# Patient Record
Sex: MALE | Race: WHITE | Employment: FULL TIME | URBAN - METROPOLITAN AREA
[De-identification: names, ages, dates, MRNs, and addresses within clinical notes are randomized per-mention and may not be internally consistent; named-entity substitution may affect disease eponyms.]

---

## 2018-09-25 ENCOUNTER — HOSPITAL ENCOUNTER (INPATIENT)
Age: 53
LOS: 2 days | Discharge: HOME | DRG: 247 | End: 2018-09-27
Attending: EMERGENCY MEDICINE | Admitting: INTERNAL MEDICINE
Payer: COMMERCIAL

## 2018-09-25 DIAGNOSIS — I21.09 ST ELEVATION MYOCARDIAL INFARCTION (STEMI) OF ANTERIOR WALL (HCC): Primary | ICD-10-CM

## 2018-09-25 DIAGNOSIS — R07.9 ACUTE CHEST PAIN: ICD-10-CM

## 2018-09-25 PROBLEM — F98.8 ADD (ATTENTION DEFICIT DISORDER): Status: ACTIVE | Noted: 2018-09-25

## 2018-09-25 PROBLEM — I21.3 STEMI (ST ELEVATION MYOCARDIAL INFARCTION) (HCC): Status: ACTIVE | Noted: 2018-09-25

## 2018-09-25 PROBLEM — I10 HTN (HYPERTENSION): Status: ACTIVE | Noted: 2018-09-25

## 2018-09-25 PROBLEM — Z72.0 TOBACCO ABUSE: Status: ACTIVE | Noted: 2018-09-25

## 2018-09-25 PROBLEM — Z95.5 S/P CORONARY ARTERY STENT PLACEMENT: Status: ACTIVE | Noted: 2018-09-25

## 2018-09-25 PROBLEM — E78.5 DYSLIPIDEMIA: Status: ACTIVE | Noted: 2018-09-25

## 2018-09-25 LAB
ALBUMIN SERPL-MCNC: 3.6 G/DL (ref 3.5–5)
ALBUMIN/GLOB SERPL: 0.8 {RATIO} (ref 1.1–2.2)
ALP SERPL-CCNC: 110 U/L (ref 45–117)
ALT SERPL-CCNC: 65 U/L (ref 12–78)
ANION GAP SERPL CALC-SCNC: 5 MMOL/L (ref 5–15)
AST SERPL-CCNC: 240 U/L (ref 15–37)
BASOPHILS # BLD: 0.1 K/UL (ref 0–0.1)
BASOPHILS NFR BLD: 0 % (ref 0–1)
BILIRUB SERPL-MCNC: 0.5 MG/DL (ref 0.2–1)
BUN SERPL-MCNC: 15 MG/DL (ref 6–20)
BUN/CREAT SERPL: 21 (ref 12–20)
CALCIUM SERPL-MCNC: 9.4 MG/DL (ref 8.5–10.1)
CHLORIDE SERPL-SCNC: 102 MMOL/L (ref 97–108)
CO2 SERPL-SCNC: 29 MMOL/L (ref 21–32)
COMMENT, HOLDF: NORMAL
CREAT SERPL-MCNC: 0.73 MG/DL (ref 0.7–1.3)
DIFFERENTIAL METHOD BLD: ABNORMAL
EOSINOPHIL # BLD: 0.1 K/UL (ref 0–0.4)
EOSINOPHIL NFR BLD: 1 % (ref 0–7)
ERYTHROCYTE [DISTWIDTH] IN BLOOD BY AUTOMATED COUNT: 13.3 % (ref 11.5–14.5)
GLOBULIN SER CALC-MCNC: 4.3 G/DL (ref 2–4)
GLUCOSE BLD STRIP.AUTO-MCNC: 128 MG/DL (ref 65–100)
GLUCOSE SERPL-MCNC: 115 MG/DL (ref 65–100)
HCT VFR BLD AUTO: 45.5 % (ref 36.6–50.3)
HGB BLD-MCNC: 15.4 G/DL (ref 12.1–17)
IMM GRANULOCYTES # BLD: 0.1 K/UL (ref 0–0.04)
IMM GRANULOCYTES NFR BLD AUTO: 0 % (ref 0–0.5)
LYMPHOCYTES # BLD: 2.5 K/UL (ref 0.8–3.5)
LYMPHOCYTES NFR BLD: 16 % (ref 12–49)
MCH RBC QN AUTO: 29.8 PG (ref 26–34)
MCHC RBC AUTO-ENTMCNC: 33.8 G/DL (ref 30–36.5)
MCV RBC AUTO: 88 FL (ref 80–99)
MONOCYTES # BLD: 1.3 K/UL (ref 0–1)
MONOCYTES NFR BLD: 8 % (ref 5–13)
NEUTS SEG # BLD: 12.1 K/UL (ref 1.8–8)
NEUTS SEG NFR BLD: 75 % (ref 32–75)
NRBC # BLD: 0 K/UL (ref 0–0.01)
NRBC BLD-RTO: 0 PER 100 WBC
PLATELET # BLD AUTO: 325 K/UL (ref 150–400)
PMV BLD AUTO: 10.1 FL (ref 8.9–12.9)
POTASSIUM SERPL-SCNC: 3.6 MMOL/L (ref 3.5–5.1)
PROT SERPL-MCNC: 7.9 G/DL (ref 6.4–8.2)
RBC # BLD AUTO: 5.17 M/UL (ref 4.1–5.7)
SAMPLES BEING HELD,HOLD: NORMAL
SERVICE CMNT-IMP: ABNORMAL
SODIUM SERPL-SCNC: 136 MMOL/L (ref 136–145)
TROPONIN I SERPL-MCNC: >100 NG/ML
WBC # BLD AUTO: 16.1 K/UL (ref 4.1–11.1)

## 2018-09-25 PROCEDURE — 74011636320 HC RX REV CODE- 636/320: Performed by: INTERNAL MEDICINE

## 2018-09-25 PROCEDURE — 74011250636 HC RX REV CODE- 250/636: Performed by: INTERNAL MEDICINE

## 2018-09-25 PROCEDURE — 74011250637 HC RX REV CODE- 250/637: Performed by: EMERGENCY MEDICINE

## 2018-09-25 PROCEDURE — B2111ZZ FLUOROSCOPY OF MULTIPLE CORONARY ARTERIES USING LOW OSMOLAR CONTRAST: ICD-10-PCS | Performed by: INTERNAL MEDICINE

## 2018-09-25 PROCEDURE — 027034Z DILATION OF CORONARY ARTERY, ONE ARTERY WITH DRUG-ELUTING INTRALUMINAL DEVICE, PERCUTANEOUS APPROACH: ICD-10-PCS | Performed by: INTERNAL MEDICINE

## 2018-09-25 PROCEDURE — 65660000000 HC RM CCU STEPDOWN

## 2018-09-25 PROCEDURE — 93005 ELECTROCARDIOGRAM TRACING: CPT

## 2018-09-25 PROCEDURE — C1887 CATHETER, GUIDING: HCPCS

## 2018-09-25 PROCEDURE — 84484 ASSAY OF TROPONIN QUANT: CPT | Performed by: NURSE PRACTITIONER

## 2018-09-25 PROCEDURE — 74011000250 HC RX REV CODE- 250: Performed by: INTERNAL MEDICINE

## 2018-09-25 PROCEDURE — C1874 STENT, COATED/COV W/DEL SYS: HCPCS

## 2018-09-25 PROCEDURE — C1725 CATH, TRANSLUMIN NON-LASER: HCPCS

## 2018-09-25 PROCEDURE — 74011636320 HC RX REV CODE- 636/320

## 2018-09-25 PROCEDURE — 80053 COMPREHEN METABOLIC PANEL: CPT | Performed by: EMERGENCY MEDICINE

## 2018-09-25 PROCEDURE — 77030018729 HC ELECTRD DEFIB PAD CARD -B

## 2018-09-25 PROCEDURE — 82962 GLUCOSE BLOOD TEST: CPT

## 2018-09-25 PROCEDURE — 74011250636 HC RX REV CODE- 250/636: Performed by: EMERGENCY MEDICINE

## 2018-09-25 PROCEDURE — 74011000258 HC RX REV CODE- 258: Performed by: INTERNAL MEDICINE

## 2018-09-25 PROCEDURE — C1894 INTRO/SHEATH, NON-LASER: HCPCS

## 2018-09-25 PROCEDURE — 74011250636 HC RX REV CODE- 250/636

## 2018-09-25 PROCEDURE — 85025 COMPLETE CBC W/AUTO DIFF WBC: CPT | Performed by: EMERGENCY MEDICINE

## 2018-09-25 PROCEDURE — 74011250637 HC RX REV CODE- 250/637

## 2018-09-25 PROCEDURE — 99284 EMERGENCY DEPT VISIT MOD MDM: CPT

## 2018-09-25 PROCEDURE — 77030028837 HC SYR ANGI PWR INJ COEU -A

## 2018-09-25 PROCEDURE — B2151ZZ FLUOROSCOPY OF LEFT HEART USING LOW OSMOLAR CONTRAST: ICD-10-PCS | Performed by: INTERNAL MEDICINE

## 2018-09-25 PROCEDURE — 74011250637 HC RX REV CODE- 250/637: Performed by: NURSE PRACTITIONER

## 2018-09-25 PROCEDURE — C1769 GUIDE WIRE: HCPCS

## 2018-09-25 PROCEDURE — 77030008543 HC TBNG MON PRSS MRTM -A

## 2018-09-25 PROCEDURE — 99153 MOD SED SAME PHYS/QHP EA: CPT

## 2018-09-25 PROCEDURE — 74011000250 HC RX REV CODE- 250

## 2018-09-25 PROCEDURE — 36415 COLL VENOUS BLD VENIPUNCTURE: CPT | Performed by: NURSE PRACTITIONER

## 2018-09-25 PROCEDURE — 77030015766

## 2018-09-25 PROCEDURE — 77030010221 HC SPLNT WR POS TELE -B

## 2018-09-25 PROCEDURE — 77030019698 HC SYR ANGI MDLON MRTM -A

## 2018-09-25 PROCEDURE — 4A023N7 MEASUREMENT OF CARDIAC SAMPLING AND PRESSURE, LEFT HEART, PERCUTANEOUS APPROACH: ICD-10-PCS | Performed by: INTERNAL MEDICINE

## 2018-09-25 PROCEDURE — 77030019569 HC BND COMPR RAD TERU -B

## 2018-09-25 PROCEDURE — 96374 THER/PROPH/DIAG INJ IV PUSH: CPT

## 2018-09-25 RX ORDER — PRAVASTATIN SODIUM 40 MG/1
40 TABLET ORAL
Status: DISCONTINUED | OUTPATIENT
Start: 2018-09-25 | End: 2018-09-27

## 2018-09-25 RX ORDER — SODIUM CHLORIDE 9 MG/ML
100 INJECTION, SOLUTION INTRAVENOUS CONTINUOUS
Status: DISCONTINUED | OUTPATIENT
Start: 2018-09-26 | End: 2018-09-26

## 2018-09-25 RX ORDER — SODIUM CHLORIDE 0.9 % (FLUSH) 0.9 %
5-10 SYRINGE (ML) INJECTION EVERY 8 HOURS
Status: DISCONTINUED | OUTPATIENT
Start: 2018-09-25 | End: 2018-09-27 | Stop reason: HOSPADM

## 2018-09-25 RX ORDER — MORPHINE SULFATE 4 MG/ML
1 INJECTION INTRAVENOUS
Status: DISCONTINUED | OUTPATIENT
Start: 2018-09-25 | End: 2018-09-27 | Stop reason: HOSPADM

## 2018-09-25 RX ORDER — DULOXETIN HYDROCHLORIDE 30 MG/1
60 CAPSULE, DELAYED RELEASE ORAL DAILY
Status: DISCONTINUED | OUTPATIENT
Start: 2018-09-26 | End: 2018-09-27 | Stop reason: HOSPADM

## 2018-09-25 RX ORDER — VERAPAMIL HYDROCHLORIDE 2.5 MG/ML
2.5 INJECTION, SOLUTION INTRAVENOUS ONCE
Status: COMPLETED | OUTPATIENT
Start: 2018-09-25 | End: 2018-09-25

## 2018-09-25 RX ORDER — ASPIRIN 81 MG/1
81 TABLET ORAL DAILY
Status: DISCONTINUED | OUTPATIENT
Start: 2018-09-26 | End: 2018-09-27 | Stop reason: HOSPADM

## 2018-09-25 RX ORDER — HEPARIN SODIUM 1000 [USP'U]/ML
INJECTION, SOLUTION INTRAVENOUS; SUBCUTANEOUS
Status: DISCONTINUED
Start: 2018-09-25 | End: 2018-09-27 | Stop reason: HOSPADM

## 2018-09-25 RX ORDER — HEPARIN SODIUM 1000 [USP'U]/ML
2500 INJECTION, SOLUTION INTRAVENOUS; SUBCUTANEOUS ONCE
Status: DISCONTINUED | OUTPATIENT
Start: 2018-09-25 | End: 2018-09-25 | Stop reason: HOSPADM

## 2018-09-25 RX ORDER — HEPARIN SODIUM 200 [USP'U]/100ML
500 INJECTION, SOLUTION INTRAVENOUS ONCE
Status: COMPLETED | OUTPATIENT
Start: 2018-09-25 | End: 2018-09-25

## 2018-09-25 RX ORDER — FENTANYL CITRATE 50 UG/ML
25-50 INJECTION, SOLUTION INTRAMUSCULAR; INTRAVENOUS
Status: DISCONTINUED | OUTPATIENT
Start: 2018-09-25 | End: 2018-09-25

## 2018-09-25 RX ORDER — BIVALIRUDIN 250 MG/5ML
INJECTION, POWDER, LYOPHILIZED, FOR SOLUTION INTRAVENOUS
Status: DISCONTINUED
Start: 2018-09-25 | End: 2018-09-27 | Stop reason: HOSPADM

## 2018-09-25 RX ORDER — VERAPAMIL HYDROCHLORIDE 2.5 MG/ML
INJECTION, SOLUTION INTRAVENOUS
Status: COMPLETED
Start: 2018-09-25 | End: 2018-09-25

## 2018-09-25 RX ORDER — MIDAZOLAM HYDROCHLORIDE 1 MG/ML
INJECTION, SOLUTION INTRAMUSCULAR; INTRAVENOUS
Status: COMPLETED
Start: 2018-09-25 | End: 2018-09-25

## 2018-09-25 RX ORDER — FENTANYL CITRATE 50 UG/ML
INJECTION, SOLUTION INTRAMUSCULAR; INTRAVENOUS
Status: COMPLETED
Start: 2018-09-25 | End: 2018-09-25

## 2018-09-25 RX ORDER — LIDOCAINE HYDROCHLORIDE 10 MG/ML
1-30 INJECTION, SOLUTION EPIDURAL; INFILTRATION; INTRACAUDAL; PERINEURAL
Status: DISCONTINUED | OUTPATIENT
Start: 2018-09-25 | End: 2018-09-25

## 2018-09-25 RX ORDER — METOPROLOL TARTRATE 25 MG/1
25 TABLET, FILM COATED ORAL EVERY 12 HOURS
Status: DISCONTINUED | OUTPATIENT
Start: 2018-09-25 | End: 2018-09-27 | Stop reason: HOSPADM

## 2018-09-25 RX ORDER — LIDOCAINE HYDROCHLORIDE 10 MG/ML
INJECTION, SOLUTION EPIDURAL; INFILTRATION; INTRACAUDAL; PERINEURAL
Status: COMPLETED
Start: 2018-09-25 | End: 2018-09-25

## 2018-09-25 RX ORDER — HEPARIN SODIUM 200 [USP'U]/100ML
INJECTION, SOLUTION INTRAVENOUS
Status: COMPLETED
Start: 2018-09-25 | End: 2018-09-25

## 2018-09-25 RX ORDER — SODIUM CHLORIDE 0.9 % (FLUSH) 0.9 %
5-10 SYRINGE (ML) INJECTION AS NEEDED
Status: DISCONTINUED | OUTPATIENT
Start: 2018-09-25 | End: 2018-09-27 | Stop reason: HOSPADM

## 2018-09-25 RX ORDER — GUAIFENESIN 100 MG/5ML
LIQUID (ML) ORAL
Status: DISCONTINUED
Start: 2018-09-25 | End: 2018-09-27 | Stop reason: HOSPADM

## 2018-09-25 RX ORDER — HEPARIN SODIUM 5000 [USP'U]/ML
4000 INJECTION, SOLUTION INTRAVENOUS; SUBCUTANEOUS
Status: COMPLETED | OUTPATIENT
Start: 2018-09-25 | End: 2018-09-25

## 2018-09-25 RX ORDER — SODIUM CHLORIDE 900 MG/100ML
INJECTION INTRAVENOUS
Status: DISCONTINUED
Start: 2018-09-25 | End: 2018-09-27 | Stop reason: HOSPADM

## 2018-09-25 RX ORDER — MIDAZOLAM HYDROCHLORIDE 1 MG/ML
.5-2 INJECTION, SOLUTION INTRAMUSCULAR; INTRAVENOUS
Status: DISCONTINUED | OUTPATIENT
Start: 2018-09-25 | End: 2018-09-25

## 2018-09-25 RX ORDER — ASPIRIN 325 MG
325 TABLET ORAL ONCE
Status: COMPLETED | OUTPATIENT
Start: 2018-09-25 | End: 2018-09-25

## 2018-09-25 RX ORDER — NALOXONE HYDROCHLORIDE 0.4 MG/ML
0.4 INJECTION, SOLUTION INTRAMUSCULAR; INTRAVENOUS; SUBCUTANEOUS AS NEEDED
Status: DISCONTINUED | OUTPATIENT
Start: 2018-09-25 | End: 2018-09-27 | Stop reason: HOSPADM

## 2018-09-25 RX ORDER — MORPHINE SULFATE 10 MG/ML
2 INJECTION, SOLUTION INTRAMUSCULAR; INTRAVENOUS ONCE
Status: COMPLETED | OUTPATIENT
Start: 2018-09-25 | End: 2018-09-25

## 2018-09-25 RX ORDER — ACETAMINOPHEN 325 MG/1
650 TABLET ORAL
Status: DISCONTINUED | OUTPATIENT
Start: 2018-09-25 | End: 2018-09-27 | Stop reason: HOSPADM

## 2018-09-25 RX ADMIN — HEPARIN SODIUM 4000 UNITS: 5000 INJECTION, SOLUTION INTRAVENOUS; SUBCUTANEOUS at 11:09

## 2018-09-25 RX ADMIN — NITROGLYCERIN 200 MCG: 5 INJECTION, SOLUTION INTRAVENOUS at 11:31

## 2018-09-25 RX ADMIN — HEPARIN SODIUM IN SODIUM CHLORIDE 1000 UNITS: 200 INJECTION INTRAVENOUS at 11:42

## 2018-09-25 RX ADMIN — METOPROLOL TARTRATE 25 MG: 25 TABLET ORAL at 13:03

## 2018-09-25 RX ADMIN — MORPHINE SULFATE 2 MG: 10 INJECTION INTRAVENOUS at 13:03

## 2018-09-25 RX ADMIN — FENTANYL CITRATE 50 MCG: 50 INJECTION, SOLUTION INTRAMUSCULAR; INTRAVENOUS at 11:13

## 2018-09-25 RX ADMIN — HEPARIN SODIUM 1000 UNITS: 200 INJECTION, SOLUTION INTRAVENOUS at 11:42

## 2018-09-25 RX ADMIN — VERAPAMIL HYDROCHLORIDE 2.5 MG: 2.5 INJECTION INTRAVENOUS at 11:30

## 2018-09-25 RX ADMIN — NITROGLYCERIN 200 MCG: 5 INJECTION, SOLUTION INTRAVENOUS at 11:45

## 2018-09-25 RX ADMIN — TICAGRELOR 90 MG: 90 TABLET ORAL at 21:51

## 2018-09-25 RX ADMIN — IOPAMIDOL 130 ML: 755 INJECTION, SOLUTION INTRAVENOUS at 11:50

## 2018-09-25 RX ADMIN — BIVALIRUDIN 1.75 MG/KG/HR: 250 INJECTION, POWDER, LYOPHILIZED, FOR SOLUTION INTRAVENOUS at 11:38

## 2018-09-25 RX ADMIN — Medication 10 ML: at 13:04

## 2018-09-25 RX ADMIN — TICAGRELOR 180 MG: 90 TABLET ORAL at 12:12

## 2018-09-25 RX ADMIN — LIDOCAINE HYDROCHLORIDE 2 ML: 10 INJECTION, SOLUTION EPIDURAL; INFILTRATION; INTRACAUDAL; PERINEURAL at 11:30

## 2018-09-25 RX ADMIN — PRAVASTATIN SODIUM 40 MG: 40 TABLET ORAL at 21:51

## 2018-09-25 RX ADMIN — IOPAMIDOL 60 ML: 755 INJECTION, SOLUTION INTRAVENOUS at 12:00

## 2018-09-25 RX ADMIN — MIDAZOLAM HYDROCHLORIDE 2 MG: 1 INJECTION, SOLUTION INTRAMUSCULAR; INTRAVENOUS at 11:13

## 2018-09-25 RX ADMIN — IOPAMIDOL 24 ML: 755 INJECTION, SOLUTION INTRAVENOUS at 12:05

## 2018-09-25 RX ADMIN — ASPIRIN 325 MG ORAL TABLET 325 MG: 325 PILL ORAL at 11:07

## 2018-09-25 RX ADMIN — VERAPAMIL HYDROCHLORIDE 2.5 MG: 2.5 INJECTION, SOLUTION INTRAVENOUS at 11:30

## 2018-09-25 RX ADMIN — Medication 10 ML: at 21:51

## 2018-09-25 RX ADMIN — MIDAZOLAM HYDROCHLORIDE 2 MG: 1 INJECTION, SOLUTION INTRAMUSCULAR; INTRAVENOUS at 11:29

## 2018-09-25 RX ADMIN — MIDAZOLAM 2 MG: 1 INJECTION INTRAMUSCULAR; INTRAVENOUS at 11:13

## 2018-09-25 RX ADMIN — METOPROLOL TARTRATE 25 MG: 25 TABLET ORAL at 21:51

## 2018-09-25 NOTE — H&P
[address] [place] [address] [address] [facility]  [phone] CARDIOLOGY HISTORY AND PHYSICAL Date of  Admission: 9/25/2018 11:06 AM  
 
Admission type:Emergency Subjective:  
  
Sia Cuevas is a 46 y.o. male admitted for STEMI. No previous cardiac hx.  8/10 CP started on 9/22/18, continued throughout the weekend, worse last night, could not sleep. Pain radiating to jaw and back, has been ongoing for several weeks, attributed to previous neck surgery. Went to PCP who directed them to ED. ECG showed LAURENCE ant/lat with qwaves. Previous treatment/evaluation includes stress thallium last year. Cardiac risk factors: smoking/ tobacco exposure, dyslipidemia, male gender, hypertension. Patient Active Problem List  
 Diagnosis Date Noted  STEMI (ST elevation myocardial infarction) ([facility] 09/25/2018  
 HTN (hypertension) 09/25/2018  Dyslipidemia 09/25/2018  ADD (attention deficit disorder) 09/25/2018  Tobacco abuse 09/25/2018 Robyn Cisneros MD 
Past Medical History:  
Diagnosis Date  ADD (attention deficit disorder) 9/25/2018  Dyslipidemia 9/25/2018  
 HTN (hypertension) 9/25/2018  Hypertension  Other ill-defined conditions(799.89) ADHD,BELL'S PALSY WEAKNESS  LT FACE  
 Psychiatric disorder DEPRESSION  
 STEMI (ST elevation myocardial infarction) ([facility] 9/25/2018  Tobacco abuse 9/25/2018 Social History Social History  Marital status:  Spouse name: N/A  
 Number of children: N/A  
 Years of education: N/A Social History Main Topics  Smoking status: Current Some Day Smoker Packs/day: 1.50 Years: 40.00  Smokeless tobacco: Not on file  Alcohol use No  
 Drug use: No  
 Sexual activity: Not on file Other Topics Concern  Not on file Social History Narrative Allergies Allergen Reactions  Bee Sting [Sting, Bee] Anaphylaxis Family History Problem Relation Age of Onset  Hypertension Mother  Lung Disease Paternal Aunt Current Facility-Administered Medications Medication Dose Route Frequency  fentaNYL citrate (PF) injection 25-50 mcg  25-50 mcg IntraVENous Multiple  iopamidol (ISOVUE-370) 76 % injection 0-150 mL  0-150 mL IntraVENous Multiple  lidocaine (PF) (XYLOCAINE) 10 mg/mL (1 %) injection 1-30 mL  1-30 mL IntraDERMal Multiple  midazolam (VERSED) injection 0.5-2 mg  0.5-2 mg IntraVENous Multiple  heparin (porcine) 1,000 unit/mL injection 2,500 Units  2,500 Units IntraVENous ONCE  
 heparin (porcine) 1,000 unit/mL injection  iopamidol (ISOVUE-370) 76 % injection  iopamidol (ISOVUE-370) 76 % injection  bivalirudin (ANGIOMAX) 250 mg injection  aspirin 81 mg chewable tablet  bivalirudin (ANGIOMAX) 250 mg in 0.9% sodium chloride (MBP/ADV) 50 mL  1.75 mg/kg/hr IntraVENous CONTINUOUS  
 nitroglycerin 100 mcg/ml compounded injection  0-300 mcg IntraCORONary Multiple Review of Symptoms: as above Constitutional: negative Eyes: negative Ears, nose, mouth, throat, and face: negative Respiratory: negative Cardiovascular: negative Gastrointestinal: negative Genitourinary:negative Musculoskeletal:negative Neurological: negative Behvioral/Psych: negative Endocrine: negative Objective:  
Physical Exam: 
General Appearance:  WNWD  male in no acute distress Chest:   Clear Cardiovascular:  Regular rate and rhythm, no murmur, no carotid bruits Abdomen:   Soft, non-tender, bowel sounds are active.  
Extremities: no peripheral edema Skin:  Warm and dry.  
 
Visit Vitals  BP (!) 160/111 (BP 1 Location: Left arm, BP Patient Position: At rest)  Pulse (!) 106  Temp 98.1 °F (36.7 °C)  Resp 16  
 Ht 5' 9\" (1.753 m)  Wt 86.7 kg (191 lb 2.2 oz)  SpO2 98%  BMI 28.23 kg/m2 Cardiographics Telemetry: ST 
ECG: LAURENCE ant/lat Labs: 
Recent Labs  
   09/25/18 
 1100 WBC  16.1*  
 HGB  15.4 HCT  45.5 PLT  325 No results for input(s): NA, K, CL, CO2, GLU, BUN, CREA, CA, MG, PHOS, ALB, TBIL, TBILI, SGOT, ALT, INR in the last 72 hours. No lab exists for component: INREXT, INREXT No results for input(s): TROIQ, CPK, CKMB in the last 72 hours. Assessment:  
  
 Principal Problem: STEMI (ST elevation myocardial infarction) ([address] (9/25/2018) Active Problems: 
  HTN (hypertension) (9/25/2018) Dyslipidemia (9/25/2018) ADD (attention deficit disorder) (9/25/2018) Tobacco abuse (9/25/2018) Plan: STEMI: 
Urgent cardiac cath Dr. Rajeev Hciks discussed the risks/benefits/alternatives of cardiac catheterization +/- PCI with the patient. Risks include (but are not limited to) bleeding, infection, cva/mi/tamponade/death. The patient understands and wishes to proceed. Receive IV heparin 4000u,, ASA Further management of HTN, dyslipidemia, tob abuse to be discussed post procedure. Patient seen and examined by me with nurse practitioner Marixa Tomlin. I personally performed all components of the history, physical, and medical decision making and agree with the assessment and plan with minor modifications as noted.

## 2018-09-25 NOTE — IP AVS SNAPSHOT
[address] [facility] 
[phone] Patient: Tuan Barrientos MRN: [id number] About your hospitalization You were admitted on:  September 25, 2018 You last received care in the:  [facility] CARDIO You were discharged on:  September 27, 2018 Why you were hospitalized Your primary diagnosis was:  Stemi (St Elevation Myocardial Infarction) ([facility]) Your diagnoses also included:  Htn (Hypertension), Dyslipidemia, Add (Attention Deficit Disorder), Tobacco Abuse, S/P Coronary Artery Stent Placement Follow-up Information Follow up With Details Comments Contact Info Harvey Castillo MD On 10/5/2018 at 11:15 am [address] [place] [address] 
[phone] Danita Vieira MD   [address] [place] [address] 
[phone] Your Scheduled Appointments Friday October 05, 2018 11:15 AM EDT  
ESTABLISHED PATIENT with Harvey Castillo MD  
[facility] [address]) [address] [place] [address]  
[phone] Discharge Orders None A check caitlyn indicates which time of day the medication should be taken. My Medications START taking these medications Instructions Each Dose to Equal  
 Morning Noon Evening Bedtime  
 aspirin delayed-release 81 mg tablet Take 1 Tab by mouth daily. 81 mg  
    
  
   
   
   
  
 atorvastatin 40 mg tablet Commonly known as:  LIPITOR Take 1 Tab by mouth nightly. 40 mg  
    
   
   
   
  
 metoprolol tartrate 25 mg tablet Commonly known as:  LOPRESSOR Take 1 Tab by mouth every twelve (12) hours. 25 mg  
    
  
   
   
   
  
 ticagrelor 90 mg tablet Commonly known as:  [employer] Take 1 Tab by mouth every twelve (12) hours every twelve (12) hours.   
 90 mg  
    
  
   
   
   
  
  
 CONTINUE taking these medications Instructions Each Dose to Equal  
 Morning Noon Evening Bedtime  
 dextroamphetamine-amphetamine 10 mg tablet Commonly known as:  ADDERALL Take 20 mg by mouth two (2) times a day. 20 mg DULoxetine 60 mg capsule Commonly known as:  CYMBALTA Take 60 mg by mouth daily. 60 mg  
    
  
   
   
   
  
 losartan 25 mg tablet Commonly known as:  COZAAR Take 25 mg by mouth daily. 25 mg  
    
  
   
   
   
  
 SPIRIVA WITH HANDIHALER 18 mcg inhalation capsule Generic drug:  tiotropium Take 1 Cap by inhalation daily. 1 Cap STOP taking these medications   
 pravastatin 40 mg tablet Commonly known as:  PRAVACHOL Where to Get Your Medications Information on where to get these meds will be given to you by the nurse or doctor. ! Ask your nurse or doctor about these medications  
  aspirin delayed-release 81 mg tablet  
 atorvastatin 40 mg tablet  
 metoprolol tartrate 25 mg tablet  
 ticagrelor 90 mg tablet Discharge Instructions [address]  [phone] Patient ID: 
Cassy Allen [id number] 
46 y.o. 
1965 Admit Date: 9/25/2018 Discharge Date: 9/27/2018 Admitting Physician: Dylan Williamson MD  
 
Discharge Physician: Dylan Williamson MD 
 
Admission Diagnoses: STEMI (ST elevation myocardial infarction) ([facility] Discharge Diagnoses:  
Principal Problem: STEMI (ST elevation [facility] infarction) ([facility] (9/25/2018) Active Problems: 
  HTN (hypertension) (9/25/2018) Dyslipidemia (9/25/2018) ADD (attention deficit disorder) (9/25/2018) Tobacco abuse (9/25/2018) S/P coronary artery stent placement (9/25/2018) Overview: 9/25/18 PCI/MAVIS to LAD Discharge Condition: Good Cardiology Procedures this Admission:  Left heart catheterization with PCI EchoCardiogram 
PCI/MAVIS to proximal LAD Disposition: home Reference discharge instructions provided by nursing for diet and activity. Signed: 
Jeimy January 9/27/2018 
9:33 AM 
 
 
Radial Cardiac Catheterization/Angiography Discharge Instructions It is normal to feel tired the first couple days. Take it easy and follow the physicians instructions. CHECK THE CATHETER INSERTION SITE DAILY: 
 
Remove the wrist dressing 24 hours after the procedure. You may shower 24 hours after the procedure. Wash with soap and water and pat dry. Gentle cleaning of the site with soap and water is sufficient, cover with a dry clean dressing or bandage. Do not apply creams or powders to the area. No soaking the wrist for 3 days. Leave the puncture site open to air after 24 hours post-procedure. CALL THE PHYSICIANS:  
 
If the site becomes red, swollen or feels warm to the touch If there is bleeding or drainage or if there is unusual pain at the radial site. If there is any minor oozing, you may apply a band-aid and remove after 12 hours. If the bleeding continues, hold pressure with the middle finger against the puncture site and the thumb against the back of the wrist,call 911 to be transported to the hospital. 
DO NOT DRIVE YOURSELF, [address]. ACTIVITY:  
For the first 24 hours do not manipulate the wrist. 
No lifting, pushing or pulling over 3-5 pounds with the affected wrist for 7 daysand no straining the insertion site. Do not life grocery bags or the garbage can, do not run the vacuum  or  for 7 days. Start with short walks as in the hospital and gradually increase as tolerated each day. It is recommended to walk 30 minutes 5-7 days per week. Follow your physicians instructions on activity.   Avoid walking outside in extremes of heat or cold. Walk inside when it is cold and windy or hot and humid. Things to keep in mind: 
No driving for at least 24 hours, or as designated by your physician. Limit the number of times you go up and down the stairs Take rests and pace yourself with activity. Be careful and do not strain with bowel movements. MEDICATIONS: 
 
Take all medications as prescribed Call your physician if you have any questions Keep an updated list of your medications with you at all times and give a list to your physician and pharmacist 
 
SIGNS AND SYMPTOMS:  
Be cautious of symptoms of angina or recurrent symptoms such as chest discomfort, unusual shortness of breath or fatigue. These could be symptoms of restenosis, a new blockage or a heart attack. If your symptoms are relieved with rest it is still recommended that you notify your physician of recurrent chest pain or discomfort. For CHEST PAIN or symptoms of angina not relieved with rest:  If the discomfort is not relieved with rest, and you have been prescribed Nitroglycerin, take as directed (taken under the tongue, one at a time 5 minutes apart for a total of 3 doses). If the discomfort is not relieved after the 3rd nitroglycerin, call 911. If you have not been prescribed Nitroglycerin  and your chest discomfort is not relieved with rest, call 911. AFTER CARE:  
Follow up with your physician as instructed. Follow a heart healthy diet with proper portion control, daily stress management, daily exercise, blood pressure and cholesterol control , and smoking cessation. Smoking Cessation Program:  
This is a free, 
phone/text/email based, smoking cessation program. The program is individualized to meet each patient's needs. To enroll use this link https://Goldpocket Interactive.Greatist. "[employer]/ra/survey/4794 Introducing \[facility]\"" & HEALTH SERVICES!    
 Serene Rios introduces [employer] patient portal. Now you can access parts of your medical record, email your doctor's office, and request medication refills online. 1. In your internet browser, go to [email or web address] 2. Click on the First Time User? Click Here link in the Sign In box. You will see the New Member Sign Up page. 3. Enter your [employer] Access Code exactly as it appears below. You will not need to use this code after youve completed the sign-up process. If you do not sign up before the expiration date, you must request a new code. · [employer] Access Code: [email or web address] Expires: 12/24/2018 10:48 AM 
 
4. Enter the last four digits of your Social Security Number (xxxx) and Date of Birth (mm/dd/yyyy) as indicated and click Submit. You will be taken to the next sign-up page. 5. Create a [employer] ID. This will be your [employer] login ID and cannot be changed, so think of one that is secure and easy to remember. 6. Create a [employer] password. You can change your password at any time. 7. Enter your Password Reset Question and Answer. This can be used at a later time if you forget your password. 8. Enter your e-mail address. You will receive e-mail notification when new information is available in [address] 9. Click Sign Up. You can now view and download portions of your medical record. 10. Click the Download Summary menu link to download a portable copy of your medical information. If you have questions, please visit the Frequently Asked Questions section of the [employer] website. Remember, [employer] is NOT to be used for urgent needs. For medical emergencies, dial 911. Now available from your iPhone and Android! Introducing Adrian Oliver As a [facility] patient, I wanted to make you aware of our electronic visit tool called Adrian Oliver. [facility] 24/7 allows you to connect within minutes with a medical provider 24 hours a day, seven days a week via a mobile device or tablet or logging into a secure website from your computer. You can access [facility] from anywhere in the [place]. A virtual visit might be right for you when you have a simple condition and feel like you just dont want to get out of bed, or cant get away from work for an appointment, when your regular [employer] provider is not available (evenings, weekends or holidays), or when youre out of town and need minor care. Electronic visits cost only $49 and if the [employer] 24/7 provider determines a prescription is needed to treat your condition, one can be electronically transmitted to a nearby pharmacy*. Please take a moment to enroll today if you have not already done so. The enrollment process is free and takes just a few minutes. To enroll, please download the [employer] 24/7 ernesto to your tablet or phone, or visit [email or web address] to enroll on your computer. And, as an [address] [place] [address] patient with a [employer] account, the results of your visits will be scanned into your electronic medical record and your primary care provider will be able to view the scanned results. We urge you to continue to see your regular [employer] provider for your ongoing medical care. And while your primary care provider may not be the one available when you seek a [facility] virtual visit, the peace of mind you get from getting a real diagnosis real time can be priceless. For more information on [facility], view our Frequently Asked Questions (FAQs) at [email or web address]. Sincerely, 
 
Shane Bellamy MD 
Chief Medical Officer [facility] Aileen Neely *:  certain medications cannot be prescribed via [facility] Providers Seen During Your Hospitalization Provider Specialty Primary office phone Maricruz Banda MD Emergency Medicine [phone] Candelario Bellamy MD Cardiology [phone] Immunizations Administered for This Admission Name Date Influenza Vaccine (Quad) PF 9/27/2018 Your Primary Care Physician (PCP) Primary Care Physician Office Phone Office Fax Antonio Blakely [phone] You are allergic to the following Allergen Reactions Bee Sting (Sting, Bee) Anaphylaxis Recent Documentation Height Weight BMI Smoking Status 1.753 m 86.7 kg 28.23 kg/m2 Current Some Day Smoker Emergency Contacts Name Discharge Info Relation Home Work Mobile [address] CAREGIVER [3] Spouse [3] [phone] Patient Belongings The following personal items are in your possession at time of discharge: 
  Dental Appliances: None         Home Medications: None   Jewelry: None  Clothing: At bedside    Other Valuables: None Please provide this summary of care documentation to your next provider. Signatures-by signing, you are acknowledging that this After Visit Summary has been reviewed with you and you have received a copy. Patient Signature:  ____________________________________________________________ Date:  ____________________________________________________________  
  
Mike Jackson Provider Signature:  ____________________________________________________________ Date:  ____________________________________________________________

## 2018-09-25 NOTE — CARDIO/PULMONARY
Cardiopulmonary Rehab Nursing Entry: 
 
Chart reviewed and pt visited for cardiac teaching. Pt is a 45 yo admitted with c/o chest pain radiating into the jaw and back, STEMI, s/p PCI/MAVIS, PMHx of HTN, hyperlipidemia, tobacco use. Cardiac Rehab: MI education folder, with catheterization brochure, to bedside of La Akins. Educated pts wife as pt napped in the bed using teach back method. Reviewed MI diagnosis definition and purpose of intervention. Discussed risk factors for CAD to include the following: family history, elevated BMI, hyperlipidemia, hypertension, diabetes, stress, and smoking. Smoking Cessation Program link added to AVS. Discussed Heart Healthy/Low Sodium (2000 mg) diet. Reviewed the importance of medication compliance, the purpose of brilinta, and potential side effects. Discussed follow up appointments with cardiologist, signs and symptoms of angina, and what to report to physician after discharge. Emphasized the value of cardiac rehab. Discussed Cardiac Rehab Program format, benefits, and encouraged enrollment to assist with risk modification and management. Mrs. Summer Whitehead will discuss with pt when awake, he works full-time rotating hours and may have flexibility of schedule. La Akins verbalized understanding with questions answered.   Raiza Gonzalez RN

## 2018-09-25 NOTE — PROGRESS NOTES
1309-Pt complaining of pain 5/10 left shoulder with some chest discomfort, MD placed one time order for morphine, 2 mg IV given, pt resting in bed with family at bedside. 1400-pt feeling better, still has mild pain 1500-pt has been up to bathroom and voided no complaints

## 2018-09-25 NOTE — ED PROVIDER NOTES
EMERGENCY DEPARTMENT HISTORY AND PHYSICAL EXAM 
 
 
Date: 9/25/2018 Patient Name: Zack Crockett History of Presenting Illness Chief Complaint Patient presents with  Chest Pain Sent by PCP for evaluation of intermittent chest pain over past several months. Pains to left arm began on Saturday and radiate to left upper chest.   
 
 
History Provided By: Patient HPI: Zack Crockett, 46 y.o. male with PMHx significant for HTN, HLD, and tobacco use, presents ambulatory to the ED with cc of persistent chest pain that radiates into the jaw and thoracic back for several weeks, but worsening in intensity and frequency. Upon ED arrival pt appears diaphoretic and reports SOB. He states he was seen by his PCP regarding sx's this morning, where an EKG was not done, but was referred to the ED for further evaluation. He notes his pain is exacerbated with exertion. Pt states his last stress test was ~1 year ago. Pt specifically denies any nausea, abdominal pain, hematuria, and leg swelling. There are no other complaints, changes, or physical findings at this time. PCP: García Red MD 
 
Current Outpatient Prescriptions Medication Sig Dispense Refill  aspirin delayed-release 81 mg tablet Take 1 Tab by mouth daily. 30 Tab 11  
 atorvastatin (LIPITOR) 40 mg tablet Take 1 Tab by mouth nightly. 30 Tab 11  
 metoprolol tartrate (LOPRESSOR) 25 mg tablet Take 1 Tab by mouth every twelve (12) hours. 60 Tab 11  
 ticagrelor (BRILINTA) 90 mg tablet Take 1 Tab by mouth every twelve (12) hours every twelve (12) hours. 60 Tab 11  
 amphetamine-dextroamphetamine (ADDERALL) 10 mg tablet Take 20 mg by mouth two (2) times a day.  losartan (COZAAR) 25 mg tablet Take 25 mg by mouth daily.  DULoxetine (CYMBALTA) 60 mg capsule Take 60 mg by mouth daily.  tiotropium (SPIRIVA WITH HANDIHALER) 18 mcg inhalation capsule Take 1 Cap by inhalation daily. Past History Past Medical History: 
Past Medical History:  
Diagnosis Date  ADD (attention deficit disorder) 9/25/2018  Dyslipidemia 9/25/2018  
 HTN (hypertension) 9/25/2018  Hypertension  Other ill-defined conditions(799.89) ADHD,BELL'S PALSY WEAKNESS  LT FACE  
 Psychiatric disorder DEPRESSION  
 S/P coronary artery stent placement 9/25/2018 9/25/18 PCI/MAVIS to LAD  STEMI (ST elevation myocardial infarction) ([facility] [address] 9/25/2018  Tobacco abuse 9/25/2018 Past Surgical History: 
Past Surgical History:  
Procedure Laterality Date 15 [facility] STOMACH ABDOMEN ACCIDENT  
 HX HEENT    
 TEETH EXTRACTION  
 [facility] 86 FRACTURE RT FEMUR  
 NEUROLOGICAL PROCEDURE UNLISTED    
 NECK Family History: 
Family History Problem Relation Age of Onset  Hypertension Mother  Lung Disease Paternal Aunt Social History: 
Social History Substance Use Topics  Smoking status: Current Some Day Smoker Packs/day: 1.50 Years: 40.00  Smokeless tobacco: Not on file  Alcohol use No  
 
 
Allergies: Allergies Allergen Reactions  Bee Sting [Sting, Bee] Anaphylaxis Review of Systems Review of Systems Constitutional: Positive for diaphoresis. HENT:  
     + jaw pain Respiratory: Positive for shortness of breath. Cardiovascular: Positive for chest pain. Negative for leg swelling. Gastrointestinal: Negative for abdominal pain and nausea. Genitourinary: Negative for hematuria. Musculoskeletal: Positive for back pain. All other systems reviewed and are negative. Physical Exam  
Physical Exam  
Vital signs and nursing notes reviewed CONSTITUTIONAL: Alert, in moderate distress; appears fatigued, diaphoretic, well-developed; well-nourished. HEAD:  Normocephalic, atraumatic EYES: PERRL; EOM's intact. ENTM: Nose: no rhinorrhea; Throat: no erythema or exudate, mucous membranes moist 
Neck:  Supple. trachea is midline. No JVD. RESP: Chest clear, equal breath sounds. - W/R/R 
CV: regular sinus tachycardia. S1 and S2 WNL; No murmurs, gallops or rubs. 2+ radial and DP pulses bilaterally. GI: non-distended, normal bowel sounds, abdomen soft and non-tender. No masses or organomegaly. : No costo-vertebral angle tenderness. BACK:  Non-tender, normal appearance UPPER EXT:  Normal inspection. no joint or soft tissue swelling LOWER EXT: No edema, no calf tenderness. NEURO: Alert and oriented x3, 5/5 strength and light touch sensation intact in bilateral upper and lower extremities. SKIN: No rashes; Warm and dry PSYCH: Normal mood, normal affect Diagnostic Study Results Labs - No results found for this or any previous visit (from the past 12 hour(s)). Medical Decision Making I am the first provider for this patient. I reviewed the vital signs, available nursing notes, past medical history, past surgical history, family history and social history. Vital Signs-Reviewed the patient's vital signs. No data found. Pulse Oximetry Analysis - 98% on RA Cardiac Monitor:  
Rate: 106 bpm 
Rhythm: Sinus Tachycardia EKG interpretation: (Preliminary) 1055 Rhythm of sinus tachycardia with a rate at 100, acute ST elevation in V2-V6 and one in AvL Written by Dell Guerrier ED Scribe, as dictated by Aye Pineda MD. Records Reviewed: Nursing Notes, Old Medical Records, Previous electrocardiograms, Previous Radiology Studies and Previous Laboratory Studies Provider Notes (Medical Decision Making):  
Pt is a 45 y/o male presenting with chest, back, and jaw pain. Presenting with concerning EKG changes for acute STEMI. Facilitated emergency facilitation of cardiac catheterization. Pt transferred to the cath lab after administration of ASA and Heparin and evaluation in the ED by Dr. Sourav Patel. ED Course:  
Initial assessment performed.  The patients presenting problems have been discussed, and they are in agreement with the care plan formulated and outlined with them. I have encouraged them to ask questions as they arise throughout their visit. JOANNE GODWIN 
1965 Time EMS pre-alert: N/A Time STEMI called: 7262 Time arrived on Unit: 1032, No att. providers found in room Time EKG completed: 1055 Time EKG read by provider: [phone] Time cardiology/cath lab paged: [phone] Time cardiologist returned call: [phone] Time Cardiologist arrived on Unit: 1106 Time Cath Lab arrived on Unit: 1106 CONSULT NOTE:  
11:04 AM 
Agustin Martinez MD spoke with Shadi Black MD 
Specialty: cardiology Discussed pt's hx, disposition, and available diagnostic and imaging results. Reviewed care plans. Consultant agrees with plans as outlined. Dr. Sosa Paulson agrees with 325mg ASA and 4000 of Heparin. Will come evaluate pt. Written by Luis Elizabeth ED Scribe, as dictated by Agustin Martinez MD. 
 
CRITICAL CARE NOTE : 
 
11:06 AM 
 
IMPENDING DETERIORATION -Cardiovascular ASSOCIATED RISK FACTORS - Shock and Dysrhythmia MANAGEMENT- Bedside Assessment and Supervision of Care INTERPRETATION -  ECG, Blood Pressure and Cardiac Output Measures INTERVENTIONS - anticoagulation, emergent cath lab activation, cardiology consultation CASE REVIEW - Medical Sub-Specialist, Nursing and Family TREATMENT RESPONSE -Stable PERFORMED BY - Self NOTES   : 
 
I have spent 32 minutes of critical care time involved in lab review, consultations with specialist, family decision- making, bedside attention and documentation. During this entire length of time I was immediately available to the patient . Agustin Martinez MD 
 
Disposition: 
ADMIT NOTE: 
11:10 AM 
The patient is being admitted to the hospital by Shadi Black MD.  The results of their tests and reasons for their admission have been discussed with the patient and/or available family.   They convey agreement and understanding for the need to be admitted and for their admission diagnosis. PLAN: 
1. Admit to cardiology Diagnosis Clinical Impression: 1. ST elevation myocardial infarction (STEMI) of anterior wall ([address] 2. Acute chest pain Attestations: This note is prepared by Joshua Rosa, acting as Scribe for Hang Hernandes MD. Hang Hernandes MD: The scribe's documentation has been prepared under my direction and personally reviewed by me in its entirety. I confirm that the note above accurately reflects all work, treatment, procedures, and medical decision making performed by me.

## 2018-09-26 LAB
ANION GAP SERPL CALC-SCNC: 8 MMOL/L (ref 5–15)
ATRIAL RATE: 100 BPM
ATRIAL RATE: 73 BPM
ATRIAL RATE: 94 BPM
BUN SERPL-MCNC: 17 MG/DL (ref 6–20)
BUN/CREAT SERPL: 22 (ref 12–20)
CALCIUM SERPL-MCNC: 8.8 MG/DL (ref 8.5–10.1)
CALCULATED P AXIS, ECG09: 44 DEGREES
CALCULATED P AXIS, ECG09: 55 DEGREES
CALCULATED P AXIS, ECG09: 67 DEGREES
CALCULATED R AXIS, ECG10: 72 DEGREES
CALCULATED R AXIS, ECG10: 78 DEGREES
CALCULATED R AXIS, ECG10: 94 DEGREES
CALCULATED T AXIS, ECG11: -17 DEGREES
CALCULATED T AXIS, ECG11: 80 DEGREES
CALCULATED T AXIS, ECG11: 87 DEGREES
CHLORIDE SERPL-SCNC: 105 MMOL/L (ref 97–108)
CHOLEST SERPL-MCNC: 209 MG/DL
CO2 SERPL-SCNC: 24 MMOL/L (ref 21–32)
CREAT SERPL-MCNC: 0.78 MG/DL (ref 0.7–1.3)
DIAGNOSIS, 93000: NORMAL
ERYTHROCYTE [DISTWIDTH] IN BLOOD BY AUTOMATED COUNT: 13.6 % (ref 11.5–14.5)
GLUCOSE SERPL-MCNC: 121 MG/DL (ref 65–100)
HCT VFR BLD AUTO: 41.2 % (ref 36.6–50.3)
HDLC SERPL-MCNC: 34 MG/DL
HDLC SERPL: 6.1 {RATIO} (ref 0–5)
HGB BLD-MCNC: 13.5 G/DL (ref 12.1–17)
LDLC SERPL CALC-MCNC: 128.8 MG/DL (ref 0–100)
LIPID PROFILE,FLP: ABNORMAL
MCH RBC QN AUTO: 29.2 PG (ref 26–34)
MCHC RBC AUTO-ENTMCNC: 32.8 G/DL (ref 30–36.5)
MCV RBC AUTO: 89.2 FL (ref 80–99)
NRBC # BLD: 0 K/UL (ref 0–0.01)
NRBC BLD-RTO: 0 PER 100 WBC
P-R INTERVAL, ECG05: 138 MS
P-R INTERVAL, ECG05: 144 MS
P-R INTERVAL, ECG05: 144 MS
PLATELET # BLD AUTO: 280 K/UL (ref 150–400)
PMV BLD AUTO: 10.4 FL (ref 8.9–12.9)
POTASSIUM SERPL-SCNC: 3.6 MMOL/L (ref 3.5–5.1)
Q-T INTERVAL, ECG07: 294 MS
Q-T INTERVAL, ECG07: 378 MS
Q-T INTERVAL, ECG07: 384 MS
QRS DURATION, ECG06: 72 MS
QRS DURATION, ECG06: 72 MS
QRS DURATION, ECG06: 74 MS
QTC CALCULATION (BEZET), ECG08: 379 MS
QTC CALCULATION (BEZET), ECG08: 423 MS
QTC CALCULATION (BEZET), ECG08: 472 MS
RBC # BLD AUTO: 4.62 M/UL (ref 4.1–5.7)
SODIUM SERPL-SCNC: 137 MMOL/L (ref 136–145)
TRIGL SERPL-MCNC: 231 MG/DL (ref ?–150)
VENTRICULAR RATE, ECG03: 100 BPM
VENTRICULAR RATE, ECG03: 73 BPM
VENTRICULAR RATE, ECG03: 94 BPM
VLDLC SERPL CALC-MCNC: 46.2 MG/DL
WBC # BLD AUTO: 13.8 K/UL (ref 4.1–11.1)

## 2018-09-26 PROCEDURE — 36415 COLL VENOUS BLD VENIPUNCTURE: CPT | Performed by: NURSE PRACTITIONER

## 2018-09-26 PROCEDURE — 93005 ELECTROCARDIOGRAM TRACING: CPT

## 2018-09-26 PROCEDURE — 80061 LIPID PANEL: CPT | Performed by: NURSE PRACTITIONER

## 2018-09-26 PROCEDURE — 85027 COMPLETE CBC AUTOMATED: CPT | Performed by: NURSE PRACTITIONER

## 2018-09-26 PROCEDURE — 74011250636 HC RX REV CODE- 250/636: Performed by: NURSE PRACTITIONER

## 2018-09-26 PROCEDURE — 80048 BASIC METABOLIC PNL TOTAL CA: CPT | Performed by: NURSE PRACTITIONER

## 2018-09-26 PROCEDURE — 65660000000 HC RM CCU STEPDOWN

## 2018-09-26 PROCEDURE — 93306 TTE W/DOPPLER COMPLETE: CPT

## 2018-09-26 PROCEDURE — 74011250637 HC RX REV CODE- 250/637: Performed by: NURSE PRACTITIONER

## 2018-09-26 RX ADMIN — Medication 10 ML: at 06:47

## 2018-09-26 RX ADMIN — PRAVASTATIN SODIUM 40 MG: 40 TABLET ORAL at 21:29

## 2018-09-26 RX ADMIN — METOPROLOL TARTRATE 25 MG: 25 TABLET ORAL at 21:29

## 2018-09-26 RX ADMIN — ASPIRIN 81 MG: 81 TABLET, COATED ORAL at 10:27

## 2018-09-26 RX ADMIN — DULOXETINE HYDROCHLORIDE 60 MG: 30 CAPSULE, DELAYED RELEASE ORAL at 10:27

## 2018-09-26 RX ADMIN — METOPROLOL TARTRATE 25 MG: 25 TABLET ORAL at 10:27

## 2018-09-26 RX ADMIN — Medication 10 ML: at 21:28

## 2018-09-26 RX ADMIN — UMECLIDINIUM 1 PUFF: 62.5 AEROSOL, POWDER ORAL at 10:27

## 2018-09-26 RX ADMIN — TICAGRELOR 90 MG: 90 TABLET ORAL at 21:29

## 2018-09-26 RX ADMIN — TICAGRELOR 90 MG: 90 TABLET ORAL at 10:27

## 2018-09-26 RX ADMIN — SODIUM CHLORIDE 100 ML/HR: 900 INJECTION, SOLUTION INTRAVENOUS at 00:30

## 2018-09-26 NOTE — PROGRESS NOTES
Problem: Pressure Injury - Risk of 
Goal: *Prevention of pressure injury Document Beau Scale and appropriate interventions in the flowsheet. Pressure Injury Interventions: 
  
 
  
 
  
 
  
 
  
 
  
 
  
 
 
 
 
Problem: Falls - Risk of 
Goal: *Absence of Falls Document Eddi Mata Fall Risk and appropriate interventions in the flowsheet. Fall Risk Interventions: 
  
 
  
 
Medication Interventions: Assess postural VS orthostatic hypotension, Evaluate medications/consider consulting pharmacy, Patient to call before getting OOB, Teach patient to arise slowly

## 2018-09-26 NOTE — PROGRESS NOTES
Problem: Pressure Injury - Risk of 
Goal: *Prevention of pressure injury Document Beau Scale and appropriate interventions in the flowsheet. Outcome: Progressing Towards Goal 
Pressure Injury Interventions:

## 2018-09-26 NOTE — PROGRESS NOTES
Bedside shift change report given to Hurtis Bamberger, RN (oncoming nurse) by Yulia Springer RN (offgoing nurse). Report included the following information SBAR, Kardex, ED Summary, Procedure Summary, Intake/Output, MAR, Accordion, Recent Results, Med Rec Status, Cardiac Rhythm NSR, ST elevation, Alarm Parameters , Pre Procedure Checklist, Procedure Verification and Quality Measures.

## 2018-09-26 NOTE — PROGRESS NOTES
[address] [place] [address]  [phone] Cardiology Progress Note 9/26/2018 12:53 PM 
 
Admit Date: 9/25/2018 Admit Diagnosis: STEMI (ST elevation myocardial infarction) ([facility] [address] Subjective:  
 
Sarita Palacio is 46year old male admitted yesterday for STEMI. Patient denies chest pain. He states that he has been walking in the hallway without chest pain as well. He does complain of some shortness of breath when walking and was educated that this could be a side effect of Brilinta that should resolve in a few days. PCI/MAVIS placed to proximal LAD yesterday. Educated patient that if SOB does not decrease or worsens when at home then call or come to the ED. Discussed with patient and family that he will be discharged with ASA, statin, BB, and Brilinta. Educated patient on the importance of taking these medications as prescribed and staying compliant. Also educated patient and family that if he were to start having chest pain similar to yesterday, then do not wait and come to the ED. Patient has no concerns or complaints at this time. Visit Vitals  /77 (BP 1 Location: Left arm)  Pulse 76  Temp 98.5 °F (36.9 °C)  Resp 18  Ht 5' 9\" (1.753 m)  Wt 191 lb 2.2 oz (86.7 kg)  SpO2 94%  BMI 28.23 kg/m2 Current Facility-Administered Medications Medication Dose Route Frequency  influenza vaccine 2018-19 (6 mos+)(PF) (FLUARIX QUAD/FLULAVAL QUAD) injection 0.5 mL  0.5 mL IntraMUSCular PRIOR TO DISCHARGE  heparin (porcine) 1,000 unit/mL injection  bivalirudin (ANGIOMAX) 250 mg injection  aspirin 81 mg chewable tablet  nitroglycerin 1 mg/10mL injection  bivalirudin (ANGIOMAX) 250 mg injection  0.9% sodium chloride (MBP/ADV) infusion  ADDaptor  DULoxetine (CYMBALTA) capsule 60 mg  60 mg Oral DAILY  pravastatin (PRAVACHOL) tablet 40 mg  40 mg Oral QHS  umeclidinium (INCRUSE ELLIPTA) 62.5 mcg/actuation  1 Puff Inhalation DAILY  aspirin delayed-release tablet 81 mg  81 mg Oral DAILY  ticagrelor (BRILINTA) tablet 90 mg  90 mg Oral Q12H  
 metoprolol tartrate (LOPRESSOR) tablet 25 mg  25 mg Oral Q12H  
 sodium chloride (NS) flush 5-10 mL  5-10 mL IntraVENous Q8H  
 sodium chloride (NS) flush 5-10 mL  5-10 mL IntraVENous PRN  
 acetaminophen (TYLENOL) tablet 650 mg  650 mg Oral Q4H PRN  
 naloxone (NARCAN) injection 0.4 mg  0.4 mg IntraVENous PRN  
 morphine injection 1 mg  1 mg IntraVENous Q4H PRN Objective:  
  
Physical Exam: 
General Appearance:   male in no acute distress. Patient has h/o bell's palsy and thus a droop is noted to his left face. Flat affect. Alert and oriented x3. Chest:   Clear to ascultation bilaterally. Cardiovascular:  Regular rate and rhythm, no murmur.  
Abdomen:   Soft, non-tender, bowel sounds are active.  
Extremities: Warm, well-perfused. No edema noted. Pulses are palpable. Right radial cath site is clean, dry, and intact. No ecchymosis or edema noted. Skin:  Warm and dry.  
 
Data Review:  
Recent Labs  
   09/26/18 
 0334  09/25/18 
 1100 WBC  13.8*  16.1* HGB  13.5  15.4 HCT  41.2  45.5 PLT  280  325 Recent Labs  
   09/26/18 
 0334  09/25/18 
 1100 NA  137  136  
K  3.6  3.6 CL  105  102 CO2  24  29 GLU  121*  115* BUN  17  15 CREA  0.78  0.73 CA  8.8  9.4 ALB   --   3.6 TBILI   --   0.5 SGOT   --   240* ALT   --   65 Recent Labs  
   09/25/18 
 1740 TROIQ  >100.00* Intake/Output Summary (Last 24 hours) at 09/26/18 1253 Last data filed at 09/26/18 7695 Gross per 24 hour Intake              590 ml Output              200 ml Net              390 ml Telemetry: Sinus rhythm EKG: New EKG pending. 9/25/2018: Normal sinus rhythm. Possible left atrial enlargement. Anterolateral infarct. Assessment:  
 
Principal Problem: STEMI (ST elevation myocardial infarction) ([facility] [address] (9/25/2018) Active Problems: 
  HTN (hypertension) (9/25/2018) Dyslipidemia (9/25/2018) ADD (attention deficit disorder) (9/25/2018) Tobacco abuse (9/25/2018) S/P coronary artery stent placement (9/25/2018) Overview: 9/25/18 PCI/MAVIS to LAD Plan: STEMI: 
PCI/MAVIS to proximal LAD yesterday with Dr. Tamy Spencer. Continue losartan, ASA, metoprolol, Lipitor and Brilinta. EKG ordered for today. Continue to monitor heart rhythm. ECHO ordered. Will await results of cardiac function post STEMI. Will recheck troponin level tomorrow. Discussed with patient and patient importance of medication compliance. Plan to discharge tomorrow. Tobacco Use: 
Discussed with patient importance of smoking cessation. Recommended stopping smoking. Encouraged use of nicotine patch, nicotine gum or lozenges. Patient not speaking towards smoking at this time. HTN: 
Continue losartan. Recommend monitoring BP at home. Metoprolol added, monitor for low BP. Dyslipidemia: 
Started Lipitor. He admits he was not taking statin prior to admission. Lipid panel checked. Elevated triglycerides and cholesterol and low HDL. Will have patient follow up with PCP outpatient for recheck of lipid panel in 4-6 weeks. Vandana Huntley [facility] Cardiology Patient seen and examined by me with nurse practitioner Vandana Huntley and nurse practitioner student. I personally performed all components of the history, physical, and medical decision making and agree with the assessment and plan with minor modifications as noted.

## 2018-09-27 VITALS
WEIGHT: 191.14 LBS | BODY MASS INDEX: 28.31 KG/M2 | OXYGEN SATURATION: 97 % | HEART RATE: 80 BPM | HEIGHT: 69 IN | RESPIRATION RATE: 18 BRPM | TEMPERATURE: 98.1 F | SYSTOLIC BLOOD PRESSURE: 129 MMHG | DIASTOLIC BLOOD PRESSURE: 81 MMHG

## 2018-09-27 LAB
ATRIAL RATE: 72 BPM
CALCULATED P AXIS, ECG09: 67 DEGREES
CALCULATED R AXIS, ECG10: 83 DEGREES
CALCULATED T AXIS, ECG11: 51 DEGREES
DIAGNOSIS, 93000: NORMAL
EST. AVERAGE GLUCOSE BLD GHB EST-MCNC: 117 MG/DL
HBA1C MFR BLD: 5.7 % (ref 4.2–6.3)
P-R INTERVAL, ECG05: 140 MS
Q-T INTERVAL, ECG07: 358 MS
QRS DURATION, ECG06: 80 MS
QTC CALCULATION (BEZET), ECG08: 392 MS
TROPONIN I SERPL-MCNC: 30.5 NG/ML
VENTRICULAR RATE, ECG03: 72 BPM

## 2018-09-27 PROCEDURE — 36415 COLL VENOUS BLD VENIPUNCTURE: CPT | Performed by: NURSE PRACTITIONER

## 2018-09-27 PROCEDURE — 74011250636 HC RX REV CODE- 250/636: Performed by: INTERNAL MEDICINE

## 2018-09-27 PROCEDURE — 74011250637 HC RX REV CODE- 250/637: Performed by: NURSE PRACTITIONER

## 2018-09-27 PROCEDURE — 90686 IIV4 VACC NO PRSV 0.5 ML IM: CPT | Performed by: INTERNAL MEDICINE

## 2018-09-27 PROCEDURE — 93005 ELECTROCARDIOGRAM TRACING: CPT

## 2018-09-27 PROCEDURE — 83036 HEMOGLOBIN GLYCOSYLATED A1C: CPT | Performed by: NURSE PRACTITIONER

## 2018-09-27 PROCEDURE — 90471 IMMUNIZATION ADMIN: CPT

## 2018-09-27 PROCEDURE — 84484 ASSAY OF TROPONIN QUANT: CPT | Performed by: NURSE PRACTITIONER

## 2018-09-27 RX ORDER — ATORVASTATIN CALCIUM 40 MG/1
40 TABLET, FILM COATED ORAL
Status: DISCONTINUED | OUTPATIENT
Start: 2018-09-27 | End: 2018-09-27 | Stop reason: HOSPADM

## 2018-09-27 RX ORDER — ASPIRIN 81 MG/1
81 TABLET ORAL DAILY
Qty: 30 TAB | Refills: 11 | Status: SHIPPED | OUTPATIENT
Start: 2018-09-27 | End: 2019-10-01 | Stop reason: SDUPTHER

## 2018-09-27 RX ORDER — METOPROLOL TARTRATE 25 MG/1
25 TABLET, FILM COATED ORAL EVERY 12 HOURS
Qty: 60 TAB | Refills: 11 | Status: SHIPPED | OUTPATIENT
Start: 2018-09-27 | End: 2018-11-30 | Stop reason: DRUGHIGH

## 2018-09-27 RX ORDER — METOPROLOL TARTRATE 25 MG/1
25 TABLET, FILM COATED ORAL EVERY 12 HOURS
Qty: 60 TAB | Refills: 11 | Status: SHIPPED | OUTPATIENT
Start: 2018-09-27 | End: 2018-09-27

## 2018-09-27 RX ORDER — ATORVASTATIN CALCIUM 40 MG/1
40 TABLET, FILM COATED ORAL
Qty: 30 TAB | Refills: 11 | Status: SHIPPED | OUTPATIENT
Start: 2018-09-27 | End: 2019-07-31 | Stop reason: DRUGHIGH

## 2018-09-27 RX ORDER — ATORVASTATIN CALCIUM 40 MG/1
40 TABLET, FILM COATED ORAL
Qty: 30 TAB | Refills: 11 | Status: SHIPPED | OUTPATIENT
Start: 2018-09-27 | End: 2018-09-27

## 2018-09-27 RX ORDER — ASPIRIN 81 MG/1
81 TABLET ORAL DAILY
Qty: 30 TAB | Refills: 11 | Status: SHIPPED | OUTPATIENT
Start: 2018-09-27 | End: 2018-09-27

## 2018-09-27 RX ADMIN — METOPROLOL TARTRATE 25 MG: 25 TABLET ORAL at 10:04

## 2018-09-27 RX ADMIN — INFLUENZA VIRUS VACCINE 0.5 ML: 15; 15; 15; 15 SUSPENSION INTRAMUSCULAR at 10:04

## 2018-09-27 RX ADMIN — Medication 10 ML: at 06:40

## 2018-09-27 RX ADMIN — DULOXETINE HYDROCHLORIDE 60 MG: 30 CAPSULE, DELAYED RELEASE ORAL at 10:04

## 2018-09-27 RX ADMIN — TICAGRELOR 90 MG: 90 TABLET ORAL at 10:04

## 2018-09-27 RX ADMIN — ASPIRIN 81 MG: 81 TABLET, COATED ORAL at 10:04

## 2018-09-27 NOTE — PROGRESS NOTES
Bedside shift change report given to Michelle Sharma RN (oncoming nurse) by Faby Boles RN (offgoing nurse). Report included the following information SBAR, Kardex, ED Summary, Procedure Summary, Intake/Output, MAR, Accordion, Recent Results, Med Rec Status, Cardiac Rhythm NSR, Alarm Parameters , Pre Procedure Checklist, Procedure Verification and Quality Measures.

## 2018-09-27 NOTE — PROGRESS NOTES
0430 (9/27) - Received a call from LAB to notify critical Troponin result: 30.50. RN did not notify MD as Troponin trended down from >100.00 on 9/25 @1740, post cath (STEMI,  post hearth cath with stent on 9/25, return to [place] @ 1225)

## 2018-09-27 NOTE — DISCHARGE INSTRUCTIONS
[address] [address]  [phone]        Patient ID:  Nisreen Lemus  [id number]  83 y.o.  1965    Admit Date: 9/25/2018    Discharge Date: 9/27/2018     Admitting Physician: Yaron Peña MD     Discharge Physician: Yaron Peña MD    Admission Diagnoses:   STEMI (ST elevation myocardial infarction) [facility])    Discharge Diagnoses:   Principal Problem:    STEMI (ST elevation myocardial infarction) ([address] (9/25/2018)    Active Problems:    HTN (hypertension) (9/25/2018)      Dyslipidemia (9/25/2018)      ADD (attention deficit disorder) (9/25/2018)      Tobacco abuse (9/25/2018)      S/P coronary artery stent placement (9/25/2018)      Overview: 9/25/18 PCI/MAVIS to LAD        Discharge Condition: Good    Cardiology Procedures this Admission:  Left heart catheterization with PCI  EchoCardiogram  PCI/MAVIS to proximal LAD    Disposition: home    Reference discharge instructions provided by nursing for diet and activity. Signed:  Wale Pollard  9/27/2018  9:33 AM      Radial Cardiac Catheterization/Angiography Discharge Instructions    It is normal to feel tired the first couple days. Take it easy and follow the physicians instructions. CHECK THE CATHETER INSERTION SITE DAILY:    Remove the wrist dressing 24 hours after the procedure. You may shower 24 hours after the procedure. Wash with soap and water and pat dry. Gentle cleaning of the site with soap and water is sufficient, cover with a dry clean dressing or bandage. Do not apply creams or powders to the area. No soaking the wrist for 3 days. Leave the puncture site open to air after 24 hours post-procedure. CALL THE PHYSICIANS:     If the site becomes red, swollen or feels warm to the touch  If there is bleeding or drainage or if there is unusual pain at the radial site. If there is any minor oozing, you may apply a band-aid and remove after 12 hours.    If the bleeding continues, hold pressure with the middle finger against the puncture site and the thumb against the back of the wrist,call 911 to be transported to the hospital.  DO NOT DRIVE YOURSELF, [id number] ACTIVITY:   For the first 24 hours do not manipulate the wrist.  No lifting, pushing or pulling over 3-5 pounds with the affected wrist for 7 daysand no straining the insertion site. Do not life grocery bags or the garbage can, do not run the vacuum  or  for 7 days. Start with short walks as in the hospital and gradually increase as tolerated each day. It is recommended to walk 30 minutes 5-7 days per week. Follow your physicians instructions on activity. Avoid walking outside in extremes of heat or cold. Walk inside when it is cold and windy or hot and humid. Things to keep in mind:  No driving for at least 24 hours, or as designated by your physician. Limit the number of times you go up and down the stairs  Take rests and pace yourself with activity. Be careful and do not strain with bowel movements. MEDICATIONS:    Take all medications as prescribed  Call your physician if you have any questions  Keep an updated list of your medications with you at all times and give a list to your physician and pharmacist    SIGNS AND SYMPTOMS:   Be cautious of symptoms of angina or recurrent symptoms such as chest discomfort, unusual shortness of breath or fatigue. These could be symptoms of restenosis, a new blockage or a heart attack. If your symptoms are relieved with rest it is still recommended that you notify your physician of recurrent chest pain or discomfort. For CHEST PAIN or symptoms of angina not relieved with rest:  If the discomfort is not relieved with rest, and you have been prescribed Nitroglycerin, take as directed (taken under the tongue, one at a time 5 minutes apart for a total of 3 doses).  If the discomfort is not relieved after the 3rd nitroglycerin, call 911. If you have not been prescribed Nitroglycerin  and your chest discomfort is not relieved with rest, call 911. AFTER CARE:   Follow up with your physician as instructed. Follow a heart healthy diet with proper portion control, daily stress management, daily exercise, blood pressure and cholesterol control , and smoking cessation. Smoking Cessation Program:   This is a free,  phone/text/email based, smoking cessation program. The program is individualized to meet each patient's needs. To enroll use this link [email or web address]/[email or web address]

## 2018-09-27 NOTE — PROGRESS NOTES
Pt. Ambulate in the [place] without difficulty. Pt. Report no SOB, chest pain or dizziness. Stable vital signs.

## 2018-09-27 NOTE — PROGRESS NOTES
Problem: Pressure Injury - Risk of 
Goal: *Prevention of pressure injury Document Beau Scale and appropriate interventions in the flowsheet. Pressure Injury Interventions: 
  
 
  
 
  
 
  
 
  
 
  
 
  
 
 
 
 
Problem: Falls - Risk of 
Goal: *Absence of Falls Document Sandrine Maher Fall Risk and appropriate interventions in the flowsheet. Fall Risk Interventions: 
  
 
  
 
Medication Interventions: Assess postural VS orthostatic hypotension, Evaluate medications/consider consulting pharmacy, Patient to call before getting OOB, Teach patient to arise slowly

## 2018-09-27 NOTE — DISCHARGE SUMMARY
[address] [place] [address]  [phone] Patient ID: 
Mary Caballero [id number] 
46 y.o. 
1965 Admit Date: 9/25/2018 Discharge Date: 9/27/2018 Admitting Physician: Ashanti Yusuf MD  
 
Discharge Physician: Ashanti Yusuf MD 
 
Admission Diagnoses: STEMI (ST elevation myocardial infarction) ([facility] Discharge Diagnoses:  
Principal Problem: STEMI (ST elevation myocardial infarction) ([facility] (9/25/2018) Active Problems: 
  HTN (hypertension) (9/25/2018) Dyslipidemia (9/25/2018) ADD (attention deficit disorder) (9/25/2018) Tobacco abuse (9/25/2018) S/P coronary artery stent placement (9/25/2018) Overview: 9/25/18 PCI/MAVIS to LAD Discharge Condition: Good Cardiology Procedures this Admission:  Left heart catheterization with PCI EchoCardiogram 
MAVIS to proximal LAD Disposition: home Reference discharge instructions provided by nursing for diet and activity. Signed: 
Ok Monroy 9/27/2018 
9:24 AM 
 
 
Radial Cardiac Catheterization/Angiography Discharge Instructions It is normal to feel tired the first couple days. Take it easy and follow the physicians instructions. CHECK THE CATHETER INSERTION SITE DAILY: 
 
Remove the wrist dressing 24 hours after the procedure. You may shower 24 hours after the procedure. Wash with soap and water and pat dry. Gentle cleaning of the site with soap and water is sufficient, cover with a dry clean dressing or bandage. Do not apply creams or powders to the area. No soaking the wrist for 3 days. Leave the puncture site open to air after 24 hours post-procedure. CALL THE PHYSICIANS:  
 
If the site becomes red, swollen or feels warm to the touch If there is bleeding or drainage or if there is unusual pain at the radial site. If there is any minor oozing, you may apply a band-aid and remove after 12 hours. If the bleeding continues, hold pressure with the middle finger against the puncture site and the thumb against the back of the wrist,call 911 to be transported to the hospital. 
DO NOT DRIVE YOURSELF, [address] ACTIVITY:  
For the first 24 hours do not manipulate the wrist. 
No lifting, pushing or pulling over 3-5 pounds with the affected wrist for 7 daysand no straining the insertion site. Do not life grocery bags or the garbage can, do not run the vacuum  or  for 7 days. Start with short walks as in the hospital and gradually increase as tolerated each day. It is recommended to walk 30 minutes 5-7 days per week. Follow your physicians instructions on activity. Avoid walking outside in extremes of heat or cold. Walk inside when it is cold and windy or hot and humid. Things to keep in mind: 
No driving for at least 24 hours, or as designated by your physician. Limit the number of times you go up and down the stairs Take rests and pace yourself with activity. Be careful and do not strain with bowel movements. MEDICATIONS: 
 
Take all medications as prescribed Call your physician if you have any questions Keep an updated list of your medications with you at all times and give a list to your physician and pharmacist 
 
SIGNS AND SYMPTOMS:  
Be cautious of symptoms of angina or recurrent symptoms such as chest discomfort, unusual shortness of breath or fatigue. These could be symptoms of restenosis, a new blockage or a heart attack. If your symptoms are relieved with rest it is still recommended that you notify your physician of recurrent chest pain or discomfort. For CHEST PAIN or symptoms of angina not relieved with rest:  If the discomfort is not relieved with rest, and you have been prescribed Nitroglycerin, take as directed (taken under the tongue, one at a time 5 minutes apart for a total of 3 doses).  If the discomfort is not relieved after the 3rd nitroglycerin, call 911. If you have not been prescribed Nitroglycerin  and your chest discomfort is not relieved with rest, call 911. AFTER CARE:  
Follow up with your physician as instructed. Follow a heart healthy diet with proper portion control, daily stress management, daily exercise, blood pressure and cholesterol control , and smoking cessation.

## 2018-09-27 NOTE — PROGRESS NOTES
Pt and wife given d/c instructions, RX, Brilinta discount card, medication education. D/C'd IV and telemetry. Pt walked with RN to main entrance for d/c.

## 2018-09-27 NOTE — DISCHARGE SUMMARY
[address] [address]  [phone]     Cardiology Discharge Summary     Patient ID:  Jeff Larios  [id number]  89 y.o.  1965    Admit Date: 9/25/2018    Discharge Date: 9/27/2018     Admitting Physician: Alyse Paulson MD     Discharge Physician: Alyse Paulson MD    Admission Diagnoses:   STEMI (ST elevation myocardial infarction) [facility])    Discharge Diagnoses:   Principal Problem:    STEMI (ST elevation myocardial infarction) ([facility] (9/25/2018)    Active Problems:    HTN (hypertension) (9/25/2018)      Dyslipidemia (9/25/2018)      ADD (attention deficit disorder) (9/25/2018)      Tobacco abuse (9/25/2018)      S/P coronary artery stent placement (9/25/2018)      Overview: 9/25/18 PCI/MAVIS to LAD        Discharge Condition: Good    Cardiology Procedures this Admission:  Left heart catheterization with PCI  EchoCardiogram  MAVIS to proximal LAD    Patient Active Problem List    Diagnosis Date Noted    [facility] 09/25/2018    HTN (hypertension) 09/25/2018    Dyslipidemia 09/25/2018    ADD (attention deficit disorder) 09/25/2018    Tobacco abuse 09/25/2018    S/P coronary artery stent placement 09/25/2018      Past Medical History:   Diagnosis Date    ADD (attention deficit disorder) 9/25/2018    Dyslipidemia 9/25/2018    HTN (hypertension) 9/25/2018    Hypertension     Other ill-defined conditions(799.89)     ADHD,BELL'S PALSY WEAKNESS  LT FACE    Psychiatric disorder     DEPRESSION    S/P coronary artery stent placement 9/25/2018 9/25/18 PCI/MAVIS to LAD    STEMI (ST elevation myocardial infarction) ([facility] 9/25/2018    Tobacco abuse 9/25/2018      Social History     Social History    Marital status:      Spouse name: N/A    Number of children: N/A    Years of education: N/A     Social History Main Topics    Smoking status: Current Some Day Smoker     Packs/day: 1.50     Years: 40.00    Smokeless tobacco: Not on file    Alcohol use No    Drug use: No    Sexual activity: Not on file     Other Topics Concern    Not on file     Social History Narrative     Allergies   Allergen Reactions    Bee Sting [Sting, Bee] Anaphylaxis      Family History   Problem Relation Age of Onset    Hypertension Mother     Lung Disease Paternal [address] Course: Abigail Medina is a 46year old male presented to ED on 9/25 with chest pain, elevated troponin and ST elevation in anterior and lateral leads with q waves. Patient was admitted for STEMI and urgently taken to the cath lab. Patient is s/p PCI/MAVIS to proximal LAD. Patient denies chest pain and states that he just has minimal shortness of breath when he is walking in the hallway. Discussed with patient that this is temporary and related to the Brilinta, but that if it worsens then patient needs to call the office or come into the ED. Discussed with patient importance of smoking cessation. Patient is agreeable to trying to quit smoking. He is willing to try nicotine patches, gum, and lozenges to quit. Patient able to verbalize two important medications that he must take to prevent a clot from forming in his stent (ASA and Brilinta). Patient has no other concerns or questions at this time. Visit Vitals    /81    Pulse 80    Temp 98.1 °F (36.7 °C)    Resp 18    Ht 5' 9\" (1.753 m)    Wt 191 lb 2.2 oz (86.7 kg)    SpO2 97%    BMI 28.23 kg/m2       Physical Exam  Abdomen: soft, non-tender. Bowel sounds normal.   Extremities: extremities normal, no edema, pulses   Heart: regular rate and rhythm, no murmur, S3/JVD  Lungs: clear to auscultation bilaterally  Neck: supple, symmetrical, trachea midline,   Neurologic: Grossly normal.  Droop to left face noted d/t h/o Bell's palsy. Alert and oriented x3.     Labs:   Recent Labs      09/26/18   0334  09/25/18   1100   WBC  13.8*  16.1*   HGB  13.5  15.4   HCT  41.2  45.5   PLT  280  325     Recent Labs 09/26/18   0334  09/25/18   1100   NA  137  136   K  3.6  3.6   CL  105  102   CO2  24  29   GLU  121*  115*   BUN  17  15   CREA  0.78  0.73   CA  8.8  9.4   ALB   --   3.6   TBILI   --   0.5   SGOT   --   240*   ALT   --   65       Recent Labs      09/27/18   0243  09/25/18   1740   TROIQ  30.50*  >100.00*       EKG:  Normal sinus rhythm   Rightward axis   Anteroseptal infarct (cited on or before 25-SEP-2018)   T wave abnormality, consider inferolateral ischemia   ** ACUTE MI **   When compared with ECG of 25-SEP-2018 12:21,   Serial changes of evolving Anteroseptal infarct present     Echo:  Left ventricle: Systolic function was moderately reduced. Ejection  fraction was estimated in the range of 40 % to 45 %. There was severe  hypokinesis of the mid anteroseptal and apical wall(s). Disposition: home    Patient Instructions:   Current Discharge Medication List      START taking these medications    Details   aspirin delayed-release 81 mg tablet Take 1 Tab by mouth daily. Qty: 30 Tab, Refills: 11      atorvastatin (LIPITOR) 40 mg tablet Take 1 Tab by mouth nightly. Qty: 30 Tab, Refills: 11      metoprolol tartrate (LOPRESSOR) 25 mg tablet Take 1 Tab by mouth every twelve (12) hours. Qty: 60 Tab, Refills: 11      ticagrelor (BRILINTA) 90 mg tablet Take 1 Tab by mouth every twelve (12) hours every twelve (12) hours. Qty: 60 Tab, Refills: 11         CONTINUE these medications which have NOT CHANGED    Details   amphetamine-dextroamphetamine (ADDERALL) 10 mg tablet Take 20 mg by mouth two (2) times a day. losartan (COZAAR) 25 mg tablet Take 25 mg by mouth daily. DULoxetine (CYMBALTA) 60 mg capsule Take 60 mg by mouth daily. tiotropium (SPIRIVA WITH HANDIHALER) 18 mcg inhalation capsule Take 1 Cap by inhalation daily.          STOP taking these medications       pravastatin (PRAVACHOL) 40 mg tablet Comments:   Reason for Stopping:               Referenced discharge instructions provided by nursing for diet and activity. Follow up with:  Dr. Ham Aguero on October 5, 2018 at 11:15 am    Signed:  Gwendolyn Jhaveri  9/27/2018  11:02 AM     Patient seen and examined by me with nurse practitioner Reema Russell and NP student. I personally performed all components of the history, physical, and medical decision making and agree with the assessment and plan with minor modifications as noted.

## 2018-09-27 NOTE — PROGRESS NOTES
[address] [place] [address]  [phone] Cardiology Progress Note 9/27/2018 9:34 AM 
 
Admit Date: 9/25/2018 Admit Diagnosis: STEMI (ST elevation myocardial infarction) ([facility] Subjective:  
 
Mary Caballero is a 46year old male presented to ED with chest pain and admitted for STEMI. Patient is s/p PCI/MAVIS to proximal LAD. Patient denies chest pain and states that he just has minimal shortness of breath when he is walking in the hallway. Discussed with patient that this is temporary and related to the Brilinta, but that if it worsens then patient needs to call the office or come into the ED. Discussed with patient importance of smoking cessation. Patient is agreeable to trying to quit smoking. He is willing to try nicotine patches, gum, and lozenges to quit. Patient able to verbalize two important medications that he must take to prevent a clot from forming in his stent (ASA and Brilinta). Patient has no other concerns or questions at this time. Visit Vitals  /81  Pulse 80  Temp 98.1 °F (36.7 °C)  Resp 18  Ht 5' 9\" (1.753 m)  Wt 191 lb 2.2 oz (86.7 kg)  SpO2 97%  BMI 28.23 kg/m2 Current Facility-Administered Medications Medication Dose Route Frequency  atorvastatin (LIPITOR) tablet 40 mg  40 mg Oral QHS  influenza vaccine 2018-19 (6 mos+)(PF) (FLUARIX QUAD/FLULAVAL QUAD) injection 0.5 mL  0.5 mL IntraMUSCular PRIOR TO DISCHARGE  heparin (porcine) 1,000 unit/mL injection  bivalirudin (ANGIOMAX) 250 mg injection  aspirin 81 mg chewable tablet  nitroglycerin 1 mg/10mL injection  bivalirudin (ANGIOMAX) 250 mg injection  0.9% sodium chloride (MBP/ADV) infusion  ADDaptor  DULoxetine (CYMBALTA) capsule 60 mg  60 mg Oral DAILY  umeclidinium (INCRUSE ELLIPTA) 62.5 mcg/actuation  1 Puff Inhalation DAILY  aspirin delayed-release tablet 81 mg  81 mg Oral DAILY  ticagrelor (BRILINTA) tablet 90 mg  90 mg Oral Q12H  
 metoprolol tartrate (LOPRESSOR) tablet 25 mg  25 mg Oral Q12H  
 sodium chloride (NS) flush 5-10 mL  5-10 mL IntraVENous Q8H  
 sodium chloride (NS) flush 5-10 mL  5-10 mL IntraVENous PRN  
 acetaminophen (TYLENOL) tablet 650 mg  650 mg Oral Q4H PRN  
 naloxone (NARCAN) injection 0.4 mg  0.4 mg IntraVENous PRN  
 morphine injection 1 mg  1 mg IntraVENous Q4H PRN Objective:  
  
Physical Exam: 
General Appearance:   male in no acute distress. Droop to left face noted d/t h/o Bell's palsy. Alert and oriented x3. Chest:   Clear to ascultation bilaterally. Cardiovascular:  Regular rate and rhythm, no murmur.  
Abdomen:   Soft, non-tender, bowel sounds are active.  
Extremities: Warm, well perfused. Right radial cath site is clean, dry, and intact. No edema, erythema, or ecchymosis noted. Pulses are palpable. Skin:  Warm and dry.  
 
Data Review:  
Recent Labs  
   09/26/18 
 0334  09/25/18 
 1100 WBC  13.8*  16.1* HGB  13.5  15.4 HCT  41.2  45.5 PLT  280  325 Recent Labs  
   09/26/18 
 0334  09/25/18 
 1100 NA  137  136  
K  3.6  3.6 CL  105  102 CO2  24  29 GLU  121*  115* BUN  17  15 CREA  0.78  0.73 CA  8.8  9.4 ALB   --   3.6 TBILI   --   0.5 SGOT   --   240* ALT   --   65 Recent Labs  
   09/27/18 
 0243  09/25/18 
 1740 TROIQ  30.50*  >100.00* Intake/Output Summary (Last 24 hours) at 09/27/18 3892 Last data filed at 09/27/18 1868 Gross per 24 hour Intake              890 ml Output              675 ml Net              215 ml Telemetry: Sinus Rhythm EKG: Normal sinus rhythm Rightward axis Anteroseptal infarct (cited on or before 25-SEP-2018) T wave abnormality, consider inferolateral ischemia ** ACUTE MI ** When compared with ECG of 25-SEP-2018 12:21, Serial changes of evolving Anteroseptal infarct present ECHO: Left ventricle: Systolic function was moderately reduced. Ejection 
fraction was estimated in the range of 40 % to 45 %. There was severe 
hypokinesis of the mid anteroseptal and apical wall(s). Assessment:  
 
Principal Problem: STEMI (ST elevation myocardial infarction) ([address] (9/25/2018) Active Problems: 
  HTN (hypertension) (9/25/2018) Dyslipidemia (9/25/2018) ADD (attention deficit disorder) (9/25/2018) Tobacco abuse (9/25/2018) S/P coronary artery stent placement (9/25/2018) Overview: 9/25/18 PCI/MAVIS to LAD Plan: STEMI: 
PCI/MAVIS to proximal LAD yesterday with Dr. Jackson Goldberg. Continue losartan, ASA, metoprolol, Lipitor and Brilinta. Troponin level this am was 30.5. Trending down. ECHO results above. Recommended possible outpatient stress test or additional cardiac cath for residual 2V CAD in future depending on patient's symptoms and presentation. Will follow up with this outpatient Stressed with patient and patient importance of medication compliance. Discharge home today. Follow up appointment scheduled for 10/5/18 at 11:15 am with Dr. Jackson Goldberg. 
  
Tobacco Use: 
Discussed with patient importance of smoking cessation. Recommended stopping smoking. Encouraged use of nicotine patch, nicotine gum or lozenges. Patient agreeable to trying to quit. 
  
HTN: 
Continue losartan. Recommend monitoring BP at home. Continue metoprolol. Follow up with PCP. 
  
Dyslipidemia: 
Discontinued pravastatin and prescribed Lipitor. He admits he was not taking statin prior to admission. Will have patient follow up with PCP outpatient for recheck of lipid panel in 4-6 weeks. Elevated Blood Glucose: 
HgbA1C was 5.7. Not previously diagnosed with DM. Elevated blood glucose most likely r/t STEMI, hospitalization, and stress. Victorino Obrien [facility] Cardiology Patient seen and examined by me with nurse practitioner Victorino Obrien and NP student. I personally performed all components of the history, physical, and medical decision making and agree with the assessment and plan with minor modifications as noted.

## 2018-09-27 NOTE — CARDIO/PULMONARY
Cardiopulmonary Rehab Nursing Entry: 
  
Chart reviewed and pt visited for cardiac teaching. Pt is a 45 yo admitted with c/o chest pain radiating into the jaw and back, STEMI, s/p PCI/MAVIS, PMHx of HTN, hyperlipidemia, tobacco use.   
  
 MI education folder, with catheterization brochure, to bedside of Gerald Nowak on 9/25/2018. Met with pt and wife. Educated using teach back method. Reviewed MI diagnosis definition and purpose of intervention. Discussed risk factors for CAD to include the following: family history, elevated BMI, hyperlipidemia, hypertension,  stress, and smoking. Smoking Cessation Program link added to AVS. Discussed Heart Healthy/Low Sodium (2000 mg) diet. Reviewed the importance of medication compliance, the purpose of Brilinta, and potential side effects. Discussed follow up appointments with cardiologist, signs and symptoms of angina, and what to report to physician after discharge. Emphasized the value of cardiac rehab. Discussed Cardiac Rehab Program format, benefits, and encouraged enrollment to assist with risk modification and management. Offered Cardiac Rehab and wife states she and pt would like to discuss it further at home as he works rotating shifts. States they will also talk with Dr. Katie Durant at follow up appt. Pt still smoking 1.5 PPD and states he does not smoke as much at work. Encouraged to stop or at least try to cut back or try something to curb cravings. Gerald Nowak verbalized understanding with questions answered.   Gene Griffith RN

## 2018-10-02 RX ORDER — LOSARTAN POTASSIUM 25 MG/1
25 TABLET ORAL DAILY
Qty: 30 TAB | Refills: 1 | Status: SHIPPED | OUTPATIENT
Start: 2018-10-02 | End: 2018-11-16 | Stop reason: SDUPTHER

## 2018-10-05 ENCOUNTER — OFFICE VISIT (OUTPATIENT)
Dept: CARDIOLOGY CLINIC | Age: 53
End: 2018-10-05

## 2018-10-05 VITALS
SYSTOLIC BLOOD PRESSURE: 132 MMHG | WEIGHT: 195.7 LBS | HEIGHT: 69 IN | RESPIRATION RATE: 18 BRPM | BODY MASS INDEX: 28.99 KG/M2 | HEART RATE: 76 BPM | OXYGEN SATURATION: 97 % | DIASTOLIC BLOOD PRESSURE: 86 MMHG

## 2018-10-05 DIAGNOSIS — I10 ESSENTIAL HYPERTENSION: ICD-10-CM

## 2018-10-05 DIAGNOSIS — Z95.5 S/P CORONARY ARTERY STENT PLACEMENT: ICD-10-CM

## 2018-10-05 DIAGNOSIS — I25.10 ARTERIOSCLEROTIC HEART DISEASE (ASHD): Primary | ICD-10-CM

## 2018-10-05 DIAGNOSIS — I25.5 ISCHEMIC CARDIOMYOPATHY: ICD-10-CM

## 2018-10-05 DIAGNOSIS — E78.5 DYSLIPIDEMIA: ICD-10-CM

## 2018-10-05 RX ORDER — VARENICLINE TARTRATE 0.5 (11)-1
1 KIT ORAL
Refills: 0 | COMMUNITY
Start: 2018-10-03 | End: 2019-01-09

## 2018-10-05 NOTE — PROGRESS NOTES
1. Have you been to the ER, urgent care clinic since your last visit? Hospitalized since your last visit? [id number] admitted 9-25-18, CAD. 2. Have you seen or consulted any other health care providers outside of the [address] [place] [address] since your last visit? Include any pap smears or colon screening. No  
 
 
 
Chief Complaint Patient presents with  Coronary Artery Disease 1 wk appt. S/P stent. Denied cardiac symptoms.

## 2018-10-05 NOTE — MR AVS SNAPSHOT
[facility] [address] 
[phone] Patient: Seble Munson MRN: [id number] VBQ:[id number] Visit Information Date & Time Provider Department Dept. Phone Encounter #  
 10/5/2018 11:15 AM Jessica Quispe, [address] [place] [address] [facility] [phone] Follow-up Instructions Routing History Follow-up and Disposition History Your Appointments 1/9/2019  9:00 AM  
ECHO CARDIOGRAMS 2D with [address] [place] [address] [facility]) Appt Note: Per Dr. Chacha Mora 2D ECHO [address] [[id number]] (Order [id number])  
 [address]  
[phone] [address] Upcoming Health Maintenance Date Due Hepatitis C Screening 1965 Pneumococcal 19-64 Medium Risk (1 of 1 - PPSV23) 9/11/1983 DTaP/Tdap/Td series (1 - Tdap) 9/11/1985 Shingrix Vaccine Age 50> (1 of 2) 9/11/2014 FOBT Q 1 YEAR AGE 50-75 9/11/2014 Allergies as of 10/5/2018  Review Complete On: 10/5/2018 By: Jessica Quispe MD  
  
 Severity Noted Reaction Type Reactions Bee Sting [Sting, Bee] High 03/20/2013   Systemic Anaphylaxis Current Immunizations  Never Reviewed Name Date Influenza Vaccine (Quad) PF 9/27/2018 10:04 AM  
  
 Not reviewed this visit You Were Diagnosed With   
  
 Codes Comments Arteriosclerotic heart disease (ASHD)    -  Primary ICD-10-CM: I25.10 ICD-9-CM: 414.00 Essential hypertension     ICD-10-CM: I10 
ICD-9-CM: 401.9 Dyslipidemia     ICD-10-CM: E78.5 ICD-9-CM: 272.4 S/P coronary artery stent placement     ICD-10-CM: Z95.5 ICD-9-CM: V45.82 Ischemic cardiomyopathy     ICD-10-CM: I25.5 ICD-9-CM: 414.8 Vitals BP Pulse Resp Height(growth percentile) Weight(growth percentile) SpO2 132/86 (BP 1 Location: Right arm, BP Patient Position: Sitting) 76 18 5' 9\" (1.753 m) 195 lb 11.2 oz (88.8 kg) 97% BMI Smoking Status 28.9 kg/m2 Current Some Day Smoker Vitals History BMI and BSA Data Body Mass Index Body Surface Area  
 28.9 kg/m 2 2.08 m 2 Preferred Pharmacy Pharmacy Name Phone [address] [place] [address] [phone] Your Updated Medication List  
  
   
This list is accurate as of 10/5/18 12:23 PM.  Always use your most recent med list.  
  
  
  
  
 aspirin delayed-release 81 mg tablet Take 1 Tab by mouth daily. atorvastatin 40 mg tablet Commonly known as:  LIPITOR Take 1 Tab by mouth nightly. CHANTIX STARTING MONTH BOX 0.5 mg (11)- 1 mg (42) Dspk Generic drug:  varenicline  
  
 dextroamphetamine-amphetamine 10 mg tablet Commonly known as:  ADDERALL Take 20 mg by mouth two (2) times a day. DULoxetine 60 mg capsule Commonly known as:  CYMBALTA Take 60 mg by mouth daily. losartan 25 mg tablet Commonly known as:  COZAAR Take 1 Tab by mouth daily. metoprolol tartrate 25 mg tablet Commonly known as:  LOPRESSOR Take 1 Tab by mouth every twelve (12) hours. SPIRIVA WITH HANDIHALER 18 mcg inhalation capsule Generic drug:  tiotropium Take 1 Cap by inhalation daily. ticagrelor 90 mg tablet Commonly known as:  [place] Take 1 Tab by mouth every twelve (12) hours every twelve (12) hours. We Performed the Following AMB POC EKG ROUTINE W/ 12 LEADS, INTER & REP [72262 CPT(R)] REFERRAL TO CARDIAC REHAB [XQX916 Custom] To-Do List   
 12/05/2018 Lab:  CK   
  
 12/05/2018 Lab:  LIPID PANEL   
  
 12/05/2018 Lab:  METABOLIC PANEL, COMPREHENSIVE   
  
 12/28/2018 ECHO:  2D ECHO LIMTED ADULT W OR WO CONTR Referral Information Referral ID Referred By Referred To [id number] Alina Harris Not Available Visits Status Start Date End Date 1 New Request 10/5/18 10/5/19 If your referral has a status of pending review or denied, additional information will be sent to support the outcome of this decision. Introducing [facility]! [facility] introduces TableGrabber patient portal. Now you can access parts of your medical record, email your doctor's office, and request medication refills online. 1. In your internet browser, go to [email or web address] 2. Click on the First Time User? Click Here link in the Sign In box. You will see the New Member Sign Up page. 3. Enter your TableGrabber Access Code exactly as it appears below. You will not need to use this code after youve completed the sign-up process. If you do not sign up before the expiration date, you must request a new code. · TableGrabber Access Code: [email or web address] Expires: 12/24/2018 10:48 AM 
 
4. Enter the last four digits of your Social Security Number (xxxx) and Date of Birth (mm/dd/yyyy) as indicated and click Submit. You will be taken to the next sign-up page. 5. Create a TableGrabber ID. This will be your TableGrabber login ID and cannot be changed, so think of one that is secure and easy to remember. 6. Create a TableGrabber password. You can change your password at any time. 7. Enter your Password Reset Question and Answer. This can be used at a later time if you forget your password. 8. Enter your e-mail address. You will receive e-mail notification when new information is available in [address] 9. Click Sign Up. You can now view and download portions of your medical record. 10. Click the Download Summary menu link to download a portable copy of your medical information. If you have questions, please visit the Frequently Asked Questions section of the TableGrabber website. Remember, TableGrabber is NOT to be used for urgent needs. For medical emergencies, dial 911. Now available from your iPhone and Android! Please provide this summary of care documentation to your next provider. Your primary care clinician is listed as Demetria Skelton. If you have any questions after today's visit, please call [phone].

## 2018-10-05 NOTE — LETTER
NOTIFICATION OF RETURN TO WORK / SCHOOL 
 
10/5/2018 Mr. Trever York [address] [place] [phone] To Whom It May Concern: 
 
Trever York was under the care of [facility] -from 9/25/2018 to 10/5/2018. He may return to work without restrictions on 10/6/2018. If there are questions or concerns please have the patient contact our office.  
 
 
 
Sincerely, 
 
 
 
 
Brit Casey MD

## 2018-10-05 NOTE — PROGRESS NOTES
Christal Oglesby DNP, ANP-BC Subjective/HPI:  
 
Edouard Hutchins is a 46 y.o. male is here for transitional care hospital follow-up from anterior wall STEMI PTCA stenting of the LAD. Post MI ejection fraction 40-45%. Patient reports compliance in taking all medications. Unfortunately has remained smoking 2 packs a day. He has a prescription for Chantix which he says he is starting today. PCP Provider Mesha Ventura MD 
Past Medical History:  
Diagnosis Date  ADD (attention deficit disorder) 9/25/2018  Dyslipidemia 9/25/2018  
 HTN (hypertension) 9/25/2018  Hypertension  Other ill-defined conditions(799.89) ADHD,BELL'S PALSY WEAKNESS  LT FACE  
 Psychiatric disorder DEPRESSION  
 S/P coronary artery stent placement 9/25/2018 9/25/18 PCI/MAVIS to LAD  STEMI (ST elevation myocardial infarction) ([facility] [address] 9/25/2018  Tobacco abuse 9/25/2018 Past Surgical History:  
Procedure Laterality Date 15 [facility] STOMACH ABDOMEN ACCIDENT  
 HX HEENT    
 TEETH EXTRACTION  
 [facility] 86 FRACTURE RT FEMUR  
 NEUROLOGICAL PROCEDURE UNLISTED    
 NECK Allergies Allergen Reactions  Bee Sting [Sting, Bee] Anaphylaxis Family History Problem Relation Age of Onset  Hypertension Mother  Lung Disease Paternal Aunt Current Outpatient Prescriptions Medication Sig  CHANTIX STARTING MONTH BOX 0.5 mg (11)- 1 mg (42) DsPk  losartan (COZAAR) 25 mg tablet Take 1 Tab by mouth daily.  aspirin delayed-release 81 mg tablet Take 1 Tab by mouth daily.  atorvastatin (LIPITOR) 40 mg tablet Take 1 Tab by mouth nightly.  metoprolol tartrate (LOPRESSOR) 25 mg tablet Take 1 Tab by mouth every twelve (12) hours.  ticagrelor (BRILINTA) 90 mg tablet Take 1 Tab by mouth every twelve (12) hours every twelve (12) hours.  DULoxetine (CYMBALTA) 60 mg capsule Take 60 mg by mouth daily.  tiotropium (SPIRIVA WITH HANDIHALER) 18 mcg inhalation capsule Take 1 Cap by inhalation daily.  amphetamine-dextroamphetamine (ADDERALL) 10 mg tablet Take 20 mg by mouth two (2) times a day. No current facility-administered medications for this visit. Vitals:  
 10/05/18 1100 10/05/18 1107 BP: 136/88 132/86 Pulse: 76 Resp: 18 SpO2: 97% Weight: 195 lb 11.2 oz (88.8 kg) Height: 5' 9\" (1.753 m) Social History Social History  Marital status:  Spouse name: N/A  
 Number of children: N/A  
 Years of education: N/A Occupational History  Not on file. Social History Main Topics  Smoking status: Current Some Day Smoker Packs/day: 1.50 Years: 40.00  Smokeless tobacco: Never Used  Alcohol use No  
 Drug use: No  
 Sexual activity: Not on file Other Topics Concern  Not on file Social History Narrative I have reviewed the nurses notes, vitals, problem list, allergy list, medical history, family, social history and medications. Review of Symptoms: 
 
General: Pt denies excessive weight gain or loss. Pt is able to conduct ADL's HEENT: Denies blurred vision, headaches, epistaxis and difficulty swallowing. Respiratory: Denies shortness of breath, OLEARY, wheezing or stridor. Cardiovascular: Denies precordial pain, palpitations, edema or PND Gastrointestinal: Denies poor appetite, indigestion, abdominal pain or blood in stool Musculoskeletal: Denies pain or swelling from muscles or joints Neurologic: Denies tremor, paresthesias, or sensory motor disturbance Skin: Denies rash, itching or texture change. Physical Exam:   
 
General: Well developed, in no acute distress, cooperative and alert HEENT: No carotid bruits, no JVD, trach is midline. Neck Supple, PEERL, EOM intact. Heart:  Normal S1/S2 negative S3 or S4. Regular, no murmur, gallop or rub.  
Respiratory: Clear bilaterally x 4, no wheezing or rales Abdomen:   Soft, non-tender, no masses, bowel sounds are active.  
Extremities:  No edema, normal cap refill, no cyanosis, atraumatic. Neuro: A&Ox3, speech clear, gait stable. Skin: Skin color is normal. No rashes or lesions. Non diaphoretic Vascular: 2+ pulses symmetric in all extremities right radial access site well-healed Cardiographics ECG: Sinus rhythm with diffuse T wave inversions consistent with recent MI. Results for orders placed or performed during the hospital encounter of 09/25/18 EKG, 12 LEAD, INITIAL Result Value Ref Range Ventricular Rate 72 BPM  
 Atrial Rate 72 BPM  
 P-R Interval 140 ms QRS Duration 80 ms  
 Q-T Interval 358 ms QTC Calculation (Bezet) 392 ms Calculated P Axis 67 degrees Calculated R Axis 83 degrees Calculated T Axis 51 degrees Diagnosis Normal sinus rhythm Anteroseptal infarct (cited on or before 25-SEP-2018) ** ACUTE MI ** When compared with ECG of 26-SEP-2018 13:37, 
T wave inversion no longer evident in Inferior leads Confirmed by Patricia Worthy, P.V. ([id number]) on 9/27/2018 10:01:11 AM 
  
 
 
 
Cardiology Labs: 
Lab Results Component Value Date/Time Cholesterol, total 209 (H) 09/26/2018 03:34 AM  
 HDL Cholesterol 34 09/26/2018 03:34 AM  
 LDL, calculated 128.8 (H) 09/26/2018 03:34 AM  
 Triglyceride 231 (H) 09/26/2018 03:34 AM  
 CHOL/HDL Ratio 6.1 (H) 09/26/2018 03:34 AM  
 
 
Lab Results Component Value Date/Time  Sodium 137 09/26/2018 03:34 AM  
 Potassium 3.6 09/26/2018 03:34 AM  
 Chloride 105 09/26/2018 03:34 AM  
 CO2 24 09/26/2018 03:34 AM  
 Anion gap 8 09/26/2018 03:34 AM  
 Glucose 121 (H) 09/26/2018 03:34 AM  
 BUN 17 09/26/2018 03:34 AM  
 Creatinine 0.78 09/26/2018 03:34 AM  
 BUN/Creatinine ratio 22 (H) 09/26/2018 03:34 AM  
 GFR est AA >60 09/26/2018 03:34 AM  
 GFR est non-AA >60 09/26/2018 03:34 AM  
 Calcium 8.8 09/26/2018 03:34 AM  
 Bilirubin, total 0.5 09/25/2018 11:00 AM  
 AST (SGOT) 240 (H) 09/25/2018 11:00 AM  
 Alk. phosphatase 110 09/25/2018 11:00 AM  
 Protein, total 7.9 09/25/2018 11:00 AM  
 Albumin 3.6 09/25/2018 11:00 AM  
 Globulin 4.3 (H) 09/25/2018 11:00 AM  
 A-G Ratio 0.8 (L) 09/25/2018 11:00 AM  
 ALT (SGPT) 65 09/25/2018 11:00 AM  
  
 
 
 Assessment: 
 
 Assessment:  
 
Diagnoses and all orders for this visit: 1. Arteriosclerotic heart disease (ASHD) -     AMB POC EKG ROUTINE W/ 12 LEADS, INTER & REP 
-     2D ECHO LIMTED ADULT W OR WO CONTR; Future -     METABOLIC PANEL, COMPREHENSIVE; Future -     LIPID PANEL; Future 
-     CK; Future 
-     REFERRAL TO CARDIAC REHAB 2. Essential hypertension 
-     2D ECHO LIMTED ADULT W OR WO CONTR; Future -     METABOLIC PANEL, COMPREHENSIVE; Future -     LIPID PANEL; Future 
-     CK; Future 
-     REFERRAL TO CARDIAC REHAB 3. Dyslipidemia 
-     2D ECHO LIMTED ADULT W OR WO CONTR; Future -     METABOLIC PANEL, COMPREHENSIVE; Future -     LIPID PANEL; Future 
-     CK; Future 
-     REFERRAL TO CARDIAC REHAB 4. S/P coronary artery stent placement 
-     2D ECHO LIMTED ADULT W OR WO CONTR; Future -     METABOLIC PANEL, COMPREHENSIVE; Future -     LIPID PANEL; Future 
-     CK; Future 
-     REFERRAL TO CARDIAC REHAB 5. Ischemic cardiomyopathy 
-     2D ECHO LIMTED ADULT W OR WO CONTR; Future -     METABOLIC PANEL, COMPREHENSIVE; Future -     LIPID PANEL; Future 
-     CK; Future 
-     REFERRAL TO CARDIAC REHAB 
 
 
  ICD-10-CM ICD-9-CM 1. Arteriosclerotic heart disease (ASHD) I25.10 414.00 AMB POC EKG ROUTINE W/ 12 LEADS, INTER & REP  
   2D ECHO LIMTED ADULT W OR WO CONTR  
   METABOLIC PANEL, COMPREHENSIVE  
   LIPID PANEL  
   CK REFERRAL TO CARDIAC REHAB 2. Essential hypertension I10 401.9 2D ECHO LIMTED ADULT W OR WO CONTR  
   METABOLIC PANEL, COMPREHENSIVE  
   LIPID PANEL  
   CK REFERRAL TO CARDIAC REHAB 3.  Dyslipidemia E78.5 272.4 2D ECHO LIMTED ADULT W OR WO CONTR  
 METABOLIC PANEL, COMPREHENSIVE  
   LIPID PANEL  
   CK REFERRAL TO CARDIAC REHAB 4. S/P coronary artery stent placement Z95.5 V45.82 2D ECHO LIMTED ADULT W OR WO CONTR  
   METABOLIC PANEL, COMPREHENSIVE  
   LIPID PANEL  
   CK REFERRAL TO CARDIAC REHAB 5. Ischemic cardiomyopathy I25.5 414.8 2D ECHO LIMTED ADULT W OR WO CONTR  
   METABOLIC PANEL, COMPREHENSIVE  
   LIPID PANEL  
   CK REFERRAL TO CARDIAC REHAB Orders Placed This Encounter  METABOLIC PANEL, COMPREHENSIVE Standing Status:   Future Standing Expiration Date:   4/5/2019  LIPID PANEL Standing Status:   Future Standing Expiration Date:   4/5/2019  CK Standing Status:   Future Standing Expiration Date:   4/5/2019  REFERRAL TO CARDIAC REHAB Referral Priority:   Routine Referral Type:   Consultation Referral Reason:   Specialty Services Required  AMB POC EKG ROUTINE W/ 12 LEADS, INTER & REP Order Specific Question:   Reason for Exam: Answer:   routine  2D ECHO LIMTED ADULT W OR WO CONTR Standing Status:   Future Standing Expiration Date:   4/5/2019 Order Specific Question:   Reason for Exam: Answer:   Ef% 90 days post MI Order Specific Question:   Contrast Enhancement (Bubble Study, Definity, Optison) may be used if criteria listed in established evidence-based protocol has been identified. Answer: Yes  CHANTIX STARTING MONTH BOX 0.5 mg (11)- 1 mg (42) DsPk Refill:  0 Plan: 1. Atherosclerotic heart disease: Status post STEMI, MAVIS to the LAD. Reinforced need to remain on dual antiplatelet therapy uninterrupted for 1 year. Refer to CV rehab. 
2.  Ischemic cardiomyopathy: Ejection fraction 40-45%, on losartan and metoprolol, will repeat echocardiogram in 90 days. 3.  Hyperlipidemia: On atorvastatin 40 mg, will recheck labs in 90 days 4.   Tobacco abuse: Discussed with patient smoking cessation plan, start Chantix +/-use of over-the-counter nicotine supplement in lieu of cigarettes. Follow-up in 3 months Rose Valentine MD 
 
This note was created using voice recognition software. Despite editing, there may be syntax errors.

## 2018-10-08 ENCOUNTER — TELEPHONE (OUTPATIENT)
Dept: CARDIOLOGY CLINIC | Age: 53
End: 2018-10-08

## 2018-10-10 NOTE — TELEPHONE ENCOUNTER
Peggy Ricardo, on HIPAA, called asking for an answer about the adderol which I told her you would call as soon as Dr. Cezar Del Toro gives you an answer. Also asking how low should BP go before they should be alarmed. Typically runs in the 120's. BP this morning was 107/71 @ 5:00 a.m. Before taking med and after med 116/71. Please try [phone] if no answer @ (H) number. Thanks!

## 2018-10-11 NOTE — TELEPHONE ENCOUNTER
Adderall is an amphetamine and will increase the risk of cardiac events including heart attack slightly. If he could use alternative agents or strategies, that would be ideal.  If he has to have it to function, I would discuss with the prescribing physician using the lowest dose possible to be able to function. Low blood pressure is not a concern unless it is below 90 and/or he is feeling very weak or lightheaded. If that is the case, let us know. Goal would typically be from about 333-328 systolic.

## 2018-11-12 ENCOUNTER — HOSPITAL ENCOUNTER (OUTPATIENT)
Dept: CARDIAC REHAB | Age: 53
Discharge: HOME | End: 2018-11-12
Payer: COMMERCIAL

## 2018-11-12 VITALS — BODY MASS INDEX: 30.13 KG/M2 | HEIGHT: 69 IN | WEIGHT: 203.4 LBS

## 2018-11-12 PROCEDURE — 93798 PHYS/QHP OP CAR RHAB W/ECG: CPT

## 2018-11-12 RX ORDER — ATOMOXETINE 40 MG/1
40 CAPSULE ORAL DAILY
COMMUNITY
End: 2019-01-09 | Stop reason: DRUGHIGH

## 2018-11-12 NOTE — CARDIO/PULMONARY
Cardiopulmonary Rehab Orientation: Met with Sheron Pearce and his wife Shi Gardner for the initial session. Mr Ivone Nath is a 46year-old patient of Dr. Beatrice Hewitt and Dr. Silverio Berry, who presents to rehab for cardiac conditioning and strengthening, S/P Stemi on 9/25/2018/2018 with cardiac stenting; LVEF 40-45  %. History also includes   HTN,Dyslipidemia,ADD,Tobacco abuse, Depression; NKDA. Immunization for influenza vaccine UTD. Pt is current everyday  Smoker. He works at [employer]. Lungs were CTA; occas smoker NP cough. No LE edema was present. Exercise at home includes- none Limitations:  Hx of cervical fusion,lower back surgery. Patient was given an educational notebook and viewed the cardiac rehab DVD. Psychosocial: currently under tx by Dr. Silverio Berry for depression,ADD. Takes Cymbalta and changed from Adderall to [employer]. States he doesn't feel like it is working-been on it one week per wife. States he has some SOB at times and states he was told in hospital it was from [employer]. States it is still going on and encouraged him to call Dr. Beatrice Hewitt re this issue. O2 sats 98%ra. States he stopped taking Chantix 2 days ago due to stomachache. Encouraged him to call Dr. Silverio Berry regarding this also. His stress level is an 8 he states due to being tired and depressed and starting back to work this Thursday. He says he has not thought of hurting himself or how to carry it through. Admits to being tired all day,every day,no exercise,no energy,napping daily,smoking. Tried to walk at home then stopped. Wife is very supportive. Does not follow [address] and admits he and his wife would like to attend the nutrition class at some point in the program.    He has 2 stepchildren with his wife. He has cut back from 2ppd smoking to 1 ppd. BMI 30 , based on height of 5'9\" and wt of 203.4lbs  . Patient's identified goals are:   1. Complete Cardiac Rehab. 2. Lose 10 pounds  3. Stop smoking  4.  Home exercise program.    Pt will be calling weekly to set up appts depending on his work schedule. Will be attending this week Tues,Wed.           Plan: Return for first exercise session on

## 2018-11-13 ENCOUNTER — HOSPITAL ENCOUNTER (OUTPATIENT)
Dept: CARDIAC REHAB | Age: 53
Discharge: HOME | End: 2018-11-13
Payer: COMMERCIAL

## 2018-11-13 VITALS — WEIGHT: 202.2 LBS | BODY MASS INDEX: 29.86 KG/M2

## 2018-11-13 PROCEDURE — 93798 PHYS/QHP OP CAR RHAB W/ECG: CPT

## 2018-11-14 ENCOUNTER — HOSPITAL ENCOUNTER (OUTPATIENT)
Dept: CARDIAC REHAB | Age: 53
Discharge: HOME | End: 2018-11-14
Payer: COMMERCIAL

## 2018-11-14 VITALS — BODY MASS INDEX: 29.98 KG/M2 | WEIGHT: 203 LBS

## 2018-11-14 PROCEDURE — 93798 PHYS/QHP OP CAR RHAB W/ECG: CPT

## 2018-11-14 NOTE — CARDIO/PULMONARY
Cardiopulmonary Rehab Nursing Entry:    Pt presented for CR exercise session 3/36 he reported having some pain in his upper back yesterday that radiated around to his shoulder. Pt denied discomfort on arrival to CR. 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 Pt stated that he has had this discomfort to his back on & off over the past 1 year. He did report that these symptoms were similar to his heart attack symptoms. Pt without changes to his telemetry strip. VSS. Allowed to exercise and advised to communicate with staff if symptoms develop during exercise. Pt denied any return of back pain or any other physical symptoms during his routine. Informed to contact cardiologist office if re-occurrence after exercise. Pt verbalized understanding.

## 2018-11-30 ENCOUNTER — APPOINTMENT (OUTPATIENT)
Dept: GENERAL RADIOLOGY | Age: 53
End: 2018-11-30
Attending: EMERGENCY MEDICINE
Payer: COMMERCIAL

## 2018-11-30 ENCOUNTER — APPOINTMENT (OUTPATIENT)
Dept: CT IMAGING | Age: 53
End: 2018-11-30
Attending: EMERGENCY MEDICINE
Payer: COMMERCIAL

## 2018-11-30 ENCOUNTER — HOSPITAL ENCOUNTER (EMERGENCY)
Age: 53
Discharge: HOME | End: 2018-11-30
Attending: EMERGENCY MEDICINE
Payer: COMMERCIAL

## 2018-11-30 VITALS
HEIGHT: 69 IN | DIASTOLIC BLOOD PRESSURE: 57 MMHG | HEART RATE: 68 BPM | SYSTOLIC BLOOD PRESSURE: 101 MMHG | TEMPERATURE: 97 F | WEIGHT: 203.93 LBS | RESPIRATION RATE: 16 BRPM | BODY MASS INDEX: 30.2 KG/M2 | OXYGEN SATURATION: 94 %

## 2018-11-30 DIAGNOSIS — Z71.6 ENCOUNTER FOR TOBACCO USE CESSATION COUNSELING: ICD-10-CM

## 2018-11-30 DIAGNOSIS — I25.10 CAD S/P PERCUTANEOUS CORONARY ANGIOPLASTY: ICD-10-CM

## 2018-11-30 DIAGNOSIS — R07.9 ACUTE CHEST PAIN: ICD-10-CM

## 2018-11-30 DIAGNOSIS — R07.89 ATYPICAL CHEST PAIN: Primary | ICD-10-CM

## 2018-11-30 DIAGNOSIS — R53.83 FATIGUE, UNSPECIFIED TYPE: ICD-10-CM

## 2018-11-30 DIAGNOSIS — Z98.61 CAD S/P PERCUTANEOUS CORONARY ANGIOPLASTY: ICD-10-CM

## 2018-11-30 DIAGNOSIS — S60.511A ABRASION OF RIGHT HAND, INITIAL ENCOUNTER: ICD-10-CM

## 2018-11-30 LAB
ALBUMIN SERPL-MCNC: 3.4 G/DL (ref 3.5–5)
ALBUMIN/GLOB SERPL: 0.9 {RATIO} (ref 1.1–2.2)
ALP SERPL-CCNC: 111 U/L (ref 45–117)
ALT SERPL-CCNC: 26 U/L (ref 12–78)
ANION GAP SERPL CALC-SCNC: 6 MMOL/L (ref 5–15)
APPEARANCE UR: CLEAR
AST SERPL-CCNC: 15 U/L (ref 15–37)
ATRIAL RATE: 78 BPM
BACTERIA URNS QL MICRO: NEGATIVE /HPF
BASOPHILS # BLD: 0.1 K/UL (ref 0–0.1)
BASOPHILS NFR BLD: 1 % (ref 0–1)
BILIRUB SERPL-MCNC: 0.3 MG/DL (ref 0.2–1)
BILIRUB UR QL: NEGATIVE
BNP SERPL-MCNC: 845 PG/ML (ref 0–125)
BUN SERPL-MCNC: 18 MG/DL (ref 6–20)
BUN/CREAT SERPL: 22 (ref 12–20)
CALCIUM SERPL-MCNC: 8.8 MG/DL (ref 8.5–10.1)
CALCULATED P AXIS, ECG09: 62 DEGREES
CALCULATED R AXIS, ECG10: 46 DEGREES
CALCULATED T AXIS, ECG11: 115 DEGREES
CHLORIDE SERPL-SCNC: 106 MMOL/L (ref 97–108)
CK SERPL-CCNC: 92 U/L (ref 39–308)
CO2 SERPL-SCNC: 26 MMOL/L (ref 21–32)
COLOR UR: NORMAL
CREAT SERPL-MCNC: 0.83 MG/DL (ref 0.7–1.3)
DIAGNOSIS, 93000: NORMAL
DIFFERENTIAL METHOD BLD: NORMAL
EOSINOPHIL # BLD: 0.4 K/UL (ref 0–0.4)
EOSINOPHIL NFR BLD: 4 % (ref 0–7)
EPITH CASTS URNS QL MICRO: NORMAL /LPF
ERYTHROCYTE [DISTWIDTH] IN BLOOD BY AUTOMATED COUNT: 14 % (ref 11.5–14.5)
GLOBULIN SER CALC-MCNC: 4 G/DL (ref 2–4)
GLUCOSE SERPL-MCNC: 94 MG/DL (ref 65–100)
GLUCOSE UR STRIP.AUTO-MCNC: NEGATIVE MG/DL
HCT VFR BLD AUTO: 42.8 % (ref 36.6–50.3)
HGB BLD-MCNC: 14 G/DL (ref 12.1–17)
HGB UR QL STRIP: NEGATIVE
HYALINE CASTS URNS QL MICRO: NORMAL /LPF (ref 0–5)
IMM GRANULOCYTES # BLD: 0 K/UL (ref 0–0.04)
IMM GRANULOCYTES NFR BLD AUTO: 0 % (ref 0–0.5)
KETONES UR QL STRIP.AUTO: NEGATIVE MG/DL
LEUKOCYTE ESTERASE UR QL STRIP.AUTO: NEGATIVE
LYMPHOCYTES # BLD: 2.9 K/UL (ref 0.8–3.5)
LYMPHOCYTES NFR BLD: 34 % (ref 12–49)
MCH RBC QN AUTO: 28.8 PG (ref 26–34)
MCHC RBC AUTO-ENTMCNC: 32.7 G/DL (ref 30–36.5)
MCV RBC AUTO: 88.1 FL (ref 80–99)
MONOCYTES # BLD: 0.6 K/UL (ref 0–1)
MONOCYTES NFR BLD: 7 % (ref 5–13)
NEUTS SEG # BLD: 4.7 K/UL (ref 1.8–8)
NEUTS SEG NFR BLD: 54 % (ref 32–75)
NITRITE UR QL STRIP.AUTO: NEGATIVE
NRBC # BLD: 0 K/UL (ref 0–0.01)
NRBC BLD-RTO: 0 PER 100 WBC
P-R INTERVAL, ECG05: 148 MS
PH UR STRIP: 5.5 [PH] (ref 5–8)
PLATELET # BLD AUTO: 319 K/UL (ref 150–400)
PMV BLD AUTO: 10.1 FL (ref 8.9–12.9)
POTASSIUM SERPL-SCNC: 4.1 MMOL/L (ref 3.5–5.1)
PROT SERPL-MCNC: 7.4 G/DL (ref 6.4–8.2)
PROT UR STRIP-MCNC: NEGATIVE MG/DL
Q-T INTERVAL, ECG07: 368 MS
QRS DURATION, ECG06: 78 MS
QTC CALCULATION (BEZET), ECG08: 419 MS
RBC # BLD AUTO: 4.86 M/UL (ref 4.1–5.7)
RBC #/AREA URNS HPF: NORMAL /HPF (ref 0–5)
SODIUM SERPL-SCNC: 138 MMOL/L (ref 136–145)
SP GR UR REFRACTOMETRY: 1.02 (ref 1–1.03)
TROPONIN I BLD-MCNC: <0.04 NG/ML (ref 0–0.08)
TROPONIN I SERPL-MCNC: <0.05 NG/ML
TROPONIN I SERPL-MCNC: <0.05 NG/ML
TSH SERPL DL<=0.05 MIU/L-ACNC: 1.39 UIU/ML (ref 0.36–3.74)
UA: UC IF INDICATED,UAUC: NORMAL
UROBILINOGEN UR QL STRIP.AUTO: 0.2 EU/DL (ref 0.2–1)
VENTRICULAR RATE, ECG03: 78 BPM
WBC # BLD AUTO: 8.6 K/UL (ref 4.1–11.1)
WBC URNS QL MICRO: NORMAL /HPF (ref 0–4)

## 2018-11-30 PROCEDURE — 93005 ELECTROCARDIOGRAM TRACING: CPT

## 2018-11-30 PROCEDURE — 84484 ASSAY OF TROPONIN QUANT: CPT

## 2018-11-30 PROCEDURE — 83880 ASSAY OF NATRIURETIC PEPTIDE: CPT

## 2018-11-30 PROCEDURE — 80053 COMPREHEN METABOLIC PANEL: CPT

## 2018-11-30 PROCEDURE — 74011250637 HC RX REV CODE- 250/637: Performed by: EMERGENCY MEDICINE

## 2018-11-30 PROCEDURE — 82550 ASSAY OF CK (CPK): CPT

## 2018-11-30 PROCEDURE — 85025 COMPLETE CBC W/AUTO DIFF WBC: CPT

## 2018-11-30 PROCEDURE — 99285 EMERGENCY DEPT VISIT HI MDM: CPT

## 2018-11-30 PROCEDURE — 84443 ASSAY THYROID STIM HORMONE: CPT

## 2018-11-30 PROCEDURE — 73130 X-RAY EXAM OF HAND: CPT

## 2018-11-30 PROCEDURE — 36415 COLL VENOUS BLD VENIPUNCTURE: CPT

## 2018-11-30 PROCEDURE — 81001 URINALYSIS AUTO W/SCOPE: CPT

## 2018-11-30 PROCEDURE — 71045 X-RAY EXAM CHEST 1 VIEW: CPT

## 2018-11-30 PROCEDURE — 70450 CT HEAD/BRAIN W/O DYE: CPT

## 2018-11-30 RX ORDER — ASPIRIN 325 MG
325 TABLET ORAL
Status: COMPLETED | OUTPATIENT
Start: 2018-11-30 | End: 2018-11-30

## 2018-11-30 RX ORDER — PANTOPRAZOLE SODIUM 20 MG/1
20 TABLET, DELAYED RELEASE ORAL DAILY
Qty: 30 TAB | Refills: 11 | Status: SHIPPED | OUTPATIENT
Start: 2018-11-30 | End: 2019-01-09

## 2018-11-30 RX ORDER — METOPROLOL TARTRATE 25 MG/1
25 TABLET, FILM COATED ORAL 2 TIMES DAILY
COMMUNITY
End: 2019-03-26

## 2018-11-30 RX ORDER — ACETAMINOPHEN 500 MG
2000 TABLET ORAL
COMMUNITY

## 2018-11-30 RX ORDER — ALBUTEROL SULFATE 90 UG/1
2 AEROSOL, METERED RESPIRATORY (INHALATION)
COMMUNITY
End: 2020-01-31 | Stop reason: SDUPTHER

## 2018-11-30 RX ORDER — NITROGLYCERIN 0.4 MG/1
0.4 TABLET SUBLINGUAL
Qty: 1 BOTTLE | Refills: 1 | Status: SHIPPED | OUTPATIENT
Start: 2018-11-30 | End: 2021-07-28 | Stop reason: SDUPTHER

## 2018-11-30 RX ORDER — ATOMOXETINE 25 MG/1
25 CAPSULE ORAL
COMMUNITY
End: 2019-01-09

## 2018-11-30 RX ORDER — ISOSORBIDE MONONITRATE 30 MG/1
30 TABLET, EXTENDED RELEASE ORAL DAILY
Qty: 30 TAB | Refills: 11 | Status: SHIPPED | OUTPATIENT
Start: 2018-11-30 | End: 2019-10-01 | Stop reason: SDUPTHER

## 2018-11-30 RX ADMIN — ASPIRIN 325 MG: 325 TABLET ORAL at 12:10

## 2018-11-30 RX ADMIN — BACITRACIN ZINC, NEOMYCIN SULFATE, POLYMYXIN B SULFATE 1 PACKET: 3.5; 5000; 4 OINTMENT TOPICAL at 12:10

## 2018-11-30 RX ADMIN — NITROGLYCERIN 1 INCH: 20 OINTMENT TOPICAL at 12:10

## 2018-11-30 NOTE — ED NOTES
Discharge instructions reviewed with patient, copy given by Dr. Ana Blum. Pt is accomponied by family, denies use of wheelchair.

## 2018-11-30 NOTE — ED PROVIDER NOTES
EMERGENCY DEPARTMENT HISTORY AND PHYSICAL EXAM 
 
 
Date: 11/30/2018 Patient Name: Ania Lew History of Presenting Illness Chief Complaint Patient presents with  Fatigue Patient complain of fatigue onset a couple od days ago  Chest Pain  Extremity Weakness History Provided By: Patient and Patient's Wife HPI: Ania Lew, 46 y.o. male with PMHx significant for STEMI, HTN, tobacco abuse, presents ambulatory to the ED with cc of a sharp chest pain 5 days ago, worsening this morning. He reports associated symptoms of lightheadedness, constant sob, feeling flushed, and nausea this morning. Pt states his pain was at its worst x 5 days ago and was sharp in nature. Today, he had experienced a mild, L sided chest pain, while waiting in the hallway today. Per medical records, the pt was admitted from 09/25-09/27 for a STEMI with a stent in his LAD and EF of 40%. Pt has residual disease in 2 vessels. He had seen his PCP yesterday, who believes his symptoms are secondary to not having stents in all his blocked vessels and had increased his metoprolol to 2 doses in the morning and 2 doses in the evening. He states this episode of chest pain is \"sometimes\" similar to his prior STEMI. He had taken his dose of ASA today. The pt is also complaining of a R hand swelling from a cut that he had sustained while cutting food. He endorses his TDAP being UTD. He further reports a R arm weakness for a couple days and states he is R hand dominant. 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 Additionally, pt specifically denies any recent fever, chills, headache, vomiting, diarrhea, abdominal pain, dizziness, numbness, slurred speech, confusion, tingling, BLE swelling, heart palpitations, urinary sxs, changes in BM, changes in PO intake, melena, hematochezia, cough, or congestion. PCP: Gagan Galaviz MD  
Cardiology: Dr. Joe Gomez PMHx: Significant for HTN, STEMI 
 PSHx: Significant for cardiac stent Social Hx: +tobacco (1 pdd), +EtOH, -Illicit Drugs There are no other complaints, changes or physical findings at this time. Past History Past Medical History: 
Past Medical History:  
Diagnosis Date  ADD (attention deficit disorder) 9/25/2018  Asthma  Chest pain 11/30/2018  Dyslipidemia 9/25/2018  Fatigue 11/30/2018  
 HTN (hypertension) 9/25/2018  Hypertension  Other ill-defined conditions(799.89) ADHD,BELL'S PALSY WEAKNESS  LT FACE  
 Psychiatric disorder DEPRESSION  
 S/P coronary artery stent placement 9/25/2018 9/25/18 PCI/MAVIS to LAD  STEMI (ST elevation myocardial infarction) ([facility] [address] 9/25/2018  Tobacco abuse 9/25/2018 Past Surgical History: 
Past Surgical History:  
Procedure [facility] Date [facility]  
 HX HEENT    
 TEETH EXTRACTION  
 [facility] 86 FRACTURE RT FEMUR  
 NEUROLOGICAL PROCEDURE UNLISTED    
 NECK Family History: 
Family History Problem Relation Age of Onset  Hypertension Mother  Lung Disease Paternal Aunt Social History: 
Social History Tobacco Use  Smoking status: Current Every Day Smoker Packs/day: 1.00 Years: 40.00 Pack years: 40.00  Smokeless tobacco: Never Used  Tobacco comment: were on chantix,stopped 2 days ago due to stomachache Substance Use Topics  Alcohol use: Yes Alcohol/week: 1.2 oz Types: 1 Cans of beer, 1 Shots of liquor per week Comment: special  
 Drug use: No  
 
 
Allergies: Allergies Allergen Reactions  Bee Sting [Sting, Bee] Anaphylaxis Review of Systems Review of Systems Constitutional: Negative for chills and fever. HENT: Negative. Negative for congestion, facial swelling, rhinorrhea, sore throat, trouble swallowing and voice change. Eyes: Negative. Respiratory: Positive for shortness of breath.  Negative for apnea, cough, chest tightness and wheezing. Cardiovascular: Positive for chest pain. Negative for palpitations and leg swelling. Gastrointestinal: Positive for nausea. Negative for abdominal distention, abdominal pain, blood in stool, constipation, diarrhea and vomiting. Endocrine: Negative. Negative for cold intolerance, heat intolerance and polyuria. Genitourinary: Negative. Negative for difficulty urinating, dysuria, flank pain, frequency, hematuria and urgency. Musculoskeletal: Negative. Negative for arthralgias, back pain, myalgias, neck pain and neck stiffness. +R hand swelling Skin: Negative. Negative for color change and rash. Neurological: Positive for weakness (+R arm) and light-headedness. Negative for dizziness, syncope, facial asymmetry, speech difficulty, numbness and headaches. Hematological: Negative. Does not bruise/bleed easily. Psychiatric/Behavioral: Negative. Negative for confusion and self-injury. The patient is not nervous/anxious. Physical Exam  
Physical Exam  
Constitutional: He is oriented to person, place, and time. Vital signs are normal. He appears well-developed and well-nourished. He is cooperative. Non-toxic appearance. Smells of tobacco  
HENT:  
Head: Normocephalic and atraumatic. Mouth/Throat: Mucous membranes are normal. No posterior oropharyngeal erythema. Eyes: Conjunctivae and EOM are normal. Pupils are equal, round, and reactive to light. Neck: Normal range of motion. Cardiovascular: Normal rate, regular rhythm, normal heart sounds and intact distal pulses. Exam reveals no gallop and no friction rub. No murmur heard. Pulmonary/Chest: Effort normal and breath sounds normal. No respiratory distress. He has no wheezes. He has no rales. He exhibits no tenderness. Abdominal: Soft. Bowel sounds are normal. He exhibits no distension and no mass. There is no tenderness. There is no rebound and no guarding. Musculoskeletal: Normal range of motion. He exhibits no edema, tenderness or deformity. Neurological: He is alert and oriented to person, place, and time. He displays normal reflexes. No cranial nerve deficit. He exhibits normal muscle tone. Coordination normal.  
L sided facial droop involving forehead (baseline per pt), strength 4/5 R arm Skin: Skin is warm. No rash noted. R hand: superficial abrasion/wound on R dorsal aspect 4th MCP, minimal swelling, no crepitus, no bony TTP, no erythema or discharge. Psychiatric: He has a normal mood and affect. Nursing note and vitals reviewed. Diagnostic Study Results Labs - Recent Results (from the past 24 hour(s)) CBC WITH AUTOMATED DIFF Collection Time: 11/30/18 12:09 PM  
Result Value Ref Range WBC 8.6 4.1 - 11.1 K/uL  
 RBC 4.86 4.10 - 5.70 M/uL  
 HGB 14.0 12.1 - 17.0 g/dL HCT 42.8 36.6 - 50.3 % MCV 88.1 80.0 - 99.0 FL  
 MCH 28.8 26.0 - 34.0 PG  
 MCHC 32.7 30.0 - 36.5 g/dL  
 RDW 14.0 11.5 - 14.5 % PLATELET 653 458 - 290 K/uL MPV 10.1 8.9 - 12.9 FL  
 NRBC 0.0 0  WBC ABSOLUTE NRBC 0.00 0.00 - 0.01 K/uL NEUTROPHILS 54 32 - 75 % LYMPHOCYTES 34 12 - 49 % MONOCYTES 7 5 - 13 % EOSINOPHILS 4 0 - 7 % BASOPHILS 1 0 - 1 % IMMATURE GRANULOCYTES 0 0.0 - 0.5 % ABS. NEUTROPHILS 4.7 1.8 - 8.0 K/UL  
 ABS. LYMPHOCYTES 2.9 0.8 - 3.5 K/UL  
 ABS. MONOCYTES 0.6 0.0 - 1.0 K/UL  
 ABS. EOSINOPHILS 0.4 0.0 - 0.4 K/UL  
 ABS. BASOPHILS 0.1 0.0 - 0.1 K/UL  
 ABS. IMM. GRANS. 0.0 0.00 - 0.04 K/UL  
 DF AUTOMATED METABOLIC PANEL, COMPREHENSIVE Collection Time: 11/30/18 12:09 PM  
Result Value Ref Range Sodium 138 136 - 145 mmol/L Potassium 4.1 3.5 - 5.1 mmol/L Chloride 106 97 - 108 mmol/L  
 CO2 26 21 - 32 mmol/L Anion gap 6 5 - 15 mmol/L Glucose 94 65 - 100 mg/dL BUN 18 6 - 20 MG/DL Creatinine 0.83 0.70 - 1.30 MG/DL  
 BUN/Creatinine ratio 22 (H) 12 - 20 GFR est AA >60 >60 ml/min/1.73m2 GFR est non-AA >60 >60 ml/min/1.73m2 Calcium 8.8 8.5 - 10.1 MG/DL Bilirubin, total 0.3 0.2 - 1.0 MG/DL  
 ALT (SGPT) 26 12 - 78 U/L  
 AST (SGOT) 15 15 - 37 U/L Alk. phosphatase 111 45 - 117 U/L Protein, total 7.4 6.4 - 8.2 g/dL Albumin 3.4 (L) 3.5 - 5.0 g/dL Globulin 4.0 2.0 - 4.0 g/dL A-G Ratio 0.9 (L) 1.1 - 2.2    
TROPONIN I Collection Time: 11/30/18 12:09 PM  
Result Value Ref Range Troponin-I, Qt. <0.05 <0.05 ng/mL NT-PRO BNP Collection Time: 11/30/18 12:09 PM  
Result Value Ref Range NT pro- (H) 0 - 125 PG/ML  
TSH 3RD GENERATION Collection Time: 11/30/18 12:09 PM  
Result Value Ref Range TSH 1.39 0.36 - 3.74 uIU/mL  
POC TROPONIN-I Collection Time: 11/30/18 12:11 PM  
Result Value Ref Range Troponin-I (POC) <0.04 0.00 - 0.08 ng/mL URINALYSIS W/ REFLEX CULTURE Collection Time: 11/30/18  2:51 PM  
Result Value Ref Range Color YELLOW/STRAW Appearance CLEAR CLEAR Specific gravity 1.021 1.003 - 1.030    
 pH (UA) 5.5 5.0 - 8.0 Protein NEGATIVE  NEG mg/dL Glucose NEGATIVE  NEG mg/dL Ketone NEGATIVE  NEG mg/dL Bilirubin NEGATIVE  NEG Blood NEGATIVE  NEG Urobilinogen 0.2 0.2 - 1.0 EU/dL Nitrites NEGATIVE  NEG Leukocyte Esterase NEGATIVE  NEG    
 WBC 0-4 0 - 4 /hpf  
 RBC 0-5 0 - 5 /hpf Epithelial cells FEW FEW /lpf Bacteria NEGATIVE  NEG /hpf  
 UA:UC IF INDICATED CULTURE NOT INDICATED BY UA RESULT CNI Hyaline cast 0-2 0 - 5 /lpf  
TROPONIN I Collection Time: 11/30/18  2:51 PM  
Result Value Ref Range Troponin-I, Qt. <0.05 <0.05 ng/mL CK W/ REFLX CKMB Collection Time: 11/30/18  2:51 PM  
Result Value Ref Range CK 92 39 - 308 U/L Radiologic Studies -  
XR HAND RT MIN 3 V Final Result Initial Result:  
Impression:  
 IMPRESSION: 
 
No evidence for acute fracture or dislocation.  
 
 
   
  
  
  
Narrative:  
 history: Trauma with right hand pain COMPARISON: 6/27/2016 FINDINGS: 
 
3 views of the right hand submitted for review. No evidence for acute fracture or dislocation. CT Results  (Last 48 hours)  
          
 11/30/18 1157  CT HEAD WO CONT Final result Impression:  IMPRESSION: No acute abnormality. Narrative:  EXAM:  CT HEAD WO CONT INDICATION:   right arm weakness (2 days) COMPARISON: None. CONTRAST:  None. TECHNIQUE: Unenhanced CT of the head was performed using 5 mm images. Brain and  
bone windows were generated. CT dose reduction was achieved through use of a  
standardized protocol tailored for this examination and automatic exposure  
control for dose modulation. FINDINGS:  
The ventricles and sulci are normal in size, shape and configuration and  
midline. There is no significant white matter disease. There is no intracranial  
hemorrhage, extra-axial collection, mass, mass effect or midline shift. The  
basilar cisterns are open. No acute infarct is identified. The bone windows  
demonstrate no abnormalities. The visualized portions of the paranasal sinuses  
and mastoid air cells are clear. CXR Results  (Last 48 hours)  
          
 11/30/18 1148  XR CHEST PORT Final result Impression:  IMPRESSION:  
1. No acute process Narrative:  EXAM:  XR CHEST PORT INDICATION:  weakness, h/o STEMI, hypertension, myocardial infarction, tobacco  
abuse COMPARISON:  6/27/2016 FINDINGS: A portable AP radiograph of the chest was obtained at 1134 hours. The  
heart size is normal. Coronary stent is identified. The lungs are clear. Visualized osseous structures reveal that the patient is  
status post cervical spine surgery. Medical Decision Making I am the first provider for this patient. I reviewed the vital signs, available nursing notes, past medical history, past surgical history, family history and social history. Vital Signs-Reviewed the patient's vital signs. Patient Vitals for the past 24 hrs: 
 Pulse Resp BP SpO2  
11/30/18 1600 68 16 101/57 94 % 11/30/18 1430 77 15 112/76 97 % 11/30/18 1230 70 17 122/84 98 % 11/30/18 1216 88  126/78   
11/30/18 1215 75  121/79   
11/30/18 1214 75  124/82  ED EKG interpretation: 1031 Rhythm: normal sinus rhythm; and regular . Rate (approx.): 78; Axis: normal; P wave: normal; QRS interval: normal ; ST/T wave: normal; Other findings: anterolateral infarct. This EKG was interpreted by Crystal Be M.D. Records Reviewed: Nursing Notes, Old Medical Records, Previous electrocardiograms, Previous Radiology Studies and Previous Laboratory Studies Provider Notes (Medical Decision Making): DDx: atypical chest pain, ACS, costochondritis, PNA, bronchitis, CHF, pleural effusion, MSK pain, GERD, pancreatitis Pt presents with acute chest pain; stable vitals and nontoxic appearing. The pt is unlikely to have PE. There is no pleuritic chest pain, no tachycardia, no hypoxia, pt non-smoker, no unilateral leg swelling, no hormone use, no recent travel/immobilization, no recent surgery. Therefore no d-dimer or PE The pt is unlikely to have aortic dissection. The pulses are equal, there is no sharp tearing chest pain radiating to back, the patient is not extremely hypertensive. Therefore no d-dimer or CTA chest for dissection Patient not extremely hypertensive, unlikely to be hypertensive emergency. No history of valve abnormality and no harsh murmur, no signs of flash pulmonary edema, therefore less likely ruptured valve. Endocarditis unlikely -- no IV drug abuse, native valve, no fevers, patient well appearing, no murmurs/splinter hemorrhages/janeway lesions or oslar nodes The pt is unlikely to have esophageal rupture. There is no history of forceful vomiting, patient well appearing, no difficulty swallowing Will pursue cardiac work-up with EKG, troponin, ckmb, CXR. While the pt is less likely to have pneumonia as there is no cough and no fever, and less likely to have ptx, will order CXR to assess lung fields. Will provide pain control and reassess. ED Course:  
Initial assessment performed. The patients presenting problems have been discussed, and they are in agreement with the care plan formulated and outlined with them. I have encouraged them to ask questions as they arise throughout their visit. TOBACCO COUNSELING:  
Upon evaluation, pt expressed that they are a current tobacco user. For approximately 10 minutes, pt has been counseled on the dangers of smoking and was encouraged to quit as soon as possible in order to decrease further risks to their health. Pt has conveyed their understanding of the risks involved should they continue to use tobacco products. ALCOHOL/SUBSTANCE ABUSE COUNSELING: 
Upon evaluation, pt endorsed recent alcohol/illicit drug use. For approximately 15 minutes, pt has been counseled on the dangers of alcohol and illicit drug use on their health, and they were encouraged to quit as soon as possible in order to decrease further risks to their health. Pt has conveyed their understanding of the risks involved should they continue to use these products. HYPERTENSION COUNSELING Education was provided to the patient today regarding their hypertension. Patient is made aware of their elevated blood pressure and is instructed to follow up this week with their Primary Care for a recheck. Patient is counseled regarding consequences of chronic, uncontrolled hypertension including kidney disease, heart disease, stroke or even death. Patient states their understanding and agrees to follow up this week.  Additionally, during their visit, I discussed sodium restriction, maintaining ideal body weight and regular exercise program as physiologic means to achieve blood pressure control. The patient will strive towards this. I reviewed our electronic medical record system for any past medical records that were available that may contribute to the patients current condition, the nursing notes and vital signs from today's visit. Debra Michelle MD 
Medications Administered During ED Management[de-identified] 
Medications  
aspirin tablet 325 mg (325 mg Oral Given 11/30/18 1210)  
neomycin-bacitracnZn-polymyxnB (NEOSPORIN) ointment 1 Packet (1 Packet Topical Given 11/30/18 1210)  
nitroglycerin (NITROBID) 2 % ointment 1 Inch (1 Inch Topical Given 11/30/18 1210) Progress Note: 
ED Course as of Dec 01 1044 Fri Nov 30, 2018 1335 Pt updated on results; currently without chest pain or SOB; will consult Cardiology, will ambulate patient to see if sx's are reproduced. [HW] 279 [facility] NOTE: 
1:53 PM 
Leslie Javier MD spoke with Dr. Joel Howard, Specialty: Cardiology Discussed patient's hx, disposition, and available diagnostic and imaging results. Reviewed care plans. Consultant agrees with plans as outlined. He recommends performing a repeat trop at 1500. [CT] 315 [facility], NP (cardiology) had came by and states she will await for the second trop, but will place the pt on IMDUR and PPI.  [CT] 12 Pt assessed by Donald Scales NP; ok for discharge home if 2nd trop is negative. [HW] 5 Pt ambulated to restroom and denies any sx of chest pain. [HW] ED Course User Index 
[CT] Mallory Dears 
[HW] Sarai Kang MD  
 
 
Progress Note: 
4:10PM 
Cardiology Note reviewed: 
Continue on ASA, Brilinta, BB, statin Start Imdur 30mg daily, nitro sl PRN, and PPI (GI prophylactic) Scheduled nuclear stress study on Tuesday Dec 4, 2018 at [facility] office. Progress Note: 
4:10PM 
Patient reports feeling better and symptoms have improved after ED treatment. Pt able to tolerate PO and ambulate per baseline.  Brooke Sandoval Moira's final labs and imaging have been reviewed with him. He has been counseled regarding his diagnosis. He verbally conveys understanding and agreement of the signs, symptoms, diagnosis, treatment and prognosis and additionally agrees to follow up as recommended with Dr. Kimi Blood MD in 24 - 48 hours. He also agrees with the care-plan and conveys that all of his questions have been answered. I have also put together some discharge instructions for him that include: 1) educational information regarding their diagnosis, 2) how to care for their diagnosis at home, as well a 3) list of reasons why they would want to return to the ED prior to their follow-up appointment, should their condition change. Critical Care Time:  
0 Disposition: 
DISCHARGE NOTE 
4:10PM 
The patient has been re-evaluated and is ready for discharge. Reviewed available results with patient. Counseled patient on diagnosis and care plan. Patient has expressed understanding, and all questions have been answered. Patient agrees with plan and agrees to follow up as recommended, or return to the ED if their symptoms worsen. Discharge instructions have been provided and explained to the patient, along with reasons to return to the ED. PLAN: 
1. Discharge Discharge Medication List as of 11/30/2018  4:14 PM  
  
START taking these medications Details  
isosorbide mononitrate ER (IMDUR) 30 mg tablet Take 1 Tab by mouth daily. , Normal, Disp-30 Tab, R-11  
  
pantoprazole (PROTONIX) 20 mg tablet Take 1 Tab by mouth daily. , Normal, Disp-30 Tab, R-11  
  
nitroglycerin (NITROSTAT) 0.4 mg SL tablet 1 Tab by SubLINGual route every five (5) minutes as needed for Chest Pain. Up to 3 doses. , Normal, Disp-1 Bottle, R-1  
  
  
CONTINUE these medications which have NOT CHANGED Details  
!! atomoxetine (STRATTERA) 25 mg capsule Take 25 mg by mouth daily (after lunch).  Patient takes 40 mg QAM and 25 mg in the afternoon, Historical Med  
 metoprolol tartrate (LOPRESSOR) 25 mg tablet Take 50 mg by mouth two (2) times a day., Historical Med  
  
acetaminophen (TYLENOL) 500 mg tablet Take 2,000 mg by mouth two (2) times daily as needed for Pain., Historical Med  
  
albuterol (PROAIR HFA) 90 mcg/actuation inhaler Take 2 Puffs by inhalation every four (4) hours as needed for Wheezing., Historical Med  
  
losartan (COZAAR) 25 mg tablet TAKE 1 TABLET BY MOUTH ONCE DAILY, NormalPlease consider 90 day supplies to promote better adherenceDisp-30 Tab, R-1  
  
!! atomoxetine (STRATTERA) 40 mg capsule Take 40 mg by mouth daily. Patient takes 40 mg QAM and 25 mg in the afternoon, Historical Med CHANTIX STARTING MONTH BOX 0.5 mg (11)- 1 mg (42) DsPk Take 1 mg by mouth daily (after breakfast). , Historical Med, R-0, MITCHELL  
  
aspirin delayed-release 81 mg tablet Take 1 Tab by mouth daily. , Print, Disp-30 Tab, R-11  
  
atorvastatin (LIPITOR) 40 mg tablet Take 1 Tab by mouth nightly. , Print, Disp-30 Tab, R-11  
  
ticagrelor (BRILINTA) 90 mg tablet Take 1 Tab by mouth every twelve (12) hours every twelve (12) hours. , Print, Disp-60 Tab, R-11 DULoxetine (CYMBALTA) 60 mg capsule Take 60 mg by mouth daily. , Historical Med  
  
 !! - Potential duplicate medications found. Please discuss with provider. 2.  
Follow-up Information Follow up With Specialties Details Why Contact Info Arslan Cleveland MD [facility]   [address] [place] [address] 
[phone] [facility] EMERGENCY DEPT Emergency Medicine  As needed, If symptoms worsen [address] [facility] 
[phone] [facility]  On 12/4/2018 at 9:30AM for nuclear stress test.  Please do not eat past midnight 12/3/18. Stop metoprolol onf 12/3/18 AM dose for stress study. Do not drink any caffeine 24 hours prior to the stress study. [address] [address] Return to ED if worse Diagnosis Clinical Impression: 1. Atypical chest pain 2. Acute chest pain 3. Encounter for tobacco use cessation counseling 4. Fatigue, unspecified type 5. CAD S/P percutaneous coronary angioplasty 6. Abrasion of right hand, initial encounter Attestations This note is prepared by Servando Montiel, acting as Scribe for MD Charlotte Reyes MD : The scribe's documentation has been prepared under my direction and personally reviewed by me in its entirety. I confirm that the note above accurately reflects all work, treatment, procedures, and medical decision making performed by me. 
 
: 
 
This note will not be viewable in [address] [place]

## 2018-11-30 NOTE — PROGRESS NOTES
[address] [address] [address] [facility]  [phone] [address] [facility] Date of  Admission: 11/30/2018 11:20 AM  
 
Admission type:Emergency Consult for: C/O CHEST PAIN Consult by ED MD 
 
 Subjective:  
 
Anneliese Garcia is a 46 y.o. male presented to ED with c/o nausea, flushed feeling, diaphoretic and weakness. While sitting in waiting room did have fleeting, stabbing chest discomfort last secs. Patient has hx of STEMI with PCI/MAVIS to LAD, HTN, dyslipidemia, tob abuse. Patient further states that on 11/25/18 had a 5 minutes episode of chest discomfort with assoc symptoms. Since no further events. His wife states that over the last few weeks he has been off and on not feeling well, c/o weakness, fatigue. ECG SR with inverted T waver ant/laterally, old, no acute changes. Troponin x 2 neg. Patient continues smoking 2 pks of cigs/day, has not been going to rehab. Previous treatment/evaluation includes Percutaneous Coronary Intervention and echocardiogram .  He does have some residual branch vessel CAD. Cardiac risk factors: smoking/ tobacco exposure, family history, dyslipidemia, obesity, sedentary life style, male gender, hypertension. Patient Active Problem List  
 Diagnosis Date Noted  Fatigue 11/30/2018  Chest pain 11/30/2018  Arteriosclerotic heart disease (ASHD) 10/05/2018  STEMI (ST elevation myocardial infarction) ([facility] [address] 09/25/2018  
 HTN (hypertension) 09/25/2018  Dyslipidemia 09/25/2018  ADD (attention deficit disorder) 09/25/2018  Tobacco abuse 09/25/2018  S/P coronary artery stent placement 09/25/2018 Zeke Vila MD 
Past Medical History:  
Diagnosis Date  ADD (attention deficit disorder) 9/25/2018  Asthma  Chest pain 11/30/2018  Dyslipidemia 9/25/2018  Fatigue 11/30/2018  
 HTN (hypertension) 9/25/2018  Hypertension  Other ill-defined conditions(799.89) ADHD,ARIAS'S PALSY WEAKNESS  LT FACE  
 Psychiatric disorder DEPRESSION  
 S/P coronary artery stent placement 9/25/2018 9/25/18 PCI/MAVIS to LAD  STEMI (ST elevation myocardial infarction) ([facility] [address] 9/25/2018  Tobacco abuse 9/25/2018 Social History Socioeconomic History  Marital status:  Spouse name: Alireza Araiza  Number of children: 2  
 Years of education: 15  
 Highest education level: 12th grade Social Needs  Financial resource strain: Not hard at all  Food insecurity - worry: Never true  Food insecurity - inability: Never true  Transportation needs - medical: Patient refused  Transportation needs - non-medical: Patient refused Tobacco Use  Smoking status: Current Every Day Smoker Packs/day: 1.00 Years: 40.00 Pack years: 40.00  Smokeless tobacco: Never Used  Tobacco comment: were on chantix,stopped 2 days ago due to stomachache Substance and Sexual Activity  Alcohol use: Yes Alcohol/week: 1.2 oz Types: 1 Cans of beer, 1 Shots of liquor per week Comment: special  
 Drug use: No  
 Sexual activity: Not Currently Other Topics Concern   Service No  
 Blood Transfusions Yes  Caffeine Concern Yes Comment: coffee 1 pot  Occupational Exposure Yes Comment: cig smoke  Hobby Hazards No  
 Sleep Concern Yes Comment: does not sleep well,wakes up sweating,dog  Stress Concern Yes Comment: high level daily -depression he states  Weight Concern Yes Comment: more tired  Special Diet No  
 Back Care No  
  Comment: neck and low back surgery  Exercise No  
  Comment: does not exercise  Bike Helmet No  
  Comment: does not ride bike [address] Yes Comment: 1/2 time wears seatbelt  Self-Exams No  
 
Allergies Allergen Reactions  Bee Sting [Sting, Bee] Anaphylaxis Family History Problem Relation Age of Onset  Hypertension Mother  Lung Disease Paternal Aunt No current facility-administered medications for this encounter. Current Outpatient Medications Medication Sig  
 atomoxetine (STRATTERA) 25 mg capsule Take 25 mg by mouth daily (after lunch). Patient takes 40 mg QAM and 25 mg in the afternoon  metoprolol tartrate (LOPRESSOR) 25 mg tablet Take 50 mg by mouth two (2) times a day.  acetaminophen (TYLENOL) 500 mg tablet Take 2,000 mg by mouth two (2) times daily as needed for Pain.  albuterol (PROAIR HFA) 90 mcg/actuation inhaler Take 2 Puffs by inhalation every four (4) hours as needed for Wheezing.  losartan (COZAAR) 25 mg tablet TAKE 1 TABLET BY MOUTH ONCE DAILY  atomoxetine (STRATTERA) 40 mg capsule Take 40 mg by mouth daily. Patient takes 40 mg QAM and 25 mg in the afternoon  CHANTIX STARTING MONTH BOX 0.5 mg (11)- 1 mg (42) DsPk Take 1 mg by mouth daily (after breakfast).  aspirin delayed-release 81 mg tablet Take 1 Tab by mouth daily.  atorvastatin (LIPITOR) 40 mg tablet Take 1 Tab by mouth nightly.  ticagrelor (BRILINTA) 90 mg tablet Take 1 Tab by mouth every twelve (12) hours every twelve (12) hours.  DULoxetine (CYMBALTA) 60 mg capsule Take 60 mg by mouth daily. Review of Symptoms:  
11 systems reviewed, negative other than as stated in the HPI Objective:  
  
Visit Vitals /76 Pulse 77 Temp 97 °F (36.1 °C) Resp 15 Ht 5' 9\" (1.753 m) Wt 92.5 kg (203 lb 14.8 oz) SpO2 97% BMI 30.11 kg/m² Physical:  
General: slightly obese Heart: RRR, no m/S3/JVD, no carotid bruits Lungs: clear Abdomen: Soft, +BS, NTND Extremities: LE guillermo +DP/PT, no edema Neurologic: Grossly normal 
 
Data Review:  
Recent Labs 11/30/18 
1209 WBC 8.6 HGB 14.0  
HCT 42.8  Recent Labs 11/30/18 
1209   
K 4.1  CO2 26 GLU 94 BUN 18 CREA 0.83 CA 8.8 ALB 3.4* TBILI 0.3 SGOT 15 ALT 26 Recent Labs 11/30/18 
1451 11/30/18 
1209 TROIQ <0.05 <0.05 No intake or output data in the 24 hours ending 11/30/18 1543 Cardiographics Telemetry: SR 
ECG: SR with  No acute changes Assessment:  
  
 Principal Problem: 
  Fatigue (11/30/2018) Active Problems: 
  HTN (hypertension) (9/25/2018) Dyslipidemia (9/25/2018) ADD (attention deficit disorder) (9/25/2018) Tobacco abuse (9/25/2018) S/P coronary artery stent placement (9/25/2018) Overview: 9/25/18 PCI/MAVIS to LAD Arteriosclerotic heart disease (ASHD) (10/5/2018) Chest pain (11/30/2018) Plan:  
CAD with  Hx of STEMI: 
His presentation to the ED with c/o fatigue and a few minor episodes of chest discomfort are not likely cardiac related. Troponin's neg x 2 and episodes of CP started 6 days ago. Not to discount that this could be anginal symptoms as he does have some small vessel disease Continue on ASA, Brilinta, BB, statin Start Imdur 30mg daily, nitro sl PRN, and PPI (GI prophylactic) Tob Abuse: 
Patient admits to worsening depression, is ADHD and uses tob products as  \"relaxer\" Encouraged cessation. Scheduled nuclear stress study on Tuesday Dec 4, 2018 at [facility] office. Instructed patient to return to ED if s/s worsen. Thank you for consulting [facility]. Abhi Hardy NP Patient seen and examined by me with nurse practitioner Kristen Mullins. I personally performed all components of the history, physical, and medical decision making and agree with the assessment and plan with minor modifications as noted. I Vernell issue today nausea, flushed feeling, diaphoresis without any specific abnormal vital signs. EKG unchanged, ruled out for MI by serial markers. A couple brief episodes of chest discomfort. Will add isosorbide mononitrate, sublingual nitroglycerin, PPI, and has nuclear stress test scheduled to evaluate small branch vessel CAD. Follow-up with me after that.  The patient knows to go to the ED if any progression of symptoms or symptoms not relieved immediately by rest/ NTG.

## 2018-11-30 NOTE — DISCHARGE INSTRUCTIONS
Thank you for allowing us to take care of you today! We hope we addressed all of your concerns and needs. We strive to provide excellent quality care in the Emergency Department. You will receive a survey after your visit to evaluate the care you were provided. Should you receive a survey from us, we invite you to share your experience and tell us what made it excellent. It was a pleasure serving you, we invite you to share your experience with us, in our pursuit for excellence, should you be selected to receive a survey. The exam and treatment you received in the Emergency Department were for an urgent problem and are not intended as complete care. It is important that you follow up with a doctor, nurse practitioner, or physician assistant for ongoing care. If your symptoms become worse or you do not improve as expected and you are unable to reach your usual health care provider, you should return to the Emergency Department. We are available 24 hours a day. Please take your discharge instructions with you when you go to your follow-up appointment. If you have any problem arranging a follow-up appointment, contact the Emergency Department immediately. If a prescription has been provided, please have it filled as soon as possible to prevent a delay in treatment. Read the entire medication instruction sheet provided to you by the pharmacy. If you have any questions or reservations about taking the medication due to side effects or interactions with other medications, please call your primary care physician or contact the ER to speak with the charge nurse. Make an appointment with your family doctor or the physician you were referred to for follow-up of this visit as instructed on your discharge paperwork, as this is mandatory follow-up. Return to the ER if you are unable to be seen or if you are unable to be seen in a timely manner.     If you have any problem arranging the follow-up visit, contact the Emergency Department immediately. I hope you feel better and thank you again for allow us to provide you with excellent care today at [facility]! Warmest regards,    Angela Turcios MD  Emergency Medicine Physician  [facility]           Chest Pain: Care Instructions  Your Care Instructions    There are many things that can cause chest pain. Some are not serious and will get better on their own in a few days. But some kinds of chest pain need more testing and treatment. Your doctor may have recommended a follow-up visit in the next 8 to 12 hours. If you are not getting better, you may need more tests or treatment. Even though your doctor has released you, you still need to watch for any problems. The doctor carefully checked you, but sometimes problems can develop later. If you have new symptoms or if your symptoms do not get better, get medical care right away. If you have worse or different chest pain or pressure that lasts more than 5 minutes or you passed out (lost consciousness), call 911 or seek other emergency help right away. A medical visit is only one step in your treatment. Even if you feel better, you still need to do what your doctor recommends, such as going to all suggested follow-up appointments and taking medicines exactly as directed. This will help you recover and help prevent future problems. How can you care for yourself at home? · Rest until you feel better. · Take your medicine exactly as prescribed. Call your doctor if you think you are having a problem with your medicine. · Do not drive after taking a prescription pain medicine. When should you call for help? Call 911 if:    · You passed out (lost consciousness).     · You have severe difficulty breathing.     · You have symptoms of a heart attack. These may include:  ?  Chest pain or pressure, or a strange feeling in your chest.  ? Sweating. ? Shortness of breath. ? Nausea or vomiting. ? Pain, pressure, or a strange feeling in your back, neck, jaw, or upper belly or in one or both shoulders or arms. ? Lightheadedness or sudden weakness. ? A fast or irregular heartbeat. After you call 911, the  may tell you to chew 1 adult-strength or 2 to 4 low-dose aspirin. Wait for an ambulance. Do not try to drive yourself.    Call your doctor today if:    · You have any trouble breathing.     · Your chest pain gets worse.     · You are dizzy or lightheaded, or you feel like you may faint.     · You are not getting better as expected.     · You are having new or different chest pain. Where can you learn more? Go to http://amor-liam.info/. Enter A120 in the search box to learn more about \"Chest Pain: Care Instructions. \"  Current as of: November 20, 2017  Content Version: 11.8  © 3926-4711 [employer]. Care instructions adapted under license by [employer] (which disclaims liability or warranty for this information). If you have questions about a medical condition or this instruction, always ask your healthcare professional. [address] [address] any warranty or liability for your use of this information.

## 2018-11-30 NOTE — PROGRESS NOTES
Pharmacy Clarification of Prior to Admission Medication Regimen The patient was interviewed regarding clarification of the prior to admission medication regimen. Wife was present in room and obtained permission from patient to discuss drug regimen with visitor(s) present. Patient was questioned regarding use of any other inhalers, topical products, over the counter medications, herbal medications, vitamin products or ophthalmic/nasal/otic medication use. Information Obtained From: Patient's wife, August Sites Pertinent Pharmacy Findings: 
? Patient is not a good historian. Patient's wife manages the medications. PTA medication list was corrected to the following:  
 
Prior to Admission Medications Prescriptions Last Dose Informant Patient Reported? Taking? CHANTIX STARTING MONTH BOX 0.5 mg (11)- 1 mg (42) DsPk 11/29/2018 at Unknown time Significant Other Yes Yes Sig: Take 1 mg by mouth daily (after breakfast). DULoxetine (CYMBALTA) 60 mg capsule 11/30/2018 at Unknown time Significant Other Yes Yes Sig: Take 60 mg by mouth daily. acetaminophen (TYLENOL) 500 mg tablet 11/28/2018 at Unknown time Significant Other Yes Yes Sig: Take 2,000 mg by mouth two (2) times daily as needed for Pain. albuterol (PROAIR HFA) 90 mcg/actuation inhaler 11/23/2018 at Unknown time Significant Other Yes Yes Sig: Take 2 Puffs by inhalation every four (4) hours as needed for Wheezing. aspirin delayed-release 81 mg tablet 11/30/2018 at Unknown time Significant Other No Yes Sig: Take 1 Tab by mouth daily. atomoxetine (STRATTERA) 25 mg capsule 11/29/2018 at Unknown time Significant Other Yes Yes Sig: Take 25 mg by mouth daily (after lunch). Patient takes 40 mg QAM and 25 mg in the afternoon  
atomoxetine (STRATTERA) 40 mg capsule 11/29/2018 at Unknown time Significant Other Yes Yes Sig: Take 40 mg by mouth daily. Patient takes 40 mg QAM and 25 mg in the afternoon atorvastatin (LIPITOR) 40 mg tablet 11/29/2018 at Unknown time Significant Other No Yes Sig: Take 1 Tab by mouth nightly. losartan (COZAAR) 25 mg tablet 11/30/2018 at Unknown time Significant Other No Yes Sig: TAKE 1 TABLET BY MOUTH ONCE DAILY  
metoprolol tartrate (LOPRESSOR) 25 mg tablet 11/30/2018 at Unknown time Significant Other Yes Yes Sig: Take 50 mg by mouth two (2) times a day. ticagrelor (BRILINTA) 90 mg tablet 11/30/2018 at Unknown time Significant Other No Yes Sig: Take 1 Tab by mouth every twelve (12) hours every twelve (12) hours. Facility-Administered Medications: None Thank you, 
Nate Espinoza CPhT Medication History Pharmacy Technician

## 2018-11-30 NOTE — LETTER
[address] 
[facility] EMERGENCY DEPT 
[address] [place] [address] [address] [id number] [phone] Work/School Note Date: 11/30/2018 To Whom It May concern: 
 
Anne Guevara was seen and treated today in the emergency room by the following provider(s): 
Attending Provider: Corrinne Bosch, MD.   
 
Anne Guevara may return to work on 12/2/2018. Sincerely, Cecilia Olvera RN

## 2018-12-04 ENCOUNTER — CLINICAL SUPPORT (OUTPATIENT)
Dept: CARDIOLOGY CLINIC | Age: 53
End: 2018-12-04

## 2018-12-04 DIAGNOSIS — R93.1 ABNORMAL NUCLEAR CARDIAC IMAGING TEST: ICD-10-CM

## 2018-12-04 DIAGNOSIS — R07.9 CHEST PAIN, UNSPECIFIED TYPE: Primary | ICD-10-CM

## 2018-12-05 DIAGNOSIS — E78.5 DYSLIPIDEMIA: ICD-10-CM

## 2018-12-05 DIAGNOSIS — I10 ESSENTIAL HYPERTENSION: ICD-10-CM

## 2018-12-05 DIAGNOSIS — Z95.5 S/P CORONARY ARTERY STENT PLACEMENT: ICD-10-CM

## 2018-12-05 DIAGNOSIS — I25.5 ISCHEMIC CARDIOMYOPATHY: ICD-10-CM

## 2018-12-05 DIAGNOSIS — I25.10 ARTERIOSCLEROTIC HEART DISEASE (ASHD): ICD-10-CM

## 2018-12-18 NOTE — TELEPHONE ENCOUNTER
Please call Lashaun Galloway with [facility] home delivery @ [phone], regarding a 90 day supply of Losartan which they need for it to mailed now. Thanks Left message need more information 3 come up at this #  Unable to send for refiill.

## 2018-12-19 RX ORDER — LOSARTAN POTASSIUM 25 MG/1
TABLET ORAL
Qty: 90 TAB | Refills: 1 | Status: SHIPPED | OUTPATIENT
Start: 2018-12-19 | End: 2019-08-01 | Stop reason: SDUPTHER

## 2019-01-04 ENCOUNTER — TELEPHONE (OUTPATIENT)
Dept: CARDIAC REHAB | Age: 54
End: 2019-01-04

## 2019-01-07 ENCOUNTER — APPOINTMENT (OUTPATIENT)
Dept: CT IMAGING | Age: 54
End: 2019-01-07
Attending: EMERGENCY MEDICINE
Payer: COMMERCIAL

## 2019-01-07 ENCOUNTER — HOSPITAL ENCOUNTER (EMERGENCY)
Age: 54
Discharge: HOME | End: 2019-01-08
Attending: EMERGENCY MEDICINE | Admitting: EMERGENCY MEDICINE
Payer: COMMERCIAL

## 2019-01-07 DIAGNOSIS — R55 SYNCOPE AND COLLAPSE: Primary | ICD-10-CM

## 2019-01-07 LAB
ALBUMIN SERPL-MCNC: 3.7 G/DL (ref 3.5–5)
ALBUMIN/GLOB SERPL: 0.9 {RATIO} (ref 1.1–2.2)
ALP SERPL-CCNC: 129 U/L (ref 45–117)
ALT SERPL-CCNC: 24 U/L (ref 12–78)
ANION GAP SERPL CALC-SCNC: 7 MMOL/L (ref 5–15)
APPEARANCE UR: CLEAR
AST SERPL-CCNC: 18 U/L (ref 15–37)
BACTERIA URNS QL MICRO: NEGATIVE /HPF
BASOPHILS # BLD: 0.1 K/UL (ref 0–0.1)
BASOPHILS NFR BLD: 1 % (ref 0–1)
BILIRUB SERPL-MCNC: 0.4 MG/DL (ref 0.2–1)
BILIRUB UR QL: NEGATIVE
BUN SERPL-MCNC: 12 MG/DL (ref 6–20)
BUN/CREAT SERPL: 15 (ref 12–20)
CALCIUM SERPL-MCNC: 9.4 MG/DL (ref 8.5–10.1)
CHLORIDE SERPL-SCNC: 105 MMOL/L (ref 97–108)
CK MB CFR SERPL CALC: 1.8 % (ref 0–2.5)
CK MB SERPL-MCNC: 2.2 NG/ML (ref 5–25)
CK SERPL-CCNC: 119 U/L (ref 39–308)
CO2 SERPL-SCNC: 28 MMOL/L (ref 21–32)
COLOR UR: ABNORMAL
CREAT SERPL-MCNC: 0.82 MG/DL (ref 0.7–1.3)
DIFFERENTIAL METHOD BLD: ABNORMAL
EOSINOPHIL # BLD: 0.3 K/UL (ref 0–0.4)
EOSINOPHIL NFR BLD: 3 % (ref 0–7)
EPITH CASTS URNS QL MICRO: ABNORMAL /LPF
ERYTHROCYTE [DISTWIDTH] IN BLOOD BY AUTOMATED COUNT: 14.3 % (ref 11.5–14.5)
GLOBULIN SER CALC-MCNC: 4.1 G/DL (ref 2–4)
GLUCOSE SERPL-MCNC: 113 MG/DL (ref 65–100)
GLUCOSE UR STRIP.AUTO-MCNC: NEGATIVE MG/DL
HCT VFR BLD AUTO: 43.1 % (ref 36.6–50.3)
HGB BLD-MCNC: 14.2 G/DL (ref 12.1–17)
HGB UR QL STRIP: ABNORMAL
HYALINE CASTS URNS QL MICRO: ABNORMAL /LPF (ref 0–5)
IMM GRANULOCYTES # BLD: 0 K/UL (ref 0–0.04)
IMM GRANULOCYTES NFR BLD AUTO: 0 % (ref 0–0.5)
KETONES UR QL STRIP.AUTO: NEGATIVE MG/DL
LEUKOCYTE ESTERASE UR QL STRIP.AUTO: NEGATIVE
LYMPHOCYTES # BLD: 3.8 K/UL (ref 0.8–3.5)
LYMPHOCYTES NFR BLD: 34 % (ref 12–49)
MCH RBC QN AUTO: 29.6 PG (ref 26–34)
MCHC RBC AUTO-ENTMCNC: 32.9 G/DL (ref 30–36.5)
MCV RBC AUTO: 89.8 FL (ref 80–99)
MONOCYTES # BLD: 0.6 K/UL (ref 0–1)
MONOCYTES NFR BLD: 5 % (ref 5–13)
NEUTS SEG # BLD: 6.3 K/UL (ref 1.8–8)
NEUTS SEG NFR BLD: 57 % (ref 32–75)
NITRITE UR QL STRIP.AUTO: NEGATIVE
NRBC # BLD: 0 K/UL (ref 0–0.01)
NRBC BLD-RTO: 0 PER 100 WBC
PH UR STRIP: 5.5 [PH] (ref 5–8)
PLATELET # BLD AUTO: 332 K/UL (ref 150–400)
PMV BLD AUTO: 10.2 FL (ref 8.9–12.9)
POTASSIUM SERPL-SCNC: 3.6 MMOL/L (ref 3.5–5.1)
PROT SERPL-MCNC: 7.8 G/DL (ref 6.4–8.2)
PROT UR STRIP-MCNC: NEGATIVE MG/DL
RBC # BLD AUTO: 4.8 M/UL (ref 4.1–5.7)
RBC #/AREA URNS HPF: ABNORMAL /HPF (ref 0–5)
SODIUM SERPL-SCNC: 140 MMOL/L (ref 136–145)
SP GR UR REFRACTOMETRY: 1.02 (ref 1–1.03)
TROPONIN I SERPL-MCNC: 0.05 NG/ML
TROPONIN I SERPL-MCNC: 0.05 NG/ML
TROPONIN I SERPL-MCNC: 0.06 NG/ML
UA: UC IF INDICATED,UAUC: ABNORMAL
UROBILINOGEN UR QL STRIP.AUTO: 0.2 EU/DL (ref 0.2–1)
WBC # BLD AUTO: 11.1 K/UL (ref 4.1–11.1)
WBC URNS QL MICRO: ABNORMAL /HPF (ref 0–4)

## 2019-01-07 PROCEDURE — 93005 ELECTROCARDIOGRAM TRACING: CPT

## 2019-01-07 PROCEDURE — 36415 COLL VENOUS BLD VENIPUNCTURE: CPT

## 2019-01-07 PROCEDURE — 85025 COMPLETE CBC W/AUTO DIFF WBC: CPT

## 2019-01-07 PROCEDURE — 84484 ASSAY OF TROPONIN QUANT: CPT

## 2019-01-07 PROCEDURE — 70450 CT HEAD/BRAIN W/O DYE: CPT

## 2019-01-07 PROCEDURE — 82550 ASSAY OF CK (CPK): CPT

## 2019-01-07 PROCEDURE — 80053 COMPREHEN METABOLIC PANEL: CPT

## 2019-01-07 PROCEDURE — 99285 EMERGENCY DEPT VISIT HI MDM: CPT

## 2019-01-07 PROCEDURE — 81001 URINALYSIS AUTO W/SCOPE: CPT

## 2019-01-07 NOTE — ED TRIAGE NOTES
Per EMS, patient was found at work unconscious. Patient states he does not remember passing out, but did eat this morning. Patient denies n/v/d/numbess/tingling. States he is beginning to have a headache. Noncompliant on BP medications. 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 BG 82 in route to ED.

## 2019-01-08 VITALS
SYSTOLIC BLOOD PRESSURE: 98 MMHG | HEART RATE: 77 BPM | WEIGHT: 203.48 LBS | RESPIRATION RATE: 21 BRPM | DIASTOLIC BLOOD PRESSURE: 57 MMHG | HEIGHT: 69 IN | BODY MASS INDEX: 30.14 KG/M2 | OXYGEN SATURATION: 96 % | TEMPERATURE: 97.7 F

## 2019-01-08 LAB
ATRIAL RATE: 74 BPM
ATRIAL RATE: 79 BPM
CALCULATED P AXIS, ECG09: 22 DEGREES
CALCULATED P AXIS, ECG09: 53 DEGREES
CALCULATED R AXIS, ECG10: 23 DEGREES
CALCULATED R AXIS, ECG10: 3 DEGREES
CALCULATED T AXIS, ECG11: 113 DEGREES
CALCULATED T AXIS, ECG11: 93 DEGREES
DIAGNOSIS, 93000: NORMAL
DIAGNOSIS, 93000: NORMAL
P-R INTERVAL, ECG05: 148 MS
P-R INTERVAL, ECG05: 152 MS
Q-T INTERVAL, ECG07: 370 MS
Q-T INTERVAL, ECG07: 386 MS
QRS DURATION, ECG06: 80 MS
QRS DURATION, ECG06: 80 MS
QTC CALCULATION (BEZET), ECG08: 410 MS
QTC CALCULATION (BEZET), ECG08: 442 MS
VENTRICULAR RATE, ECG03: 74 BPM
VENTRICULAR RATE, ECG03: 79 BPM

## 2019-01-08 NOTE — ED NOTES
Pt via EMS to ED with wife. Pt c/o of syncopal episode that began today at work. EMS reports he was on break and found unresponsive but was short lived as the patient quickly came back to. Pt reports some redness and numbness on the left side of his face after but no long has that symptom. Pt also reports blurred vision which has subsided. Pt reports a headache. Pt has hx of MI in September and bels palsy with baseline left side facial droop. Pt is non-compliant with bp medications. Pt denies numbness, tingling, N/V. Pt in NAD. Pt changed into hospital gown and placed on monitor x3. Pt's wife at the bedside.

## 2019-01-08 NOTE — ED NOTES
OS BP completed. Discussed pt's OS BP of 125/80, 102/81 and 113/64, supine, sitting and standing respectively with Jayne Gay MD. No orders received at this time.

## 2019-01-08 NOTE — ED NOTES
Pt discharge with discharge instructions from Yissel Duran MD. Pt given opportunities to ask questions. Pt expressed no more needs at the time of discharge. Pt discharged via ambulatory with wife. Pt refused wheelchair.

## 2019-01-08 NOTE — DISCHARGE INSTRUCTIONS
Patient Education        Fainting: Care Instructions  Your Care Instructions    When you faint, or pass out, you lose consciousness for a short time. A brief drop in blood flow to the brain often causes it. When you fall or lie down, more blood flows to your brain and you regain consciousness. Emotional stress, pain, or overheating--especially if you have been standing--can make you faint. In these cases, fainting is usually not serious. But fainting can be a sign of a more serious problem. Your doctor may want you to have more tests to rule out other causes. The treatment you need depends on the reason why you fainted. The doctor has checked you carefully, but problems can develop later. If you notice any problems or new symptoms, get medical treatment right away. Follow-up care is a key part of your treatment and safety. Be sure to make and go to all appointments, and call your doctor if you are having problems. It's also a good idea to know your test results and keep a list of the medicines you take. How can you care for yourself at home? · Drink plenty of fluids to prevent dehydration. If you have kidney, heart, or liver disease and have to limit fluids, talk with your doctor before you increase your fluid intake. When should you call for help? Call 911 anytime you think you may need emergency care. For example, call if:    · You have symptoms of a heart problem. These may include:  ? Chest pain or pressure. ? Severe trouble breathing. ? A fast or irregular heartbeat. ? Lightheadedness or sudden weakness. ? Coughing up pink, foamy mucus. ? Passing out. After you call 911, the  may tell you to chew 1 adult-strength or 2 to 4 low-dose aspirin. Wait for an ambulance. Do not try to drive yourself.     · You have symptoms of a stroke. These may include:  ? Sudden numbness, tingling, weakness, or loss of movement in your face, arm, or leg, especially on only one side of your body.   ? Sudden vision changes. ? Sudden trouble speaking. ? Sudden confusion or trouble understanding simple statements. ? Sudden problems with walking or balance. ? A sudden, severe headache that is different from past headaches.     · You passed out (lost consciousness) again.    Watch closely for changes in your health, and be sure to contact your doctor if:    · You do not get better as expected. Where can you learn more? Go to http://amor-liam.info/. Enter A927 in the search box to learn more about \"Fainting: Care Instructions. \"  Current as of: November 20, 2017  Content Version: 11.8  © 8255-9923 [employer]. Care instructions adapted under license by [employer] (which disclaims liability or warranty for this information). If you have questions about a medical condition or this instruction, always ask your healthcare professional. [address] any warranty or liability for your use of this information.

## 2019-01-08 NOTE — ED PROVIDER NOTES
EMERGENCY DEPARTMENT HISTORY AND PHYSICAL EXAM 
 
 
Date: 1/7/2019 Patient Name: Shelly Rodriguez History of Presenting Illness Chief Complaint Patient presents with  Syncope History Provided By: Patient, Patient's Wife and EMS 
 
HPI: Shelly Rodriguez, 46 y.o. male with PMHx significant for HTN, STEMI, asthma, presents via EMS to the ED with cc of an episode of unresponsiveness today. He reports associated symptoms of a \"burning\" sensation the L side of his face, BL hand and feet numbness/tingling, SOB. Pt notes he was at work and had felt SOB to which he had taken his inhaler twice with relief. He had then went on break as he had felt unwell and was found unresponsive. His manager was able to finally wake him up to which he had reported all these symptoms. Wife states that due to a prior MI in September, they were concerned about a stroke. Pt notes he is currently not experiencing any of the symptoms he was experiencing earlier today. Pt denies any CP, vision changes, dizziness, HA, nausea, vomiting, or changes in his urine/bowel habits. There are no other complaints, changes, or physical findings at this time. PCP: Loren Anaya MD  
Cardiology: Dr. Olmedo Query No current facility-administered medications on file prior to encounter. Current Outpatient Medications on File Prior to Encounter Medication Sig Dispense Refill  losartan (COZAAR) 25 mg tablet TAKE 1 TABLET BY MOUTH ONCE DAILY 90 Tab 1  
 atomoxetine (STRATTERA) 25 mg capsule Take 25 mg by mouth daily (after lunch). Patient takes 40 mg QAM and 25 mg in the afternoon  metoprolol tartrate (LOPRESSOR) 25 mg tablet Take 50 mg by mouth two (2) times a day.  acetaminophen (TYLENOL) 500 mg tablet Take 2,000 mg by mouth two (2) times daily as needed for Pain.  albuterol (PROAIR HFA) 90 mcg/actuation inhaler Take 2 Puffs by inhalation every four (4) hours as needed for Wheezing.  isosorbide mononitrate ER (IMDUR) 30 mg tablet Take 1 Tab by mouth daily. 30 Tab 11  
 pantoprazole (PROTONIX) 20 mg tablet Take 1 Tab by mouth daily. 30 Tab 11  
 nitroglycerin (NITROSTAT) 0.4 mg SL tablet 1 Tab by SubLINGual route every five (5) minutes as needed for Chest Pain. Up to 3 doses. 1 Bottle 1  
 atomoxetine (STRATTERA) 40 mg capsule Take 40 mg by mouth daily. Patient takes 40 mg QAM and 25 mg in the afternoon  CHANTIX STARTING MONTH BOX 0.5 mg (11)- 1 mg (42) DsPk Take 1 mg by mouth daily (after breakfast). 0  
 aspirin delayed-release 81 mg tablet Take 1 Tab by mouth daily. 30 Tab 11  
 atorvastatin (LIPITOR) 40 mg tablet Take 1 Tab by mouth nightly. 30 Tab 11  
 ticagrelor (BRILINTA) 90 mg tablet Take 1 Tab by mouth every twelve (12) hours every twelve (12) hours. 60 Tab 11  
 DULoxetine (CYMBALTA) 60 mg capsule Take 60 mg by mouth daily. Past History Past Medical History: 
Past Medical History:  
Diagnosis Date  ADD (attention deficit disorder) 9/25/2018  Asthma  Chest pain 11/30/2018  Dyslipidemia 9/25/2018  Fatigue 11/30/2018  
 HTN (hypertension) 9/25/2018  Hypertension  Other ill-defined conditions(799.89) ADHD,BELL'S PALSY WEAKNESS  LT FACE  
 Psychiatric disorder DEPRESSION  
 S/P coronary artery stent placement 9/25/2018 9/25/18 PCI/MAVIS to LAD  STEMI (ST elevation myocardial infarction) ([facility] [address] 9/25/2018  Tobacco abuse 9/25/2018 Past Surgical History: 
Past Surgical History:  
Procedure Laterality Date 15 [facility] STOMACH ABDOMEN ACCIDENT  
 HX HEENT    
 TEETH EXTRACTION  
 [place] [id number] FRACTURE RT FEMUR  
 NEUROLOGICAL PROCEDURE UNLISTED    
 NECK Family History: 
Family History Problem Relation Age of Onset  Hypertension Mother  Lung Disease Paternal Aunt Social History: 
Social History Tobacco Use  Smoking status: Current Every Day Smoker   Packs/day: 1.00  
 Years: 40.00 Pack years: 40.00  Smokeless tobacco: Never Used  Tobacco comment: were on chantix,stopped 2 days ago due to stomachache Substance Use Topics  Alcohol use: Yes Alcohol/week: 1.2 oz Types: 1 Cans of beer, 1 Shots of liquor per week Comment: special  
 Drug use: No  
 
 
Allergies: Allergies Allergen Reactions  Bee Sting [Sting, Bee] Anaphylaxis Review of Systems Review of Systems Constitutional: Negative for chills, fatigue and fever. HENT: Negative for congestion and rhinorrhea. Eyes: Negative for visual disturbance. Respiratory: Positive for shortness of breath. Negative for cough and wheezing. Cardiovascular: Negative for chest pain and palpitations. Gastrointestinal: Negative for abdominal distention, abdominal pain, constipation, diarrhea, nausea and vomiting. Endocrine: Negative. Genitourinary: Negative for difficulty urinating and dysuria. Musculoskeletal: Negative. Skin: Negative for rash. Neurological: Positive for numbness. Negative for dizziness, weakness, light-headedness and headaches. Psychiatric/Behavioral: Negative for suicidal ideas. Physical Exam  
Physical Exam  
Constitutional: He is oriented to person, place, and time. He appears well-developed and well-nourished. No distress. HENT:  
Head: Normocephalic and atraumatic. Mouth/Throat: Oropharynx is clear and moist.  
Eyes: Conjunctivae and EOM are normal.  
Neck: Neck supple. No JVD present. No tracheal deviation present. Cardiovascular: Normal rate, regular rhythm and intact distal pulses. Exam reveals no gallop and no friction rub. No murmur heard. Pulmonary/Chest: Effort normal and breath sounds normal. No stridor. No respiratory distress. He has no wheezes. Abdominal: Soft. Bowel sounds are normal. He exhibits no distension and no mass. There is no tenderness. There is no guarding. Musculoskeletal: Normal range of motion. He exhibits no edema or tenderness. No deformity Neurological: He is alert and oriented to person, place, and time. He has normal strength. 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 5/5 muscle strength in bilateral UE and LE. Sensation intact bilaterally. No pronator drift. Skin: Skin is warm, dry and intact. No rash noted. Psychiatric: He has a normal mood and affect. His behavior is normal. Judgment and thought content normal.  
Nursing note and vitals reviewed. Diagnostic Study Results Labs - Recent Results (from the past 12 hour(s)) EKG, 12 LEAD, INITIAL Collection Time: 01/07/19  5:19 PM  
Result Value Ref Range Ventricular Rate 74 BPM  
 Atrial Rate 74 BPM  
 P-R Interval 152 ms QRS Duration 80 ms  
 Q-T Interval 370 ms QTC Calculation (Bezet) 410 ms Calculated P Axis 22 degrees Calculated R Axis 3 degrees Calculated T Axis 93 degrees Diagnosis Normal sinus rhythm Anteroseptal infarct (cited on or before 25-SEP-2018) T wave abnormality, consider lateral ischemia When compared with ECG of 30-NOV-2018 10:31, 
Questionable change in initial forces of Lateral leads Nonspecific T wave abnormality has replaced inverted T waves in Anterior  
leads METABOLIC PANEL, COMPREHENSIVE Collection Time: 01/07/19  7:48 PM  
Result Value Ref Range Sodium 140 136 - 145 mmol/L Potassium 3.6 3.5 - 5.1 mmol/L Chloride 105 97 - 108 mmol/L  
 CO2 28 21 - 32 mmol/L Anion gap 7 5 - 15 mmol/L Glucose 113 (H) 65 - 100 mg/dL BUN 12 6 - 20 MG/DL Creatinine 0.82 0.70 - 1.30 MG/DL  
 BUN/Creatinine ratio 15 12 - 20 GFR est AA >60 >60 ml/min/1.73m2 GFR est non-AA >60 >60 ml/min/1.73m2 Calcium 9.4 8.5 - 10.1 MG/DL Bilirubin, total 0.4 0.2 - 1.0 MG/DL  
 ALT (SGPT) 24 12 - 78 U/L  
 AST (SGOT) 18 15 - 37 U/L Alk.  phosphatase 129 (H) 45 - 117 U/L  
 Protein, total 7.8 6.4 - 8.2 g/dL Albumin 3.7 3.5 - 5.0 g/dL Globulin 4.1 (H) 2.0 - 4.0 g/dL A-G Ratio 0.9 (L) 1.1 - 2.2 CK W/ CKMB & INDEX Collection Time: 01/07/19  7:48 PM  
Result Value Ref Range  39 - 308 U/L  
 CK - MB 2.2 <3.6 NG/ML  
 CK-MB Index 1.8 0 - 2.5    
CBC WITH AUTOMATED DIFF Collection Time: 01/07/19  7:48 PM  
Result Value Ref Range WBC 11.1 4.1 - 11.1 K/uL  
 RBC 4.80 4. 10 - 5.70 M/uL  
 HGB 14.2 12.1 - 17.0 g/dL HCT 43.1 36.6 - 50.3 % MCV 89.8 80.0 - 99.0 FL  
 MCH 29.6 26.0 - 34.0 PG  
 MCHC 32.9 30.0 - 36.5 g/dL  
 RDW 14.3 11.5 - 14.5 % PLATELET 091 590 - 393 K/uL MPV 10.2 8.9 - 12.9 FL  
 NRBC 0.0 0  WBC ABSOLUTE NRBC 0.00 0.00 - 0.01 K/uL NEUTROPHILS 57 32 - 75 % LYMPHOCYTES 34 12 - 49 % MONOCYTES 5 5 - 13 % EOSINOPHILS 3 0 - 7 % BASOPHILS 1 0 - 1 % IMMATURE GRANULOCYTES 0 0.0 - 0.5 % ABS. NEUTROPHILS 6.3 1.8 - 8.0 K/UL  
 ABS. LYMPHOCYTES 3.8 (H) 0.8 - 3.5 K/UL  
 ABS. MONOCYTES 0.6 0.0 - 1.0 K/UL  
 ABS. EOSINOPHILS 0.3 0.0 - 0.4 K/UL  
 ABS. BASOPHILS 0.1 0.0 - 0.1 K/UL  
 ABS. IMM. GRANS. 0.0 0.00 - 0.04 K/UL  
 DF AUTOMATED    
TROPONIN I Collection Time: 01/07/19  7:48 PM  
Result Value Ref Range Troponin-I, Qt. 0.05 (H) <0.05 ng/mL TROPONIN I Collection Time: 01/07/19  8:57 PM  
Result Value Ref Range Troponin-I, Qt. 0.05 (H) <0.05 ng/mL EKG, 12 LEAD, SUBSEQUENT Collection Time: 01/07/19  9:30 PM  
Result Value Ref Range Ventricular Rate 79 BPM  
 Atrial Rate 79 BPM  
 P-R Interval 148 ms QRS Duration 80 ms  
 Q-T Interval 386 ms QTC Calculation (Bezet) 442 ms Calculated P Axis 53 degrees Calculated R Axis 23 degrees Calculated T Axis 113 degrees Diagnosis Normal sinus rhythm Anterolateral infarct , age undetermined When compared with ECG of 07-JAN-2019 17:19, 
MANUAL COMPARISON REQUIRED, DATA IS UNCONFIRMED URINALYSIS W/ REFLEX CULTURE  
 Collection Time: 01/07/19  9:42 PM  
Result Value Ref Range Color YELLOW/STRAW Appearance CLEAR CLEAR Specific gravity 1.016 1.003 - 1.030    
 pH (UA) 5.5 5.0 - 8.0 Protein NEGATIVE  NEG mg/dL Glucose NEGATIVE  NEG mg/dL Ketone NEGATIVE  NEG mg/dL Bilirubin NEGATIVE  NEG Blood SMALL (A) NEG Urobilinogen 0.2 0.2 - 1.0 EU/dL Nitrites NEGATIVE  NEG Leukocyte Esterase NEGATIVE  NEG    
 WBC 0-4 0 - 4 /hpf  
 RBC 5-10 0 - 5 /hpf Epithelial cells FEW FEW /lpf Bacteria NEGATIVE  NEG /hpf  
 UA:UC IF INDICATED CULTURE NOT INDICATED BY UA RESULT CNI Hyaline cast 0-2 0 - 5 /lpf  
TROPONIN I Collection Time: 01/07/19 11:02 PM  
Result Value Ref Range Troponin-I, Qt. 0.06 (H) <0.05 ng/mL Radiologic Studies -  
CT HEAD WO CONT (Final result) Result time 01/07/19 22:28:12 Final result by Gris Delgado MD (01/07/19 61:20:00) Impression:  
 IMPRESSION:  
 
No acute intracranial abnormality on this noncontrast head CT. No change. Narrative: EXAM: CT HEAD WO CONT INDICATION: Syncope today. Headache. COMPARISON: CT head on 11/30/2018. TECHNIQUE: Noncontrast head CT. Coronal and sagittal reformats.  CT dose 
reduction was achieved through the use of a standardized protocol tailored for 
this examination and automatic exposure control for dose modulation. FINDINGS: The ventricles and sulci are age-appropriate without hydrocephalus. There is no mass effect or midline shift. There is no intracranial hemorrhage or 
extra-axial fluid collection. There is no abnormal area of decreased density to 
suggest infarct. The calvarium is intact. The visualized paranasal sinuses and mastoid air cells 
are clear. Medical Decision Making I am the first provider for this patient. I reviewed the vital signs, available nursing notes, past medical history, past surgical history, family history and social history. Vital Signs-Reviewed the patient's vital signs. Patient Vitals for the past 12 hrs: 
 Temp Pulse Resp BP SpO2  
01/08/19 0001  77 21 98/57 96 % 01/07/19 2332  73  125/80   
01/07/19 2330  74 16 116/81   
01/07/19 2300  75 19 111/76   
01/07/19 2230  70 16 112/71   
01/07/19 2200  75 18 120/79 93 % 01/07/19 2141    126/84   
01/07/19 1726 97.7 °F (36.5 °C) 77 14 150/83 100 % EKG interpretation: (Preliminary): 0814 Rhythm: normal sinus rhythm; and regular . Rate (approx.): 74; Axis: normal; QRS interval: normal ; ST/T wave: T wave inversion in lateral leads; Other findings: similar to prior 11/30/18. Written by Katherin Pallas, ED Scribe, as dictated by Georgina Pérez DO. 
 
EKG interpretation: (Repeat): 2130 Rhythm: normal sinus rhythm; and regular . Rate (approx.): 79; Axis: normal; QRS interval: normal ; ST/T wave: T wave inversion in anterior leads. Written by Katherin Pallas, ED Scribe, as dictated by Georgina Pérez DO. Records Reviewed: Nursing Notes, Old Medical Records, Previous electrocardiograms, Previous Radiology Studies and Previous Laboratory Studies Provider Notes (Medical Decision Making): Pt presenting s/p syncopal episode. Has no focal neurological deficits. Hx inconsistent with CVA. DDx include arrhythmia, vasovagal syncope, orthostatic hypotension, ACS, metabolic abnormality, dehydration. Pt is now at his baseline and is asymptomatic. Will check labs, cardiac enzymes, EKG, CXR, and head CT. ED Course:  
Initial assessment performed. The patients presenting problems have been discussed, and they are in agreement with the care plan formulated and outlined with them. I have encouraged them to ask questions as they arise throughout their visit. 10:18 PM 
Patient's presentation, labs/imaging and plan of care was reviewed with Jair Oates MD as part of sign out. They will assume care as part of the plan discussed with the patient. Henry Morrow MD's assistance in completion of this plan is greatly appreciated but it should be noted that I will be the provider of record for this patient. Nichole Huynh, DO 
 
PROGRESS NOTE: 
12:13 AM 
The pt's BP did drop when going from lying down to sitting up, but is otherwise asymptomatic. His last echocardiogram had an EF of 40-45%. Will not fluid bolus in the ED and encouraged the pt to drink on his own. Offered admission for synopal event to which he declined and so he will be discharged per Dr. Elif George and he will f/u w/ Dr. Naila Tai, his cardiologist, on Wednesday Written by Mirta Eduardo, ED Scribe, as dictated by Henry Morrow MD. Critical Care Time:  
0 Disposition: 
DISCHARGE NOTE 
12:13 AM 
The patient has been re-evaluated and is ready for discharge. Reviewed available results with patient. Counseled pt on diagnosis and care plan. Pt has expressed understanding, and all questions have been answered. Pt agrees with plan and agrees to F/U as recommended, or return to the ED if their sxs worsen. Discharge instructions have been provided and explained to the pt, along with reasons to return to the ED. Written by Mirta Eduardo, ED Scribe, as dictated by Henry Morrow MD. 
 
PLAN: 
1. Current Discharge Medication List  
  
 
2. Follow-up Information Follow up With Specialties Details Why Contact Info Parris Rios MD Family Practice In 2 days If symptoms worsen, As needed [address] [place] [address] 
[phone] Radhames Anderson MD Cardiology, [address] [facility] Surgery, Internal Medicine In 1 day as scheduled [address] 
[phone] [facility] EMERGENCY DEPT Emergency Medicine  If symptoms worsen [address] [facility] 
[phone] Return to ED if worse Diagnosis Clinical Impression: 1. Syncope and collapse Attestations: This note is prepared by Brianna Silverman, acting as Scribe for Julia Devries DO. Julia Devries DO: The scribe's documentation has been prepared under my direction and personally reviewed by me in its entirety. I confirm that the note above accurately reflects all work, treatment, procedures, and medical decision making performed by me. This note is prepared by Sarthak Callejas, acting as Scribe for MD Jonah Shaw MD: The scribe's documentation has been prepared under my direction and personally reviewed by me in its entirety. I confirm that the note above accurately reflects all work, treatment, procedures, and medical decision making performed by me. This note will not be viewable in [address]

## 2019-01-09 ENCOUNTER — CLINICAL SUPPORT (OUTPATIENT)
Dept: CARDIOLOGY CLINIC | Age: 54
End: 2019-01-09

## 2019-01-09 ENCOUNTER — OFFICE VISIT (OUTPATIENT)
Dept: CARDIOLOGY CLINIC | Age: 54
End: 2019-01-09

## 2019-01-09 VITALS
BODY MASS INDEX: 29.93 KG/M2 | OXYGEN SATURATION: 98 % | RESPIRATION RATE: 20 BRPM | WEIGHT: 202.1 LBS | SYSTOLIC BLOOD PRESSURE: 120 MMHG | DIASTOLIC BLOOD PRESSURE: 90 MMHG | HEIGHT: 69 IN | HEART RATE: 80 BPM

## 2019-01-09 DIAGNOSIS — Z95.5 S/P CORONARY ARTERY STENT PLACEMENT: ICD-10-CM

## 2019-01-09 DIAGNOSIS — I10 ESSENTIAL HYPERTENSION: ICD-10-CM

## 2019-01-09 DIAGNOSIS — R55 SYNCOPE, UNSPECIFIED SYNCOPE TYPE: ICD-10-CM

## 2019-01-09 DIAGNOSIS — I25.5 ISCHEMIC CARDIOMYOPATHY: ICD-10-CM

## 2019-01-09 DIAGNOSIS — E78.5 DYSLIPIDEMIA: ICD-10-CM

## 2019-01-09 DIAGNOSIS — I25.10 ARTERIOSCLEROTIC HEART DISEASE (ASHD): Primary | ICD-10-CM

## 2019-01-09 DIAGNOSIS — I25.10 ARTERIOSCLEROTIC HEART DISEASE (ASHD): ICD-10-CM

## 2019-01-09 RX ORDER — ATOMOXETINE 40 MG/1
40 CAPSULE ORAL DAILY
COMMUNITY
End: 2020-09-17 | Stop reason: ALTCHOICE

## 2019-01-09 NOTE — LETTER
NOTIFICATION OF RETURN TO WORK / SCHOOL 
1/9/2019 Mr. Nadia Brown [address] [place] [id number] To Whom It May Concern: 
 
Nadia Brown was under the care of [facility]. He will not return to work until seen on January 17, 2019. If there are questions or concerns please have the patient contact our office.  
 
 
 
Sincerely, 
 
 
Rosalia Hankins MD

## 2019-01-09 NOTE — PROGRESS NOTES
[address], [address] [facility]  [phone] Subjective:  
  
Billy Fowler is a 46 y.o. male is here for ED f/u where he presented 1/7/19 s/p syncopal episode at work, negative cardiac work up with normal head CT in the ED. He was also in the ED in November for cp/fatigue, scheduled OP stress test which was done in December. Today, reports rare episodes of chest discomfort, unchanged george but worse when he is anxious. Denies orthopnea, PND, LE edema, or palpitations. [facility] 12/18 Findings: The overall quality of the study is excellent. Attenuation artifact was Noted. Left ventricular cavity is noted to be mildly dilated on the rest and stress studies. Additionally, the right ventricle is normal.  
 
SPECT images demonstrate a large, fixed abnormality of severe degree in the anterior, inferior and apical regions on the stress and rest images. Gated SPECT images demonstrates akinesis  of the anterior, septal and apical regions. The left ventricular ejection fraction was calculated to be 33 %. Impression:  
Myocardial perfusion imaging is abnormal: there is a large area of infarction in the anterior, apical and distal inferior walls. Overall left ventricular systolic function was abnormal: with regional wall motion abnormalities (as noted above). These test results indicate high likelihood for the presence of angiographically significant coronary artery disease.      
 
 
 
Patient Active Problem List  
 Diagnosis Date Noted  Fatigue 11/30/2018  Chest pain 11/30/2018  Arteriosclerotic heart disease (ASHD) 10/05/2018  STEMI (ST elevation myocardial infarction) ([facility] [place] [address] 09/25/2018  
 HTN (hypertension) 09/25/2018  Dyslipidemia 09/25/2018  ADD (attention deficit disorder) 09/25/2018  Tobacco abuse 09/25/2018  S/P coronary artery stent placement 09/25/2018 Jose Francisco Lucia MD 
Past Medical History:  
Diagnosis Date  ADD (attention deficit disorder) 9/25/2018  Asthma  Chest pain 11/30/2018  Dyslipidemia 9/25/2018  Fatigue 11/30/2018  
 HTN (hypertension) 9/25/2018  Hypertension  Other ill-defined conditions(799.89) ADHD,BELL'S PALSY WEAKNESS  LT FACE  
 Psychiatric disorder DEPRESSION  
 S/P coronary artery stent placement 9/25/2018 9/25/18 PCI/MAVIS to LAD  STEMI (ST elevation myocardial infarction) ([facility] [address] 9/25/2018  Tobacco abuse 9/25/2018 Past Surgical History:  
Procedure Laterality Date [address] STOMACH ABDOMEN ACCIDENT  
 HX HEENT    
 TEETH EXTRACTION  
 [facility] [address] FRACTURE RT FEMUR  
 NEUROLOGICAL PROCEDURE UNLISTED    
 NECK Allergies Allergen Reactions  Bee Sting [Sting, Bee] Anaphylaxis Family History Problem Relation Age of Onset  Hypertension Mother  Lung Disease Paternal Aunt Social History Socioeconomic History  Marital status:  Spouse name: Bryanna Body  Number of children: 2  
 Years of education: 15  
 Highest education level: 12th grade Social Needs  Financial resource strain: Not hard at all  Food insecurity - worry: Never true  Food insecurity - inability: Never true  Transportation needs - medical: Patient refused  Transportation needs - non-medical: Patient refused Occupational History  Not on file Tobacco Use  Smoking status: Current Every Day Smoker Packs/day: 1.00 Years: 40.00 Pack years: 40.00  Smokeless tobacco: Never Used  Tobacco comment: were on chantix,stopped 2 days ago due to stomachache Substance and Sexual Activity  Alcohol use: Yes Alcohol/week: 1.2 oz Types: 1 Cans of beer, 1 Shots of liquor per week Comment: special  
 Drug use: No  
 Sexual activity: Not Currently Other Topics Concern   Service No  
 Blood Transfusions Yes  Caffeine Concern Yes Comment: coffee 1 pot  Occupational Exposure Yes Comment: cig smoke  Hobby Hazards No  
 Sleep Concern Yes Comment: does not sleep well,wakes up sweating,dog  Stress Concern Yes Comment: high level daily -depression he states  Weight Concern Yes Comment: more tired  Special Diet No  
 Back Care No  
  Comment: neck and low back surgery  Exercise No  
  Comment: does not exercise  Bike Helmet No  
  Comment: does not ride bike [address] Yes Comment: 1/2 time wears seatbelt  Self-Exams No  
Social History Narrative  Not on file Current Outpatient Medications Medication Sig  
 atomoxetine (STRATTERA) 40 mg capsule Take 40 mg by mouth two (2) times a day.  losartan (COZAAR) 25 mg tablet TAKE 1 TABLET BY MOUTH ONCE DAILY  metoprolol tartrate (LOPRESSOR) 25 mg tablet Take 25 mg by mouth two (2) times a day.  acetaminophen (TYLENOL) 500 mg tablet Take 2,000 mg by mouth two (2) times daily as needed for Pain.  albuterol (PROAIR HFA) 90 mcg/actuation inhaler Take 2 Puffs by inhalation every four (4) hours as needed for Wheezing.  isosorbide mononitrate ER (IMDUR) 30 mg tablet Take 1 Tab by mouth daily.  nitroglycerin (NITROSTAT) 0.4 mg SL tablet 1 Tab by SubLINGual route every five (5) minutes as needed for Chest Pain. Up to 3 doses.  aspirin delayed-release 81 mg tablet Take 1 Tab by mouth daily.  atorvastatin (LIPITOR) 40 mg tablet Take 1 Tab by mouth nightly.  ticagrelor (BRILINTA) 90 mg tablet Take 1 Tab by mouth every twelve (12) hours every twelve (12) hours.  DULoxetine (CYMBALTA) 60 mg capsule Take 60 mg by mouth daily. No current facility-administered medications for this visit. Review of Symptoms: 
11 systems reviewed, negative other than as stated in the HPI Physical ExamPhysical Exam:   
Vitals:  
 01/09/19 1258 01/09/19 1304 01/09/19 1305 01/09/19 1306 BP: 140/90 130/90 120/90 120/90 Pulse: 80 84 80 Resp:  20    
 SpO2: 98% 97% 98% Weight:  202 lb 1.6 oz (91.7 kg) Height:  5' 9\" (1.753 m) Body mass index is 29.84 kg/m². General PE Gen:  NAD Mental Status - Alert. General Appearance - Not in acute distress. Chest and Lung Exam  
Inspection: Accessory muscles - No use of accessory muscles in breathing. Auscultation:  
Breath sounds: - Normal.  
Cardiovascular Inspection: Jugular vein - Bilateral - Inspection Normal.  
Palpation/Percussion:  
Apical Impulse: - Normal.  
Auscultation: Rhythm - Regular. Heart Sounds - S1 WNL and S2 WNL. No S3 or S4. Murmurs & Other Heart Sounds: Auscultation of the heart reveals - No Murmurs. Peripheral Vascular Upper Extremity: Inspection - Bilateral - No Cyanotic nailbeds or Digital clubbing. Lower Extremity:  
Palpation: Edema - Bilateral - No edema. Abdomen:   Soft, non-tender, bowel sounds are active. Neuro: A&O times 3, CN and motor grossly WNL Labs:  
Lab Results Component Value Date/Time Cholesterol, total 209 (H) 09/26/2018 03:34 AM  
 HDL Cholesterol 34 09/26/2018 03:34 AM  
 LDL, calculated 128.8 (H) 09/26/2018 03:34 AM  
 Triglyceride 231 (H) 09/26/2018 03:34 AM  
 CHOL/HDL Ratio 6.1 (H) 09/26/2018 03:34 AM  
 
Lab Results Component Value Date/Time  01/07/2019 07:48 PM  
 
Lab Results Component Value Date/Time Sodium 140 01/07/2019 07:48 PM  
 Potassium 3.6 01/07/2019 07:48 PM  
 Chloride 105 01/07/2019 07:48 PM  
 CO2 28 01/07/2019 07:48 PM  
 Anion gap 7 01/07/2019 07:48 PM  
 Glucose 113 (H) 01/07/2019 07:48 PM  
 BUN 12 01/07/2019 07:48 PM  
 Creatinine 0.82 01/07/2019 07:48 PM  
 BUN/Creatinine ratio 15 01/07/2019 07:48 PM  
 GFR est AA >60 01/07/2019 07:48 PM  
 GFR est non-AA >60 01/07/2019 07:48 PM  
 Calcium 9.4 01/07/2019 07:48 PM  
 Bilirubin, total 0.4 01/07/2019 07:48 PM  
 AST (SGOT) 18 01/07/2019 07:48 PM  
 Alk.  phosphatase 129 (H) 01/07/2019 07:48 PM  
 Protein, total 7.8 01/07/2019 07:48 PM  
 Albumin 3.7 01/07/2019 07:48 PM  
 Globulin 4.1 (H) 01/07/2019 07:48 PM  
 A-G Ratio 0.9 (L) 01/07/2019 07:48 PM  
 ALT (SGPT) 24 01/07/2019 07:48 PM  
 
 
EKG: 
NSR, evidence of anterior infarction Assessment: 1. Arteriosclerotic heart disease (ASHD) 2. Essential hypertension 3. S/P coronary artery stent placement 4. Dyslipidemia 5. Ischemic cardiomyopathy 6. Syncope, unspecified syncope type Orders Placed This Encounter  AMB POC EKG ROUTINE W/ 12 LEADS, INTER & REP Order Specific Question:   Reason for Exam: Answer:   routine  HOLTER MONITOR, 24 HOURS, Clinic Performed Standing Status:   Future Number of Occurrences:   1 Standing Expiration Date:   7/9/2019 Order Specific Question:   Reason for Exam: Answer:   syncope/cardiomyopathy  atomoxetine (STRATTERA) 40 mg capsule Sig: Take 40 mg by mouth two (2) times a day. Plan: This  is a 46 y.o. male is here for ED f/u where he presented 1/7/19 s/p syncopal episode at work, negative cardiac work up with normal head CT in the ED. He was also in the ED in November for cp/fatigue, scheduled OP stress test which was done in December. Today, reports rare episodes of chest discomfort, unchanged george but worse when he is anxious. Syncopal episode He reports he was coming in from his break at work and felt weak and \"heavy\" with numbness in his arms bilaterally and dizziness. Went to sit down, and placed his head on his desk. The next thing he remembers is his co-workers waking him up. Denies any palpitations or rapid heart rate associated prior to this. Does report some dizziness from time to time not associated with these symptoms.  
Will obtain Holter monitor to r/o arrhythmia, refer to Dr Nelly Lim in light of syncope, large anterior infarction(patient presented late with his MI after 2 days of severe symptoms, with initial troponin of 100), high suspicion for dysrhythmia-consider EP study if indicated, if VT seen on monitor with recent syncope, likely ICD indicated Atherosclerotic heart disease:  
Status post STEMI, PCI/MAVIS to the LAD 9/18 Nuclear stress test no ischemia, showed fixed defect in anterior, apical and distal inferior walls December 2018 Reinforced need to remain on dual antiplatelet therapy uninterrupted for 1 year. Continue Imdur, statin, BB Has not been compliant with cardiac rehab Ischemic cardiomyopathy:  
Ejection fraction 40-45 September 2018, on losartan and metoprolol Repeat echo today same ejection fraction, wall motion abnormalities consistent with a large anterior infarction Hyperlipidemia:  
9/18 LDL at 128. On atorvastatin 40 mg, Has the labslip for repeat Tobacco abuse, down to 1 PPD Continues to smoke. Not on Chantix Continue current care and f/u 3 mos after follow-up with Dr. Joanne Guy in the near future.  
 
Edwina Lewis MD

## 2019-01-09 NOTE — PROGRESS NOTES
1. Have you been to the ER, urgent care clinic since your last visit? Hospitalized since your last visit? Yes When: 1/7/19 [facility] syncope 2. Have you seen or consulted any other health care providers outside of the [address] [place] [address] since your last visit? Include any pap smears or colon screening. Yes PCP & Eye Exam 
 
Patient had syncope episode 1/7/19 seen in ED from work he C/O fatigue and SOB . The metoprolol was never increased to 50 mg BID due to script was never given will need meds refill @ 90 supply if to continue. He had recent Echo this am and nuclear testing in Dec that requires review

## 2019-01-17 ENCOUNTER — HOSPITAL ENCOUNTER (OUTPATIENT)
Dept: GENERAL RADIOLOGY | Age: 54
Discharge: HOME | End: 2019-01-17
Payer: COMMERCIAL

## 2019-01-17 ENCOUNTER — OFFICE VISIT (OUTPATIENT)
Dept: CARDIOLOGY CLINIC | Age: 54
End: 2019-01-17

## 2019-01-17 VITALS
BODY MASS INDEX: 30.13 KG/M2 | WEIGHT: 203.4 LBS | HEART RATE: 86 BPM | SYSTOLIC BLOOD PRESSURE: 126 MMHG | RESPIRATION RATE: 18 BRPM | HEIGHT: 69 IN | DIASTOLIC BLOOD PRESSURE: 80 MMHG

## 2019-01-17 DIAGNOSIS — I25.10 ARTERIOSCLEROTIC HEART DISEASE (ASHD): Primary | ICD-10-CM

## 2019-01-17 DIAGNOSIS — I50.22 CHRONIC SYSTOLIC CONGESTIVE HEART FAILURE (HCC): ICD-10-CM

## 2019-01-17 DIAGNOSIS — I25.10 ASHD (ARTERIOSCLEROTIC HEART DISEASE): ICD-10-CM

## 2019-01-17 DIAGNOSIS — I25.5 ISCHEMIC CARDIOMYOPATHY: ICD-10-CM

## 2019-01-17 DIAGNOSIS — R55 SYNCOPE AND COLLAPSE: ICD-10-CM

## 2019-01-17 PROCEDURE — 71046 X-RAY EXAM CHEST 2 VIEWS: CPT

## 2019-01-17 NOTE — H&P (VIEW-ONLY)
Subjective:  
  
Yury Baron is a 46 y.o. male is here for EP consult. He is sp STEMI. He had syncope at work last week - no prodrome. His nuclear scan last month revealed an ef of 33% with large infarct anteriorly. His lvef was 40-45% this month. He has fatigue and continues to smoke. Patient Active Problem List  
 Diagnosis Date Noted  Fatigue 11/30/2018  Chest pain 11/30/2018  Arteriosclerotic heart disease (ASHD) 10/05/2018  STEMI (ST elevation myocardial infarction) ([facility] [address] 09/25/2018  
 HTN (hypertension) 09/25/2018  Dyslipidemia 09/25/2018  ADD (attention deficit disorder) 09/25/2018  Tobacco abuse 09/25/2018  S/P coronary artery stent placement 09/25/2018 Marc Amaya MD 
Past Medical History:  
Diagnosis Date  ADD (attention deficit disorder) 9/25/2018  Asthma  Chest pain 11/30/2018  Dyslipidemia 9/25/2018  Fatigue 11/30/2018  
 HTN (hypertension) 9/25/2018  Hypertension  Other ill-defined conditions(799.89) ADHD,BELL'S PALSY WEAKNESS  LT FACE  
 Psychiatric disorder DEPRESSION  
 S/P coronary artery stent placement 9/25/2018 9/25/18 PCI/MAVIS to LAD  STEMI (ST elevation myocardial infarction) ([facility] [address] 9/25/2018  Tobacco abuse 9/25/2018 Past Surgical History:  
Procedure Laterality Date [address] STOMACH ABDOMEN ACCIDENT  
 HX HEENT    
 TEETH EXTRACTION  
 [facility] FRACTURE RT FEMUR  
 NEUROLOGICAL PROCEDURE UNLISTED    
 NECK Allergies Allergen Reactions  Bee Sting [Sting, Bee] Anaphylaxis Family History Problem Relation Age of Onset  Hypertension Mother  Lung Disease Paternal Aunt   
 negative for cardiac disease Social History Socioeconomic History  Marital status:  Spouse name: Dixon Corbin  Number of children: 2  
 Years of education: 15  
 Highest education level: 12th grade Social Needs  Financial resource strain: Not hard at all  Food insecurity - worry: Never true  Food insecurity - inability: Never true  Transportation needs - medical: Patient refused  Transportation needs - non-medical: Patient refused Tobacco Use  Smoking status: Current Every Day Smoker Packs/day: 1.00 Years: 40.00 Pack years: 40.00  Smokeless tobacco: Never Used  Tobacco comment: were on chantix,stopped 2 days ago due to stomachache Substance and Sexual Activity  Alcohol use: No  
  Alcohol/week: 0.0 oz Frequency: Never  Drug use: No  
 Sexual activity: Not Currently Other Topics Concern   Service No  
 Blood Transfusions Yes  Caffeine Concern Yes Comment: coffee 1 pot  Occupational Exposure Yes Comment: cig smoke  Hobby Hazards No  
 Sleep Concern Yes Comment: does not sleep well,wakes up sweating,dog  Stress Concern Yes Comment: high level daily -depression he states  Weight Concern Yes Comment: more tired  Special Diet No  
 Back Care No  
  Comment: neck and low back surgery  Exercise No  
  Comment: does not exercise  Bike Helmet No  
  Comment: does not ride bike [address] Yes Comment: 1/2 time wears seatbelt  Self-Exams No  
 
Current Outpatient Medications Medication Sig  
 atomoxetine (STRATTERA) 40 mg capsule Take 40 mg by mouth two (2) times a day.  losartan (COZAAR) 25 mg tablet TAKE 1 TABLET BY MOUTH ONCE DAILY  metoprolol tartrate (LOPRESSOR) 25 mg tablet Take 25 mg by mouth two (2) times a day.  acetaminophen (TYLENOL) 500 mg tablet Take 2,000 mg by mouth two (2) times daily as needed for Pain.  albuterol (PROAIR HFA) 90 mcg/actuation inhaler Take 2 Puffs by inhalation every four (4) hours as needed for Wheezing.  isosorbide mononitrate ER (IMDUR) 30 mg tablet Take 1 Tab by mouth daily.   
 nitroglycerin (NITROSTAT) 0.4 mg SL tablet 1 Tab by SubLINGual route every five (5) minutes as needed for Chest Pain. Up to 3 doses.  aspirin delayed-release 81 mg tablet Take 1 Tab by mouth daily.  atorvastatin (LIPITOR) 40 mg tablet Take 1 Tab by mouth nightly.  ticagrelor (BRILINTA) 90 mg tablet Take 1 Tab by mouth every twelve (12) hours every twelve (12) hours.  DULoxetine (CYMBALTA) 60 mg capsule Take 60 mg by mouth daily. No current facility-administered medications for this visit. Vitals:  
 01/17/19 6596 BP: 126/80 Pulse: 86 Resp: 18 Weight: 203 lb 6.4 oz (92.3 kg) Height: 5' 9\" (1.753 m) I have reviewed the nurses notes, vitals, problem list, allergy list, medical history, family, social history and medications. Review of Symptoms: 
 
General: Pt denies excessive weight gain or loss. Pt is able to conduct ADL's, + fatigue HEENT: Denies blurred vision, headaches, epistaxis and difficulty swallowing. Respiratory: Denies shortness of breath, OLEARY, wheezing or stridor. Cardiovascular: Denies precordial pain, palpitations, edema or PND Gastrointestinal: Denies poor appetite, indigestion, abdominal pain or blood in stool Urinary: Denies dysuria, pyuria Musculoskeletal: Denies pain or swelling from muscles or joints Neurologic: Denies tremor, paresthesias, or sensory motor disturbance Skin: Denies rash, itching or texture change. Psych: Denies depression Physical Exam:   
 
General: Well developed, in no acute distress. HEENT: Eyes - PERRL, no jvd Heart:  Normal S1/S2 negative S3 or S4. Regular, no murmur, gallop or rub.  
Respiratory: Clear bilaterally x 4, no wheezing or rales Abdomen:   Soft, non-tender, bowel sounds are active.  
Extremities:  No edema, normal cap refill, no cyanosis. Musculoskeletal: No clubbing Neuro: A&Ox3, speech clear, gait stable. Skin: Skin color is normal. No rashes or lesions. Non diaphoretic Vascular: 2+ pulses symmetric in all extremities Cardiographics Ekg: nsr 
 
 Stress - ef 33%, anterior scar Echo - lvef 40-45 Results for orders placed or performed in visit on 01/09/19 CARDIAC HOLTER MONITOR, 24 HOURS Narrative ECG Monitor/24 hours, Complete Reason for Holter Monitor  SYNCOPE Heartbeat Slowest 60 Average 83 Fastest  113 Results:  
Underlying Rhythm: Normal sinus rhythm Atrial Arrhythmias: premature atrial contractions; occasional         
 
AV Conduction: normal 
 
Ventricular Arrhythmias: premature ventricular contractions; occasional  
 
ST Segment Analysis:normal  
 
Symptom Correlation: 
none Comment:  
Sinus rhythm throughout - no symptoms Milena Dockery MD, Kaleigh Roche Results for orders placed or performed during the hospital encounter of 01/07/19 EKG, 12 LEAD, INITIAL Result Value Ref Range Ventricular Rate 74 BPM  
 Atrial Rate 74 BPM  
 P-R Interval 152 ms QRS Duration 80 ms  
 Q-T Interval 370 ms QTC Calculation (Bezet) 410 ms Calculated P Axis 22 degrees Calculated R Axis 3 degrees Calculated T Axis 93 degrees Diagnosis Normal sinus rhythm Anterolateral infarct (cited on or before 25-SEP-2018) T wave abnormality, consider lateral ischemia Confirmed by Kirit Gomez ([id number]) on 1/8/2019 8:48:20 PM 
  
 
 
 
Lab Results Component Value Date/Time WBC 11.1 01/07/2019 07:48 PM  
 HGB 14.2 01/07/2019 07:48 PM  
 HCT 43.1 01/07/2019 07:48 PM  
 PLATELET 308 23/75/7822 07:48 PM  
 MCV 89.8 01/07/2019 07:48 PM  
  
Lab Results Component Value Date/Time  Sodium 140 01/07/2019 07:48 PM  
 Potassium 3.6 01/07/2019 07:48 PM  
 Chloride 105 01/07/2019 07:48 PM  
 CO2 28 01/07/2019 07:48 PM  
 Anion gap 7 01/07/2019 07:48 PM  
 Glucose 113 (H) 01/07/2019 07:48 PM  
 BUN 12 01/07/2019 07:48 PM  
 Creatinine 0.82 01/07/2019 07:48 PM  
 BUN/Creatinine ratio 15 01/07/2019 07:48 PM  
 GFR est AA >60 01/07/2019 07:48 PM  
 GFR est non-AA >60 01/07/2019 07:48 PM  
 Calcium 9.4 01/07/2019 07:48 PM  
 Bilirubin, total 0.4 01/07/2019 07:48 PM  
 AST (SGOT) 18 01/07/2019 07:48 PM  
 Alk. phosphatase 129 (H) 01/07/2019 07:48 PM  
 Protein, total 7.8 01/07/2019 07:48 PM  
 Albumin 3.7 01/07/2019 07:48 PM  
 Globulin 4.1 (H) 01/07/2019 07:48 PM  
 A-G Ratio 0.9 (L) 01/07/2019 07:48 PM  
 ALT (SGPT) 24 01/07/2019 07:48 PM  
  
 
 Assessment: 
 
 Assessment: ICD-10-CM ICD-9-CM 1. Arteriosclerotic heart disease (ASHD) I25.10 414.00 AMB POC EKG ROUTINE W/ 12 LEADS, INTER & REP 2. Syncope and collapse R55 780.2 PROTHROMBIN TIME + INR  
   CBC WITH AUTOMATED DIFF  
   METABOLIC PANEL, COMPREHENSIVE  
   XR CHEST PA LAT 3. Ischemic cardiomyopathy I25.5 414.8 PROTHROMBIN TIME + INR  
   CBC WITH AUTOMATED DIFF  
   METABOLIC PANEL, COMPREHENSIVE  
   XR CHEST PA LAT 4. Chronic systolic congestive heart failure (HCC) I50.22 428.22 PROTHROMBIN TIME + INR  
  428.0 CBC WITH AUTOMATED DIFF  
   METABOLIC PANEL, COMPREHENSIVE  
   XR CHEST PA LAT 5. ASHD (arteriosclerotic heart disease) I25.10 414.00 PROTHROMBIN TIME + INR  
   CBC WITH AUTOMATED DIFF  
   METABOLIC PANEL, COMPREHENSIVE  
   XR CHEST PA LAT Orders Placed This Encounter  XR CHEST PA LAT Standing Status:   Future Standing Expiration Date:   2/17/2020 Order Specific Question:   Reason for Exam  
  Answer:   pre op Order Specific Question:   Is Patient Allergic to Contrast Dye? Answer:   Unknown  PROTHROMBIN TIME + INR  CBC WITH AUTOMATED DIFF  
 METABOLIC PANEL, COMPREHENSIVE  
 AMB POC EKG ROUTINE W/ 12 LEADS, INTER & REP Order Specific Question:   Reason for Exam: Answer:   routine Plan:  
Mr Bess Gan is a pleasant gentleman with ischemic cardiomyopathy with large anterior scar on nuclear scan and now presenting with syncope. Will need an eps to r/o ventricular arrhythmia.  If the eps is positive, he will need an icd. I discussed the risks/benefits/alternatives of the procedure with the patient. Risks include (but are not limited to) bleeding, heart block, infection, cva/mi/tamponade/death. The patient understands and agrees to proceed. Thank you for this interesting consultation. Continue medical management for ischemic cardiomyopathy, ashd and htn. Thank you for allowing me to participate in Matt Knight 's care.  
 
Oral Moreno MD, Vandana Zamudio

## 2019-01-17 NOTE — PROGRESS NOTES
Subjective:      Cheyanne Melara is a 46 y.o. male is here for EP consult. He is sp STEMI. He had syncope at work last week - no prodrome. His nuclear scan last month revealed an ef of 33% with large infarct anteriorly. His lvef was 40-45% this month. He has fatigue and continues to smoke.     Patient Active Problem List    Diagnosis Date Noted    Fatigue 11/30/2018    Chest pain 11/30/2018    Arteriosclerotic heart disease (ASHD) 10/05/2018    STEMI (ST elevation myocardial infarction) ([facility] [address] 09/25/2018    HTN (hypertension) 09/25/2018    Dyslipidemia 09/25/2018    ADD (attention deficit disorder) 09/25/2018    Tobacco abuse 09/25/2018    S/P coronary artery stent placement 09/25/2018      Leticia Murphy MD  Past Medical History:   Diagnosis Date    ADD (attention deficit disorder) 9/25/2018    Asthma     Chest pain 11/30/2018    Dyslipidemia 9/25/2018    Fatigue 11/30/2018    HTN (hypertension) 9/25/2018    Hypertension     Other ill-defined conditions(799.89)     ADHD,BELL'S PALSY WEAKNESS  LT FACE    Psychiatric disorder     DEPRESSION    S/P coronary artery stent placement 9/25/2018 9/25/18 PCI/MAVIS to LAD    STEMI (ST elevation myocardial infarction) ([facility] [address] 9/25/2018    Tobacco abuse 9/25/2018      Past Surgical History:   Procedure Laterality Date    HX GI  1984    STOMACH ABDOMEN ACCIDENT    HX HEENT      TEETH EXTRACTION    HX ORTHOPAEDIC  1984    FRACTURE RT FEMUR    NEUROLOGICAL PROCEDURE UNLISTED      NECK     Allergies   Allergen Reactions    Bee Sting [Sting, Bee] Anaphylaxis      Family History   Problem Relation Age of Onset    Hypertension Mother     Lung Disease Paternal Aunt     negative for cardiac disease  Social History     Socioeconomic History    Marital status:      Spouse name: Rodriguez Hamilton Number of children: 2    Years of education: 15    Highest education level: 12th grade   Social Needs    Financial resource strain: Not hard at all   [facility] Food insecurity - worry: Never true    Food insecurity - inability: Never true    Transportation needs - medical: Patient refused    Transportation needs - non-medical: Patient refused   Tobacco Use    Smoking status: Current Every Day Smoker     Packs/day: 1.00     Years: 40.00     Pack years: 40.00    Smokeless tobacco: Never Used    Tobacco comment: were on chantix,stopped 2 days ago due to stomachache   Substance and Sexual Activity    Alcohol use: No     Alcohol/week: 0.0 oz     Frequency: Never    Drug use: No    Sexual activity: Not Currently   Other Topics Concern     Service No    Blood Transfusions Yes    Caffeine Concern Yes     Comment: coffee 1 pot    Occupational Exposure Yes     Comment: cig smoke    Hobby Hazards No    Sleep Concern Yes     Comment: does not sleep well,wakes up sweating,dog    Stress Concern Yes     Comment: high level daily -depression he states    Weight Concern Yes     Comment: more tired    Special Diet No    Back Care No     Comment: neck and low back surgery    Exercise No     Comment: does not exercise    Bike Helmet No     Comment: does not ride bike   [place] Yes     Comment: 1/2 time wears seatbelt    Self-Exams No     Current Outpatient Medications   Medication Sig    atomoxetine (STRATTERA) 40 mg capsule Take 40 mg by mouth two (2) times a day.  losartan (COZAAR) 25 mg tablet TAKE 1 TABLET BY MOUTH ONCE DAILY    metoprolol tartrate (LOPRESSOR) 25 mg tablet Take 25 mg by mouth two (2) times a day.  acetaminophen (TYLENOL) 500 mg tablet Take 2,000 mg by mouth two (2) times daily as needed for Pain.  albuterol (PROAIR HFA) 90 mcg/actuation inhaler Take 2 Puffs by inhalation every four (4) hours as needed for Wheezing.  isosorbide mononitrate ER (IMDUR) 30 mg tablet Take 1 Tab by mouth daily.  nitroglycerin (NITROSTAT) 0.4 mg SL tablet 1 Tab by SubLINGual route every five (5) minutes as needed for Chest Pain. Up to 3 doses.  aspirin delayed-release 81 mg tablet Take 1 Tab by mouth daily.  atorvastatin (LIPITOR) 40 mg tablet Take 1 Tab by mouth nightly.  ticagrelor (BRILINTA) 90 mg tablet Take 1 Tab by mouth every twelve (12) hours every twelve (12) hours.  DULoxetine (CYMBALTA) 60 mg capsule Take 60 mg by mouth daily. No current facility-administered medications for this visit. Vitals:    01/17/19 0956   BP: 126/80   Pulse: 86   Resp: 18   Weight: 203 lb 6.4 oz (92.3 kg)   Height: 5' 9\" (1.753 m)       I have reviewed the nurses notes, vitals, problem list, allergy list, medical history, family, social history and medications. Review of Symptoms:    General: Pt denies excessive weight gain or loss. Pt is able to conduct ADL's, + fatigue  HEENT: Denies blurred vision, headaches, epistaxis and difficulty swallowing. Respiratory: Denies shortness of breath, OLEARY, wheezing or stridor. Cardiovascular: Denies precordial pain, palpitations, edema or PND  Gastrointestinal: Denies poor appetite, indigestion, abdominal pain or blood in stool  Urinary: Denies dysuria, pyuria  Musculoskeletal: Denies pain or swelling from muscles or joints  Neurologic: Denies tremor, paresthesias, or sensory motor disturbance  Skin: Denies rash, itching or texture change. Psych: Denies depression      Physical Exam:      General: Well developed, in no acute distress. HEENT: Eyes - PERRL, no jvd  Heart:  Normal S1/S2 negative S3 or S4. Regular, no murmur, gallop or rub.   Respiratory: Clear bilaterally x 4, no wheezing or rales  Abdomen:   Soft, non-tender, bowel sounds are active.   Extremities:  No edema, normal cap refill, no cyanosis. Musculoskeletal: No clubbing  Neuro: A&Ox3, speech clear, gait stable. Skin: Skin color is normal. No rashes or lesions.  Non diaphoretic  Vascular: 2+ pulses symmetric in all extremities    Cardiographics    Ekg: nsr    Stress - ef 33%, anterior scar  Echo - lvef 40-45    Results for orders placed or performed in visit on 01/09/19   CARDIAC HOLTER MONITOR, 24 HOURS    Narrative    ECG Monitor/24 hours, Complete    Reason for Holter Monitor  SYNCOPE    Heartbeat    Slowest 60  Average 83  Fastest  113      Results:   Underlying Rhythm: Normal sinus rhythm      Atrial Arrhythmias: premature atrial contractions; occasional            AV Conduction: normal    Ventricular Arrhythmias: premature ventricular contractions; occasional     ST Segment Analysis:normal     Symptom Correlation:  none    Comment:   Sinus rhythm throughout - no symptoms     Beltran Johnston MD, [facility]      Results for orders placed or performed during the hospital encounter of 01/07/19   EKG, 12 LEAD, INITIAL   Result Value Ref Range    Ventricular Rate 74 BPM    Atrial Rate 74 BPM    P-R Interval 152 ms    QRS Duration 80 ms    Q-T Interval 370 ms    QTC Calculation (Bezet) 410 ms    Calculated P Axis 22 degrees    Calculated R Axis 3 degrees    Calculated T Axis 93 degrees    Diagnosis       Normal sinus rhythm  Anterolateral infarct (cited on or before 25-SEP-2018)  T wave abnormality, consider lateral ischemia  Confirmed by Paulo Galdamez ([id number]) on 1/8/2019 8:48:20 PM           Lab Results   Component Value Date/Time    WBC 11.1 01/07/2019 07:48 PM    HGB 14.2 01/07/2019 07:48 PM    HCT 43.1 01/07/2019 07:48 PM    PLATELET 456 59/14/3374 07:48 PM    MCV 89.8 01/07/2019 07:48 PM      Lab Results   Component Value Date/Time    Sodium 140 01/07/2019 07:48 PM    Potassium 3.6 01/07/2019 07:48 PM    Chloride 105 01/07/2019 07:48 PM    CO2 28 01/07/2019 07:48 PM    Anion gap 7 01/07/2019 07:48 PM    Glucose 113 (H) 01/07/2019 07:48 PM    BUN 12 01/07/2019 07:48 PM    Creatinine 0.82 01/07/2019 07:48 PM    BUN/Creatinine ratio 15 01/07/2019 07:48 PM    GFR est AA >60 01/07/2019 07:48 PM    GFR est non-AA >60 01/07/2019 07:48 PM    Calcium 9.4 01/07/2019 07:48 PM    Bilirubin, total 0.4 01/07/2019 07:48 PM    AST (SGOT) 18 01/07/2019 07:48 PM    Alk. phosphatase 129 (H) 01/07/2019 07:48 PM    Protein, total 7.8 01/07/2019 07:48 PM    Albumin 3.7 01/07/2019 07:48 PM    Globulin 4.1 (H) 01/07/2019 07:48 PM    A-G Ratio 0.9 (L) 01/07/2019 07:48 PM    ALT (SGPT) 24 01/07/2019 07:48 PM         Assessment:     Assessment:        ICD-10-CM ICD-9-CM    1. Arteriosclerotic heart disease (ASHD) I25.10 414.00 AMB POC EKG ROUTINE W/ 12 LEADS, INTER & REP   2. Syncope and collapse R55 780.2 PROTHROMBIN TIME + INR      CBC WITH AUTOMATED DIFF      METABOLIC PANEL, COMPREHENSIVE      XR CHEST PA LAT   3. Ischemic cardiomyopathy I25.5 414.8 PROTHROMBIN TIME + INR      CBC WITH AUTOMATED DIFF      METABOLIC PANEL, COMPREHENSIVE      XR CHEST PA LAT   4. Chronic systolic congestive heart failure (HCC) I50.22 428.22 PROTHROMBIN TIME + INR     428.0 CBC WITH AUTOMATED DIFF      METABOLIC PANEL, COMPREHENSIVE      XR CHEST PA LAT   5. ASHD (arteriosclerotic heart disease) I25.10 414.00 PROTHROMBIN TIME + INR      CBC WITH AUTOMATED DIFF      METABOLIC PANEL, COMPREHENSIVE      XR CHEST PA LAT     Orders Placed This Encounter    XR CHEST PA LAT     Standing Status:   Future     Standing Expiration Date:   2/17/2020     Order Specific Question:   Reason for Exam     Answer:   pre op     Order Specific Question:   Is Patient Allergic to Contrast Dye? Answer:   Unknown    PROTHROMBIN TIME + INR    CBC WITH AUTOMATED DIFF    METABOLIC PANEL, COMPREHENSIVE    AMB POC EKG ROUTINE W/ 12 LEADS, INTER & REP     Order Specific Question:   Reason for Exam:     Answer:   routine        Plan:   Mr Vinnie Cool is a pleasant gentleman with ischemic cardiomyopathy with large anterior scar on nuclear scan and now presenting with syncope. Will need an eps to r/o ventricular arrhythmia. If the eps is positive, he will need an icd. I discussed the risks/benefits/alternatives of the procedure with the patient.  Risks include (but are not limited to) bleeding, heart block, infection, cva/mi/tamponade/death. The patient understands and agrees to proceed. Thank you for this interesting consultation. Continue medical management for ischemic cardiomyopathy, ashd and htn. Thank you for allowing me to participate in [facility] 's care.     Ramez Hugo MD, Dorita Wagner

## 2019-01-17 NOTE — PROGRESS NOTES
1. Have you been to the ER, urgent care clinic since your last visit? Hospitalized since your last visit? No      2. Have you seen or consulted any other health care providers outside of the [address] [place] [address] since your last visit? Include any pap smears or colon screening. No      Chief Complaint   Patient presents with    Dizziness     NP, ref by Dr. Hang Canseco, syncope. See HM results. R/O arrhythmia. C/O CP, SOB rest or exertion.

## 2019-01-18 LAB
ALBUMIN SERPL-MCNC: 4.2 G/DL (ref 3.5–5.5)
ALBUMIN/GLOB SERPL: 1.4 {RATIO} (ref 1.2–2.2)
ALP SERPL-CCNC: 112 IU/L (ref 39–117)
ALT SERPL-CCNC: 25 IU/L (ref 0–44)
AST SERPL-CCNC: 22 IU/L (ref 0–40)
BASOPHILS # BLD AUTO: 0.1 X10E3/UL (ref 0–0.2)
BASOPHILS NFR BLD AUTO: 1 %
BILIRUB SERPL-MCNC: 0.2 MG/DL (ref 0–1.2)
BUN SERPL-MCNC: 16 MG/DL (ref 6–24)
BUN/CREAT SERPL: 19 (ref 9–20)
CALCIUM SERPL-MCNC: 9.8 MG/DL (ref 8.7–10.2)
CHLORIDE SERPL-SCNC: 101 MMOL/L (ref 96–106)
CO2 SERPL-SCNC: 23 MMOL/L (ref 20–29)
CREAT SERPL-MCNC: 0.86 MG/DL (ref 0.76–1.27)
EOSINOPHIL # BLD AUTO: 0.3 X10E3/UL (ref 0–0.4)
EOSINOPHIL NFR BLD AUTO: 2 %
ERYTHROCYTE [DISTWIDTH] IN BLOOD BY AUTOMATED COUNT: 15.2 % (ref 12.3–15.4)
GLOBULIN SER CALC-MCNC: 2.9 G/DL (ref 1.5–4.5)
GLUCOSE SERPL-MCNC: 86 MG/DL (ref 65–99)
HCT VFR BLD AUTO: 43.6 % (ref 37.5–51)
HGB BLD-MCNC: 14.8 G/DL (ref 13–17.7)
IMM GRANULOCYTES # BLD AUTO: 0 X10E3/UL (ref 0–0.1)
IMM GRANULOCYTES NFR BLD AUTO: 0 %
INR PPP: 1 (ref 0.8–1.2)
LYMPHOCYTES # BLD AUTO: 3.4 X10E3/UL (ref 0.7–3.1)
LYMPHOCYTES NFR BLD AUTO: 25 %
MCH RBC QN AUTO: 29.2 PG (ref 26.6–33)
MCHC RBC AUTO-ENTMCNC: 33.9 G/DL (ref 31.5–35.7)
MCV RBC AUTO: 86 FL (ref 79–97)
MONOCYTES # BLD AUTO: 1 X10E3/UL (ref 0.1–0.9)
MONOCYTES NFR BLD AUTO: 7 %
NEUTROPHILS # BLD AUTO: 8.7 X10E3/UL (ref 1.4–7)
NEUTROPHILS NFR BLD AUTO: 65 %
PLATELET # BLD AUTO: 310 X10E3/UL (ref 150–379)
POTASSIUM SERPL-SCNC: 4.5 MMOL/L (ref 3.5–5.2)
PROT SERPL-MCNC: 7.1 G/DL (ref 6–8.5)
PROTHROMBIN TIME: 9.9 SEC (ref 9.1–12)
RBC # BLD AUTO: 5.06 X10E6/UL (ref 4.14–5.8)
SODIUM SERPL-SCNC: 141 MMOL/L (ref 134–144)
WBC # BLD AUTO: 13.6 X10E3/UL (ref 3.4–10.8)

## 2019-01-22 ENCOUNTER — DOCUMENTATION ONLY (OUTPATIENT)
Dept: CARDIOLOGY CLINIC | Age: 54
End: 2019-01-22

## 2019-01-25 ENCOUNTER — ANESTHESIA EVENT (OUTPATIENT)
Dept: NON INVASIVE DIAGNOSTICS | Age: 54
End: 2019-01-25
Payer: COMMERCIAL

## 2019-01-25 ENCOUNTER — HOSPITAL ENCOUNTER (OUTPATIENT)
Dept: NON INVASIVE DIAGNOSTICS | Age: 54
Discharge: HOME | End: 2019-01-25
Attending: INTERNAL MEDICINE | Admitting: INTERNAL MEDICINE
Payer: COMMERCIAL

## 2019-01-25 ENCOUNTER — APPOINTMENT (OUTPATIENT)
Dept: GENERAL RADIOLOGY | Age: 54
End: 2019-01-25
Attending: INTERNAL MEDICINE
Payer: COMMERCIAL

## 2019-01-25 ENCOUNTER — ANESTHESIA (OUTPATIENT)
Dept: NON INVASIVE DIAGNOSTICS | Age: 54
End: 2019-01-25
Payer: COMMERCIAL

## 2019-01-25 VITALS
HEIGHT: 69 IN | DIASTOLIC BLOOD PRESSURE: 82 MMHG | HEART RATE: 82 BPM | WEIGHT: 203 LBS | BODY MASS INDEX: 30.07 KG/M2 | TEMPERATURE: 97.5 F | SYSTOLIC BLOOD PRESSURE: 118 MMHG | RESPIRATION RATE: 7 BRPM | OXYGEN SATURATION: 90 %

## 2019-01-25 PROBLEM — R55 SYNCOPE: Status: ACTIVE | Noted: 2019-01-25

## 2019-01-25 PROCEDURE — A4565 SLINGS: HCPCS

## 2019-01-25 PROCEDURE — 76060000033 HC ANESTHESIA 1 TO 1.5 HR

## 2019-01-25 PROCEDURE — C1730 CATH, EP, 19 OR FEW ELECT: HCPCS

## 2019-01-25 PROCEDURE — 71045 X-RAY EXAM CHEST 1 VIEW: CPT

## 2019-01-25 PROCEDURE — 74011250636 HC RX REV CODE- 250/636

## 2019-01-25 PROCEDURE — 77030018673

## 2019-01-25 PROCEDURE — 77030031139 HC SUT VCRL2 J&J -A

## 2019-01-25 PROCEDURE — C1777 LEAD, AICD, ENDO SINGLE COIL: HCPCS

## 2019-01-25 PROCEDURE — 93620 COMP EP EVL R AT VEN PAC&REC: CPT

## 2019-01-25 PROCEDURE — 74011000250 HC RX REV CODE- 250

## 2019-01-25 PROCEDURE — 77030015398 HC CBL EP EXT STJU -C

## 2019-01-25 PROCEDURE — 77030018836 HC SOL IRR NACL ICUM -A

## 2019-01-25 PROCEDURE — 77030002996 HC SUT SLK J&J -A

## 2019-01-25 PROCEDURE — C1894 INTRO/SHEATH, NON-LASER: HCPCS

## 2019-01-25 PROCEDURE — C1722 AICD, SINGLE CHAMBER: HCPCS

## 2019-01-25 PROCEDURE — 77030018729 HC ELECTRD DEFIB PAD CARD -B

## 2019-01-25 PROCEDURE — C1893 INTRO/SHEATH, FIXED,NON-PEEL: HCPCS

## 2019-01-25 PROCEDURE — 77030016894 HC CBL EP DX CATH3 STJU -B

## 2019-01-25 PROCEDURE — 74011250636 HC RX REV CODE- 250/636: Performed by: INTERNAL MEDICINE

## 2019-01-25 PROCEDURE — 77030037713 HC CLOSR DEV INCIS ZIP STRY -B

## 2019-01-25 RX ORDER — LIDOCAINE HYDROCHLORIDE 20 MG/ML
INJECTION, SOLUTION EPIDURAL; INFILTRATION; INTRACAUDAL; PERINEURAL AS NEEDED
Status: DISCONTINUED | OUTPATIENT
Start: 2019-01-25 | End: 2019-01-25 | Stop reason: HOSPADM

## 2019-01-25 RX ORDER — LIDOCAINE HYDROCHLORIDE 10 MG/ML
1-40 INJECTION INFILTRATION; PERINEURAL
Status: DISCONTINUED | OUTPATIENT
Start: 2019-01-25 | End: 2019-01-25 | Stop reason: HOSPADM

## 2019-01-25 RX ORDER — LIDOCAINE HYDROCHLORIDE 10 MG/ML
INJECTION INFILTRATION; PERINEURAL
Status: COMPLETED
Start: 2019-01-25 | End: 2019-01-25

## 2019-01-25 RX ORDER — ACETAMINOPHEN 325 MG/1
TABLET ORAL
Status: DISCONTINUED
Start: 2019-01-25 | End: 2019-01-25 | Stop reason: HOSPADM

## 2019-01-25 RX ORDER — HEPARIN SODIUM 200 [USP'U]/100ML
500 INJECTION, SOLUTION INTRAVENOUS ONCE
Status: DISCONTINUED | OUTPATIENT
Start: 2019-01-25 | End: 2019-01-25 | Stop reason: HOSPADM

## 2019-01-25 RX ORDER — LIDOCAINE HYDROCHLORIDE 10 MG/ML
INJECTION, SOLUTION EPIDURAL; INFILTRATION; INTRACAUDAL; PERINEURAL
Status: COMPLETED
Start: 2019-01-25 | End: 2019-01-25

## 2019-01-25 RX ORDER — PROPOFOL 10 MG/ML
INJECTION, EMULSION INTRAVENOUS AS NEEDED
Status: DISCONTINUED | OUTPATIENT
Start: 2019-01-25 | End: 2019-01-25 | Stop reason: HOSPADM

## 2019-01-25 RX ORDER — CEFAZOLIN SODIUM/WATER 2 G/20 ML
2 SYRINGE (ML) INTRAVENOUS ONCE
Status: COMPLETED | OUTPATIENT
Start: 2019-01-25 | End: 2019-01-25

## 2019-01-25 RX ORDER — FENTANYL CITRATE 50 UG/ML
INJECTION, SOLUTION INTRAMUSCULAR; INTRAVENOUS
Status: COMPLETED
Start: 2019-01-25 | End: 2019-01-25

## 2019-01-25 RX ORDER — NALOXONE HYDROCHLORIDE 0.4 MG/ML
0.4 INJECTION, SOLUTION INTRAMUSCULAR; INTRAVENOUS; SUBCUTANEOUS AS NEEDED
Status: DISCONTINUED | OUTPATIENT
Start: 2019-01-25 | End: 2019-01-25 | Stop reason: HOSPADM

## 2019-01-25 RX ORDER — MIDAZOLAM HYDROCHLORIDE 1 MG/ML
INJECTION, SOLUTION INTRAMUSCULAR; INTRAVENOUS AS NEEDED
Status: DISCONTINUED | OUTPATIENT
Start: 2019-01-25 | End: 2019-01-25 | Stop reason: HOSPADM

## 2019-01-25 RX ORDER — FENTANYL CITRATE 50 UG/ML
12.5-5 INJECTION, SOLUTION INTRAMUSCULAR; INTRAVENOUS
Status: DISCONTINUED | OUTPATIENT
Start: 2019-01-25 | End: 2019-01-25 | Stop reason: HOSPADM

## 2019-01-25 RX ORDER — MIDAZOLAM HYDROCHLORIDE 1 MG/ML
INJECTION, SOLUTION INTRAMUSCULAR; INTRAVENOUS
Status: COMPLETED
Start: 2019-01-25 | End: 2019-01-25

## 2019-01-25 RX ORDER — DOBUTAMINE HYDROCHLORIDE 200 MG/100ML
INJECTION INTRAVENOUS
Status: DISCONTINUED
Start: 2019-01-25 | End: 2019-01-25 | Stop reason: HOSPADM

## 2019-01-25 RX ORDER — PHENYLEPHRINE HCL IN 0.9% NACL 0.4MG/10ML
SYRINGE (ML) INTRAVENOUS AS NEEDED
Status: DISCONTINUED | OUTPATIENT
Start: 2019-01-25 | End: 2019-01-25 | Stop reason: HOSPADM

## 2019-01-25 RX ORDER — SODIUM CHLORIDE 0.9 % (FLUSH) 0.9 %
5-40 SYRINGE (ML) INJECTION AS NEEDED
Status: DISCONTINUED | OUTPATIENT
Start: 2019-01-25 | End: 2019-01-25 | Stop reason: HOSPADM

## 2019-01-25 RX ORDER — MIDAZOLAM HYDROCHLORIDE 1 MG/ML
1-5 INJECTION, SOLUTION INTRAMUSCULAR; INTRAVENOUS
Status: DISCONTINUED | OUTPATIENT
Start: 2019-01-25 | End: 2019-01-25 | Stop reason: HOSPADM

## 2019-01-25 RX ORDER — BACITRACIN 50000 [IU]/1
INJECTION, POWDER, FOR SOLUTION INTRAMUSCULAR
Status: COMPLETED
Start: 2019-01-25 | End: 2019-01-25

## 2019-01-25 RX ORDER — SODIUM CHLORIDE 9 MG/ML
INJECTION, SOLUTION INTRAVENOUS
Status: DISCONTINUED | OUTPATIENT
Start: 2019-01-25 | End: 2019-01-25 | Stop reason: HOSPADM

## 2019-01-25 RX ORDER — PROPOFOL 10 MG/ML
INJECTION, EMULSION INTRAVENOUS
Status: DISCONTINUED | OUTPATIENT
Start: 2019-01-25 | End: 2019-01-25 | Stop reason: HOSPADM

## 2019-01-25 RX ORDER — SODIUM CHLORIDE 0.9 % (FLUSH) 0.9 %
5-40 SYRINGE (ML) INJECTION EVERY 8 HOURS
Status: DISCONTINUED | OUTPATIENT
Start: 2019-01-25 | End: 2019-01-25 | Stop reason: HOSPADM

## 2019-01-25 RX ORDER — FENTANYL CITRATE 50 UG/ML
INJECTION, SOLUTION INTRAMUSCULAR; INTRAVENOUS AS NEEDED
Status: DISCONTINUED | OUTPATIENT
Start: 2019-01-25 | End: 2019-01-25 | Stop reason: HOSPADM

## 2019-01-25 RX ORDER — DOBUTAMINE HYDROCHLORIDE 200 MG/100ML
0-10 INJECTION INTRAVENOUS
Status: DISCONTINUED | OUTPATIENT
Start: 2019-01-25 | End: 2019-01-25 | Stop reason: HOSPADM

## 2019-01-25 RX ORDER — BACITRACIN 50000 [IU]/1
50000 INJECTION, POWDER, FOR SOLUTION INTRAMUSCULAR ONCE
Status: COMPLETED | OUTPATIENT
Start: 2019-01-25 | End: 2019-01-25

## 2019-01-25 RX ORDER — PROPOFOL 10 MG/ML
INJECTION, EMULSION INTRAVENOUS
Status: COMPLETED
Start: 2019-01-25 | End: 2019-01-25

## 2019-01-25 RX ORDER — HEPARIN SODIUM 200 [USP'U]/100ML
500 INJECTION, SOLUTION INTRAVENOUS ONCE
Status: COMPLETED | OUTPATIENT
Start: 2019-01-25 | End: 2019-01-25

## 2019-01-25 RX ORDER — ACETAMINOPHEN 325 MG/1
650 TABLET ORAL
Status: DISCONTINUED | OUTPATIENT
Start: 2019-01-25 | End: 2019-01-25 | Stop reason: HOSPADM

## 2019-01-25 RX ORDER — HEPARIN SODIUM 200 [USP'U]/100ML
INJECTION, SOLUTION INTRAVENOUS
Status: COMPLETED
Start: 2019-01-25 | End: 2019-01-25

## 2019-01-25 RX ADMIN — FENTANYL CITRATE 50 MCG: 50 INJECTION, SOLUTION INTRAMUSCULAR; INTRAVENOUS at 07:57

## 2019-01-25 RX ADMIN — MIDAZOLAM HYDROCHLORIDE 1 MG: 1 INJECTION, SOLUTION INTRAMUSCULAR; INTRAVENOUS at 08:40

## 2019-01-25 RX ADMIN — HEPARIN SODIUM 2000 UNITS: 200 INJECTION, SOLUTION INTRAVENOUS at 08:29

## 2019-01-25 RX ADMIN — LIDOCAINE HYDROCHLORIDE 10 ML: 10 INJECTION, SOLUTION EPIDURAL; INFILTRATION; INTRACAUDAL; PERINEURAL at 08:20

## 2019-01-25 RX ADMIN — BACITRACIN 50000 UNITS: 50000 INJECTION, POWDER, FOR SOLUTION INTRAMUSCULAR at 08:55

## 2019-01-25 RX ADMIN — PROPOFOL 20 MG: 10 INJECTION, EMULSION INTRAVENOUS at 07:56

## 2019-01-25 RX ADMIN — FENTANYL CITRATE 25 MCG: 50 INJECTION, SOLUTION INTRAMUSCULAR; INTRAVENOUS at 08:40

## 2019-01-25 RX ADMIN — LIDOCAINE HYDROCHLORIDE 30 ML: 10 INJECTION INFILTRATION; PERINEURAL at 08:41

## 2019-01-25 RX ADMIN — Medication 80 MCG: at 08:17

## 2019-01-25 RX ADMIN — LIDOCAINE HYDROCHLORIDE 60 MG: 20 INJECTION, SOLUTION EPIDURAL; INFILTRATION; INTRACAUDAL; PERINEURAL at 07:57

## 2019-01-25 RX ADMIN — MIDAZOLAM HYDROCHLORIDE 1 MG: 1 INJECTION, SOLUTION INTRAMUSCULAR; INTRAVENOUS at 08:30

## 2019-01-25 RX ADMIN — Medication 2 G: at 08:23

## 2019-01-25 RX ADMIN — MIDAZOLAM HYDROCHLORIDE 3 MG: 1 INJECTION, SOLUTION INTRAMUSCULAR; INTRAVENOUS at 07:55

## 2019-01-25 RX ADMIN — PROPOFOL 20 MG: 10 INJECTION, EMULSION INTRAVENOUS at 08:17

## 2019-01-25 RX ADMIN — SODIUM CHLORIDE: 9 INJECTION, SOLUTION INTRAVENOUS at 07:45

## 2019-01-25 RX ADMIN — LIDOCAINE HYDROCHLORIDE 30 ML: 10 INJECTION, SOLUTION INFILTRATION; PERINEURAL at 08:41

## 2019-01-25 RX ADMIN — PROPOFOL 50 MCG/KG/MIN: 10 INJECTION, EMULSION INTRAVENOUS at 07:56

## 2019-01-25 RX ADMIN — FENTANYL CITRATE 25 MCG: 50 INJECTION, SOLUTION INTRAMUSCULAR; INTRAVENOUS at 08:37

## 2019-01-25 NOTE — PROCEDURES
[address]  [phone]    Indications and Pre-Procedure Diagnosis:  Matt Knight is a 46 y.o. male with syncope, ischemic cardiomyopathy is referred for electr-physiologic evaluation and intervention. The left ventricular ejection fraction is 33% (per nuclear scan) and 40-45% per echo and the patient is NYHA Class II. Post Procedure Diagnosis  Syncope  Ischemic cardiomyopathy  VT/VF    Electrophysiology Study Procedure  Informed consent was obtained. All vascular access sites were prepped and draped in the usual sterile fashion and the Seldinger technique was used to catherize the RFV and LFV with multi-polar electrode catheters, which were placed in the appropriate intra-cardiac sites under fluoroscopic guidance (see catheter list). Right and left atrial pacing and recording, His bundle recording, and right ventricular pacing and recording were performed. Continuous pulse oximetry and cuff BP monitoring were performed. During the procedure, the patient received Versed and Fentanyl for sedation. Rapid atrial pacing failed to induce any arrhythmias. Ventricular pacing with extra systoles induced VT/VF that was successfully cardioverted with a 300J biphasic shock. At the end of the procedure all catheters were removed and vascular hemostasis achieved. The patient left the laboratory in a stable condition. Fluoroscopic and total procedure times were 1 and 20 minutes respectively. Estimated blood loss: <10 ml. Sharp counts: correct. Specimen (s) collected: none. The procedure related complication occurred: none. The following problems were encountered: none. Conduction intervals (ms)    A-A A-H H-V P-R QRS Q-T R-R V-V  [phone] [phone]    AV tai conduction    VA Block when pacing at 300 ms    AV Wenckebach when pacing at 330 ms    Findings and Summary    This study demonstrates:  1.  Easily inducible VT/VF requiring cardioversion    Recommendation:  1. ICD    Thank you for allowing me to participate in this patients care.     Meche Lockwood MD, [facility]

## 2019-01-25 NOTE — PROGRESS NOTES
Cardiac Cath Lab Recovery Arrival Note: 
 
 
Yury Baron arrived to Cardiac Cath Lab, Recovery Area. Staff introduced to patient. Patient identifiers verified with NAME and DATE OF BIRTH. Procedure verified with patient. Consent forms reviewed and signed by patient or authorized representative and verified. Allergies verified. Patient and family oriented to department. Patient and family informed of procedure and plan of care. Questions answered with review. Patient prepped for procedure, per orders from physician, prior to arrival. 
 
Patient on cardiac monitor, non-invasive blood pressure, SPO2 monitor. On room air. Patient is A&Ox 3. Patient reports no c/o. Patient in stretcher, in low position, with side rails up, call bell within reach, patient instructed to call if assistance as needed. Patient prep in: [address], [place] [address]. Patient family has pager # none Family in: CCL waiting area.   
Prep by: Mi Berry, TONJA and Sunny Ivy RN

## 2019-01-25 NOTE — PROGRESS NOTES
Patient tolerated post procedure without problems. Swelling to chest lessened prior to d/c area soft no hematoma. Tolerated po intake and ambulation. Discharged to home.

## 2019-01-25 NOTE — DISCHARGE INSTRUCTIONS
[address] [place], [place] [id number]        ICD INSERTION DISCHARGE INSTRUCTIONS    Patient ID:  Becky Petersen  [id number]  59 y.o.  1965    Admit Date: 1/25/2019    Discharge Date: 1/25/2019     Admitting Physician: Leslie Hernandez MD     Discharge Physician: Leslie Hernandez MD    Admission Diagnoses:   syncope    Discharge Diagnoses: Active Problems:    * No active hospital problems. *      Discharge Condition: Good    Cardiology Procedures this Admission:  S/P ICD implantation. Disposition: home    Reference discharge instructions provided by nursing for diet and activity. Follow-up with ICD/PM clinic in 2 weeks. Call [phone] to make an appointment. Signed:  PERLA Bunch  1/25/2019  9:10 AM      DISCHARGE INSTRUCTIONS FOR PATIENTS WITH ICD'S    1. Remember to call for an appointment in 2-4 weeks [phone]. 2. Medic Alert Bracelets are available from your pharmacist to wear at all times if you choose to wear one. 3. Carry your ID card for your ICD with you at all times. This card will be given to you in the hospital or mailed to you. 4. The ICD will bulge slightly under your skin. The bulge will decrease in size over the next few weeks. Please notify the doctor's office if you notice any of the following around your ICD site:   A.  A bruise that does not go away. B.  Soreness or yellow, green, or brown drainage from the site. C. Any swelling from the site. D. If you have a fever of 100 degrees or higher that lasts for a few days. INCISION CARE       1.  Leave the dressing over your site until your follow up visit. 2.  You may not shower until after follow up visit. 3.  For comfort, wear loose fitting clothing. 4.  Report any signs of infection, fever, pain, swelling, redness, oozing, or heat at site especially if these symptoms increase after the first 3 to 4 days. ACTIVITY PRECAUTIONS     1.  Avoid rough contact with the implant site. 2. No driving for 14 days. 3. Avoid lifting your arm over your head, carrying anything on the affected side, or lifting over 10 pounds for 30 days. For the first 2 days only bend your arm at the elbow. 4. Any extreme activity such as golf, weight lifting or exercise biking should be restricted for 60 days. 5. Do not carry objects by holding them against your implant site. 6.  No shooting rifles or any type of gun with the affected shoulder permanently. 7.  Welding and chainsaws are prohibited. SPECIAL PRECAUTIONS     1. You should avoid all strong magnetic fields, such as arc welding, large transformers, large motors. Some ICD devices will beep if it detects a strong magnet. If this occurs, move out of the area. 2.  You may or may not have an MRI which uses a strong magnet to take pictures. 3.  Treatments or surgery that requires diathermy or electrocautery should be discussed with your doctor before scheduled. 4.  Avoid radio frequency transmitters, including radar. 5.  Advise dentist or other medical personnel you see that you have an ICD. 6.  Cell phones and microwave oven use is okay. 7.  If you plan to move or take a trip to a new area, the doctor's office will give you a name of a doctor to contact for any problems. SPECIAL INSTRUCTIONS ON SHOCKS     1. Notify your doctor for any of the following:       A. Anytime a shock is received in a 24 hour period. An office visit is not usually required for a single shock. B.  Two or more shocks in a row. If you do not feel well, call the [facility], otherwise call your doctor. This may require an office visit. C. Two or more shocks spaced apart by several hours. This may require an office visit. 2.  Keep a record of events. Include date, time, symptoms and activity at that time.         ANTIBIOTIC THERAPY    During the first 8 weeks after your ICD insertion, you may need antibiotics before any dental work or certain tests or operations. Let the dentist or doctor who is caring for you know that you have had an implanted device. S/P EPS DISCHARGE INSTRUCTIONS    It is normal to feel tired the first couple days. Take it easy and follow the physicians instructions. CHECK THE CATHETER INSERTION SITE DAILY:  You may shower 24 hours after the procedure, remove the bandage during showering. Wash with soap and water and pat dry. Gentle cleaning of the site with soap and water is sufficient, cover with a dry clean dressing or bandage. Do not apply creams or powders to the area. Do not sit in a bathtub or pool of water for 7 days or until wound has completely healed. Temporary bruising and discomfort is normal and may last a few weeks. You may have a  formation of a small lump at the site which may last up to 6 weeks. CALL THE PHYSICIAN:  If the site becomes red, swollen or feels warm to the touch  If there is bleeding or drainage or if there is unusual pain at the groin or down the leg. If there is any bleeding, lie down, apply pressure or have someone apply pressure with a clean cloth until the bleeding stops. If the bleeding continues, call 911 to be transported to the hospital.  DO NOT DRIVE YOURSELF, [facility]. Activity:      For the first 24-48 hours or as instructed by the physician:  No lifting, pushing or pulling over 5 pounds and no straining the insertion site. Do not life grocery bags or the garbage can, do not run the vacuum  or  for 7 days. Start with short walks as in the hospital and gradually increase as tolerated each day. It is recommended to walk 30 minutes 5-7 days per week. Follow your physicians instructions on activity. Avoid walking outside in extremes of heat or cold. Walk inside when it is cold and windy or hot and humid.      Things to keep in mind:  No driving for at least 5 days, or as designated by your physician. Limit the number of times you go up and down the stairs  Take rests and pace yourself with activity. Be careful and do not strain with bowel movements. Medications: Take all medications as prescribed  Call your physician if you have any questions  Keep an updated list of your medications with you at all times and give a list to your physician and pharmacist    Signs and Symptoms:  Be cautious of symptoms of angina or recurrent symptoms such as chest discomfort, unusual shortness of breath or fatigue, palpitations. After Care: Follow up with your physician as instructed. Follow a heart healthy diet with proper portion control, daily stress management, daily exercise, blood pressure and cholesterol control , and smoking cessation.

## 2019-01-25 NOTE — ANESTHESIA POSTPROCEDURE EVALUATION
* No procedures listed *. Anesthesia Post Evaluation Patient location during evaluation: PACU Note status: Adequate. Level of consciousness: responsive to verbal stimuli and sleepy but conscious Pain management: satisfactory to patient Airway patency: patent Anesthetic complications: no 
Cardiovascular status: acceptable Respiratory status: acceptable Hydration status: acceptable Comments: +Post-Anesthesia Evaluation and Assessment Patient: Elo Rosario MRN: [id number]  SSN: [id number] YOB: 1965  Age: 46 y.o. Sex: male Cardiovascular Function/Vital Signs /84   Pulse 72   Temp 36.4 °C (97.5 °F)   Resp 14   Ht 5' 9\" (1.753 m)   Wt 92.1 kg (203 lb)   SpO2 97%   BMI 29.98 kg/m² Patient is status post * No procedures listed *. Nausea/Vomiting: Controlled. Postoperative hydration reviewed and adequate. Pain: 
Pain Scale 1: Numeric (0 - 10) (01/25/19 1030) Pain Intensity 1: 0 (01/25/19 1030) Managed. Neurological Status: At baseline. Mental Status and Level of Consciousness: Arousable. Pulmonary Status:  
O2 Device: Nasal cannula (01/25/19 1030) Adequate oxygenation and airway patent. Complications related to anesthesia: None Post-anesthesia assessment completed. No concerns. Signed By: Elsie Leach MD  
 1/25/2019 Post anesthesia nausea and vomiting:  controlled Visit Vitals /84 Pulse 72 Temp 36.4 °C (97.5 °F) Resp 14 Ht 5' 9\" (1.753 m) Wt 92.1 kg (203 lb) SpO2 97% BMI 29.98 kg/m²

## 2019-01-25 NOTE — PROGRESS NOTES
TRANSFER - IN REPORT: 
 
Verbal report received from [facility] C[facility] on Nadia Brown  being received from EP for routine progression of care. Report consisted of patients Situation, Background, Assessment and Recommendations(SBAR). Information from the following report(s) Procedure Summary and MAR was reviewed with the receiving clinician. Opportunity for questions and clarification was provided. Assessment completed upon patients arrival to [address] [place] [address] and care assumed. Cardiac Cath Lab Recovery Arrival Note: 
 
Nadia Brown arrived to [facility] recovery area. Patient procedure= EPS/ICD. Patient on cardiac monitor, non-invasive blood pressure, SPO2 monitor. On O2 @ 2 lpm via nc. Patient status doing well without problems. Patient is A&Ox 3. Patient reports no c/o. PROCEDURE SITE CHECK: 
 
Procedure site:without any bleeding and no hematoma, no pain/discomfort reported at procedure site. No change in patient status. Continue to monitor patient and status.

## 2019-01-25 NOTE — ANESTHESIA PREPROCEDURE EVALUATION
Anesthetic History No history of anesthetic complications Review of Systems / Medical History Patient summary reviewed, nursing notes reviewed and pertinent labs reviewed Pulmonary Smoker Asthma : well controlled Neuro/Psych Psychiatric history Cardiovascular Hypertension Past MI (09/2018), CAD, cardiac stents and hyperlipidemia Exercise tolerance: >4 METS Comments: Ejection fraction was estimated in the range of 40 % to 45 %. GI/Hepatic/Renal 
Within defined limits Endo/Other Obesity Other Findings Physical Exam 
 
Airway Mallampati: II 
TM Distance: 4 - 6 cm Neck ROM: normal range of motion Mouth opening: Normal 
 
 Cardiovascular Regular rate and rhythm,  S1 and S2 normal,  no murmur, click, rub, or gallop Dental 
 
Dentition: Full upper dentures and Full lower dentures Pulmonary Breath sounds clear to auscultation Abdominal 
GI exam deferred Other Findings Anesthetic Plan ASA: 3 Anesthesia type: total IV anesthesia and MAC Induction: Intravenous Anesthetic plan and risks discussed with: Patient

## 2019-01-25 NOTE — PROCEDURES
[address] [place] [address]  [phone]    Indications and Pre-Procedure Diagnosis:  Marlin Multani is a 46 y.o. male with syncope, ischemic cardiomyopathy and VT is referred for single chamber defibrillator. The left ventricular ejection fraction is 33/40-45% and the patient is NYHA Class II. The patient has been on ace inhibitor and beta blocker therapy for greater than 3 months. Post Procedure Diagnosis:  Ischemic cardiomyopathy  Syncope  VT    ICD Implant Procedure and Findings:  Informed consent was obtained and the patient was premedicated with cefazolin. The procedure was performed under local anesthesia. Continuous pulse oximetry and cuff pressure were monitored. During the procedure, the patient received Versed, Fentanyl and Propofol for sedation per anesthesia personnel. The left deltopectoral area was prepped and draped in the usual sterile fashion and was liberally infiltrated with 1% lidocaine. An incision was made over the left subpectoral area and a generator pocket was manually dissected. Access was achieved in the left axillary vein under fluoroscopic guidance and using the seldinger technique. Through the left axillary vein, pacing/defibrillation leads were positioned in appropriate regions in the right heart chambers where satisfactory pacing and sensing parameters were measured. Stability of the leads was assessed with deep breathing and there was no diaphragmatic pacing at 10V output. The leads were anchored using the sleeves and a pulse generator pocket fashioned using blunt dissection. The leads were then connected to the pulse generator. The pulse generator pocket was then liberally infiltrated with bacitracin solution, and the device implanted with a single silk fixation suture in the header to prevent migration. The wound was closed in layers using continuous 2-0 Vicryl and 4-0 Vicryl ending with a sub-cuticular closure.  A bio-occlusive dressing were applied to the skin. Fluoroscopy and total procedure times were 1 and 20 minutes respectively. Estimated blood loss  <10 ml. Sharp count: correct. Specimen(s) collected: none. The following procedure related complication occurred: none. The following problems were encountered: none. Findings: successful ICD placement.     Device Data Measurements:  Lead Sensing (mV) Threshold (V)Pulse Width (ms) Impedance (Ohms)    RV 12.5  0.9  0.5   490      Defibrillator Function Testing  Induction Rhythm Energy (J) Impedance Polarity Success        (Ohms)  On T  VF  41  61  RV-  Yes         Final Programmed Parameters  Bradycardia pacing rate  40 bpm  Pacing Mode    VVI  Pacing Output    3.5 V@ 0.5 ms  Fibrillation Detect Interval  210 bpm  First Shock Energy   41 J  Electrode Configuration  RV -  ATP Status    On      Supplies Summary available in the chart    Jose Alberto South MD, Michael Weiss

## 2019-01-25 NOTE — INTERVAL H&P NOTE
H&P Update: 
Beatris Hurst was seen and examined. History and physical has been reviewed. The patient has been examined.  There have been no significant clinical changes since the completion of the originally dated History and Physical. 
 
Signed By: Fer Milan MD   
 January 25, 2019 8:07 AM

## 2019-01-28 ENCOUNTER — DOCUMENTATION ONLY (OUTPATIENT)
Dept: CARDIOLOGY CLINIC | Age: 54
End: 2019-01-28

## 2019-01-28 NOTE — PROGRESS NOTES
RENITA paperwork completed. Message left with patient that he needs to sign a release of information form before we can fax paperwork.

## 2019-01-31 ENCOUNTER — TELEPHONE (OUTPATIENT)
Dept: CARDIAC REHAB | Age: 54
End: 2019-01-31

## 2019-01-31 NOTE — CARDIO/PULMONARY
Spoke with pt regarding returning to Cardiac Rehab. States he had an ICD placed January 25,2019. Follow up appt on Feb 14,2019 and would like to start back if cleared by Dr. Yariel Varghese on February 18. Appts scheduled. Sent in basket message to Dr. Yariel Varghese regarding pt returning.     Called pt to remind him to get a note from Dr. Yariel Varghese with ok to return to exercise

## 2019-01-31 NOTE — TELEPHONE ENCOUNTER
Spoke with pt regarding returning to Cardiac Rehab. States he had an ICD placed January 25,2019. Follow up appt on Feb 14,2019 and would like to start back if cleared by Dr. Alfredo Jennings on February 18. Appts scheduled.

## 2019-02-14 ENCOUNTER — CLINICAL SUPPORT (OUTPATIENT)
Dept: CARDIOLOGY CLINIC | Age: 54
End: 2019-02-14

## 2019-02-14 ENCOUNTER — DOCUMENTATION ONLY (OUTPATIENT)
Dept: CARDIOLOGY CLINIC | Age: 54
End: 2019-02-14

## 2019-02-14 DIAGNOSIS — I25.5 ISCHEMIC CARDIOMYOPATHY: Primary | ICD-10-CM

## 2019-02-14 DIAGNOSIS — Z95.810 PRESENCE OF AUTOMATIC CARDIOVERTER/DEFIBRILLATOR (AICD): ICD-10-CM

## 2019-02-14 DIAGNOSIS — I25.5 ISCHEMIC CARDIOMYOPATHY: ICD-10-CM

## 2019-02-14 DIAGNOSIS — Z51.89 VISIT FOR WOUND CHECK: ICD-10-CM

## 2019-02-14 DIAGNOSIS — Z95.810 ICD (IMPLANTABLE CARDIOVERTER-DEFIBRILLATOR) IN PLACE: Primary | ICD-10-CM

## 2019-02-14 NOTE — PROGRESS NOTES
Marlin Multani 1965 [place] Russ MD 
 
 
  
 Subjective: 
 
Patient is here for 2 week site check and device interrogation after ICD implantation. The patient denies chest pain or shortness of breath. Patient Active Problem List  
 Diagnosis Date Noted  Syncope 01/25/2019  Fatigue 11/30/2018  Chest pain 11/30/2018  Arteriosclerotic heart disease (ASHD) 10/05/2018  STEMI (ST elevation myocardial infarction) ([facility] 09/25/2018  
 HTN (hypertension) 09/25/2018  Dyslipidemia 09/25/2018  ADD (attention deficit disorder) 09/25/2018  Tobacco abuse 09/25/2018  S/P coronary artery stent placement 09/25/2018 Past Medical History:  
Diagnosis Date  ADD (attention deficit disorder) 9/25/2018  Asthma  Chest pain 11/30/2018  Dyslipidemia 9/25/2018  Fatigue 11/30/2018  
 HTN (hypertension) 9/25/2018  Hypertension  Other ill-defined conditions(799.89) ADHD,BELL'S PALSY WEAKNESS  LT FACE  
 Psychiatric disorder DEPRESSION  
 S/P coronary artery stent placement 9/25/2018 9/25/18 PCI/MAVIS to LAD  STEMI (ST elevation myocardial infarction) ([facility] 9/25/2018  Tobacco abuse 9/25/2018 Past Surgical History:  
Procedure Laterality Date 15 [facility] [place] [facility] STOMACH ABDOMEN ACCIDENT  
 HX HEENT    
 TEETH EXTRACTION  
 [facility] 86 FRACTURE RT FEMUR  
 NEUROLOGICAL PROCEDURE UNLISTED    
 NECK Allergies Allergen Reactions  Bee Sting [Sting, Bee] Anaphylaxis Family History Problem Relation Age of Onset  Hypertension Mother  Lung Disease Paternal Aunt Current Outpatient Medications Medication Sig  
 atomoxetine (STRATTERA) 40 mg capsule Take 40 mg by mouth two (2) times a day.  losartan (COZAAR) 25 mg tablet TAKE 1 TABLET BY MOUTH ONCE DAILY  metoprolol tartrate (LOPRESSOR) 25 mg tablet Take 25 mg by mouth two (2) times a day.  acetaminophen (TYLENOL) 500 mg tablet Take 2,000 mg by mouth two (2) times daily as needed for Pain.  albuterol (PROAIR HFA) 90 mcg/actuation inhaler Take 2 Puffs by inhalation every four (4) hours as needed for Wheezing.  isosorbide mononitrate ER (IMDUR) 30 mg tablet Take 1 Tab by mouth daily.  nitroglycerin (NITROSTAT) 0.4 mg SL tablet 1 Tab by SubLINGual route every five (5) minutes as needed for Chest Pain. Up to 3 doses.  aspirin delayed-release 81 mg tablet Take 1 Tab by mouth daily.  atorvastatin (LIPITOR) 40 mg tablet Take 1 Tab by mouth nightly.  ticagrelor (BRILINTA) 90 mg tablet Take 1 Tab by mouth every twelve (12) hours every twelve (12) hours.  DULoxetine (CYMBALTA) 60 mg capsule Take 60 mg by mouth daily. No current facility-administered medications for this visit. Review of Systems: 
 
General: Pt denies excessive weight gain or loss. Pt is able to conduct ADL's Respiratory: Denies shortness of breath, OLEARY, wheezing or stridor. Cardiovascular: Denies precordial pain, palpitations, edema or PND Physical ExamPhysical Exam:  
  
General: Well developed, in no acute distress. Deedee Peaks Chest: left subclavian ICD site approximated well Neuro: A&Ox3, speech clear, gait stable. Assessment:  
Assessment: ICD-10-CM ICD-9-CM 1. ICD (implantable cardioverter-defibrillator) in place Z95.810 V45.02   
2. Ischemic cardiomyopathy I25.5 414.8 3. Visit for wound check Z51.89 V58.89 Plan:  
 
Patient feels well following following ICD implantation. Left subclavian ICD site approximated well, no discharge. No erythema or heat. Device interrogation demonstrates normal functioning. Follow up as planned.   
 
Salazar Veliz NP

## 2019-03-20 ENCOUNTER — HOSPITAL ENCOUNTER (OUTPATIENT)
Dept: GENERAL RADIOLOGY | Age: 54
Discharge: HOME | End: 2019-03-20
Payer: COMMERCIAL

## 2019-03-20 DIAGNOSIS — R06.02 SHORTNESS OF BREATH: ICD-10-CM

## 2019-03-20 PROCEDURE — 71046 X-RAY EXAM CHEST 2 VIEWS: CPT

## 2019-03-22 ENCOUNTER — TELEPHONE (OUTPATIENT)
Dept: CARDIOLOGY CLINIC | Age: 54
End: 2019-03-22

## 2019-03-22 ENCOUNTER — OFFICE VISIT (OUTPATIENT)
Dept: CARDIOLOGY CLINIC | Age: 54
End: 2019-03-22

## 2019-03-22 VITALS
OXYGEN SATURATION: 95 % | HEART RATE: 88 BPM | HEIGHT: 69 IN | DIASTOLIC BLOOD PRESSURE: 90 MMHG | SYSTOLIC BLOOD PRESSURE: 136 MMHG | RESPIRATION RATE: 18 BRPM | BODY MASS INDEX: 30.87 KG/M2 | WEIGHT: 208.4 LBS

## 2019-03-22 DIAGNOSIS — I25.10 ARTERIOSCLEROTIC HEART DISEASE (ASHD): Primary | ICD-10-CM

## 2019-03-22 DIAGNOSIS — Z95.810 ICD (IMPLANTABLE CARDIOVERTER-DEFIBRILLATOR) IN PLACE: ICD-10-CM

## 2019-03-22 DIAGNOSIS — I10 ESSENTIAL HYPERTENSION: ICD-10-CM

## 2019-03-22 DIAGNOSIS — Z72.0 TOBACCO ABUSE: ICD-10-CM

## 2019-03-22 DIAGNOSIS — R39.198 DIFFICULTY URINATING: ICD-10-CM

## 2019-03-22 DIAGNOSIS — E78.5 DYSLIPIDEMIA: ICD-10-CM

## 2019-03-22 DIAGNOSIS — I25.5 ISCHEMIC CARDIOMYOPATHY: ICD-10-CM

## 2019-03-22 RX ORDER — TAMSULOSIN HYDROCHLORIDE 0.4 MG/1
0.4 CAPSULE ORAL DAILY
Qty: 30 CAP | Refills: 0 | Status: SHIPPED | OUTPATIENT
Start: 2019-03-22 | End: 2019-03-26

## 2019-03-22 RX ORDER — FUROSEMIDE 40 MG/1
40 TABLET ORAL DAILY
Qty: 90 TAB | Refills: 1 | Status: ON HOLD | OUTPATIENT
Start: 2019-03-22 | End: 2019-03-26 | Stop reason: SDUPTHER

## 2019-03-22 NOTE — LETTER
3/22/19 Patient: Jose Eduardo Dixon YOB: 1965 Date of Visit: 3/22/2019 Yariel Jackson MD 
[address] [place] [address] [address] [address] VIA Facsimile: [phone] Dear Yariel Jackson MD, Thank you for referring Mr. Joel Carter to [address] [place] [address] for evaluation. My notes for this consultation are attached. If you have questions, please do not hesitate to call me. I look forward to following your patient along with you.  
 
 
Sincerely, 
 
Kimi Echeverria MD

## 2019-03-22 NOTE — PROGRESS NOTES
Chief Complaint Patient presents with  Fatigue  
  feeling weaker and weaker- feels he has no strength  Urinary Hesitancy  
  difficulty urinating  Hand Swelling  
  as well as face  Shortness of Breath  
  daily 1. Have you been to the ER, urgent care clinic since your last visit? Hospitalized since your last visit? No  
 
2. Have you seen or consulted any other health care providers outside of the [address] [place] [address] since your last visit? Include any pap smears or colon screening.   No

## 2019-03-22 NOTE — PROGRESS NOTES
Basilio Hawkins, [facility] Subjective/HPI:  
 
Mr. Evangelista Chi is a 46 y.o. male is here for hospital f/u. He has a PMHx of CAD s/p STEMI, ischemic cardiomyopathy s/p ICD, HTN, HLD, tobacco abuse. Since last visit, he underwent EP study for syncopal episode. He had inducible VTach, and so had ICD implant by Dr. Joey Nash on 1/25/19. Since then, he reports feeling weaker, with increased swelling and shortness of breath. He reports weight gain since his heart attack. He reports symptoms of orthopnea, abdominal bloating, swelling of his hands and face, and shortness of breath with exertion. He also describes difficulty with urination. He reports a fullness in his bladder, and has difficulty with urination. He reports a start/stop stream.  He denies dysuria. He would like to quit smoking. He was told he cannot take Chantix. He is interested in nicotine patches. PCP Provider Mitch De Jesus MD 
 
Patient Active Problem List  
Diagnosis Code  STEMI (ST elevation myocardial infarction) (HCC) I21.3  
 HTN (hypertension) I10  
 Dyslipidemia E78.5  ADD (attention deficit disorder) F98.8  Tobacco abuse Z72.0  
 S/P coronary artery stent placement Z95.5  Arteriosclerotic heart disease (ASHD) I25.10  Fatigue R53.83  Chest pain R07.9  Syncope R55  Ischemic cardiomyopathy I25.5  ICD (implantable cardioverter-defibrillator) in place Z95.810 Past Surgical History:  
Procedure Laterality Date 15 [facility] STOMACH ABDOMEN ACCIDENT  
 HX HEENT    
 TEETH EXTRACTION  
 [place] [address] FRACTURE RT FEMUR  
 NEUROLOGICAL PROCEDURE UNLISTED    
 NECK Family History Problem Relation Age of Onset  Hypertension Mother  Lung Disease Paternal Aunt Social History Socioeconomic History  Marital status:  Spouse name: Mary Parrish  Number of children: 2  
 Years of education: 15  
 Highest education level: 12th grade Occupational History  Not on file Social Needs  Financial resource strain: Not hard at all  Food insecurity:  
  Worry: Never true Inability: Never true  Transportation needs:  
  Medical: Patient refused Non-medical: Patient refused Tobacco Use  Smoking status: Current Every Day Smoker Packs/day: 1.00 Years: 40.00 Pack years: 40.00  Smokeless tobacco: Never Used  Tobacco comment: were on chantix,stopped 2 days ago due to stomachache Substance and Sexual Activity  Alcohol use: No  
  Alcohol/week: 0.0 oz Frequency: Never  Drug use: No  
 Sexual activity: Not Currently Lifestyle  Physical activity:  
  Days per week: 0 days Minutes per session: 0 min  Stress: Very much Relationships  Social connections:  
  Talks on phone: More than three times a week Gets together: Once a week Attends [employer] service: Never Active member of club or organization: No  
  Attends meetings of clubs or organizations: Never Relationship status:   Intimate partner violence:  
  Fear of current or ex partner: No  
  Emotionally abused: No  
  Physically abused: No  
  Forced sexual activity: No  
Other Topics Concern   Service No  
 Blood Transfusions Yes  Caffeine Concern Yes Comment: coffee 1 pot  Occupational Exposure Yes Comment: cig smoke  Hobby Hazards No  
 Sleep Concern Yes Comment: does not sleep well,wakes up sweating,dog  Stress Concern Yes Comment: high level daily -depression he states  Weight Concern Yes Comment: more tired  Special Diet No  
 Back Care No  
  Comment: neck and low back surgery  Exercise No  
  Comment: does not exercise  Bike Helmet No  
  Comment: does not ride bike [address] Yes Comment: 1/2 time wears seatbelt  Self-Exams No  
Social History Narrative  Not on file Allergies Allergen Reactions  Bee Sting [Sting, Bee] Anaphylaxis Current Outpatient Medications Medication Sig  furosemide (LASIX) 40 mg tablet Take 1 Tab by mouth daily.  tamsulosin (FLOMAX) 0.4 mg capsule Take 1 Cap by mouth daily.  atomoxetine (STRATTERA) 40 mg capsule Take 40 mg by mouth two (2) times a day.  losartan (COZAAR) 25 mg tablet TAKE 1 TABLET BY MOUTH ONCE DAILY  metoprolol tartrate (LOPRESSOR) 25 mg tablet Take 25 mg by mouth two (2) times a day.  acetaminophen (TYLENOL) 500 mg tablet Take 2,000 mg by mouth two (2) times daily as needed for Pain.  albuterol (PROAIR HFA) 90 mcg/actuation inhaler Take 2 Puffs by inhalation every four (4) hours as needed for Wheezing.  isosorbide mononitrate ER (IMDUR) 30 mg tablet Take 1 Tab by mouth daily.  nitroglycerin (NITROSTAT) 0.4 mg SL tablet 1 Tab by SubLINGual route every five (5) minutes as needed for Chest Pain. Up to 3 doses.  aspirin delayed-release 81 mg tablet Take 1 Tab by mouth daily.  atorvastatin (LIPITOR) 40 mg tablet Take 1 Tab by mouth nightly.  ticagrelor (BRILINTA) 90 mg tablet Take 1 Tab by mouth every twelve (12) hours every twelve (12) hours.  DULoxetine (CYMBALTA) 60 mg capsule Take 60 mg by mouth daily. No current facility-administered medications for this visit. I have reviewed the problem list, allergy list, medical history, family, social history and medications. Review of Symptoms: 
 
Review of Systems Constitutional: Negative for chills, fever and weight loss. HENT: Negative for nosebleeds. Eyes: Negative for blurred vision and double vision. Respiratory: Positive for shortness of breath. Negative for cough and wheezing. Cardiovascular: Positive for leg swelling. Negative for chest pain, palpitations, orthopnea and PND. Gastrointestinal: Negative for abdominal pain, blood in stool, diarrhea, nausea and vomiting. Musculoskeletal: Negative for joint pain. Skin: Negative for rash. Neurological: Negative for dizziness, tingling and loss of consciousness. Endo/Heme/Allergies: Does not bruise/bleed easily. Physical Exam:   
 
General: Well developed, in no acute distress, cooperative and alert HEENT: No carotid bruits, no JVD, trach is midline. Neck Supple, PEERL, EOM intact. Heart:  reg rate and rhythm; normal S1/S2; no murmurs, gallops or rubs. Respiratory: Clear bilaterally x 4, no wheezing or rales Abdomen:   Soft, non-tender, no distention, no masses. + BS. Extremities:  Normal cap refill, no cyanosis, atraumatic. No edema. Neuro: A&Ox3, speech clear, gait stable. Skin: Skin color is normal. No rashes or lesions. Non diaphoretic Vascular: 2+ pulses symmetric in all extremities Vitals:  
 03/22/19 4183 03/22/19 5580 BP: 136/90 136/90 Pulse: 88 Resp: 18 SpO2: 95% Weight: 208 lb 6.4 oz (94.5 kg) Height: 5' 9\" (1.753 m) Cardiographics ECG: sinus rhythm Results for orders placed or performed in visit on 01/09/19 CARDIAC HOLTER MONITOR, 24 HOURS Narrative ECG Monitor/24 hours, Complete Reason for Holter Monitor  SYNCOPE Heartbeat Slowest 60 Average 83 Fastest  113 Results:  
Underlying Rhythm: Normal sinus rhythm Atrial Arrhythmias: premature atrial contractions; occasional         
 
AV Conduction: normal 
 
Ventricular Arrhythmias: premature ventricular contractions; occasional  
 
ST Segment Analysis:normal  
 
Symptom Correlation: 
none Comment:  
Sinus rhythm throughout - no symptoms Brie De La Paz MD, Az Bishop Results for orders placed or performed during the hospital encounter of 01/07/19 EKG, 12 LEAD, INITIAL Result Value Ref Range Ventricular Rate 74 BPM  
 Atrial Rate 74 BPM  
 P-R Interval 152 ms QRS Duration 80 ms  
 Q-T Interval 370 ms QTC Calculation (Bezet) 410 ms Calculated P Axis 22 degrees Calculated R Axis 3 degrees Calculated T Axis 93 degrees Diagnosis Normal sinus rhythm Anterolateral infarct (cited on or before 25-SEP-2018) T wave abnormality, consider lateral ischemia Confirmed by Kirit Gomez ([id number]) on 1/8/2019 8:48:20 PM 
  
 
 
Cardiology Labs: 
Lab Results Component Value Date/Time Cholesterol, total 209 (H) 09/26/2018 03:34 AM  
 HDL Cholesterol 34 09/26/2018 03:34 AM  
 LDL, calculated 128.8 (H) 09/26/2018 03:34 AM  
 Triglyceride 231 (H) 09/26/2018 03:34 AM  
 CHOL/HDL Ratio 6.1 (H) 09/26/2018 03:34 AM  
 
 
Lab Results Component Value Date/Time Sodium 141 01/17/2019 11:56 AM  
 Potassium 4.5 01/17/2019 11:56 AM  
 Chloride 101 01/17/2019 11:56 AM  
 CO2 23 01/17/2019 11:56 AM  
 Anion gap 7 01/07/2019 07:48 PM  
 Glucose 86 01/17/2019 11:56 AM  
 BUN 16 01/17/2019 11:56 AM  
 Creatinine 0.86 01/17/2019 11:56 AM  
 BUN/Creatinine ratio 19 01/17/2019 11:56 AM  
 GFR est  01/17/2019 11:56 AM  
 GFR est non-AA 98 01/17/2019 11:56 AM  
 Calcium 9.8 01/17/2019 11:56 AM  
 Bilirubin, total 0.2 01/17/2019 11:56 AM  
 AST (SGOT) 22 01/17/2019 11:56 AM  
 Alk. phosphatase 112 01/17/2019 11:56 AM  
 Protein, total 7.1 01/17/2019 11:56 AM  
 Albumin 4.2 01/17/2019 11:56 AM  
 Globulin 4.1 (H) 01/07/2019 07:48 PM  
 A-G Ratio 1.4 01/17/2019 11:56 AM  
 ALT (SGPT) 25 01/17/2019 11:56 AM  
  
 
Orders Placed This Encounter  LIPID PANEL  
 METABOLIC PANEL, COMPREHENSIVE  BNP  METABOLIC PANEL, COMPREHENSIVE Standing Status:   Future Standing Expiration Date:   9/22/2019  BNP Standing Status:   Future Standing Expiration Date:   9/22/2019  AMB POC EKG ROUTINE W/ 12 LEADS, INTER & REP Order Specific Question:   Reason for Exam: Answer:   routine  furosemide (LASIX) 40 mg tablet Sig: Take 1 Tab by mouth daily. Dispense:  90 Tab Refill:  1  
 tamsulosin (FLOMAX) 0.4 mg capsule Sig: Take 1 Cap by mouth daily. Dispense:  30 Cap Refill:  0 Assessment: 
 
 Assessment: ICD-10-CM ICD-9-CM 1. Arteriosclerotic heart disease (ASHD) I25.10 414.00 AMB POC EKG ROUTINE W/ 12 LEADS, INTER & REP  
 9/2018 - anterior STEMI s/p MAVIS to prox LAD 2. Ischemic cardiomyopathy G08.7 917.6 METABOLIC PANEL, COMPREHENSIVE  
   BNP  
   furosemide (LASIX) 40 mg tablet METABOLIC PANEL, COMPREHENSIVE  
   BNP 3. Essential hypertension I10 401.9 4. Dyslipidemia E78.5 272.4 LIPID PANEL 5. Tobacco abuse Z72.0 305.1 6. ICD (implantable cardioverter-defibrillator) in place Z95.810 V45.02   
 single chamber [employer] ICD - 1/2019 7. Difficulty urinating R39.198 788.99 tamsulosin (FLOMAX) 0.4 mg capsule Plan: 1. Arteriosclerotic heart disease (ASHD) S/p anterior STEMI in 9/2018, with peak troponin 100, with MAVIS to the prox LAD. Echo in 1/2019 with reduced LVEF 40-45% with severe hypokinesis of the apical anterior, apical inferior, apical septal and apical lateral walls. Presently maintained on Brilinta, ASA, metoprolol and atorvastatin. Continue present medical management and risk factor modification. 
- AMB POC EKG ROUTINE W/ 12 LEADS, INTER & REP 2. Ischemic cardiomyopathy Echo in 1/2019 with reduced LVEF 40-45%; on lopressor and cozaar. With NYHA Class II-III symptoms. Will start him on Lasix 40 mg daily. Obtain BNP and CMP today, and again in 2 weeks. Discussed importance of daily weights, sodium restriction and fluid restriction. S/p [employer] ICD implant on 1/2019. 
 
3. Essential hypertension BP controlled; continue anti-hypertensive medications and low sodium diet. 4. Dyslipidemia  in 9/2018; on statin therapy. Repeat lipids today. 5. Tobacco abuse Down to 1 ppd; continues to smoke; wants to quit. Discussed OTC nicotine patches, start at 21 mcg/day x 6 weeks, then 14 mcg/day x 2 weeks, and finally 7 mcg/day x 2 weeks. 6. ICD (implantable cardioverter-defibrillator) in place S/p single chamber [employer] ICD implant in 1/2019 by Dr. Joanne Guy. 
Continue with routine device interrogations in Dr. Blanche Harris clinic. 7.  Difficulty with urination Will start Flomax today, supply only 30 days. If improvement, he must discuss further with his PCP. He has an appointment in about 3 weeks. He will keep that appointment unless he is feeling dramatically better and his labs are stable. If that is the case, we will move his appointment out 3 months.  
 
Edwina Lewis MD

## 2019-03-23 ENCOUNTER — HOSPITAL ENCOUNTER (OUTPATIENT)
Age: 54
Setting detail: OBSERVATION
Discharge: HOME | End: 2019-03-26
Attending: EMERGENCY MEDICINE | Admitting: INTERNAL MEDICINE
Payer: COMMERCIAL

## 2019-03-23 ENCOUNTER — APPOINTMENT (OUTPATIENT)
Dept: CT IMAGING | Age: 54
End: 2019-03-23
Attending: EMERGENCY MEDICINE
Payer: COMMERCIAL

## 2019-03-23 ENCOUNTER — APPOINTMENT (OUTPATIENT)
Dept: GENERAL RADIOLOGY | Age: 54
End: 2019-03-23
Attending: EMERGENCY MEDICINE
Payer: COMMERCIAL

## 2019-03-23 DIAGNOSIS — I25.5 ISCHEMIC CARDIOMYOPATHY: ICD-10-CM

## 2019-03-23 DIAGNOSIS — I95.1 ORTHOSTATIC HYPOTENSION: ICD-10-CM

## 2019-03-23 DIAGNOSIS — R55 SYNCOPE, UNSPECIFIED SYNCOPE TYPE: Primary | ICD-10-CM

## 2019-03-23 PROBLEM — I25.10 ARTERIOSCLEROTIC HEART DISEASE (ASHD): Chronic | Status: ACTIVE | Noted: 2018-10-05

## 2019-03-23 PROBLEM — I10 HTN (HYPERTENSION): Chronic | Status: ACTIVE | Noted: 2018-09-25

## 2019-03-23 LAB
ALBUMIN SERPL-MCNC: 3.9 G/DL (ref 3.5–5)
ALBUMIN/GLOB SERPL: 0.9 {RATIO} (ref 1.1–2.2)
ALP SERPL-CCNC: 128 U/L (ref 45–117)
ALT SERPL-CCNC: 24 U/L (ref 12–78)
ANION GAP SERPL CALC-SCNC: 8 MMOL/L (ref 5–15)
APPEARANCE UR: CLEAR
AST SERPL-CCNC: 28 U/L (ref 15–37)
BACTERIA URNS QL MICRO: NEGATIVE /HPF
BASOPHILS # BLD: 0.1 K/UL (ref 0–0.1)
BASOPHILS NFR BLD: 1 % (ref 0–1)
BILIRUB SERPL-MCNC: 0.3 MG/DL (ref 0.2–1)
BILIRUB UR QL: NEGATIVE
BUN SERPL-MCNC: 15 MG/DL (ref 6–20)
BUN/CREAT SERPL: 16 (ref 12–20)
CALCIUM SERPL-MCNC: 9 MG/DL (ref 8.5–10.1)
CHLORIDE SERPL-SCNC: 103 MMOL/L (ref 97–108)
CK SERPL-CCNC: 129 U/L (ref 39–308)
CO2 SERPL-SCNC: 24 MMOL/L (ref 21–32)
COLOR UR: NORMAL
COMMENT, HOLDF: NORMAL
CREAT SERPL-MCNC: 0.91 MG/DL (ref 0.7–1.3)
DIFFERENTIAL METHOD BLD: ABNORMAL
EOSINOPHIL # BLD: 0.3 K/UL (ref 0–0.4)
EOSINOPHIL NFR BLD: 3 % (ref 0–7)
EPITH CASTS URNS QL MICRO: NORMAL /LPF
ERYTHROCYTE [DISTWIDTH] IN BLOOD BY AUTOMATED COUNT: 13.2 % (ref 11.5–14.5)
GLOBULIN SER CALC-MCNC: 4.3 G/DL (ref 2–4)
GLUCOSE SERPL-MCNC: 117 MG/DL (ref 65–100)
GLUCOSE UR STRIP.AUTO-MCNC: NEGATIVE MG/DL
HCT VFR BLD AUTO: 46 % (ref 36.6–50.3)
HGB BLD-MCNC: 15.1 G/DL (ref 12.1–17)
HGB UR QL STRIP: NEGATIVE
HYALINE CASTS URNS QL MICRO: NORMAL /LPF (ref 0–5)
IMM GRANULOCYTES # BLD AUTO: 0.1 K/UL (ref 0–0.04)
IMM GRANULOCYTES NFR BLD AUTO: 0 % (ref 0–0.5)
KETONES UR QL STRIP.AUTO: NEGATIVE MG/DL
LEUKOCYTE ESTERASE UR QL STRIP.AUTO: NEGATIVE
LYMPHOCYTES # BLD: 3.4 K/UL (ref 0.8–3.5)
LYMPHOCYTES NFR BLD: 29 % (ref 12–49)
MCH RBC QN AUTO: 29.2 PG (ref 26–34)
MCHC RBC AUTO-ENTMCNC: 32.8 G/DL (ref 30–36.5)
MCV RBC AUTO: 89 FL (ref 80–99)
MONOCYTES # BLD: 0.8 K/UL (ref 0–1)
MONOCYTES NFR BLD: 7 % (ref 5–13)
NEUTS SEG # BLD: 7.2 K/UL (ref 1.8–8)
NEUTS SEG NFR BLD: 61 % (ref 32–75)
NITRITE UR QL STRIP.AUTO: NEGATIVE
NRBC # BLD: 0 K/UL (ref 0–0.01)
NRBC BLD-RTO: 0 PER 100 WBC
PH UR STRIP: 6 [PH] (ref 5–8)
PLATELET # BLD AUTO: 267 K/UL (ref 150–400)
POTASSIUM SERPL-SCNC: 3.9 MMOL/L (ref 3.5–5.1)
PROT SERPL-MCNC: 8.2 G/DL (ref 6.4–8.2)
PROT UR STRIP-MCNC: NEGATIVE MG/DL
RBC # BLD AUTO: 5.17 M/UL (ref 4.1–5.7)
RBC #/AREA URNS HPF: NORMAL /HPF (ref 0–5)
SAMPLES BEING HELD,HOLD: NORMAL
SODIUM SERPL-SCNC: 135 MMOL/L (ref 136–145)
SP GR UR REFRACTOMETRY: 1.01 (ref 1–1.03)
TROPONIN I SERPL-MCNC: <0.05 NG/ML
TROPONIN I SERPL-MCNC: <0.05 NG/ML
UA: UC IF INDICATED,UAUC: NORMAL
UROBILINOGEN UR QL STRIP.AUTO: 0.2 EU/DL (ref 0.2–1)
WBC # BLD AUTO: 11.9 K/UL (ref 4.1–11.1)
WBC URNS QL MICRO: NORMAL /HPF (ref 0–4)

## 2019-03-23 PROCEDURE — 74011250636 HC RX REV CODE- 250/636: Performed by: INTERNAL MEDICINE

## 2019-03-23 PROCEDURE — 80053 COMPREHEN METABOLIC PANEL: CPT

## 2019-03-23 PROCEDURE — 96372 THER/PROPH/DIAG INJ SC/IM: CPT

## 2019-03-23 PROCEDURE — 93005 ELECTROCARDIOGRAM TRACING: CPT

## 2019-03-23 PROCEDURE — 81001 URINALYSIS AUTO W/SCOPE: CPT

## 2019-03-23 PROCEDURE — 70450 CT HEAD/BRAIN W/O DYE: CPT

## 2019-03-23 PROCEDURE — 84484 ASSAY OF TROPONIN QUANT: CPT

## 2019-03-23 PROCEDURE — 85025 COMPLETE CBC W/AUTO DIFF WBC: CPT

## 2019-03-23 PROCEDURE — 99285 EMERGENCY DEPT VISIT HI MDM: CPT

## 2019-03-23 PROCEDURE — 36415 COLL VENOUS BLD VENIPUNCTURE: CPT

## 2019-03-23 PROCEDURE — 71045 X-RAY EXAM CHEST 1 VIEW: CPT

## 2019-03-23 PROCEDURE — 74011250637 HC RX REV CODE- 250/637: Performed by: INTERNAL MEDICINE

## 2019-03-23 PROCEDURE — 82550 ASSAY OF CK (CPK): CPT

## 2019-03-23 PROCEDURE — 99218 HC RM OBSERVATION: CPT

## 2019-03-23 RX ORDER — SODIUM CHLORIDE 0.9 % (FLUSH) 0.9 %
5-40 SYRINGE (ML) INJECTION AS NEEDED
Status: DISCONTINUED | OUTPATIENT
Start: 2019-03-23 | End: 2019-03-26 | Stop reason: HOSPADM

## 2019-03-23 RX ORDER — ATORVASTATIN CALCIUM 40 MG/1
40 TABLET, FILM COATED ORAL
Status: DISCONTINUED | OUTPATIENT
Start: 2019-03-23 | End: 2019-03-26 | Stop reason: HOSPADM

## 2019-03-23 RX ORDER — ATOMOXETINE 40 MG/1
40 CAPSULE ORAL 2 TIMES DAILY
Status: DISCONTINUED | OUTPATIENT
Start: 2019-03-23 | End: 2019-03-26 | Stop reason: HOSPADM

## 2019-03-23 RX ORDER — IBUPROFEN 200 MG
1 TABLET ORAL DAILY
Status: DISCONTINUED | OUTPATIENT
Start: 2019-03-24 | End: 2019-03-26 | Stop reason: HOSPADM

## 2019-03-23 RX ORDER — TAMSULOSIN HYDROCHLORIDE 0.4 MG/1
0.4 CAPSULE ORAL DAILY
Status: DISCONTINUED | OUTPATIENT
Start: 2019-03-24 | End: 2019-03-24

## 2019-03-23 RX ORDER — LOSARTAN POTASSIUM 25 MG/1
25 TABLET ORAL DAILY
Status: DISCONTINUED | OUTPATIENT
Start: 2019-03-24 | End: 2019-03-26 | Stop reason: HOSPADM

## 2019-03-23 RX ORDER — SODIUM CHLORIDE 0.9 % (FLUSH) 0.9 %
5-40 SYRINGE (ML) INJECTION EVERY 8 HOURS
Status: DISCONTINUED | OUTPATIENT
Start: 2019-03-23 | End: 2019-03-26 | Stop reason: HOSPADM

## 2019-03-23 RX ORDER — ENOXAPARIN SODIUM 100 MG/ML
40 INJECTION SUBCUTANEOUS EVERY 24 HOURS
Status: DISCONTINUED | OUTPATIENT
Start: 2019-03-23 | End: 2019-03-26 | Stop reason: HOSPADM

## 2019-03-23 RX ORDER — DULOXETIN HYDROCHLORIDE 30 MG/1
60 CAPSULE, DELAYED RELEASE ORAL DAILY
Status: DISCONTINUED | OUTPATIENT
Start: 2019-03-24 | End: 2019-03-26 | Stop reason: HOSPADM

## 2019-03-23 RX ORDER — ISOSORBIDE MONONITRATE 30 MG/1
30 TABLET, EXTENDED RELEASE ORAL DAILY
Status: DISCONTINUED | OUTPATIENT
Start: 2019-03-24 | End: 2019-03-26 | Stop reason: HOSPADM

## 2019-03-23 RX ORDER — ASPIRIN 81 MG/1
81 TABLET ORAL DAILY
Status: DISCONTINUED | OUTPATIENT
Start: 2019-03-24 | End: 2019-03-26 | Stop reason: HOSPADM

## 2019-03-23 RX ADMIN — ATORVASTATIN CALCIUM 40 MG: 40 TABLET, FILM COATED ORAL at 21:04

## 2019-03-23 RX ADMIN — TICAGRELOR 90 MG: 90 TABLET ORAL at 21:04

## 2019-03-23 RX ADMIN — ENOXAPARIN SODIUM 40 MG: 40 INJECTION SUBCUTANEOUS at 21:04

## 2019-03-23 RX ADMIN — Medication 10 ML: at 04:35

## 2019-03-23 NOTE — CONSULTS
Subjective:                 [address], [place], [address] [facility], [place]  [phone]    Date of  Admission: 3/23/2019 11:38 AM     Admission type:Emergency    Jessica Campbell is a 46 y.o. male admitted for syncope. He was seen by dr Jonh Alexandra yesterday in clinic and felt to fluid overloaded. Dr Jonh Alexandra started him on lasix and flomax. He presents post syncopal episode. He is noted to be hypotensive. He denies cp/sob.     Patient Active Problem List    Diagnosis Date Noted    Ischemic cardiomyopathy 03/22/2019    ICD (implantable cardioverter-defibrillator) in place 03/22/2019    Syncope 01/25/2019    Fatigue 11/30/2018    Chest pain 11/30/2018    Arteriosclerotic heart disease (ASHD) 10/05/2018    STEMI (ST elevation myocardial infarction) ([facility] [address] 09/25/2018    HTN (hypertension) 09/25/2018    Dyslipidemia 09/25/2018    ADD (attention deficit disorder) 09/25/2018    Tobacco abuse 09/25/2018    S/P coronary artery stent placement 09/25/2018      Luzma Castelan MD  Past Medical History:   Diagnosis Date    ADD (attention deficit disorder) 9/25/2018    Asthma     Chest pain 11/30/2018    Dyslipidemia 9/25/2018    Fatigue 11/30/2018    HTN (hypertension) 9/25/2018    Hypertension     Other ill-defined conditions(799.89)     ADHD,BELL'S PALSY WEAKNESS  LT FACE    Psychiatric disorder     DEPRESSION    S/P coronary artery stent placement 9/25/2018 9/25/18 PCI/MAVIS to LAD    STEMI (ST elevation myocardial infarction) ([facility] [address] 9/25/2018    Tobacco abuse 9/25/2018      Past Surgical History:   Procedure Laterality Date    HX GI  1984    STOMACH ABDOMEN ACCIDENT    HX HEENT      TEETH EXTRACTION    HX ORTHOPAEDIC  1984    FRACTURE RT FEMUR    NEUROLOGICAL PROCEDURE UNLISTED      NECK     Allergies   Allergen Reactions    Bee Sting [Sting, Bee] Anaphylaxis      Social History     Tobacco Use    Smoking status: Current Every Day Smoker     Packs/day: 1.00     Years: 40.00     Pack years: 40.00    Smokeless tobacco: Never Used    Tobacco comment: were on chantix,stopped 2 days ago due to stomachache   Substance Use Topics    Alcohol use: No     Alcohol/week: 0.0 oz     Frequency: Never    Drug use: No     Family History   Problem Relation Age of Onset    Hypertension Mother     Lung Disease Paternal Aunt       No current facility-administered medications for this encounter. Current Outpatient Medications   Medication Sig    furosemide (LASIX) 40 mg tablet Take 1 Tab by mouth daily.  tamsulosin (FLOMAX) 0.4 mg capsule Take 1 Cap by mouth daily.  atomoxetine (STRATTERA) 40 mg capsule Take 40 mg by mouth two (2) times a day.  losartan (COZAAR) 25 mg tablet TAKE 1 TABLET BY MOUTH ONCE DAILY    metoprolol tartrate (LOPRESSOR) 25 mg tablet Take 25 mg by mouth two (2) times a day.  acetaminophen (TYLENOL) 500 mg tablet Take 2,000 mg by mouth two (2) times daily as needed for Pain.  albuterol (PROAIR HFA) 90 mcg/actuation inhaler Take 2 Puffs by inhalation every four (4) hours as needed for Wheezing.  isosorbide mononitrate ER (IMDUR) 30 mg tablet Take 1 Tab by mouth daily.  nitroglycerin (NITROSTAT) 0.4 mg SL tablet 1 Tab by SubLINGual route every five (5) minutes as needed for Chest Pain. Up to 3 doses.  aspirin delayed-release 81 mg tablet Take 1 Tab by mouth daily.  atorvastatin (LIPITOR) 40 mg tablet Take 1 Tab by mouth nightly.  ticagrelor (BRILINTA) 90 mg tablet Take 1 Tab by mouth every twelve (12) hours every twelve (12) hours.  DULoxetine (CYMBALTA) 60 mg capsule Take 60 mg by mouth daily.          Review of Symptoms:  Constitutional: negative  Eyes: negative  Ears, nose, mouth, throat, and face: negative  Respiratory: negative  Cardiovascular: negative  Gastrointestinal: negative  Genitourinary: negative  Musculoskeletal: negative  Neurological: syncope  Behvioral/Psych: adhd  Endocrine: negative     Subjective:      Visit Vitals  /86 (BP 1 Location: Left arm, BP Patient Position: Supine)   Pulse 82   Temp 97.9 °F (36.6 °C)   Resp 19   Ht 5' 9\" (1.753 m)   Wt 208 lb 5.4 oz (94.5 kg)   SpO2 96%   BMI 30.77 kg/m²       Physical Exam  Abdomen: soft, non-tender. Extremities: extremities normal  Heart: regular rate and rhythm  Lungs: clear to auscultation bilaterally  Pulses: 2+ and symmetric    Cardiographics    Telemetry: nsr    ECG: nsr    Echocardiogram: lvef 40-45    icd - wnl    Labs:  Recent Labs     03/23/19  1150   WBC 11.9*   HGB 15.1   HCT 46.0        Recent Labs     03/23/19  1150   *   K 3.9      CO2 24   *   BUN 15   CREA 0.91   CA 9.0   ALB 3.9   TBILI 0.3   SGOT 28   ALT 24       Recent Labs     03/23/19  1516 03/23/19  1150   TROIQ <0.05 <0.05       No intake or output data in the 24 hours ending 03/23/19 1551      Assessment:     Assessment:       Principal Problem:    Syncope (1/25/2019)    Active Problems:    HTN (hypertension) (9/25/2018)      Arteriosclerotic heart disease (ASHD) (10/5/2018)      Ischemic cardiomyopathy (3/22/2019)         Plan:     Bill Ford is a pleasant gentleman with an ischemic cardiomyopathy and class II CHF. He presented with syncope post starting lasix and flomax. He was hypotensive in the er and has responded to fluid challenge. His ICD was interrogated and he did not have any ventricular arrhythmias. Syncope likely related to hypovolumia. Would gently hydrate overnight and ok for dc tmrw. F/u with Dr Alfredo Jennings next week. Thank you for this consultation.      Jael Del Real MD, [facility]    3/23/2019

## 2019-03-23 NOTE — ED TRIAGE NOTES
Pt arrives via EMS with complaints of seizure like activity, chest pain, dizziness and weakness. Chest pain started around 7am and the weakness and dizziness started around 10am. Per family when the patient walked into the rooming complaining of dizziness and weakness pt collapsed and began to have \"seizure like activity and foaming at the mouth\". Pt has a hx of MI and stent placed in Sept 2018 and currently has a pacemaker. Pt denies CP, SOB, and pain at this time. Pt placed x3 on monitor. MD at bedside.

## 2019-03-23 NOTE — H&P
Hospitalist Admission NoteNAME: Nadia Brown :  1965 MRN:  [id number] Date/Time:  3/23/2019 4:14 PM 
 
Patient PCP: Marc Yusuf MD 
______________________________________________________________________ Given the patient's current clinical presentation, I have a high level of concern for decompensation if discharged from the emergency department. Complex decision making was performed, which includes reviewing the patient's available past medical records, laboratory results, and x-ray films. My assessment of this patient's clinical condition and my plan of care is as follows. Assessment / Plan: 
Syncope 
-may be 2/2 diuretics, which were just started for CHF 
-orthostatic VS 
-hold  
-admit to OBS tele 
-cardiology consulted 
-reported to be hypotensive on presentation to ED, though recorded BPs nL on charting 
-will hold diuretics and cont gentle IVF hydration 
-recent echo on record: 
Systolic function was mildly to moderately reduced. Ejection fraction was estimated in the range of 40 % to 45 %. There was 
severe hypokinesis of the apical anterior, apical inferior, apical septal, 
and apical lateral wall(s) 
-s/p AICD, no shocks Ischemic cardiomyopathy, [facility] class II-III CHF 
CAD Hx of STEMI 2018 
-cont Imdur, statin, asa 
-s/p MAVIS to LAD, cont brilinta Tobacco use 
-attempting to quit, given nicotine patches at cardio office  
-further encouraged cessation BPH 
-cont flomax, which was recently stated Depression 
-cont cymbalta Code Status: Full Surrogate Decision Maker: wife DVT Prophylaxis: lovenox GI Prophylaxis: not indicated Baseline: functional, independent ADLs lives with wife Subjective: CHIEF COMPLAINT: Syncope HISTORY OF PRESENT ILLNESS:    
Patt Moran is a 46 y.o.  PMHx of STEMI 2018 s/p MAVIS to LAD, CAD, ischemic cardiomyopathy, Tobacco use, HTN, ADD who presented  to the ED after passing out while making breakfast this AM. He states that he felt week and went to sit down. Per his wife, who is present in the ED, he was unresponsive for several seconds but did not fall as he was sitting down. Per the wife, he was alert and oriented afterward but with no recollection of the event. He denies chest pain, N/V, or fevers/chills. Of note, the patient was seen in the office of his cardiologist yesterday and started on lasix with recent TTE in January showing LVEF of 40-45% with NHYA class II-III symps. He is s/p AICD placement also in January. Pt admits to ongoing tobacco use of 1PPD and is trying to quit. for several seconds, witnessed by wife. Admits chills, no URI symps. Hx of STEMI 9/2018 s/p defribrillator 1/25/1019. We were asked to admit for work up and evaluation of the above problems. Past Medical History:  
Diagnosis Date  ADD (attention deficit disorder) 9/25/2018  Asthma  Chest pain 11/30/2018  Dyslipidemia 9/25/2018  Fatigue 11/30/2018  
 HTN (hypertension) 9/25/2018  Hypertension  Other ill-defined conditions(799.89) ADHD,BELL'S PALSY WEAKNESS  LT FACE  
 Psychiatric disorder DEPRESSION  
 S/P coronary artery stent placement 9/25/2018 9/25/18 PCI/MAVIS to LAD  STEMI (ST elevation myocardial infarction) ([facility] [address] 9/25/2018  Tobacco abuse 9/25/2018 Past Surgical History:  
Procedure Laterality Date 15 [facility] STOMACH ABDOMEN ACCIDENT  
 HX HEENT    
 TEETH EXTRACTION  
 [facility] [address] FRACTURE RT FEMUR  
 NEUROLOGICAL PROCEDURE UNLISTED    
 NECK Social History Tobacco Use  Smoking status: Current Every Day Smoker Packs/day: 1.00 Years: 40.00 Pack years: 40.00  Smokeless tobacco: Never Used  Tobacco comment: were on chantix,stopped 2 days ago due to stomachache Substance Use Topics  Alcohol use: No  
  Alcohol/week: 0.0 oz Frequency: Never Family History Problem Relation Age of Onset  Hypertension Mother  Lung Disease Paternal Aunt Allergies Allergen Reactions  Bee Sting [Sting, Bee] Anaphylaxis Prior to Admission medications Medication Sig Start Date End Date Taking? Authorizing Provider  
furosemide (LASIX) 40 mg tablet Take 1 Tab by mouth daily. 3/22/19  Yes Marla Santamaria NP  
tamsulosin (FLOMAX) 0.4 mg capsule Take 1 Cap by mouth daily. 3/22/19  Yes Marla Santamaria NP  
atomoxetine (STRATTERA) 40 mg capsule Take 40 mg by mouth two (2) times a day. Yes Provider, Historical  
losartan (COZAAR) 25 mg tablet TAKE 1 TABLET BY MOUTH ONCE DAILY 12/19/18  Yes Jas Park MD  
metoprolol tartrate (LOPRESSOR) 25 mg tablet Take 25 mg by mouth two (2) times a day. Yes Jane, MD Keke  
acetaminophen (TYLENOL) 500 mg tablet Take 2,000 mg by mouth two (2) times daily as needed for Pain. Yes Jane, MD Keke  
albuterol (PROAIR HFA) 90 mcg/actuation inhaler Take 2 Puffs by inhalation every four (4) hours as needed for Wheezing. Yes Keke Lara MD  
isosorbide mononitrate ER (IMDUR) 30 mg tablet Take 1 Tab by mouth daily. 11/30/18  Yes Chanelle Pereyra NP  
nitroglycerin (NITROSTAT) 0.4 mg SL tablet 1 Tab by SubLINGual route every five (5) minutes as needed for Chest Pain. Up to 3 doses. 11/30/18  Yes Chanelle Pereyra NP  
aspirin delayed-release 81 mg tablet Take 1 Tab by mouth daily. 9/27/18  Yes Chanelle Pereyra NP  
atorvastatin (LIPITOR) 40 mg tablet Take 1 Tab by mouth nightly. 9/27/18  Yes Chanelle Pereyra NP  
ticagrelor (BRILINTA) 90 mg tablet Take 1 Tab by mouth every twelve (12) hours every twelve (12) hours. 9/27/18  Yes Chanelle Pereyra NP  
DULoxetine (CYMBALTA) 60 mg capsule Take 60 mg by mouth daily. Yes Jane, MD Keke  
 
 
REVIEW OF SYSTEMS:    
I am not able to complete the review of systems because: The patient is intubated and sedated The patient has altered mental status due to his acute medical problems The patient has baseline aphasia from prior stroke(s) The patient has baseline dementia and is not reliable historian The patient is in acute medical distress and unable to provide information Total of 12 systems reviewed as follows:   
   POSITIVE= underlined text  Negative = text not underlined General:  fever, chills, sweats, generalized weakness, weight loss/gain,  
   loss of appetite Eyes:    blurred vision, eye pain, loss of vision, double vision ENT:    rhinorrhea, pharyngitis Respiratory:   cough, sputum production, SOB, OLEARY, wheezing, pleuritic pain  
Cardiology:   chest pain, palpitations, orthopnea, PND, edema, syncope Gastrointestinal:  abdominal pain , N/V, diarrhea, dysphagia, constipation, bleeding Genitourinary:  frequency, urgency, dysuria, hematuria, incontinence Muskuloskeletal :  arthralgia, myalgia, back pain Hematology:  easy bruising, nose or gum bleeding, lymphadenopathy Dermatological: rash, ulceration, pruritis, color change / jaundice Endocrine:   hot flashes or polydipsia Neurological:  headache, dizziness, confusion, focal weakness, paresthesia, Speech difficulties, memory loss, gait difficulty Psychological: Feelings of anxiety, depression, agitation Objective: VITALS:   
Visit Vitals /86 (BP 1 Location: Left arm, BP Patient Position: Supine) Pulse 82 Temp 97.9 °F (36.6 °C) Resp 19 Ht 5' 9\" (1.753 m) Wt 94.5 kg (208 lb 5.4 oz) SpO2 96% BMI 30.77 kg/m² PHYSICAL EXAM: 
 
 
_______________________________________________________________________ Care Plan discussed with: 
  Comments Patient x Family  x   
RN x Care Manager Consultant:  sadiq RIVERA physician  
_______________________________________________________________________ Expected  Disposition:  
Home with Family x HH/PT/OT/RN   
SNF/LTC   
ABDI   
________________________________________________________________________ TOTAL TIME:  > 60 Minutes Critical Care Provided     Minutes non procedure based Comments  
 x Reviewed previous records  
>50% of visit spent in counseling and coordination of care x Discussion with patient and/or family and questions answered 
  
 
________________________________________________________________________ Signed: Shin Yu DO 
 
Procedures: see electronic medical records for all procedures/Xrays and details which were not copied into this note but were reviewed prior to creation of Plan. LAB DATA REVIEWED:   
Recent Results (from the past 24 hour(s)) CBC WITH AUTOMATED DIFF Collection Time: 03/23/19 11:50 AM  
Result Value Ref Range WBC 11.9 (H) 4.1 - 11.1 K/uL  
 RBC 5.17 4.10 - 5.70 M/uL  
 HGB 15.1 12.1 - 17.0 g/dL HCT 46.0 36.6 - 50.3 % MCV 89.0 80.0 - 99.0 FL  
 MCH 29.2 26.0 - 34.0 PG  
 MCHC 32.8 30.0 - 36.5 g/dL  
 RDW 13.2 11.5 - 14.5 % PLATELET 996 697 - 589 K/uL NRBC 0.0 0  WBC ABSOLUTE NRBC 0.00 0.00 - 0.01 K/uL NEUTROPHILS 61 32 - 75 % LYMPHOCYTES 29 12 - 49 % MONOCYTES 7 5 - 13 % EOSINOPHILS 3 0 - 7 % BASOPHILS 1 0 - 1 % IMMATURE GRANULOCYTES 0 0.0 - 0.5 % ABS. NEUTROPHILS 7.2 1.8 - 8.0 K/UL  
 ABS. LYMPHOCYTES 3.4 0.8 - 3.5 K/UL  
 ABS. MONOCYTES 0.8 0.0 - 1.0 K/UL  
 ABS. EOSINOPHILS 0.3 0.0 - 0.4 K/UL  
 ABS. BASOPHILS 0.1 0.0 - 0.1 K/UL  
 ABS. IMM. GRANS. 0.1 (H) 0.00 - 0.04 K/UL  
 DF AUTOMATED METABOLIC PANEL, COMPREHENSIVE Collection Time: 03/23/19 11:50 AM  
Result Value Ref Range Sodium 135 (L) 136 - 145 mmol/L Potassium 3.9 3.5 - 5.1 mmol/L Chloride 103 97 - 108 mmol/L  
 CO2 24 21 - 32 mmol/L Anion gap 8 5 - 15 mmol/L Glucose 117 (H) 65 - 100 mg/dL BUN 15 6 - 20 MG/DL Creatinine 0.91 0.70 - 1.30 MG/DL  
 BUN/Creatinine ratio 16 12 - 20 GFR est AA >60 >60 ml/min/1.73m2 GFR est non-AA >60 >60 ml/min/1.73m2 Calcium 9.0 8.5 - 10.1 MG/DL Bilirubin, total 0.3 0.2 - 1.0 MG/DL  
 ALT (SGPT) 24 12 - 78 U/L  
 AST (SGOT) 28 15 - 37 U/L Alk. phosphatase 128 (H) 45 - 117 U/L Protein, total 8.2 6.4 - 8.2 g/dL Albumin 3.9 3.5 - 5.0 g/dL Globulin 4.3 (H) 2.0 - 4.0 g/dL A-G Ratio 0.9 (L) 1.1 - 2.2 CK W/ REFLX CKMB Collection Time: 03/23/19 11:50 AM  
Result Value Ref Range  39 - 308 U/L  
TROPONIN I Collection Time: 03/23/19 11:50 AM  
Result Value Ref Range Troponin-I, Qt. <0.05 <0.05 ng/mL SAMPLES BEING HELD Collection Time: 03/23/19 11:50 AM  
Result Value Ref Range SAMPLES BEING HELD BLUE,RED COMMENT Add-on orders for these samples will be processed based on acceptable specimen integrity and analyte stability, which may vary by analyte. EKG, 12 LEAD, INITIAL Collection Time: 03/23/19 12:00 PM  
Result Value Ref Range Ventricular Rate 83 BPM  
 Atrial Rate 83 BPM  
 P-R Interval 158 ms QRS Duration 78 ms Q-T Interval 386 ms QTC Calculation (Bezet) 453 ms Calculated P Axis 20 degrees Calculated R Axis 15 degrees Calculated T Axis 93 degrees Diagnosis Normal sinus rhythm Anteroseptal infarct (cited on or before 25-SEP-2018) When compared with ECG of 07-JAN-2019 21:30, 
Questionable change in initial forces of Lateral leads Nonspecific T wave abnormality has replaced inverted T waves in Anterolateral 
 leads URINALYSIS W/ REFLEX CULTURE Collection Time: 03/23/19 12:36 PM  
Result Value Ref Range Color YELLOW/STRAW Appearance CLEAR CLEAR Specific gravity 1.007 1.003 - 1.030    
 pH (UA) 6.0 5.0 - 8.0 Protein NEGATIVE  NEG mg/dL Glucose NEGATIVE  NEG mg/dL Ketone NEGATIVE  NEG mg/dL Bilirubin NEGATIVE  NEG Blood NEGATIVE  NEG Urobilinogen 0.2 0.2 - 1.0 EU/dL Nitrites NEGATIVE  NEG Leukocyte Esterase NEGATIVE  NEG    
 WBC 0-4 0 - 4 /hpf  
 RBC 0-5 0 - 5 /hpf Epithelial cells FEW FEW /lpf Bacteria NEGATIVE  NEG /hpf  
 UA:UC IF INDICATED CULTURE NOT INDICATED BY UA RESULT CNI Hyaline cast 2-5 0 - 5 /lpf  
TROPONIN I Collection Time: 03/23/19  3:16 PM  
Result Value Ref Range Troponin-I, Qt. <0.05 <0.05 ng/mL

## 2019-03-23 NOTE — ED NOTES
Orthostatic BP performed at this time. Pt tolerated well, denies dizziness or weakness during orthostatics.

## 2019-03-23 NOTE — ED PROVIDER NOTES
EMERGENCY DEPARTMENT HISTORY AND PHYSICAL EXAM 
 
 
Date: 3/23/2019 Patient Name: Matt Knight History of Presenting Illness Chief Complaint Patient presents with  Seizure  
  witnessed seizure around 10am, per family pt was reporting weakness and dizziness prior to seizure, per family patient had \"seizure like activity and was foaming at the mouth\"  Dizziness  Chest Pain  
  pt reports chest pain at 7am this morning History Provided By: Patient, Patient's Wife and EMS 
 
HPI: Matt Knight, 46 y.o. male with PMHx significant for CAD, ischemic cardiomyopathy, hypertension, presents to EMS to the ED with cc of syncopal episode this a.m. around 11:00. Wife reports that he was sitting at table eating breakfast.  He got up and he was feeling slightly lightheaded and dizzy. He passed out and had some brief shaking activity but no overt seizure activity. Episode lasted about 3 minutes. When he regained consciousness he was alert and oriented but had no memory of the episode. Denies any chest pain or shortness of breath. He only complains of generalized fatigue. On old record reviewed in the EHR: He was admitted here 9/25 for a STEMI receiving a MAVIS to his LAD. Echo with EF of 40%. He was seen by cardiology by Dr. Naila Tai. He was also seen here in January for a syncopal episode, with negative workup. Followed up with his cardiologist who referred him to electrophysiology and the cardiology group Dr. Brian Duran. He had a positive EP study,  And ended up receiving an AICD on January 25. Wife reports he saw his primary care doctor on Monday. And then had an appointment yesterday with Dr. Fausto Greenwood with cardiology. He was started on 40 mg of Lasix, as well as Flomax. He took Flomax last night for the first time. He took Lasix 40 mg this morning for the first time. He currently denies any chest pain or shortness of breath.   Denies any fevers or chills or cough. Denies any abdominal pain nausea or vomiting. PMHx: Significant for CAD, ischemic cardiomyopathy, hypertension. PSHx: Significant for AICD Social Hx:  
 
There are no other complaints, changes, or physical findings at this time. PCP: Chava Freedman MD 
 
No current facility-administered medications on file prior to encounter. Current Outpatient Medications on File Prior to Encounter Medication Sig Dispense Refill  furosemide (LASIX) 40 mg tablet Take 1 Tab by mouth daily. 90 Tab 1  
 tamsulosin (FLOMAX) 0.4 mg capsule Take 1 Cap by mouth daily. 30 Cap 0  
 atomoxetine (STRATTERA) 40 mg capsule Take 40 mg by mouth two (2) times a day.  losartan (COZAAR) 25 mg tablet TAKE 1 TABLET BY MOUTH ONCE DAILY 90 Tab 1  
 metoprolol tartrate (LOPRESSOR) 25 mg tablet Take 25 mg by mouth two (2) times a day.  acetaminophen (TYLENOL) 500 mg tablet Take 2,000 mg by mouth two (2) times daily as needed for Pain.  albuterol (PROAIR HFA) 90 mcg/actuation inhaler Take 2 Puffs by inhalation every four (4) hours as needed for Wheezing.  isosorbide mononitrate ER (IMDUR) 30 mg tablet Take 1 Tab by mouth daily. 30 Tab 11  
 nitroglycerin (NITROSTAT) 0.4 mg SL tablet 1 Tab by SubLINGual route every five (5) minutes as needed for Chest Pain. Up to 3 doses. 1 Bottle 1  
 aspirin delayed-release 81 mg tablet Take 1 Tab by mouth daily. 30 Tab 11  
 atorvastatin (LIPITOR) 40 mg tablet Take 1 Tab by mouth nightly. 30 Tab 11  
 ticagrelor (BRILINTA) 90 mg tablet Take 1 Tab by mouth every twelve (12) hours every twelve (12) hours. 60 Tab 11  
 DULoxetine (CYMBALTA) 60 mg capsule Take 60 mg by mouth daily. Past History Past Medical History: 
Past Medical History:  
Diagnosis Date  ADD (attention deficit disorder) 9/25/2018  Asthma  Chest pain 11/30/2018  Dyslipidemia 9/25/2018  Fatigue 11/30/2018  
 HTN (hypertension) 9/25/2018  Hypertension  Other ill-defined conditions(799.89) ADHD,BELL'S PALSY WEAKNESS  LT FACE  
 Psychiatric disorder DEPRESSION  
 S/P coronary artery stent placement 9/25/2018 9/25/18 PCI/MAVIS to LAD  STEMI (ST elevation myocardial infarction) ([facility] [address] 9/25/2018  Tobacco abuse 9/25/2018 Past Surgical History: 
Past Surgical History:  
Procedure Laterality Date 15 [facility] STOMACH ABDOMEN ACCIDENT  
 HX HEENT    
 TEETH EXTRACTION  
 [address] 85 FRACTURE RT FEMUR  
 NEUROLOGICAL PROCEDURE UNLISTED    
 NECK Family History: 
Family History Problem Relation Age of Onset  Hypertension Mother  Lung Disease Paternal Aunt Social History: 
Social History Tobacco Use  Smoking status: Current Every Day Smoker Packs/day: 1.00 Years: 40.00 Pack years: 40.00  Smokeless tobacco: Never Used  Tobacco comment: were on chantix,stopped 2 days ago due to stomachache Substance Use Topics  Alcohol use: No  
  Alcohol/week: 0.0 oz Frequency: Never  Drug use: No  
 
 
Allergies: Allergies Allergen Reactions  Bee Sting [Sting, Bee] Anaphylaxis Review of Systems Review of Systems Constitutional: Negative for activity change, chills and fever. HENT: Negative for congestion and sore throat. Eyes: Negative for pain and redness. Respiratory: Negative for cough, chest tightness and shortness of breath. Cardiovascular: Negative for chest pain and palpitations. Gastrointestinal: Negative for abdominal pain, diarrhea, nausea and vomiting. Genitourinary: Negative for dysuria, frequency and urgency. Musculoskeletal: Negative for back pain and neck pain. Skin: Negative for rash. Neurological: Positive for dizziness, syncope and light-headedness. Negative for headaches. Psychiatric/Behavioral: Negative for confusion. All other systems reviewed and are negative.  
 
 
Physical Exam  
Physical Exam  
 Constitutional: He is oriented to person, place, and time. He appears well-developed and well-nourished. No distress. HENT:  
Head: Normocephalic. Nose: Nose normal.  
Mouth/Throat: Oropharynx is clear and moist. No oropharyngeal exudate. Eyes: Pupils are equal, round, and reactive to light. Conjunctivae are normal. No scleral icterus. Neck: Normal range of motion. Neck supple. No JVD present. No tracheal deviation present. No thyromegaly present. Cardiovascular: Normal rate, regular rhythm and intact distal pulses. Exam reveals no gallop and no friction rub. No murmur heard. Pulmonary/Chest: Effort normal and breath sounds normal. No stridor. No respiratory distress. He has no wheezes. He has no rales. AICD left chest wall Abdominal: Soft. Bowel sounds are normal. He exhibits no distension. There is no tenderness. There is no rebound and no guarding. Musculoskeletal: Normal range of motion. He exhibits no edema. Lymphadenopathy:  
  He has no cervical adenopathy. Neurological: He is alert and oriented to person, place, and time. No cranial nerve deficit. He exhibits normal muscle tone. Coordination normal.  
Skin: Skin is warm and dry. No rash noted. He is not diaphoretic. No erythema. Psychiatric: He has a normal mood and affect. His behavior is normal.  
Nursing note and vitals reviewed. Diagnostic Study Results Labs - Recent Results (from the past 12 hour(s)) CBC WITH AUTOMATED DIFF Collection Time: 03/23/19 11:50 AM  
Result Value Ref Range WBC 11.9 (H) 4.1 - 11.1 K/uL  
 RBC 5.17 4.10 - 5.70 M/uL  
 HGB 15.1 12.1 - 17.0 g/dL HCT 46.0 36.6 - 50.3 % MCV 89.0 80.0 - 99.0 FL  
 MCH 29.2 26.0 - 34.0 PG  
 MCHC 32.8 30.0 - 36.5 g/dL  
 RDW 13.2 11.5 - 14.5 % PLATELET 380 172 - 901 K/uL NRBC 0.0 0  WBC ABSOLUTE NRBC 0.00 0.00 - 0.01 K/uL NEUTROPHILS 61 32 - 75 % LYMPHOCYTES 29 12 - 49 % MONOCYTES 7 5 - 13 % EOSINOPHILS 3 0 - 7 % BASOPHILS 1 0 - 1 % IMMATURE GRANULOCYTES 0 0.0 - 0.5 % ABS. NEUTROPHILS 7.2 1.8 - 8.0 K/UL  
 ABS. LYMPHOCYTES 3.4 0.8 - 3.5 K/UL  
 ABS. MONOCYTES 0.8 0.0 - 1.0 K/UL  
 ABS. EOSINOPHILS 0.3 0.0 - 0.4 K/UL  
 ABS. BASOPHILS 0.1 0.0 - 0.1 K/UL  
 ABS. IMM. GRANS. 0.1 (H) 0.00 - 0.04 K/UL  
 DF AUTOMATED METABOLIC PANEL, COMPREHENSIVE Collection Time: 03/23/19 11:50 AM  
Result Value Ref Range Sodium 135 (L) 136 - 145 mmol/L Potassium 3.9 3.5 - 5.1 mmol/L Chloride 103 97 - 108 mmol/L  
 CO2 24 21 - 32 mmol/L Anion gap 8 5 - 15 mmol/L Glucose 117 (H) 65 - 100 mg/dL BUN 15 6 - 20 MG/DL Creatinine 0.91 0.70 - 1.30 MG/DL  
 BUN/Creatinine ratio 16 12 - 20 GFR est AA >60 >60 ml/min/1.73m2 GFR est non-AA >60 >60 ml/min/1.73m2 Calcium 9.0 8.5 - 10.1 MG/DL Bilirubin, total 0.3 0.2 - 1.0 MG/DL  
 ALT (SGPT) 24 12 - 78 U/L  
 AST (SGOT) 28 15 - 37 U/L Alk. phosphatase 128 (H) 45 - 117 U/L Protein, total 8.2 6.4 - 8.2 g/dL Albumin 3.9 3.5 - 5.0 g/dL Globulin 4.3 (H) 2.0 - 4.0 g/dL A-G Ratio 0.9 (L) 1.1 - 2.2 CK W/ REFLX CKMB Collection Time: 03/23/19 11:50 AM  
Result Value Ref Range  39 - 308 U/L  
TROPONIN I Collection Time: 03/23/19 11:50 AM  
Result Value Ref Range Troponin-I, Qt. <0.05 <0.05 ng/mL SAMPLES BEING HELD Collection Time: 03/23/19 11:50 AM  
Result Value Ref Range SAMPLES BEING HELD BLUE,RED COMMENT Add-on orders for these samples will be processed based on acceptable specimen integrity and analyte stability, which may vary by analyte. EKG, 12 LEAD, INITIAL Collection Time: 03/23/19 12:00 PM  
Result Value Ref Range Ventricular Rate 83 BPM  
 Atrial Rate 83 BPM  
 P-R Interval 158 ms QRS Duration 78 ms Q-T Interval 386 ms QTC Calculation (Bezet) 453 ms Calculated P Axis 20 degrees Calculated R Axis 15 degrees Calculated T Axis 93 degrees Diagnosis Normal sinus rhythm Anteroseptal infarct (cited on or before 25-SEP-2018) When compared with ECG of 07-JAN-2019 21:30, 
Questionable change in initial forces of Lateral leads Nonspecific T wave abnormality has replaced inverted T waves in Anterolateral 
 leads URINALYSIS W/ REFLEX CULTURE Collection Time: 03/23/19 12:36 PM  
Result Value Ref Range Color YELLOW/STRAW Appearance CLEAR CLEAR Specific gravity 1.007 1.003 - 1.030    
 pH (UA) 6.0 5.0 - 8.0 Protein NEGATIVE  NEG mg/dL Glucose NEGATIVE  NEG mg/dL Ketone NEGATIVE  NEG mg/dL Bilirubin NEGATIVE  NEG Blood NEGATIVE  NEG Urobilinogen 0.2 0.2 - 1.0 EU/dL Nitrites NEGATIVE  NEG Leukocyte Esterase NEGATIVE  NEG    
 WBC 0-4 0 - 4 /hpf  
 RBC 0-5 0 - 5 /hpf Epithelial cells FEW FEW /lpf Bacteria NEGATIVE  NEG /hpf  
 UA:UC IF INDICATED CULTURE NOT INDICATED BY UA RESULT CNI Hyaline cast 2-5 0 - 5 /lpf Radiologic Studies -  
CT HEAD WO CONT Final Result IMPRESSION:  
  
No acute intracranial abnormality. No change XR CHEST SNGL V Final Result Impression: 1. No acute disease CT Results  (Last 48 hours) 03/23/19 1240  CT HEAD WO CONT Final result Impression:  IMPRESSION:  
   
No acute intracranial abnormality. No change Narrative:  EXAM: CT HEAD WO CONT INDICATION: Seizure, new, >41yo, no trauma; Syncope/fainting COMPARISON: 1/7/2019. CONTRAST: None. TECHNIQUE: Unenhanced CT of the head was performed using 5 mm images. Brain and  
bone windows were generated. CT dose reduction was achieved through use of a  
standardized protocol tailored for this examination and automatic exposure  
control for dose modulation. FINDINGS:  
The ventricles and sulci are normal in size, shape and configuration and  
midline. There is no significant white matter disease.  There is no intracranial  
 hemorrhage, extra-axial collection, mass, mass effect or midline shift. The  
basilar cisterns are open. No acute infarct is identified. The bone windows  
demonstrate no abnormalities. The visualized portions of the paranasal sinuses  
and mastoid air cells are clear. CXR Results  (Last 48 hours) 03/23/19 1217  XR CHEST SNGL V Final result Impression:  Impression: 1. No acute disease Narrative:  INDICATION:  Chest Pain Exam: Portable chest 1153. Comparison: 3/20/2019. Findings: Cardiomediastinal silhouette is within normal limits. Pulmonary  
vasculature is not engorged. There are no focal parenchymal opacities,  
effusions, or pneumothorax. Left chest AICD unchanged Medical Decision Making I am the first provider for this patient. I reviewed the vital signs, available nursing notes, past medical history, past surgical history, family history and social history. Vital Signs-Reviewed the patient's vital signs. Patient Vitals for the past 12 hrs: 
 Temp Pulse Resp BP SpO2  
03/23/19 1145 97.9 °F (36.6 °C) 79 21 153/84 96 % Pulse Oximetry Analysis -96 % on RA Cardiac Monitor:  
Rate: 79 bpm 
Rhythm: Normal Sinus Rhythm ED EKG interpretation:12:00 Rhythm: Normal sinus rhythm; and regular . Rate (approx.): 83; Axis: normal; CT Interval: normal; QRS interval: normal ; ST/T wave: non-specific changes; This EKG was interpreted by Dejah Muller MD,ED Provider. Records Reviewed: Nursing Notes and Old Medical Records Provider Notes (Medical Decision Making): DDX: ACS, dysrhythmia, seizure, metabolic abnormality. ED Course:  
Initial assessment performed. The patients presenting problems have been discussed, and they are in agreement with the care plan formulated and outlined with them. I have encouraged them to ask questions as they arise throughout their visit. I consulted [place] cardiology. Spoke with Ab Oliveira. She agreed with plan to admit to hospitalist.  I reviewed that the AICD interrogation did not reveal any significant events. She will contact Dr. Maru Garces in consult on the patient. Terry Liu MD 
 
 
 
PROGRESS NOTE Pt reevaluated. Pt asymptomatic currently. Negative initial troponin. No acute EKG changes. AICD interrogated without evidence of significant events. CBC CMP grossly unremarkable. CT head negative. CXR clear. Did not suspect seizure as there was no postictal.  No incontinence. Possibly orthostatic episode given that patient just started Flomax last night Lasix this morning prior to episode. Will repeat troponin and consult cardiology. And to admit to hospitalist. Terry Liu MD. 
Written by Terry Liu MD  
 
14:25 I spoke with Dr. Suyapa Nowak, hospitalist, who will evaluate the patient for admission. Critical Care Time:  
0 Disposition: 
Admit to hospital 
 
PLAN: 
1. Current Discharge Medication List  
  
 
2. Follow-up Information None Return to ED if worse Diagnosis Clinical Impression: 1. Syncope, unspecified syncope type

## 2019-03-23 NOTE — ED NOTES
Bedside and Verbal shift change report given to [facility] (oncoming nurse) by Marc Wagner (offgoing nurse). Report included the following information SBAR, Kardex, ED Summary, Procedure Summary, Intake/Output, MAR, Recent Results and Cardiac Rhythm NSR.

## 2019-03-24 LAB
ATRIAL RATE: 83 BPM
CALCULATED P AXIS, ECG09: 20 DEGREES
CALCULATED R AXIS, ECG10: 15 DEGREES
CALCULATED T AXIS, ECG11: 93 DEGREES
DIAGNOSIS, 93000: NORMAL
P-R INTERVAL, ECG05: 158 MS
Q-T INTERVAL, ECG07: 386 MS
QRS DURATION, ECG06: 78 MS
QTC CALCULATION (BEZET), ECG08: 453 MS
VENTRICULAR RATE, ECG03: 83 BPM

## 2019-03-24 PROCEDURE — 99218 HC RM OBSERVATION: CPT

## 2019-03-24 PROCEDURE — 74011250637 HC RX REV CODE- 250/637: Performed by: INTERNAL MEDICINE

## 2019-03-24 PROCEDURE — 74011250636 HC RX REV CODE- 250/636: Performed by: INTERNAL MEDICINE

## 2019-03-24 PROCEDURE — 96372 THER/PROPH/DIAG INJ SC/IM: CPT

## 2019-03-24 RX ORDER — CARVEDILOL 3.12 MG/1
3.12 TABLET ORAL 2 TIMES DAILY
Qty: 30 TAB | Refills: 3 | Status: SHIPPED | OUTPATIENT
Start: 2019-03-24 | End: 2019-04-09 | Stop reason: SDUPTHER

## 2019-03-24 RX ORDER — CARVEDILOL 3.12 MG/1
3.12 TABLET ORAL 2 TIMES DAILY
Status: DISCONTINUED | OUTPATIENT
Start: 2019-03-24 | End: 2019-03-26 | Stop reason: HOSPADM

## 2019-03-24 RX ADMIN — ENOXAPARIN SODIUM 40 MG: 40 INJECTION SUBCUTANEOUS at 21:43

## 2019-03-24 RX ADMIN — DULOXETINE HYDROCHLORIDE 60 MG: 30 CAPSULE, DELAYED RELEASE ORAL at 09:39

## 2019-03-24 RX ADMIN — Medication 10 ML: at 14:44

## 2019-03-24 RX ADMIN — Medication 10 ML: at 21:43

## 2019-03-24 RX ADMIN — CARVEDILOL 3.12 MG: 3.12 TABLET, FILM COATED ORAL at 17:30

## 2019-03-24 RX ADMIN — LOSARTAN POTASSIUM 25 MG: 25 TABLET, FILM COATED ORAL at 09:42

## 2019-03-24 RX ADMIN — ATORVASTATIN CALCIUM 40 MG: 40 TABLET, FILM COATED ORAL at 21:43

## 2019-03-24 RX ADMIN — ISOSORBIDE MONONITRATE 30 MG: 30 TABLET, EXTENDED RELEASE ORAL at 09:42

## 2019-03-24 RX ADMIN — ASPIRIN 81 MG: 81 TABLET, COATED ORAL at 09:39

## 2019-03-24 RX ADMIN — CARVEDILOL 3.12 MG: 3.12 TABLET, FILM COATED ORAL at 10:55

## 2019-03-24 RX ADMIN — TICAGRELOR 90 MG: 90 TABLET ORAL at 21:43

## 2019-03-24 RX ADMIN — ATOMOXETINE 40 MG: 40 CAPSULE ORAL at 17:59

## 2019-03-24 RX ADMIN — TICAGRELOR 90 MG: 90 TABLET ORAL at 09:41

## 2019-03-24 RX ADMIN — Medication 10 ML: at 06:35

## 2019-03-24 NOTE — PROGRESS NOTES
Cardiology Progress Note [address]  [phone] 
 
3/24/2019 10:01 AM 
 
Admit Date: 3/23/2019 Admit Diagnosis: Syncope [R55] Subjective:  
 
Reinaldo Mckinley   Feels hot flashes Visit Vitals BP (!) 137/93 (BP Patient Position: Standing) Pulse (!) 127 Temp 97.4 °F (36.3 °C) Resp 18 Ht 5' 9\" (1.753 m) Wt 208 lb 5.4 oz (94.5 kg) SpO2 96% BMI 30.77 kg/m² Current Facility-Administered Medications Medication Dose Route Frequency  carvedilol (COREG) tablet 3.125 mg  3.125 mg Oral BID  sodium chloride (NS) flush 5-40 mL  5-40 mL IntraVENous Q8H  
 sodium chloride (NS) flush 5-40 mL  5-40 mL IntraVENous PRN  
 enoxaparin (LOVENOX) injection 40 mg  40 mg SubCUTAneous Q24H  
 aspirin delayed-release tablet 81 mg  81 mg Oral DAILY  atomoxetine (STRATTERA) capsule 40 mg  40 mg Oral BID  atorvastatin (LIPITOR) tablet 40 mg  40 mg Oral QHS  DULoxetine (CYMBALTA) capsule 60 mg  60 mg Oral DAILY  isosorbide mononitrate ER (IMDUR) tablet 30 mg  30 mg Oral DAILY  losartan (COZAAR) tablet 25 mg  25 mg Oral DAILY  tamsulosin (FLOMAX) capsule 0.4 mg  0.4 mg Oral DAILY  ticagrelor (BRILINTA) tablet 90 mg  90 mg Oral Q12H  
 nicotine (NICODERM CQ) 21 mg/24 hr patch 1 Patch  1 Patch TransDERmal DAILY Objective:  
  
Visit Vitals BP (!) 137/93 (BP Patient Position: Standing) Pulse (!) 127 Temp 97.4 °F (36.3 °C) Resp 18 Ht 5' 9\" (1.753 m) Wt 208 lb 5.4 oz (94.5 kg) SpO2 96% BMI 30.77 kg/m² Physical Exam: Abdomen: soft, non-tender Extremities: extremities normal 
Heart: tachy, regular Lungs: clear to auscultation bilaterally Pulses: 2+ and symmetric Data Review:  
Labs:   
Recent Labs  
  03/23/19 
1150 WBC 11.9* HGB 15.1 HCT 46.0  Recent Labs  
  03/23/19 
1150 * K 3.9  CO2 24 * BUN 15  
CREA 0.91  
CA 9.0 ALB 3.9 TBILI 0.3 SGOT 28 ALT 24 Recent Labs  
  03/23/19 
1516 03/23/19 
1150 TROIQ <0.05 <0.05 No intake or output data in the 24 hours ending 03/24/19 1001 Telemetry: s tach Assessment:  
 
Principal Problem: 
  Syncope (1/25/2019) Active Problems: 
  HTN (hypertension) (9/25/2018) Arteriosclerotic heart disease (ASHD) (10/5/2018) Ischemic cardiomyopathy (3/22/2019) Plan:  
 
[employer] Staff is feeling hot flashes and in sinus tach. Will start low dose coreg. Observe overnight. Dr Artie Hollis to see in the am 
 
Ramez Hugo MD, [facility] 
 
3/24/2019

## 2019-03-24 NOTE — PROGRESS NOTES
1023 Call placed to Dr. Luis F Wick re: heart rate increased to over 200 when patinet went to bathroom,  Once nurse arrived in room , heart rate had decreased to 143,  After valsalva manuevers heart rate decreased to 130's and now 117.

## 2019-03-24 NOTE — PROGRESS NOTES
General Surgery End of Shift Nursing Note Bedside shift change report given to Marjorie Robles RN (oncoming nurse) by Pat Tang RN (offgoing nurse). Report included the following information SBAR, Kardex, ED Summary, Intake/Output, MAR and Recent Results. Shift worked:   [employer] to [employer] Summary of shift:    Discharge canceled after Cardiology saw patient,  Episode of SVT after using restroom, resolved with vagal maneuvers,  Started on Coreg today. Encouraged no caffeine beverages. , Issues for physician to address:    
 
Number times ambulated in hallway past shift: 1 Number of times OOB to chair past shift: 3, and side of bed to eat. Pain Management: 
Current medication: No c/o pain Patient states pain is manageable on current pain medication: YES 
 
GI: 
 
Current diet:  DIET CARDIAC Regular Tolerating current diet: YES Passing flatus: YES Last Bowel Movement: yesterday Appearance:  
 
Respiratory: 
 
Incentive Spirometer at bedside: YES Patient instructed on use: YES Patient Safety: 
 
Falls Score: 2 Bed Alarm On? No 
Sitter?  No 
 
Ronny Priest RN

## 2019-03-24 NOTE — PROGRESS NOTES
Charge Nurse Manolo Danielson RN and Don Obregon, Primary RN performed a dual skin assessment on this patient No impairment noted Beau score is 21 Manolo Danielson RN 
03/24/19 
12:45 AM

## 2019-03-24 NOTE — ED NOTES
TRANSFER - OUT REPORT: 
 
Verbal report given to Paula RN(name) on Nadia Brown  being transferred to Gen Surg(unit) for routine progression of care Report consisted of patients Situation, Background, Assessment and  
Recommendations(SBAR). Information from the following report(s) SBAR, Kardex, ED Summary, Procedure Summary, MAR and Recent Results was reviewed with the receiving nurse. Lines:  
Peripheral IV 03/23/19 Right Hand (Active) Site Assessment Clean, dry, & intact 3/23/2019 12:57 PM  
Phlebitis Assessment 0 3/23/2019 12:57 PM  
Infiltration Assessment 0 3/23/2019 12:57 PM  
Dressing Status Clean, dry, & intact 3/23/2019 12:57 PM  
Dressing Type Transparent;Tape 3/23/2019 12:57 PM  
Hub Color/Line Status Pink;Patent; Flushed 3/23/2019 12:57 PM  
Action Taken Blood drawn 3/23/2019 12:57 PM  
  
 
Opportunity for questions and clarification was provided. Patient transported with: 
 [employer]

## 2019-03-24 NOTE — PROGRESS NOTES
Hospitalist Progress Note NAME: Magui Lewis :  1965 MRN:  [id number] Assessment / Plan: 
Syncope - related to hypotension. Better today. Placed on coreg for hot flashes and tachycardia. Home tomorrow likely. Ischemic cardiolmyopathy - chronic sytolic heart failure - hold lasix. Cont coreg and losartan and aspirin. Tobacco use - on nicotine patch 
 
bph - on flomax Depression - cymbalta 30.0 - 39.9 Obese / Body mass index is 30.77 kg/m². Code status: Full Prophylaxis: Lovenox Recommended Disposition: Home w/Family Subjective: Chief Complaint / Reason for Physician Visit \"hot flashes no dizziness\". Discussed with RN events overnight. Review of Systems: 
Symptom Y/N Comments  Symptom Y/N Comments Fever/Chills    Chest Pain Poor Appetite    Edema Cough    Abdominal Pain Sputum    Joint Pain SOB/OLEARY    Pruritis/Rash Nausea/vomit    Tolerating PT/OT Diarrhea    Tolerating Diet Constipation    Other Could NOT obtain due to:   
 
Objective: VITALS:  
Last 24hrs VS reviewed since prior progress note. Most recent are: 
Patient Vitals for the past 24 hrs: 
 Temp Pulse Resp BP SpO2  
19 0951  (!) 127  (!) 137/93   
19 0949  (!) 103  (!) 154/93   
19 0945 97.4 °F (36.3 °C) (!) 110  (!) 148/92 96 % 19 0758 98.3 °F (36.8 °C) 89 18 164/87 96 % 19 0320    101/66   
19 0317    105/75   
19 0315 98.1 °F (36.7 °C) 94 18 (!) 129/96 95 % 19 0036 97.6 °F (36.4 °C) 97 17 146/78 97 % 19 2300  97 16 110/72 92 % 19 2200  97 16 113/65 92 % 19 2100  96 20 108/66 92 % 19 1900  (!) 102 17 123/77 94 % 19 1752  93 15 (!) 131/92 95 % 19 1727  89 17  93 % 19 1712  89 15  94 % 19 1700  88 16 121/83 95 % 19 1652  89 17  96 % 19 1636  93 15  98 % 19 1630  93 17 135/87 97 % 03/23/19 1609  92 16  97 % 03/23/19 1600  92 20 129/86 97 % 03/23/19 1550  91 19  95 % 03/23/19 1535  89 19  97 % 03/23/19 1530  90 19 128/83 97 % 03/23/19 1519  86 19  97 % 03/23/19 1509  89 21  97 % 03/23/19 1500  85 18 109/64 95 % 03/23/19 1447  84 18  96 % 03/23/19 1440  83 16  98 % 03/23/19 1430  92 23 113/66 95 % 03/23/19 1422  82 18  98 % 03/23/19 1414  83 16 110/63 97 % 03/23/19 1411  82 19 148/86   
03/23/19 1411  88 21  96 % 03/23/19 1400  82 16 (!) 149/97 96 % 03/23/19 1347  83 17  96 % 03/23/19 1334  80 16  96 % 03/23/19 1330  80 16 140/90 96 % 03/23/19 1312  78 17  96 % 03/23/19 1304  77 16  98 % 03/23/19 1300  77 16 (!) 146/96 98 % 03/23/19 1248    135/90   
03/23/19 1145 97.9 °F (36.6 °C) 79 21 153/84 96 % No intake or output data in the 24 hours ending 03/24/19 1005 PHYSICAL EXAM: 
General: WD, WN. Alert, cooperative, no acute distress   
EENT:  EOMI. Anicteric sclerae. MMM Resp:  CTA bilaterally, no wheezing or rales. No accessory muscle use CV:  Regular  rhythm,  No edema GI:  Soft, Non distended, Non tender.  +Bowel sounds Neurologic:  Alert and oriented X 3, normal speech, Psych:   Good insight. Not anxious nor agitated Skin:  No rashes. No jaundice Reviewed most current lab test results and cultures  YES Reviewed most current radiology test results   YES Review and summation of old records today    NO Reviewed patient's current orders and MAR    YES 
PMH/SH reviewed - no change compared to H&P 
________________________________________________________________________ Care Plan discussed with: 
  Comments Patient Family RN Care Manager Consultant Multidiciplinary team rounds were held today with , nursing, pharmacist and clinical coordinator.   Patient's plan of care was discussed; medications were reviewed and discharge planning was addressed. ________________________________________________________________________ Total NON critical care TIME:  30 Minutes Total CRITICAL CARE TIME Spent:   Minutes non procedure based Comments >50% of visit spent in counseling and coordination of care    
________________________________________________________________________ Watson Hein DO  
 
Procedures: see electronic medical records for all procedures/Xrays and details which were not copied into this note but were reviewed prior to creation of Plan. LABS: 
I reviewed today's most current labs and imaging studies. Pertinent labs include: 
Recent Labs  
  03/23/19 
1150 WBC 11.9* HGB 15.1 HCT 46.0  Recent Labs  
  03/23/19 
1150 * K 3.9  CO2 24 * BUN 15  
CREA 0.91  
CA 9.0 ALB 3.9 TBILI 0.3 SGOT 28 ALT 24 Signed: Watson Hein DO

## 2019-03-24 NOTE — PROGRESS NOTES
TRANSFER - IN REPORT: 
 
Verbal report received from ER (name) on Sandy Art  being received from ER (unit) for routine progression of care Report consisted of patients Situation, Background, Assessment and  
Recommendations(SBAR). Information from the following report(s) ED Summary was reviewed with the receiving nurse. Opportunity for questions and clarification was provided. Assessment completed upon patients arrival to unit and care assumed. Primary RN, Amber Zayas, to assume care of patient upon arrival to unit.  
 
Ranulfo Del Rio RN 
03/24/19 
12:16 AM

## 2019-03-25 ENCOUNTER — APPOINTMENT (OUTPATIENT)
Dept: CARDIAC REHAB | Age: 54
End: 2019-03-25

## 2019-03-25 LAB
ANION GAP SERPL CALC-SCNC: 8 MMOL/L (ref 5–15)
BUN SERPL-MCNC: 15 MG/DL (ref 6–20)
BUN/CREAT SERPL: 22 (ref 12–20)
CALCIUM SERPL-MCNC: 9.1 MG/DL (ref 8.5–10.1)
CHLORIDE SERPL-SCNC: 103 MMOL/L (ref 97–108)
CO2 SERPL-SCNC: 24 MMOL/L (ref 21–32)
CREAT SERPL-MCNC: 0.69 MG/DL (ref 0.7–1.3)
ERYTHROCYTE [DISTWIDTH] IN BLOOD BY AUTOMATED COUNT: 13.2 % (ref 11.5–14.5)
GLUCOSE SERPL-MCNC: 136 MG/DL (ref 65–100)
HCT VFR BLD AUTO: 43.1 % (ref 36.6–50.3)
HGB BLD-MCNC: 14.6 G/DL (ref 12.1–17)
MCH RBC QN AUTO: 29.7 PG (ref 26–34)
MCHC RBC AUTO-ENTMCNC: 33.9 G/DL (ref 30–36.5)
MCV RBC AUTO: 87.6 FL (ref 80–99)
NRBC # BLD: 0 K/UL (ref 0–0.01)
NRBC BLD-RTO: 0 PER 100 WBC
PLATELET # BLD AUTO: 271 K/UL (ref 150–400)
PMV BLD AUTO: 10.6 FL (ref 8.9–12.9)
POTASSIUM SERPL-SCNC: 3.5 MMOL/L (ref 3.5–5.1)
RBC # BLD AUTO: 4.92 M/UL (ref 4.1–5.7)
SODIUM SERPL-SCNC: 135 MMOL/L (ref 136–145)
TSH SERPL DL<=0.05 MIU/L-ACNC: 0.57 UIU/ML (ref 0.36–3.74)
WBC # BLD AUTO: 12.2 K/UL (ref 4.1–11.1)

## 2019-03-25 PROCEDURE — 99218 HC RM OBSERVATION: CPT

## 2019-03-25 PROCEDURE — 74011250637 HC RX REV CODE- 250/637: Performed by: INTERNAL MEDICINE

## 2019-03-25 PROCEDURE — 96372 THER/PROPH/DIAG INJ SC/IM: CPT

## 2019-03-25 PROCEDURE — 36415 COLL VENOUS BLD VENIPUNCTURE: CPT

## 2019-03-25 PROCEDURE — 74011250636 HC RX REV CODE- 250/636: Performed by: INTERNAL MEDICINE

## 2019-03-25 PROCEDURE — 84443 ASSAY THYROID STIM HORMONE: CPT

## 2019-03-25 PROCEDURE — 85027 COMPLETE CBC AUTOMATED: CPT

## 2019-03-25 PROCEDURE — 80048 BASIC METABOLIC PNL TOTAL CA: CPT

## 2019-03-25 RX ORDER — SIMETHICONE 80 MG
80 TABLET,CHEWABLE ORAL
Status: DISCONTINUED | OUTPATIENT
Start: 2019-03-25 | End: 2019-03-26 | Stop reason: HOSPADM

## 2019-03-25 RX ADMIN — CARVEDILOL 3.12 MG: 3.12 TABLET, FILM COATED ORAL at 09:18

## 2019-03-25 RX ADMIN — Medication 10 ML: at 05:13

## 2019-03-25 RX ADMIN — Medication 10 ML: at 16:17

## 2019-03-25 RX ADMIN — ISOSORBIDE MONONITRATE 30 MG: 30 TABLET, EXTENDED RELEASE ORAL at 09:18

## 2019-03-25 RX ADMIN — ATORVASTATIN CALCIUM 40 MG: 40 TABLET, FILM COATED ORAL at 22:23

## 2019-03-25 RX ADMIN — DULOXETINE HYDROCHLORIDE 60 MG: 30 CAPSULE, DELAYED RELEASE ORAL at 09:18

## 2019-03-25 RX ADMIN — ATOMOXETINE 40 MG: 40 CAPSULE ORAL at 17:35

## 2019-03-25 RX ADMIN — TICAGRELOR 90 MG: 90 TABLET ORAL at 22:23

## 2019-03-25 RX ADMIN — ASPIRIN 81 MG: 81 TABLET, COATED ORAL at 09:18

## 2019-03-25 RX ADMIN — ENOXAPARIN SODIUM 40 MG: 40 INJECTION SUBCUTANEOUS at 22:23

## 2019-03-25 RX ADMIN — CARVEDILOL 3.12 MG: 3.12 TABLET, FILM COATED ORAL at 17:34

## 2019-03-25 RX ADMIN — LOSARTAN POTASSIUM 25 MG: 25 TABLET, FILM COATED ORAL at 09:18

## 2019-03-25 RX ADMIN — TICAGRELOR 90 MG: 90 TABLET ORAL at 09:18

## 2019-03-25 RX ADMIN — Medication 10 ML: at 21:23

## 2019-03-25 RX ADMIN — ATOMOXETINE 40 MG: 40 CAPSULE ORAL at 09:19

## 2019-03-25 NOTE — PROGRESS NOTES
Reason for Admission:   Syncope RRAT Score:  11 Plan for utilizing home health:  Not at this time Likelihood of Readmission: Low Transition of Care Plan:   Patient is independent with ADL/IADL. Patient has a drivers license but depends on wife for transportation. Patient denies past HH/Rehab and DME use. Patient/wife dont have any needs or concerns regarding d/c at this time. CM will continue to follow. PCP- Dr Beronica De Anda in [facility] Care Management Interventions PCP Verified by CM: Yes(Dr Lanier) Mode of Transport at Discharge: Other (see comment)(wife) Transition of Care Consult (CM Consult): Discharge Planning Discharge Durable Medical Equipment: No(no DME use) Physical Therapy Consult: No 
Occupational Therapy Consult: No 
Speech Therapy Consult: No 
Current Support Network: Lives with Spouse, Own Home(Lives in a one story home with 6 steps to enter the home) Confirm Follow Up Transport: Family(wife transports to appointments. .  Patient drives very seldom) Plan discussed with Pt/Family/Caregiver: Yes(wife at bedside) Discharge Location Discharge Placement: (Anticipate home with family) Skylar Cantor Ext [phone]

## 2019-03-25 NOTE — PROGRESS NOTES
General Surgery End of Shift Nursing Note Bedside shift change report given to Gunner Bhakta (oncoming nurse) by Alfredo Rivera (offgoing nurse). Report included the following information SBAR, Kardex, Intake/Output and MAR. Shift worked:   7pm to [employer] of shift:   Pt has been tachycardic with highest HR in the 120s but no runs of SVT. Pt has been resting throughout the night. Issues for physician to address:  none Number times ambulated in hallway past shift: 0 Number of times OOB to chair past shift: 0 
GI: 
 
Current diet:  DIET CARDIAC Regular Tolerating current diet: YES Passing flatus: YES Last Bowel Movement: yesterday Rosie Gutierrez RN

## 2019-03-25 NOTE — PROGRESS NOTES
Cardiology Progress Note [address], [address] [facility]  [phone] 
 
3/25/2019 3:00PM AM 
 
Admit Date: 3/23/2019 Admit Diagnosis: Syncope [R55] Subjective:  
 
Yury Baron has negative orthostatics since stopping the flomax. He is concerned about the increased HR, which has been NSR since yesterday. He has been restarted on Coreg vs.  Metoprolol. Visit Vitals /77 (BP Patient Position: At rest) Pulse (!) 105 Temp 97.7 °F (36.5 °C) Resp 16 Ht 5' 9\" (1.753 m) Wt 94.5 kg (208 lb 5.4 oz) SpO2 99% BMI 30.77 kg/m² Current Facility-Administered Medications Medication Dose Route Frequency  simethicone (MYLICON) tablet 80 mg  80 mg Oral QID PRN  
 carvedilol (COREG) tablet 3.125 mg  3.125 mg Oral BID  sodium chloride (NS) flush 5-40 mL  5-40 mL IntraVENous Q8H  
 sodium chloride (NS) flush 5-40 mL  5-40 mL IntraVENous PRN  
 enoxaparin (LOVENOX) injection 40 mg  40 mg SubCUTAneous Q24H  
 aspirin delayed-release tablet 81 mg  81 mg Oral DAILY  atomoxetine (STRATTERA) capsule 40 mg (Patient Supplied)  40 mg Oral BID  atorvastatin (LIPITOR) tablet 40 mg  40 mg Oral QHS  DULoxetine (CYMBALTA) capsule 60 mg  60 mg Oral DAILY  isosorbide mononitrate ER (IMDUR) tablet 30 mg  30 mg Oral DAILY  losartan (COZAAR) tablet 25 mg  25 mg Oral DAILY  ticagrelor (BRILINTA) tablet 90 mg  90 mg Oral Q12H  
 nicotine (NICODERM CQ) 21 mg/24 hr patch 1 Patch  1 Patch TransDERmal DAILY Objective:  
  
Visit Vitals /77 (BP Patient Position: At rest) Pulse (!) 105 Temp 97.7 °F (36.5 °C) Resp 16 Ht 5' 9\" (1.753 m) Wt 94.5 kg (208 lb 5.4 oz) SpO2 99% BMI 30.77 kg/m² Physical Exam: 
General:  Obese  male in no acute distress Abdomen: soft, non-tender Extremities: extremities normal 
Heart: tachy, regular Lungs: clear to auscultation bilaterally Pulses: 2+ and symmetric Data Review:  
Labs: Recent Labs  
  03/25/19 
1034 03/23/19 
1150 WBC 12.2* 11.9* HGB 14.6 15.1 HCT 43.1 46.0  267 Recent Labs  
  03/25/19 
1034 03/23/19 
1150 * 135* K 3.5 3.9  103 CO2 24 24 * 117* BUN 15 15 CREA 0.69* 0.91  
CA 9.1 9.0 ALB  --  3.9 TBILI  --  0.3 SGOT  --  28 ALT  --  24 Recent Labs  
  03/23/19 
1516 03/23/19 
1150 TROIQ <0.05 <0.05 Intake/Output Summary (Last 24 hours) at 3/25/2019 1542 Last data filed at 3/25/2019 1529 Gross per 24 hour Intake 240 ml Output 400 ml Net -160 ml Telemetry: s tach Assessment:  
 
Principal Problem: 
  Syncope (1/25/2019) Active Problems: 
  HTN (hypertension) (9/25/2018) Arteriosclerotic heart disease (ASHD) (10/5/2018) Ischemic cardiomyopathy (3/22/2019) Plan:  
 
Syncope/ orthostatic hypotension/ ICM: 
Unclear etiology, neg orthostatics now with stopping flomax and holding BB Has been tolerating Coreg, BP and HR stable. OK for discharge per Dr. Yessenia Laughlin. F/u 1-2 weeks. Patient seen and examined by me with nurse practitioner Alecia Melara. I personally performed all components of the history, physical, and medical decision making and agree with the assessment and plan with minor modifications as noted. Suspect flomax was a major contributor to syncope. Mildly orthostatic, but standing SBP>110. Hold Flomax, discuss alternatives with PCP/ urology PRN. Recommend lower dose lasix at DC- likely 20 mg daily. F/u with me within 2 weeks.

## 2019-03-25 NOTE — PROGRESS NOTES
Hospitalist Progress Note NAME: Cm Carranza :  1965 MRN:  [id number] Assessment / Plan: 
Syncope - related to hypotension. Pt remains tachycardic with standing and reports bloating and hot flashes. Check tsh and cortisol. 
  
Ischemic cardiolmyopathy - chronic sytolic heart failure - hold lasix. On coreg and losartan. 
  
Tobacco use - on nicotine patch 
  
bph - on flomax 
  
Depression - cymbalta Gas paij - try gasx 30.0 - 39.9 Obese / Body mass index is 30.77 kg/m². Code status: Full Prophylaxis: Lovenox Recommended Disposition: Home w/Family Subjective: Chief Complaint / Reason for Physician Visit \"reports hot flashes and bloating today. Nurse states hr increases into 140's with standing. \". Discussed with RN events overnight. Review of Systems: 
Symptom Y/N Comments  Symptom Y/N Comments Fever/Chills    Chest Pain Poor Appetite    Edema Cough    Abdominal Pain Sputum    Joint Pain SOB/OLEARY    Pruritis/Rash Nausea/vomit    Tolerating PT/OT Diarrhea    Tolerating Diet Constipation    Other Could NOT obtain due to:   
 
Objective: VITALS:  
Last 24hrs VS reviewed since prior progress note. Most recent are: 
Patient Vitals for the past 24 hrs: 
 Temp Pulse Resp BP SpO2  
19 0723 98.8 °F (37.1 °C) (!) 116 18 (!) 142/95 97 % 19 0508 97.7 °F (36.5 °C) (!) 106 18 (!) 143/94 93 % 19 2354 98.8 °F (37.1 °C) (!) 115 18 152/85 94 % 19 1934    (!) 143/91   
19 1909 97.7 °F (36.5 °C) (!) 112 18 (!) 143/106 96 % 19 1623 98.7 °F (37.1 °C) 78 18 (!) 130/94 98 % 19 1202 97.6 °F (36.4 °C) 87 17 (!) 134/92 96 % 19 1118  1515 [employer] Intake/Output Summary (Last 24 hours) at 3/25/2019 1056 Last data filed at 3/25/2019 5768 Gross per 24 hour Intake 240 ml Output 0 ml Net 240 ml PHYSICAL EXAM: 
General: WD, WN.  Alert, cooperative, no acute distress   
 EENT:  EOMI. Anicteric sclerae. MMM Resp:  CTA bilaterally, no wheezing or rales. No accessory muscle use CV:  Regular  rhythm,  No edema GI:  Soft, Non distended, Non tender.  +Bowel sounds Neurologic:  Alert and oriented X 3, normal speech, Psych:   Good insight. Not anxious nor agitated Skin:  No rashes. No jaundice Reviewed most current lab test results and cultures  YES Reviewed most current radiology test results   YES Review and summation of old records today    NO Reviewed patient's current orders and MAR    YES 
PMH/SH reviewed - no change compared to H&P 
________________________________________________________________________ Care Plan discussed with: 
  Comments Patient Family RN Care Manager Consultant Multidiciplinary team rounds were held today with , nursing, pharmacist and clinical coordinator. Patient's plan of care was discussed; medications were reviewed and discharge planning was addressed. ________________________________________________________________________ Total NON critical care TIME:  30 Minutes Total CRITICAL CARE TIME Spent:   Minutes non procedure based Comments >50% of visit spent in counseling and coordination of care    
________________________________________________________________________ Lula Platt DO  
 
Procedures: see electronic medical records for all procedures/Xrays and details which were not copied into this note but were reviewed prior to creation of Plan. LABS: 
I reviewed today's most current labs and imaging studies. Pertinent labs include: 
Recent Labs  
  03/23/19 
1150 WBC 11.9* HGB 15.1 HCT 46.0  Recent Labs  
  03/23/19 
1150 * K 3.9  CO2 24 * BUN 15  
CREA 0.91  
CA 9.0 ALB 3.9 TBILI 0.3 SGOT 28 ALT 24 Signed: Lula Platt DO

## 2019-03-25 NOTE — PROGRESS NOTES
Patient ambulating hallway, HR up to 155 then to 140's. Patient stated \"a little short of breath. \" assisted patient back to room and into bed. Patient has had several episodes of feeling flush. Notified Dr Lang Peters, replied \"keep an eye on it. \"  Patient ambulated halls times 4. General Surgery End of Shift Nursing Note Bedside shift change report given to Cookie Sewell (oncoming nurse) by Jenny Pham (offgoing nurse). Report included the following information SBAR, Kardex and Intake/Output. Shift worked:   7a-7p Summary of shift:    See above Issues for physician to address:   See above Number times ambulated in hallway past shift: 4 Number of times OOB to chair past shift: 3 Pain Management: 
Current medication: see mar Patient states pain is manageable on current pain medication: YES 
 
GI: 
 
Current diet:  DIET CARDIAC Regular Tolerating current diet: YES Passing flatus: YES Last Bowel Movement: several days ago Appearance: Radha Castro Respiratory: 
 
Incentive Spirometer at bedside: YES Patient instructed on use: YES Patient Safety: 
 
Falls Score: 2 Bed Alarm On? No 
Sitter? No 
 
Addy Villasenor

## 2019-03-26 VITALS
SYSTOLIC BLOOD PRESSURE: 141 MMHG | RESPIRATION RATE: 18 BRPM | DIASTOLIC BLOOD PRESSURE: 89 MMHG | BODY MASS INDEX: 30.86 KG/M2 | TEMPERATURE: 97.7 F | OXYGEN SATURATION: 99 % | HEART RATE: 105 BPM | HEIGHT: 69 IN | WEIGHT: 208.34 LBS

## 2019-03-26 LAB
ANION GAP SERPL CALC-SCNC: 8 MMOL/L (ref 5–15)
BUN SERPL-MCNC: 14 MG/DL (ref 6–20)
BUN/CREAT SERPL: 19 (ref 12–20)
CALCIUM SERPL-MCNC: 8.9 MG/DL (ref 8.5–10.1)
CHLORIDE SERPL-SCNC: 105 MMOL/L (ref 97–108)
CO2 SERPL-SCNC: 22 MMOL/L (ref 21–32)
CORTIS SERPL-MCNC: 8.1 UG/DL
CREAT SERPL-MCNC: 0.72 MG/DL (ref 0.7–1.3)
ERYTHROCYTE [DISTWIDTH] IN BLOOD BY AUTOMATED COUNT: 13.1 % (ref 11.5–14.5)
GLUCOSE SERPL-MCNC: 98 MG/DL (ref 65–100)
HCT VFR BLD AUTO: 43.2 % (ref 36.6–50.3)
HGB BLD-MCNC: 14.8 G/DL (ref 12.1–17)
MCH RBC QN AUTO: 29.8 PG (ref 26–34)
MCHC RBC AUTO-ENTMCNC: 34.3 G/DL (ref 30–36.5)
MCV RBC AUTO: 87.1 FL (ref 80–99)
NRBC # BLD: 0 K/UL (ref 0–0.01)
NRBC BLD-RTO: 0 PER 100 WBC
PLATELET # BLD AUTO: 269 K/UL (ref 150–400)
PMV BLD AUTO: 10.3 FL (ref 8.9–12.9)
POTASSIUM SERPL-SCNC: 3.3 MMOL/L (ref 3.5–5.1)
RBC # BLD AUTO: 4.96 M/UL (ref 4.1–5.7)
SODIUM SERPL-SCNC: 135 MMOL/L (ref 136–145)
WBC # BLD AUTO: 10.3 K/UL (ref 4.1–11.1)

## 2019-03-26 PROCEDURE — 85027 COMPLETE CBC AUTOMATED: CPT

## 2019-03-26 PROCEDURE — 74011250637 HC RX REV CODE- 250/637: Performed by: INTERNAL MEDICINE

## 2019-03-26 PROCEDURE — 36415 COLL VENOUS BLD VENIPUNCTURE: CPT

## 2019-03-26 PROCEDURE — 99218 HC RM OBSERVATION: CPT

## 2019-03-26 PROCEDURE — 82533 TOTAL CORTISOL: CPT

## 2019-03-26 PROCEDURE — 80048 BASIC METABOLIC PNL TOTAL CA: CPT

## 2019-03-26 RX ORDER — FUROSEMIDE 40 MG/1
20 TABLET ORAL DAILY
Qty: 90 TAB | Refills: 1 | Status: SHIPPED | OUTPATIENT
Start: 2019-03-26 | End: 2019-07-29

## 2019-03-26 RX ADMIN — ASPIRIN 81 MG: 81 TABLET, COATED ORAL at 08:38

## 2019-03-26 RX ADMIN — DULOXETINE HYDROCHLORIDE 60 MG: 30 CAPSULE, DELAYED RELEASE ORAL at 08:38

## 2019-03-26 RX ADMIN — CARVEDILOL 3.12 MG: 3.12 TABLET, FILM COATED ORAL at 08:39

## 2019-03-26 RX ADMIN — ATOMOXETINE 40 MG: 40 CAPSULE ORAL at 08:41

## 2019-03-26 RX ADMIN — Medication 10 ML: at 05:04

## 2019-03-26 RX ADMIN — ISOSORBIDE MONONITRATE 30 MG: 30 TABLET, EXTENDED RELEASE ORAL at 08:38

## 2019-03-26 RX ADMIN — TICAGRELOR 90 MG: 90 TABLET ORAL at 08:38

## 2019-03-26 RX ADMIN — LOSARTAN POTASSIUM 25 MG: 25 TABLET, FILM COATED ORAL at 08:39

## 2019-03-26 NOTE — PROGRESS NOTES
I have reviewed discharge instructions with the patient. The patient verbalized understanding. Follow up appointments and care at home also reviewed with pt. At home meds discussed with patient. Pt understands which meds have been d/c'd and which meds to take at home. IV removed and pt discharged with belongings.

## 2019-03-26 NOTE — PROGRESS NOTES
Patient is expected to discharge home today without any needs or concerns. Patiens wife is in the room and will transport pt home. Follow-up appointment is on the AVS. 
 
Care Management Interventions PCP Verified by CM: Yes(Dr Lanier) Mode of Transport at Discharge: Other (see comment)(wife) Transition of Care Consult (CM Consult): Discharge Planning Discharge Durable Medical Equipment: No(no DME use) Physical Therapy Consult: No 
Occupational Therapy Consult: No 
Speech Therapy Consult: No 
Current Support Network: Lives with Spouse, Own Home(Lives in a one story home with 6 steps to enter the home) Confirm Follow Up Transport: Family(wife transports to appointments. .  Patient drives very seldom) Plan discussed with Pt/Family/Caregiver: Yes(wife at bedside) Discharge Location Discharge Placement: Home with family assistance Tiffany Leon Ext [phone]

## 2019-03-26 NOTE — DISCHARGE SUMMARY
Hospitalist Discharge Summary     Patient ID:  Amparo Pompa  [id number]  67 y.o.  1965    PCP on record: Paige Zamudio MD    Admit date: 3/23/2019  Discharge date and time: 3/26/2019      DISCHARGE DIAGNOSIS:    Syncope   Ischemic cardiolmyopathy    Tobacco use     bph      Depression     Gas pain      CONSULTATIONS:  IP CONSULT TO HOSPITALIST    Excerpted HPI from H&P of Vignesh Sherman DO:  Fani Nation is a 46 y.o. PMHx of STEMI 9/2018 s/p MAVIS to LAD, CAD, ischemic cardiomyopathy, Tobacco use, HTN, ADD who presented  to the ED after passing out while making breakfast this AM. He states that he felt week and went to sit down. Per his wife, who is present in the ED, he was unresponsive for several seconds but did not fall as he was sitting down. Per the wife, he was alert and oriented afterward but with no recollection of the event. He denies chest pain, N/V, or fevers/chills. Of note, the patient was seen in the office of his cardiologist yesterday and started on lasix with recent TTE in January showing LVEF of 40-45% with NHYA class II-III symps. He is s/p AICD placement also in January. Pt admits to ongoing tobacco use of 1PPD and is trying to quit.      for several seconds, witnessed by wife. Admits chills, no URI symps. Hx of STEMI 9/2018 s/p defribrillator 1/25/1019.      We were asked to admit for work up and evaluation of the above problems. ______________________________________________________________________  DISCHARGE SUMMARY/HOSPITAL COURSE:  for full details see H&P, daily progress notes, labs, consult notes. The pt was obsrved over 3 nights in the hospital and undewent a ct of the headwhcih was negative.   The pt was switched from lopressor to carvdilol by cardology and was given a script for carvedilol on dc.            _______________________________________________________________________  Patient seen and examined by me on discharge day.  Pertinent Findings:  Gen:    Not in distress  Chest: Clear lungs  CVS:   Regular rhythm. No edema  Abd:  Soft, not distended, not tender  Neuro:  Alert, nad  _______________________________________________________________________  DISCHARGE MEDICATIONS:   Current Discharge Medication List      START taking these medications    Details   carvedilol (COREG) 3.125 mg tablet Take 1 Tab by mouth two (2) times a day. Qty: 30 Tab, Refills: 3         CONTINUE these medications which have NOT CHANGED    Details   tamsulosin (FLOMAX) 0.4 mg capsule Take 1 Cap by mouth daily. Qty: 30 Cap, Refills: 0    Associated Diagnoses: Difficulty urinating      atomoxetine (STRATTERA) 40 mg capsule Take 40 mg by mouth two (2) times a day. losartan (COZAAR) 25 mg tablet TAKE 1 TABLET BY MOUTH ONCE DAILY  Qty: 90 Tab, Refills: 1    Comments: Please consider 90 day supplies to promote better adherence      acetaminophen (TYLENOL) 500 mg tablet Take 2,000 mg by mouth two (2) times daily as needed for Pain. albuterol (PROAIR HFA) 90 mcg/actuation inhaler Take 2 Puffs by inhalation every four (4) hours as needed for Wheezing. isosorbide mononitrate ER (IMDUR) 30 mg tablet Take 1 Tab by mouth daily. Qty: 30 Tab, Refills: 11      nitroglycerin (NITROSTAT) 0.4 mg SL tablet 1 Tab by SubLINGual route every five (5) minutes as needed for Chest Pain. Up to 3 doses. Qty: 1 Bottle, Refills: 1      aspirin delayed-release 81 mg tablet Take 1 Tab by mouth daily. Qty: 30 Tab, Refills: 11      atorvastatin (LIPITOR) 40 mg tablet Take 1 Tab by mouth nightly. Qty: 30 Tab, Refills: 11      ticagrelor (BRILINTA) 90 mg tablet Take 1 Tab by mouth every twelve (12) hours every twelve (12) hours. Qty: 60 Tab, Refills: 11      DULoxetine (CYMBALTA) 60 mg capsule Take 60 mg by mouth daily.          STOP taking these medications       furosemide (LASIX) 40 mg tablet Comments:   Reason for Stopping:         metoprolol tartrate (LOPRESSOR) 25 mg tablet Comments:   Reason for Stopping:               My Recommended Diet, Activity, Wound Care, and follow-up labs are listed in the patient's Discharge Insturctions which I have personally completed and reviewed.     ______________________________________________________________________    Risk of deterioration: Moderate    Condition at Discharge:  Stable  ______________________________________________________________________    Disposition  Home with family, no needs  ______________________________________________________________________    Care Plan discussed with:   Patient, Family, RN, Care Manager, Consultant    ______________________________________________________________________    Code Status: Full Code  ______________________________________________________________________      Follow up with:   PCP : Mathew Alvarez MD  Follow-up Information     Follow up With Specialties Details Why Contact Info    Mathew Alvarez MD Family Practice Schedule an appointment as soon as possible for a visit  [address] Monserrat El NP Nurse Practitioner On 4/9/2019 2:45pm  Cardiology foloow-up [address] [address]  [phone]                Total time in minutes spent coordinating this discharge (includes going over instructions, follow-up, prescriptions, and preparing report for sign off to her PCP) :  30 minutes    Signed:  Yesica Black DO

## 2019-03-26 NOTE — PROGRESS NOTES
General Surgery End of Shift Nursing Note Bedside shift change report given to Mateo Herbert (oncoming nurse) by Breana Carmichael (offgoing nurse). Report included the following information SBAR, Kardex, Intake/Output and MAR. Shift worked:   7pm to [employer] of shift:   Pt had no runs of SVT. Pt rested comfortably throughout the night. Issues for physician to address:  none Gregory Stafford RN

## 2019-03-28 NOTE — TELEPHONE ENCOUNTER
Patient is an appropriate candidate for cardiac rehab status post PTCA. If I have not already signed forms, I will be happy to do so.

## 2019-04-05 DIAGNOSIS — I25.5 ISCHEMIC CARDIOMYOPATHY: ICD-10-CM

## 2019-04-09 ENCOUNTER — OFFICE VISIT (OUTPATIENT)
Dept: CARDIOLOGY CLINIC | Age: 54
End: 2019-04-09

## 2019-04-09 VITALS
HEART RATE: 106 BPM | RESPIRATION RATE: 16 BRPM | DIASTOLIC BLOOD PRESSURE: 70 MMHG | WEIGHT: 209 LBS | OXYGEN SATURATION: 95 % | HEIGHT: 69 IN | SYSTOLIC BLOOD PRESSURE: 110 MMHG | BODY MASS INDEX: 30.96 KG/M2

## 2019-04-09 DIAGNOSIS — E78.5 DYSLIPIDEMIA: ICD-10-CM

## 2019-04-09 DIAGNOSIS — I25.10 ARTERIOSCLEROTIC HEART DISEASE (ASHD): Chronic | ICD-10-CM

## 2019-04-09 DIAGNOSIS — I25.5 ISCHEMIC CARDIOMYOPATHY: Primary | Chronic | ICD-10-CM

## 2019-04-09 RX ORDER — CARVEDILOL 3.12 MG/1
3.12 TABLET ORAL 2 TIMES DAILY
Qty: 180 TAB | Refills: 3 | Status: SHIPPED | OUTPATIENT
Start: 2019-04-09 | End: 2019-04-09 | Stop reason: SDUPTHER

## 2019-04-09 RX ORDER — CARVEDILOL 3.12 MG/1
3.12 TABLET ORAL 2 TIMES DAILY
Qty: 60 TAB | Refills: 0 | Status: SHIPPED | OUTPATIENT
Start: 2019-04-09 | End: 2019-05-06 | Stop reason: SDUPTHER

## 2019-04-09 RX ORDER — CLOPIDOGREL BISULFATE 75 MG/1
75 TABLET ORAL DAILY
Qty: 90 TAB | Refills: 3 | Status: SHIPPED | OUTPATIENT
Start: 2019-04-09 | End: 2020-02-05

## 2019-04-09 RX ORDER — TAMSULOSIN HYDROCHLORIDE 0.4 MG/1
CAPSULE ORAL
Refills: 0 | COMMUNITY
Start: 2019-03-22 | End: 2019-04-09 | Stop reason: ALTCHOICE

## 2019-04-09 RX ORDER — IBUPROFEN 200 MG
1 TABLET ORAL EVERY 24 HOURS
COMMUNITY
End: 2019-07-29

## 2019-04-09 NOTE — PROGRESS NOTES
1. Have you been to the ER, urgent care clinic since your last visit? Hospitalized since your last visit? Yes Reason for visit: for syncope on 3/23/19 at [facility]    2. Have you seen or consulted any other health care providers outside of the [address] [place] [address] since your last visit? Include any pap smears or colon screening.  No    Chief Complaint   Patient presents with   [facility] Follow Up     for syncope on 3/23/19    Palpitations    Shortness of Breath    Hand Swelling     pt reports mild swelling to hands bilaterally    Fatigue

## 2019-04-09 NOTE — PROGRESS NOTES
Titus Scheuermann DNP, ANP-BC  Subjective/HPI:     Jose Ramon Espinosa is a 46 y.o. male is here for hospital follow-up from syncopal episode due to medication induced orthostatic hypotension. March 22 patient was placed on furosemide 40 mg for increasing the dependent edema and shortness of breath and given reports of weakened urinary stream was placed on Flomax. The following day he had a witnessed syncopal episode and admitted to [facility]. Since hospital discharge he has been taken off metoprolol taken off Flomax and has been tolerating carvedilol 3.125 mg twice a day without lightheadedness or dizziness. Diuretic reduced to 20 mg daily with improvement in dependent edema as well as edema in his hands. He reports he has continued episodes of intermittent dyspnea while at rest which started after he was placed on Brilinta from PTCA stenting last year.     PCP Provider  Wily Albarado MD  Past Medical History:   Diagnosis Date    ADD (attention deficit disorder) 9/25/2018    Asthma     Chest pain 11/30/2018    Dyslipidemia 9/25/2018    Fatigue 11/30/2018    HTN (hypertension) 9/25/2018    Hypertension     Other ill-defined conditions(799.89)     ADHD,BELL'S PALSY WEAKNESS  LT FACE    Psychiatric disorder     DEPRESSION    S/P coronary artery stent placement 9/25/2018 9/25/18 PCI/MAVIS to LAD    STEMI (ST elevation myocardial infarction) ([facility] [address] 9/25/2018    Tobacco abuse 9/25/2018      Past Surgical History:   Procedure Laterality Date    HX GI  1984    STOMACH ABDOMEN ACCIDENT    HX HEENT      TEETH EXTRACTION    HX ORTHOPAEDIC  1984    FRACTURE RT FEMUR    NEUROLOGICAL PROCEDURE UNLISTED      NECK     Allergies   Allergen Reactions    Bee Sting [Sting, Bee] Anaphylaxis      Family History   Problem Relation Age of Onset    Hypertension Mother     Lung Disease Paternal Aunt       Current Outpatient Medications   Medication Sig    nicotine (NICODERM CQ) 21 mg/24 hr 1 Patch by TransDERmal route every twenty-four (24) hours.  clopidogrel (PLAVIX) 75 mg tab Take 1 Tab by mouth daily. D/C Brilinta    carvedilol (COREG) 3.125 mg tablet Take 1 Tab by mouth two (2) times a day.  furosemide (LASIX) 40 mg tablet Take 0.5 Tabs by mouth daily.  atomoxetine (STRATTERA) 40 mg capsule Take 40 mg by mouth two (2) times a day.  losartan (COZAAR) 25 mg tablet TAKE 1 TABLET BY MOUTH ONCE DAILY    acetaminophen (TYLENOL) 500 mg tablet Take 2,000 mg by mouth two (2) times daily as needed for Pain.  albuterol (PROAIR HFA) 90 mcg/actuation inhaler Take 2 Puffs by inhalation every four (4) hours as needed for Wheezing.  isosorbide mononitrate ER (IMDUR) 30 mg tablet Take 1 Tab by mouth daily.  nitroglycerin (NITROSTAT) 0.4 mg SL tablet 1 Tab by SubLINGual route every five (5) minutes as needed for Chest Pain. Up to 3 doses.  aspirin delayed-release 81 mg tablet Take 1 Tab by mouth daily.  atorvastatin (LIPITOR) 40 mg tablet Take 1 Tab by mouth nightly.  DULoxetine (CYMBALTA) 60 mg capsule Take 60 mg by mouth daily. No current facility-administered medications for this visit.        Vitals:    19 1433 19 1452 19 1453   BP: 136/86 118/80 110/70   Pulse: 98 85 (!) 106   Resp: 16     SpO2: 95%     Weight: 209 lb (94.8 kg)     Height: 5' 9\" (1.753 m)       Social History     Socioeconomic History    Marital status:      Spouse name: Kevin Hairston Number of children: 2    Years of education: 12    Highest education level: 12th grade   Occupational History    Not on file   Social Needs    Financial resource strain: Not hard at all   [place] insecurity:     Worry: Never true     Inability: Never true   [employer] needs:     Medical: Patient refused     Non-medical: Patient refused   Tobacco Use    Smoking status: Former Smoker     Packs/day: 1.00     Years: 40.00     Pack years: 40.00     Last attempt to quit: 3/26/2019     Years since quittin.0    Smokeless tobacco: Never Used    Tobacco comment: were on chantix,stopped 2 days ago due to stomachache   Substance and Sexual Activity    Alcohol use: No     Alcohol/week: 0.0 oz     Frequency: Never    Drug use: No    Sexual activity: Not Currently   Lifestyle    Physical activity:     Days per week: 0 days     Minutes per session: 0 min    Stress: Very much   Relationships    Social connections:     Talks on phone: More than three times a week     Gets together: Once a week     Attends Nondenominational service: Never     Active member of club or organization: No     Attends meetings of clubs or organizations: Never     Relationship status:     Intimate partner violence:     Fear of current or ex partner: No     Emotionally abused: No     Physically abused: No     Forced sexual activity: No   Other Topics Concern     Service No    Blood Transfusions Yes    Caffeine Concern Yes     Comment: coffee 1 pot    Occupational Exposure Yes     Comment: cig smoke    Hobby Hazards No    Sleep Concern Yes     Comment: does not sleep well,wakes up sweating,dog    Stress Concern Yes     Comment: high level daily -depression he states    Weight Concern Yes     Comment: more tired    Special Diet No    Back Care No     Comment: neck and low back surgery    Exercise No     Comment: does not exercise    Bike Helmet No     Comment: does not ride bike   [address] Yes     Comment: 1/2 time wears seatbelt    Self-Exams No   Social History Narrative    Not on file       I have reviewed the nurses notes, vitals, problem list, allergy list, medical history, family, social history and medications. Review of Symptoms:    General: Pt denies excessive weight gain or loss. Pt is able to conduct ADL's  HEENT: Denies blurred vision, headaches, epistaxis and difficulty swallowing. Respiratory: + Intermittent shortness of breath, denies OLEARY, wheezing or stridor.   Cardiovascular: Denies precordial pain, palpitations, + intermittent hand and lower extremity edema eyes PND  Gastrointestinal: Denies poor appetite, indigestion, abdominal pain or blood in stool  Musculoskeletal: Denies pain or swelling from muscles or joints  Neurologic: Denies tremor, paresthesias, or sensory motor disturbance  Skin: Denies rash, itching or texture change. Physical Exam:      General: Well developed, in no acute distress, cooperative and alert  HEENT: No carotid bruits, no JVD, trach is midline. Neck Supple, PEERL, EOM intact. Heart:  Normal S1/S2 negative S3 or S4. Regular, no murmur, gallop or rub.   Respiratory: Clear bilaterally x 4, no wheezing or rales  Abdomen:   Soft, non-tender, no masses, bowel sounds are active.   Extremities:  No edema, normal cap refill, no cyanosis, atraumatic. Neuro: A&Ox3, speech clear, gait stable. Skin: Skin color is normal. No rashes or lesions.  Non diaphoretic  Vascular: 2+ pulses symmetric in all extremities    Cardiographics    ECG: Sinus rhythm  Results for orders placed or performed during the hospital encounter of 03/23/19   EKG, 12 LEAD, INITIAL   Result Value Ref Range    Ventricular Rate 83 BPM    Atrial Rate 83 BPM    P-R Interval 158 ms    QRS Duration 78 ms    Q-T Interval 386 ms    QTC Calculation (Bezet) 453 ms    Calculated P Axis 20 degrees    Calculated R Axis 15 degrees    Calculated T Axis 93 degrees    Diagnosis       Normal sinus rhythm  Anteroseptal infarct (cited on or before 25-SEP-2018)  When compared with ECG of 07-JAN-2019 21:30,  No significant change was found  Confirmed by Branden Romano ([id number]) on 3/24/2019 6:19:07 PM     Results for orders placed or performed in visit on 01/09/19   CARDIAC HOLTER MONITOR, 24 HOURS    Narrative    ECG Monitor/24 hours, Complete    Reason for Holter Monitor  SYNCOPE    Heartbeat    Slowest 60  Average 83  Fastest  113      Results:   Underlying Rhythm: Normal sinus rhythm      Atrial Arrhythmias: premature atrial contractions; occasional AV Conduction: normal    Ventricular Arrhythmias: premature ventricular contractions; occasional     ST Segment Analysis:normal     Symptom Correlation:  none    Comment:   Sinus rhythm throughout - no symptoms     Lo Mallory MD, [facility]            Cardiology Labs:  Lab Results   Component Value Date/Time    Cholesterol, total 209 (H) 09/26/2018 03:34 AM    HDL Cholesterol 34 09/26/2018 03:34 AM    LDL, calculated 128.8 (H) 09/26/2018 03:34 AM    Triglyceride 231 (H) 09/26/2018 03:34 AM    CHOL/HDL Ratio 6.1 (H) 09/26/2018 03:34 AM       Lab Results   Component Value Date/Time    Sodium 135 (L) 03/26/2019 01:12 AM    Potassium 3.3 (L) 03/26/2019 01:12 AM    Chloride 105 03/26/2019 01:12 AM    CO2 22 03/26/2019 01:12 AM    Anion gap 8 03/26/2019 01:12 AM    Glucose 98 03/26/2019 01:12 AM    BUN 14 03/26/2019 01:12 AM    Creatinine 0.72 03/26/2019 01:12 AM    BUN/Creatinine ratio 19 03/26/2019 01:12 AM    GFR est AA >60 03/26/2019 01:12 AM    GFR est non-AA >60 03/26/2019 01:12 AM    Calcium 8.9 03/26/2019 01:12 AM    Bilirubin, total 0.3 03/23/2019 11:50 AM    AST (SGOT) 28 03/23/2019 11:50 AM    Alk. phosphatase 128 (H) 03/23/2019 11:50 AM    Protein, total 8.2 03/23/2019 11:50 AM    Albumin 3.9 03/23/2019 11:50 AM    Globulin 4.3 (H) 03/23/2019 11:50 AM    A-G Ratio 0.9 (L) 03/23/2019 11:50 AM    ALT (SGPT) 24 03/23/2019 11:50 AM           Assessment:     Assessment:     Diagnoses and all orders for this visit:    1. Ischemic cardiomyopathy    2. Dyslipidemia  -     AMB POC EKG ROUTINE W/ 12 LEADS, INTER & REP    3. Arteriosclerotic heart disease (ASHD)    Other orders  -     clopidogrel (PLAVIX) 75 mg tab; Take 1 Tab by mouth daily. D/C Brilinta  -     carvedilol (COREG) 3.125 mg tablet; Take 1 Tab by mouth two (2) times a day. ICD-10-CM ICD-9-CM    1. Ischemic cardiomyopathy I25.5 414.8    2. Dyslipidemia E78.5 272.4 AMB POC EKG ROUTINE W/ 12 LEADS, INTER & REP   3.  Arteriosclerotic heart disease (ASHD) I25.10 414.00      Orders Placed This Encounter    AMB POC EKG ROUTINE W/ 12 LEADS, INTER & REP     Order Specific Question:   Reason for Exam:     Answer:   Routine    DISCONTD: tamsulosin (FLOMAX) 0.4 mg capsule     Refill:  0    nicotine (NICODERM CQ) 21 mg/24 hr     Si Patch by TransDERmal route every twenty-four (24) hours.  clopidogrel (PLAVIX) 75 mg tab     Sig: Take 1 Tab by mouth daily. D/C Brilinta     Dispense:  90 Tab     Refill:  3    DISCONTD: carvedilol (COREG) 3.125 mg tablet     Sig: Take 1 Tab by mouth two (2) times a day. Dispense:  180 Tab     Refill:  3    carvedilol (COREG) 3.125 mg tablet     Sig: Take 1 Tab by mouth two (2) times a day. Dispense:  60 Tab     Refill:  0        Plan:     1. Atherosclerotic heart disease status post non-STEMI with PTCA stenting mid LAD 2018. Continues to experience resting dyspnea with Brilinta will change to Plavix 75 mg daily. 2.  Ischemic cardiomyopathy: Now on carvedilol 3.125 mg twice a day and losartan. [place] heart class IIb, continue furosemide 20 mg daily. Reinforced low-sodium diet. Reinforced caffeine restrictions. 3.  Reported weakened urine stream: Has improved on carvedilol, recommend he follow-up with [facility] urology as he is an established patient for history of renal stones. Patient does not recall the name of the provider, wife will call the office to reestablish care. 4.  ICD: Followed by electrophysiology. Follow-up with Dr. Blake Morales in 3 months     Abraham Munson NP    This note was created using voice recognition software. Despite editing, there may be syntax errors.

## 2019-04-10 ENCOUNTER — HOSPITAL ENCOUNTER (OUTPATIENT)
Dept: CARDIAC REHAB | Age: 54
Discharge: HOME | End: 2019-04-10
Payer: COMMERCIAL

## 2019-04-10 VITALS — BODY MASS INDEX: 30.72 KG/M2 | WEIGHT: 208 LBS

## 2019-04-10 PROCEDURE — 93798 PHYS/QHP OP CAR RHAB W/ECG: CPT

## 2019-04-15 ENCOUNTER — HOSPITAL ENCOUNTER (OUTPATIENT)
Dept: CARDIAC REHAB | Age: 54
Discharge: HOME | End: 2019-04-15
Payer: COMMERCIAL

## 2019-04-15 VITALS — WEIGHT: 208 LBS | BODY MASS INDEX: 30.72 KG/M2

## 2019-04-15 PROCEDURE — 93798 PHYS/QHP OP CAR RHAB W/ECG: CPT

## 2019-04-17 ENCOUNTER — HOSPITAL ENCOUNTER (OUTPATIENT)
Dept: CARDIAC REHAB | Age: 54
Discharge: HOME | End: 2019-04-17
Payer: COMMERCIAL

## 2019-04-17 VITALS — WEIGHT: 209 LBS | BODY MASS INDEX: 30.86 KG/M2

## 2019-04-17 PROCEDURE — 93798 PHYS/QHP OP CAR RHAB W/ECG: CPT

## 2019-04-17 NOTE — CARDIO/PULMONARY
Spoke with pt who will start attending 3 x week- he is looking forward to coming back- had ICD placed.

## 2019-04-18 ENCOUNTER — CLINICAL SUPPORT (OUTPATIENT)
Dept: CARDIOLOGY CLINIC | Age: 54
End: 2019-04-18

## 2019-04-18 ENCOUNTER — OFFICE VISIT (OUTPATIENT)
Dept: CARDIOLOGY CLINIC | Age: 54
End: 2019-04-18

## 2019-04-18 VITALS
BODY MASS INDEX: 31.34 KG/M2 | WEIGHT: 211.6 LBS | DIASTOLIC BLOOD PRESSURE: 90 MMHG | SYSTOLIC BLOOD PRESSURE: 124 MMHG | OXYGEN SATURATION: 96 % | HEIGHT: 69 IN | RESPIRATION RATE: 18 BRPM | HEART RATE: 101 BPM

## 2019-04-18 DIAGNOSIS — I25.5 ISCHEMIC CARDIOMYOPATHY: ICD-10-CM

## 2019-04-18 DIAGNOSIS — I50.22 CHRONIC SYSTOLIC CONGESTIVE HEART FAILURE (HCC): ICD-10-CM

## 2019-04-18 DIAGNOSIS — Z95.810 ICD (IMPLANTABLE CARDIOVERTER-DEFIBRILLATOR) IN PLACE: ICD-10-CM

## 2019-04-18 DIAGNOSIS — Z95.810 PRESENCE OF AUTOMATIC CARDIOVERTER/DEFIBRILLATOR (AICD): Primary | ICD-10-CM

## 2019-04-18 DIAGNOSIS — I10 ESSENTIAL HYPERTENSION: ICD-10-CM

## 2019-04-18 DIAGNOSIS — I25.5 ISCHEMIC CARDIOMYOPATHY: Chronic | ICD-10-CM

## 2019-04-18 DIAGNOSIS — I25.10 ARTERIOSCLEROTIC HEART DISEASE (ASHD): Primary | Chronic | ICD-10-CM

## 2019-04-18 NOTE — PROGRESS NOTES
Subjective:      Sia Cuevas is a 46 y.o. male is here for follow up s/p ICD implant 1/25/2019. He reports palpitations which occur daily. The patient denies chest pain/ shortness of breath, orthopnea, PND, LE edema, syncope, presyncope or fatigue.        Patient Active Problem List    Diagnosis Date Noted    Ischemic cardiomyopathy 03/22/2019    ICD (implantable cardioverter-defibrillator) in place 03/22/2019    Syncope 01/25/2019    Fatigue 11/30/2018    Chest pain 11/30/2018    Arteriosclerotic heart disease (ASHD) 10/05/2018    STEMI (ST elevation myocardial infarction) ([facility] [address] 09/25/2018    HTN (hypertension) 09/25/2018    Dyslipidemia 09/25/2018    ADD (attention deficit disorder) 09/25/2018    Tobacco abuse 09/25/2018    S/P coronary artery stent placement 09/25/2018      Emiliana Webster MD  Past Medical History:   Diagnosis Date    ADD (attention deficit disorder) 9/25/2018    Asthma     Chest pain 11/30/2018    Dyslipidemia 9/25/2018    Fatigue 11/30/2018    HTN (hypertension) 9/25/2018    Hypertension     Other ill-defined conditions(799.89)     ADHD,BELL'S PALSY WEAKNESS  LT FACE    Psychiatric disorder     DEPRESSION    S/P coronary artery stent placement 9/25/2018 9/25/18 PCI/MAVIS to LAD    STEMI (ST elevation myocardial infarction) ([facility] [address] 9/25/2018    Tobacco abuse 9/25/2018      Past Surgical History:   Procedure Laterality Date    HX GI  1984    STOMACH ABDOMEN ACCIDENT    HX HEENT      TEETH EXTRACTION    HX ORTHOPAEDIC  1984    FRACTURE RT FEMUR    NEUROLOGICAL PROCEDURE UNLISTED      NECK     Allergies   Allergen Reactions    Bee Sting [Sting, Bee] Anaphylaxis      Family History   Problem Relation Age of Onset    Hypertension Mother     Lung Disease Paternal Aunt     negative for cardiac disease  Social History     Socioeconomic History    Marital status:      Spouse name: Emilia Servant Number of children: 2    Years of education: 12    Highest education level: 12th grade   Social Needs    Financial resource strain: Not hard at all   [place] insecurity:     Worry: Never true     Inability: Never true   Arden Reed needs:     Medical: Patient refused     Non-medical: Patient refused   Tobacco Use    Smoking status: Former Smoker     Packs/day: 1.00     Years: 40.00     Pack years: 40.00     Types: Cigarettes     Last attempt to quit: 3/26/2019     Years since quittin.0    Smokeless tobacco: Never Used    Tobacco comment: were on chantix,stopped 2 days ago due to stomachache   Substance and Sexual Activity    Alcohol use: No     Alcohol/week: 0.0 oz     Frequency: Never    Drug use: No    Sexual activity: Not Currently   Lifestyle    Physical activity:     Days per week: 0 days     Minutes per session: 0 min    Stress: Very much   Relationships    Social connections:     Talks on phone: More than three times a week     Gets together: Once a week     Attends Catholic service: Never     Active member of club or organization: No     Attends meetings of clubs or organizations: Never     Relationship status:    Other Topics Concern     Service No    Blood Transfusions Yes    Caffeine Concern Yes     Comment: coffee 1 pot    Occupational Exposure Yes     Comment: cig smoke    Hobby Hazards No    Sleep Concern Yes     Comment: does not sleep well,wakes up sweating,dog    Stress Concern Yes     Comment: high level daily -depression he states    Weight Concern Yes     Comment: more tired    Special Diet No    Back Care No     Comment: neck and low back surgery    Exercise No     Comment: does not exercise    Bike Helmet No     Comment: does not ride bike   [address] Yes     Comment: 1/2 time wears seatbelt    Self-Exams No     Current Outpatient Medications   Medication Sig    nicotine (NICODERM CQ) 21 mg/24 hr 1 Patch by TransDERmal route every twenty-four (24) hours.     clopidogrel (PLAVIX) 75 mg tab Take 1 Tab by mouth daily. D/C Brilinta    carvedilol (COREG) 3.125 mg tablet Take 1 Tab by mouth two (2) times a day.  furosemide (LASIX) 40 mg tablet Take 0.5 Tabs by mouth daily.  atomoxetine (STRATTERA) 40 mg capsule Take 40 mg by mouth two (2) times a day.  losartan (COZAAR) 25 mg tablet TAKE 1 TABLET BY MOUTH ONCE DAILY    acetaminophen (TYLENOL) 500 mg tablet Take 2,000 mg by mouth two (2) times daily as needed for Pain.  albuterol (PROAIR HFA) 90 mcg/actuation inhaler Take 2 Puffs by inhalation every four (4) hours as needed for Wheezing.  isosorbide mononitrate ER (IMDUR) 30 mg tablet Take 1 Tab by mouth daily.  nitroglycerin (NITROSTAT) 0.4 mg SL tablet 1 Tab by SubLINGual route every five (5) minutes as needed for Chest Pain. Up to 3 doses.  aspirin delayed-release 81 mg tablet Take 1 Tab by mouth daily.  atorvastatin (LIPITOR) 40 mg tablet Take 1 Tab by mouth nightly.  DULoxetine (CYMBALTA) 60 mg capsule Take 60 mg by mouth daily. No current facility-administered medications for this visit. Vitals:    04/18/19 0849   BP: 124/90   Pulse: (!) 101   Resp: 18   SpO2: 96%   Weight: 211 lb 9.6 oz (96 kg)   Height: 5' 9\" (1.753 m)       I have reviewed the nurses notes, vitals, problem list, allergy list, medical history, family, social history and medications. Review of Symptoms:    General: Pt denies excessive weight gain or loss. Pt is able to conduct ADL's  HEENT: Denies blurred vision, headaches, epistaxis and difficulty swallowing. Respiratory: Denies shortness of breath, OLEARY, wheezing or stridor. Cardiovascular: +palpitations, Denies precordial pain, edema or PND  Gastrointestinal: Denies poor appetite, indigestion, abdominal pain or blood in stool  Urinary: Denies dysuria, pyuria  Musculoskeletal: Denies pain or swelling from muscles or joints  Neurologic: Denies tremor, paresthesias, or sensory motor disturbance  Skin: Denies rash, itching or texture change.   Psych: Denies depression      Physical Exam:      General: Well developed, in no acute distress. HEENT: Eyes - PERRL, no jvd  Heart:  Normal S1/S2 negative S3 or S4. Regular, no murmur, gallop or rub.   Respiratory: Clear bilaterally x 4, no wheezing or rales  Abdomen:   Soft, non-tender, bowel sounds are active.   Extremities:  No edema, normal cap refill, no cyanosis. Musculoskeletal: No clubbing  Neuro: A&Ox3, speech clear, gait stable. Skin: Skin color is normal. No rashes or lesions.  Non diaphoretic  Vascular: 2+ pulses symmetric in all extremities    Cardiographics    Ekg: sinus rhythm     Results for orders placed or performed during the hospital encounter of 03/23/19   EKG, 12 LEAD, INITIAL   Result Value Ref Range    Ventricular Rate 83 BPM    Atrial Rate 83 BPM    P-R Interval 158 ms    QRS Duration 78 ms    Q-T Interval 386 ms    QTC Calculation (Bezet) 453 ms    Calculated P Axis 20 degrees    Calculated R Axis 15 degrees    Calculated T Axis 93 degrees    Diagnosis       Normal sinus rhythm  Anteroseptal infarct (cited on or before 25-SEP-2018)  When compared with ECG of 07-JAN-2019 21:30,  No significant change was found  Confirmed by Branden Romano ([id number]) on 3/24/2019 6:19:07 PM     Results for orders placed or performed in visit on 01/09/19   CARDIAC HOLTER MONITOR, 24 HOURS    Narrative    ECG Monitor/24 hours, Complete    Reason for Holter Monitor  SYNCOPE    Heartbeat    Slowest 60  Average 83  Fastest  113      Results:   Underlying Rhythm: Normal sinus rhythm      Atrial Arrhythmias: premature atrial contractions; occasional            AV Conduction: normal    Ventricular Arrhythmias: premature ventricular contractions; occasional     ST Segment Analysis:normal     Symptom Correlation:  none    Comment:   Sinus rhythm throughout - no symptoms     Keenan Minaya MD, [facility]            Lab Results   Component Value Date/Time    WBC 10.3 03/26/2019 01:12 AM    HGB 14.8 03/26/2019 01:12 AM    HCT 43.2 03/26/2019 01:12 AM    PLATELET 754 70/41/7055 01:12 AM    MCV 87.1 03/26/2019 01:12 AM      Lab Results   Component Value Date/Time    Sodium 135 (L) 03/26/2019 01:12 AM    Potassium 3.3 (L) 03/26/2019 01:12 AM    Chloride 105 03/26/2019 01:12 AM    CO2 22 03/26/2019 01:12 AM    Anion gap 8 03/26/2019 01:12 AM    Glucose 98 03/26/2019 01:12 AM    BUN 14 03/26/2019 01:12 AM    Creatinine 0.72 03/26/2019 01:12 AM    BUN/Creatinine ratio 19 03/26/2019 01:12 AM    GFR est AA >60 03/26/2019 01:12 AM    GFR est non-AA >60 03/26/2019 01:12 AM    Calcium 8.9 03/26/2019 01:12 AM    Bilirubin, total 0.3 03/23/2019 11:50 AM    AST (SGOT) 28 03/23/2019 11:50 AM    Alk. phosphatase 128 (H) 03/23/2019 11:50 AM    Protein, total 8.2 03/23/2019 11:50 AM    Albumin 3.9 03/23/2019 11:50 AM    Globulin 4.3 (H) 03/23/2019 11:50 AM    A-G Ratio 0.9 (L) 03/23/2019 11:50 AM    ALT (SGPT) 24 03/23/2019 11:50 AM         Assessment:     Assessment:        ICD-10-CM ICD-9-CM    1. Arteriosclerotic heart disease (ASHD) I25.10 414.00 AMB POC EKG ROUTINE W/ 12 LEADS, INTER & REP   2. ICD (implantable cardioverter-defibrillator) in place Z95.810 V45.02 AMB POC EKG ROUTINE W/ 12 LEADS, INTER & REP   3. Ischemic cardiomyopathy I25.5 414.8    4. Essential hypertension I10 401.9    5. Chronic systolic congestive heart failure (HCC) I50.22 428.22      428.0      Orders Placed This Encounter    AMB POC EKG ROUTINE W/ 12 LEADS, INTER & REP     Order Specific Question:   Reason for Exam:     Answer:   routine        Plan:   Mr. Brittney Null is here for device follow up s/p ICD 1/25/2019. He was recently hospitalized for syncope secondary to hypotension/hypovolemia. Device interrogation at that time failed to reveal arrhthymias. EKG today shows sinus rhythm and his device interrogation today shows an episode of VF for whtich ATP was delivered on 3/24/19. He feels fluttering daily, previous monitor showed sinus rhythm during times of flutter.  Continue follow up per device clinic, office visit in one year. Continue medical management for CAD, ICM, HTN. Thank you for allowing me to participate in Augustina Marvin 's care. Jacquie Munguia MD    Patient seen and examined by me with nurse practitioner. I personally performed all components of the history, physical, and medical decision making and agree with the assessment and plan with minor modifications as noted. Had appropriate ICD shock for VF. Advised to cut back on strattera for ADHD. Cont med rx for cardiomyopathy, ashd and chf. F/u in one year. Previous monitor was wnl in the setting of palpitations.     Jacquie Munguia MD, [facility]

## 2019-04-18 NOTE — PROGRESS NOTES
1. Have you been to the ER, urgent care clinic since your last visit? Hospitalized since your last visit? Seen on 3/23/19    2. Have you seen or consulted any other health care providers outside of the [address] [place] [address] since your last visit? Include any pap smears or colon screening.    No.        Chief Complaint   Patient presents with    Follow-up     3 month and device check- heart fluttering

## 2019-04-18 NOTE — LETTER
4/18/19 Patient: Jordan Brice YOB: 1965 Date of Visit: 4/18/2019 Ady Cristina MD 
[address] [place] [address] [address]  [address] VIA Facsimile: [phone] Dear Ady Cristina MD, Thank you for referring Mr. Ni Razo to [address] [place], [place] [address]kate for evaluation. My notes for this consultation are attached. If you have questions, please do not hesitate to call me. I look forward to following your patient along with you. Sincerely, Neda Bui MD

## 2019-04-18 NOTE — LETTER
4/18/2019 10:01 AM 
 
Mr. Fantasma Gonzalez [address] [facility] [place] [phone] To Whom It May Concern; 
 
Mr. Katarzyna Mims has been under the care of myself and Dr. Jordan Peters at [facility]. He is able to return to work without restriction effective 4/22/2019. Sincerely, Samina Gold NP

## 2019-04-19 ENCOUNTER — HOSPITAL ENCOUNTER (OUTPATIENT)
Dept: CARDIAC REHAB | Age: 54
Discharge: HOME | End: 2019-04-19
Payer: COMMERCIAL

## 2019-04-19 VITALS — BODY MASS INDEX: 31.16 KG/M2 | WEIGHT: 211 LBS

## 2019-04-19 PROCEDURE — 93798 PHYS/QHP OP CAR RHAB W/ECG: CPT

## 2019-04-22 ENCOUNTER — HOSPITAL ENCOUNTER (OUTPATIENT)
Dept: CARDIAC REHAB | Age: 54
Discharge: HOME | End: 2019-04-22
Payer: COMMERCIAL

## 2019-04-22 VITALS — WEIGHT: 211 LBS | BODY MASS INDEX: 31.16 KG/M2

## 2019-04-22 PROCEDURE — 93798 PHYS/QHP OP CAR RHAB W/ECG: CPT

## 2019-04-24 ENCOUNTER — HOSPITAL ENCOUNTER (OUTPATIENT)
Dept: CARDIAC REHAB | Age: 54
Discharge: HOME | End: 2019-04-24
Payer: COMMERCIAL

## 2019-04-24 VITALS — WEIGHT: 211 LBS | BODY MASS INDEX: 31.16 KG/M2

## 2019-04-24 PROCEDURE — 93798 PHYS/QHP OP CAR RHAB W/ECG: CPT

## 2019-04-26 ENCOUNTER — HOSPITAL ENCOUNTER (OUTPATIENT)
Dept: CARDIAC REHAB | Age: 54
Discharge: HOME | End: 2019-04-26
Payer: COMMERCIAL

## 2019-04-26 VITALS — BODY MASS INDEX: 31.31 KG/M2 | WEIGHT: 212 LBS

## 2019-04-26 PROCEDURE — 93798 PHYS/QHP OP CAR RHAB W/ECG: CPT

## 2019-04-29 ENCOUNTER — HOSPITAL ENCOUNTER (OUTPATIENT)
Dept: CARDIAC REHAB | Age: 54
Discharge: HOME | End: 2019-04-29
Payer: COMMERCIAL

## 2019-04-29 ENCOUNTER — TELEPHONE (OUTPATIENT)
Dept: CARDIOLOGY CLINIC | Age: 54
End: 2019-04-29

## 2019-04-29 VITALS — WEIGHT: 213 LBS | BODY MASS INDEX: 31.45 KG/M2

## 2019-04-29 PROCEDURE — 93798 PHYS/QHP OP CAR RHAB W/ECG: CPT

## 2019-04-29 NOTE — TELEPHONE ENCOUNTER
Pt would like a call to discuss fluttering he felt over the weekend.   Thanks, [facility]

## 2019-04-29 NOTE — TELEPHONE ENCOUNTER
Patient states been noticing palpitations and weakness this weekend after returning to work his /87 pulse 80's has also continued cardiac rehab called out this weekend he is a  feels better today . His SOB has improved on Plavix taking meds as requested no shock from device.  Please advise thanks

## 2019-04-29 NOTE — CARDIO/PULMONARY
On discharge, pt mentioned that he had some \"fluttering\" in his chest over the weekend. Rhythm strip here was reviewed for today's visit only showed a rare PVC. He denied any fluttering during exercise today. Pt then also complained about being hot at times in a cold environment (he is a ), feeling very tired and also mentioned a tiredness in his left shoulder/arm. He also mentioned that he had been switched from Brilinta to Plavix. Pt encouraged to call Dr. Letitia Aldrich office to report these symptoms.

## 2019-05-01 ENCOUNTER — HOSPITAL ENCOUNTER (OUTPATIENT)
Dept: CARDIAC REHAB | Age: 54
Discharge: HOME | End: 2019-05-01
Payer: COMMERCIAL

## 2019-05-01 VITALS — WEIGHT: 212 LBS | BODY MASS INDEX: 31.31 KG/M2

## 2019-05-01 PROCEDURE — 93798 PHYS/QHP OP CAR RHAB W/ECG: CPT

## 2019-05-03 ENCOUNTER — HOSPITAL ENCOUNTER (OUTPATIENT)
Dept: CARDIAC REHAB | Age: 54
Discharge: HOME | End: 2019-05-03
Payer: COMMERCIAL

## 2019-05-03 NOTE — CARDIO/PULMONARY
Pt reports he had chest pain 7/10 earlier today and last night. Pt states he has a known blockage his doctors are medically treating. Discussed how to use his prescribed nitro, he has never used. Reports he can feel his heart fluttering in his ears. Resting -107, sinus tach on telemetry with frequent PAC's. BP: 137/80. Reports he did take all medications as prescribed and denies caffeine consumption. Cancelled cardiac rehab appointment today and encouraged him to call his cardiologist and explain his chest discomfort and fluttering.

## 2019-05-06 ENCOUNTER — HOSPITAL ENCOUNTER (OUTPATIENT)
Dept: CARDIAC REHAB | Age: 54
Discharge: HOME | End: 2019-05-06
Payer: COMMERCIAL

## 2019-05-06 ENCOUNTER — OFFICE VISIT (OUTPATIENT)
Dept: CARDIOLOGY CLINIC | Age: 54
End: 2019-05-06

## 2019-05-06 VITALS
HEIGHT: 69 IN | HEART RATE: 114 BPM | BODY MASS INDEX: 31.36 KG/M2 | SYSTOLIC BLOOD PRESSURE: 132 MMHG | DIASTOLIC BLOOD PRESSURE: 80 MMHG | RESPIRATION RATE: 16 BRPM | OXYGEN SATURATION: 96 % | WEIGHT: 211.7 LBS

## 2019-05-06 DIAGNOSIS — I10 ESSENTIAL HYPERTENSION: ICD-10-CM

## 2019-05-06 DIAGNOSIS — Z72.0 TOBACCO ABUSE: ICD-10-CM

## 2019-05-06 DIAGNOSIS — I25.5 ISCHEMIC CARDIOMYOPATHY: ICD-10-CM

## 2019-05-06 DIAGNOSIS — I25.10 ARTERIOSCLEROTIC HEART DISEASE (ASHD): Primary | ICD-10-CM

## 2019-05-06 DIAGNOSIS — E78.5 DYSLIPIDEMIA: ICD-10-CM

## 2019-05-06 PROCEDURE — 93798 PHYS/QHP OP CAR RHAB W/ECG: CPT

## 2019-05-06 RX ORDER — CARVEDILOL 6.25 MG/1
6.25 TABLET ORAL 2 TIMES DAILY
Qty: 60 TAB | Refills: 1 | Status: SHIPPED | OUTPATIENT
Start: 2019-05-06 | End: 2019-09-03 | Stop reason: SDUPTHER

## 2019-05-06 RX ORDER — RANOLAZINE 500 MG/1
500 TABLET, EXTENDED RELEASE ORAL 2 TIMES DAILY
Qty: 60 TAB | Refills: 1 | Status: SHIPPED | OUTPATIENT
Start: 2019-05-06 | End: 2019-07-01 | Stop reason: SDUPTHER

## 2019-05-06 NOTE — PROGRESS NOTES
Jose Manuel Gordillo, [facility] Subjective/HPI:  
 
Mr. Jhonny Coley is a 46 y.o. male is here with complaints of chest pains. He has a PMHx of CAD s/p STEMI, ischemic cardiomyopathy s/p ICD, HTN, HLD, tobacco abuse. He reports complaints of chest pain that started last week. They did not last long. They occurred at night time, while getting ready for bed. The pain was left-sided, below the breast and had no radiation of his symptoms. He has similar symptoms the following day, but felt fine over the weekend. He also had an episode of pain in the patient room today, after getting EKG done. He continues to report mild swelling of his hands and his face. He denies lower extremity edema, orthopnea or PND. He says he is always \"wiped out\" by the end of the week, after working 32 hours as a  and attending cardiac rehab 3x/week. His heart rates at home are always around  bpm, no lower than 80 bpm. 
 
 
 
PCP Provider Kelvin Garcia MD 
 
Patient Active Problem List  
Diagnosis Code  STEMI (ST elevation myocardial infarction) (HCC) I21.3  
 HTN (hypertension) I10  
 Dyslipidemia E78.5  ADD (attention deficit disorder) F98.8  Tobacco abuse Z72.0  
 S/P coronary artery stent placement Z95.5  Arteriosclerotic heart disease (ASHD) I25.10  Fatigue R53.83  Chest pain R07.9  Syncope R55  Ischemic cardiomyopathy I25.5  ICD (implantable cardioverter-defibrillator) in place Z95.810 Past Surgical History:  
Procedure Laterality Date 15 [facility] STOMACH ABDOMEN ACCIDENT  
 HX HEENT    
 TEETH EXTRACTION  
 [facility] FRACTURE RT FEMUR  
 NEUROLOGICAL PROCEDURE UNLISTED    
 NECK Family History Problem Relation Age of Onset  Hypertension Mother  Lung Disease Paternal Aunt Social History Socioeconomic History  Marital status:  Spouse name: Erna Jalil  Number of children: 2  
 Years of education: 15  
  Highest education level: 12th grade Occupational History  Not on file Social Needs  Financial resource strain: Not hard at all  Food insecurity:  
  Worry: Never true Inability: Never true  Transportation needs:  
  Medical: Patient refused Non-medical: Patient refused Tobacco Use  Smoking status: Former Smoker Packs/day: 1.00 Years: 40.00 Pack years: 40.00 Types: Cigarettes Last attempt to quit: 3/26/2019 Years since quittin.1  Smokeless tobacco: Never Used  Tobacco comment: were on chantix,stopped 2 days ago due to stomachache Substance and Sexual Activity  Alcohol use: No  
  Alcohol/week: 0.0 oz Frequency: Never  Drug use: No  
 Sexual activity: Not Currently Lifestyle  Physical activity:  
  Days per week: 0 days Minutes per session: 0 min  Stress: Very much Relationships  Social connections:  
  Talks on phone: More than three times a week Gets together: Once a week Attends Zoroastrianism service: Never Active member of club or organization: No  
  Attends meetings of clubs or organizations: Never Relationship status:   Intimate partner violence:  
  Fear of current or ex partner: No  
  Emotionally abused: No  
  Physically abused: No  
  Forced sexual activity: No  
Other Topics Concern   Service No  
 Blood Transfusions Yes  Caffeine Concern Yes Comment: coffee 1 pot  Occupational Exposure Yes Comment: cig smoke  Hobby Hazards No  
 Sleep Concern Yes Comment: does not sleep well,wakes up sweating,dog  Stress Concern Yes Comment: high level daily -depression he states  Weight Concern Yes Comment: more tired  Special Diet No  
 Back Care No  
  Comment: neck and low back surgery  Exercise No  
  Comment: does not exercise  Bike Helmet No  
  Comment: does not ride bike  [employer] Yes Comment: 1/2 time wears seatbelt  Self-Exams No  
Social History Narrative  Not on file Allergies Allergen Reactions  Bee Sting [Sting, Bee] Anaphylaxis Current Outpatient Medications Medication Sig  
 ranolazine ER (RANEXA) 500 mg SR tablet Take 1 Tab by mouth two (2) times a day.  carvedilol (COREG) 6.25 mg tablet Take 1 Tab by mouth two (2) times a day.  nicotine (NICODERM CQ) 21 mg/24 hr 1 Patch by TransDERmal route every twenty-four (24) hours.  clopidogrel (PLAVIX) 75 mg tab Take 1 Tab by mouth daily. D/C Brilinta  furosemide (LASIX) 40 mg tablet Take 0.5 Tabs by mouth daily.  atomoxetine (STRATTERA) 40 mg capsule Take 40 mg by mouth two (2) times a day.  losartan (COZAAR) 25 mg tablet TAKE 1 TABLET BY MOUTH ONCE DAILY  acetaminophen (TYLENOL) 500 mg tablet Take 2,000 mg by mouth two (2) times daily as needed for Pain.  albuterol (PROAIR HFA) 90 mcg/actuation inhaler Take 2 Puffs by inhalation every four (4) hours as needed for Wheezing.  isosorbide mononitrate ER (IMDUR) 30 mg tablet Take 1 Tab by mouth daily.  nitroglycerin (NITROSTAT) 0.4 mg SL tablet 1 Tab by SubLINGual route every five (5) minutes as needed for Chest Pain. Up to 3 doses.  aspirin delayed-release 81 mg tablet Take 1 Tab by mouth daily.  atorvastatin (LIPITOR) 40 mg tablet Take 1 Tab by mouth nightly.  DULoxetine (CYMBALTA) 60 mg capsule Take 60 mg by mouth daily. No current facility-administered medications for this visit. I have reviewed the problem list, allergy list, medical history, family, social history and medications. Review of Symptoms: 
 
Review of Systems Constitutional: Negative for chills, fever and weight loss. HENT: Negative for nosebleeds. Eyes: Negative for blurred vision and double vision. Respiratory: Negative for cough, shortness of breath and wheezing. Cardiovascular: Positive for chest pain. Negative for palpitations, orthopnea, leg swelling and PND. Gastrointestinal: Negative for abdominal pain, blood in stool, diarrhea, nausea and vomiting. Musculoskeletal: Negative for joint pain. Skin: Negative for rash. Neurological: Negative for dizziness, tingling and loss of consciousness. Endo/Heme/Allergies: Does not bruise/bleed easily. Physical Exam:   
 
General: Well developed, in no acute distress, cooperative and alert HEENT: No carotid bruits, no JVD, trach is midline. Neck Supple, PEERL, EOM intact. Heart:  reg rate and rhythm; normal S1/S2; no murmurs, gallops or rubs. Respiratory: Clear bilaterally x 4, no wheezing or rales Abdomen:   Soft, non-tender, no distention, no masses. + BS. Extremities:  Normal cap refill, no cyanosis, atraumatic. No edema. Neuro: A&Ox3, speech clear, gait stable. Skin: Skin color is normal. No rashes or lesions. Non diaphoretic Vascular: 2+ pulses symmetric in all extremities Vitals:  
 05/06/19 1119 05/06/19 1127 BP: 130/80 132/80 Pulse: (!) 114 Resp: 16 SpO2: 96% Weight: 211 lb 11.2 oz (96 kg) Height: 5' 9\" (1.753 m) Cardiographics ECG: sinus rhythm Results for orders placed or performed during the hospital encounter of 03/23/19 EKG, 12 LEAD, INITIAL Result Value Ref Range Ventricular Rate 83 BPM  
 Atrial Rate 83 BPM  
 P-R Interval 158 ms QRS Duration 78 ms Q-T Interval 386 ms QTC Calculation (Bezet) 453 ms Calculated P Axis 20 degrees Calculated R Axis 15 degrees Calculated T Axis 93 degrees Diagnosis Normal sinus rhythm Anteroseptal infarct (cited on or before 25-SEP-2018) When compared with ECG of 07-JAN-2019 21:30, No significant change was found Confirmed by Dori Michelle ([id number]) on 3/24/2019 6:19:07 PM 
  
Results for orders placed or performed in visit on 01/09/19 CARDIAC HOLTER MONITOR, 24 HOURS Narrative ECG Monitor/24 hours, Complete Reason for Holter Monitor  SYNCOPE Heartbeat Slowest 60 Average 83 Fastest  113 Results:  
Underlying Rhythm: Normal sinus rhythm Atrial Arrhythmias: premature atrial contractions; occasional         
 
AV Conduction: normal 
 
Ventricular Arrhythmias: premature ventricular contractions; occasional  
 
ST Segment Analysis:normal  
 
Symptom Correlation: 
none Comment:  
Sinus rhythm throughout - no symptoms Sia Belcher MD, [facility] Labs: 
Lab Results Component Value Date/Time Cholesterol, total 209 (H) 09/26/2018 03:34 AM  
 HDL Cholesterol 34 09/26/2018 03:34 AM  
 LDL, calculated 128.8 (H) 09/26/2018 03:34 AM  
 Triglyceride 231 (H) 09/26/2018 03:34 AM  
 CHOL/HDL Ratio 6.1 (H) 09/26/2018 03:34 AM  
 
 
Lab Results Component Value Date/Time Sodium 135 (L) 03/26/2019 01:12 AM  
 Potassium 3.3 (L) 03/26/2019 01:12 AM  
 Chloride 105 03/26/2019 01:12 AM  
 CO2 22 03/26/2019 01:12 AM  
 Anion gap 8 03/26/2019 01:12 AM  
 Glucose 98 03/26/2019 01:12 AM  
 BUN 14 03/26/2019 01:12 AM  
 Creatinine 0.72 03/26/2019 01:12 AM  
 BUN/Creatinine ratio 19 03/26/2019 01:12 AM  
 GFR est AA >60 03/26/2019 01:12 AM  
 GFR est non-AA >60 03/26/2019 01:12 AM  
 Calcium 8.9 03/26/2019 01:12 AM  
 Bilirubin, total 0.3 03/23/2019 11:50 AM  
 AST (SGOT) 28 03/23/2019 11:50 AM  
 Alk. phosphatase 128 (H) 03/23/2019 11:50 AM  
 Protein, total 8.2 03/23/2019 11:50 AM  
 Albumin 3.9 03/23/2019 11:50 AM  
 Globulin 4.3 (H) 03/23/2019 11:50 AM  
 A-G Ratio 0.9 (L) 03/23/2019 11:50 AM  
 ALT (SGPT) 24 03/23/2019 11:50 AM  
  
 
Orders Placed This Encounter  AMB POC EKG ROUTINE W/ 12 LEADS, INTER & REP Order Specific Question:   Reason for Exam: Answer:   routine  ranolazine ER (RANEXA) 500 mg SR tablet Sig: Take 1 Tab by mouth two (2) times a day. Dispense:  60 Tab Refill:  1  carvedilol (COREG) 6.25 mg tablet Sig: Take 1 Tab by mouth two (2) times a day. Dispense:  60 Tab Refill:  1 Assessment: Assessment: ICD-10-CM ICD-9-CM 1. Arteriosclerotic heart disease (ASHD) I25.10 414.00 AMB POC EKG ROUTINE W/ 12 LEADS, INTER & REP  
   ranolazine ER (RANEXA) 500 mg SR tablet NUCLEAR CARDIAC STRESS TEST 2. Ischemic cardiomyopathy I25.5 414.8 3. Essential hypertension I10 401.9 4. Dyslipidemia E78.5 272.4 5. Tobacco abuse Z72.0 305.1 Plan: 1. Arteriosclerotic heart disease (ASHD) S/p anterior STEMI in 9/2018, with peak troponin 100, with MAVIS to the prox LAD. He had 80% stenosis of the proximal third of the OM1 and RPL. Will obtain lexiscan stress test. 
Echo in 1/2019 with reduced LVEF 40-45% with severe hypokinesis of the apical anterior, apical inferior, apical septal and apical lateral walls. Continue with Plavix, ASA, BB and statin therapy. Add Ranexa 500 mg BID 
- AMB POC EKG ROUTINE W/ 12 LEADS, INTER & REP 2. Ischemic cardiomyopathy Echo in 1/2019 with reduced LVEF 40-45%; on lopressor and cozaar. With NYHA Class IIb functionality. S/p ICD Appears euvolemic on exam today Continue with Coreg, lasix and losartan. Recommended to increase Coreg 6.25 mg BID -- if not tolerated at this dose, would consider addition of Corlanor for further rate control. 3. Essential hypertension BP controlled; continue anti-hypertensive medications and low sodium diet. 4. Dyslipidemia Repeat lipids today Continue statin therapy and low fat, low cholesterol diet. 5. Tobacco abuse Congratulated on smoking cessation. 6. ICD (implantable cardioverter-defibrillator) in place S/p single chamber [employer] ICD implant in 1/2019 by Dr. Sony Camp. 
Continue with routine device interrogations in Dr. Drew Sin clinic. Follow-up after stress test to reassess symptoms.  
 
Traci Blackmon MD

## 2019-05-06 NOTE — LETTER
5/6/19 Patient: Mary Caballero YOB: 1965 Date of Visit: 5/6/2019 Jae Mcpherson MD 
[address] [place] [address] [address] [address] VIA Facsimile: [phone] Dear Jae Mcpherson MD, Thank you for referring Mr. Singh Short to [address] [place] [address] for evaluation. My notes for this consultation are attached. If you have questions, please do not hesitate to call me. I look forward to following your patient along with you.  
 
 
Sincerely, 
 
Ashanti Yusuf MD

## 2019-05-06 NOTE — PROGRESS NOTES
1. Have you been to the ER, urgent care clinic since your last visit? Hospitalized since your last visit? NO 
 
2. Have you seen or consulted any other health care providers outside of the [address] [place] [address] since your last visit? Include any pap smears or colon screening. NO 
 
C/O SHARP SHOOTING IN CHEST, SWELLING IN FACE, AND HANDS, SLIGHT IN LEGS.

## 2019-05-08 ENCOUNTER — HOSPITAL ENCOUNTER (OUTPATIENT)
Dept: CARDIAC REHAB | Age: 54
Discharge: HOME | End: 2019-05-08
Payer: COMMERCIAL

## 2019-05-08 VITALS — WEIGHT: 211 LBS | BODY MASS INDEX: 31.16 KG/M2

## 2019-05-08 PROCEDURE — 93798 PHYS/QHP OP CAR RHAB W/ECG: CPT

## 2019-05-10 ENCOUNTER — HOSPITAL ENCOUNTER (OUTPATIENT)
Dept: CARDIAC REHAB | Age: 54
Discharge: HOME | End: 2019-05-10
Payer: COMMERCIAL

## 2019-05-10 VITALS — BODY MASS INDEX: 31.31 KG/M2 | WEIGHT: 212 LBS

## 2019-05-10 PROCEDURE — 93798 PHYS/QHP OP CAR RHAB W/ECG: CPT

## 2019-05-13 ENCOUNTER — HOSPITAL ENCOUNTER (OUTPATIENT)
Dept: CARDIAC REHAB | Age: 54
Discharge: HOME | End: 2019-05-13
Payer: COMMERCIAL

## 2019-05-13 VITALS — WEIGHT: 211 LBS | BODY MASS INDEX: 31.16 KG/M2

## 2019-05-13 PROCEDURE — 93798 PHYS/QHP OP CAR RHAB W/ECG: CPT

## 2019-05-15 ENCOUNTER — APPOINTMENT (OUTPATIENT)
Dept: CARDIAC REHAB | Age: 54
End: 2019-05-15
Payer: COMMERCIAL

## 2019-05-20 ENCOUNTER — TELEPHONE (OUTPATIENT)
Dept: CARDIOLOGY CLINIC | Age: 54
End: 2019-05-20

## 2019-05-20 ENCOUNTER — APPOINTMENT (OUTPATIENT)
Dept: CARDIAC REHAB | Age: 54
End: 2019-05-20
Payer: COMMERCIAL

## 2019-05-20 NOTE — TELEPHONE ENCOUNTER
----- Message from Shanika Camacho NP sent at 5/20/2019 12:43 PM EDT -----  Jorge Bird,    Please call the patient and inform that stress test did not show any worsening blockages -- does show the scar in the same area of his heart attack, which is not concerning. Keep f/u appt so we can see how he has been feeling with addition of Ranexa.     Thanks,  [employer]

## 2019-05-22 ENCOUNTER — HOSPITAL ENCOUNTER (OUTPATIENT)
Dept: CARDIAC REHAB | Age: 54
End: 2019-05-22
Payer: COMMERCIAL

## 2019-05-24 ENCOUNTER — HOSPITAL ENCOUNTER (EMERGENCY)
Age: 54
Discharge: HOME | End: 2019-05-24
Attending: EMERGENCY MEDICINE
Payer: COMMERCIAL

## 2019-05-24 ENCOUNTER — APPOINTMENT (OUTPATIENT)
Dept: GENERAL RADIOLOGY | Age: 54
End: 2019-05-24
Attending: EMERGENCY MEDICINE
Payer: COMMERCIAL

## 2019-05-24 ENCOUNTER — APPOINTMENT (OUTPATIENT)
Dept: CT IMAGING | Age: 54
End: 2019-05-24
Attending: EMERGENCY MEDICINE
Payer: COMMERCIAL

## 2019-05-24 VITALS
HEIGHT: 69 IN | RESPIRATION RATE: 18 BRPM | OXYGEN SATURATION: 96 % | TEMPERATURE: 98.5 F | WEIGHT: 210.98 LBS | DIASTOLIC BLOOD PRESSURE: 90 MMHG | SYSTOLIC BLOOD PRESSURE: 133 MMHG | BODY MASS INDEX: 31.25 KG/M2 | HEART RATE: 91 BPM

## 2019-05-24 DIAGNOSIS — R42 DIZZINESS: Primary | ICD-10-CM

## 2019-05-24 LAB
ALBUMIN SERPL-MCNC: 3.7 G/DL (ref 3.5–5)
ALBUMIN/GLOB SERPL: 1 {RATIO} (ref 1.1–2.2)
ALP SERPL-CCNC: 125 U/L (ref 45–117)
ALT SERPL-CCNC: 33 U/L (ref 12–78)
ANION GAP SERPL CALC-SCNC: 4 MMOL/L (ref 5–15)
APPEARANCE UR: CLEAR
AST SERPL-CCNC: 22 U/L (ref 15–37)
BACTERIA URNS QL MICRO: NEGATIVE /HPF
BASOPHILS # BLD: 0.1 K/UL (ref 0–0.1)
BASOPHILS NFR BLD: 1 % (ref 0–1)
BILIRUB SERPL-MCNC: 0.2 MG/DL (ref 0.2–1)
BILIRUB UR QL: NEGATIVE
BNP SERPL-MCNC: 380 PG/ML
BUN SERPL-MCNC: 17 MG/DL (ref 6–20)
BUN/CREAT SERPL: 14 (ref 12–20)
CALCIUM SERPL-MCNC: 9.2 MG/DL (ref 8.5–10.1)
CHLORIDE SERPL-SCNC: 103 MMOL/L (ref 97–108)
CK MB CFR SERPL CALC: 1.5 % (ref 0–2.5)
CK MB SERPL-MCNC: 1.2 NG/ML (ref 5–25)
CK SERPL-CCNC: 78 U/L (ref 39–308)
CO2 SERPL-SCNC: 32 MMOL/L (ref 21–32)
COLOR UR: NORMAL
CREAT SERPL-MCNC: 1.21 MG/DL (ref 0.7–1.3)
DIFFERENTIAL METHOD BLD: ABNORMAL
EOSINOPHIL # BLD: 0.4 K/UL (ref 0–0.4)
EOSINOPHIL NFR BLD: 5 % (ref 0–7)
EPITH CASTS URNS QL MICRO: NORMAL /LPF
ERYTHROCYTE [DISTWIDTH] IN BLOOD BY AUTOMATED COUNT: 13.2 % (ref 11.5–14.5)
GLOBULIN SER CALC-MCNC: 3.8 G/DL (ref 2–4)
GLUCOSE SERPL-MCNC: 85 MG/DL (ref 65–100)
GLUCOSE UR STRIP.AUTO-MCNC: NEGATIVE MG/DL
HCT VFR BLD AUTO: 40 % (ref 36.6–50.3)
HGB BLD-MCNC: 13.4 G/DL (ref 12.1–17)
HGB UR QL STRIP: NEGATIVE
HYALINE CASTS URNS QL MICRO: NORMAL /LPF (ref 0–5)
IMM GRANULOCYTES # BLD AUTO: 0 K/UL (ref 0–0.04)
IMM GRANULOCYTES NFR BLD AUTO: 1 % (ref 0–0.5)
KETONES UR QL STRIP.AUTO: NEGATIVE MG/DL
LEUKOCYTE ESTERASE UR QL STRIP.AUTO: NEGATIVE
LYMPHOCYTES # BLD: 2.9 K/UL (ref 0.8–3.5)
LYMPHOCYTES NFR BLD: 34 % (ref 12–49)
MAGNESIUM SERPL-MCNC: 2.2 MG/DL (ref 1.6–2.4)
MCH RBC QN AUTO: 29.4 PG (ref 26–34)
MCHC RBC AUTO-ENTMCNC: 33.5 G/DL (ref 30–36.5)
MCV RBC AUTO: 87.7 FL (ref 80–99)
MONOCYTES # BLD: 0.7 K/UL (ref 0–1)
MONOCYTES NFR BLD: 8 % (ref 5–13)
NEUTS SEG # BLD: 4.3 K/UL (ref 1.8–8)
NEUTS SEG NFR BLD: 51 % (ref 32–75)
NITRITE UR QL STRIP.AUTO: NEGATIVE
NRBC # BLD: 0 K/UL (ref 0–0.01)
NRBC BLD-RTO: 0 PER 100 WBC
PH UR STRIP: 5.5 [PH] (ref 5–8)
PLATELET # BLD AUTO: 309 K/UL (ref 150–400)
PMV BLD AUTO: 10.2 FL (ref 8.9–12.9)
POTASSIUM SERPL-SCNC: 3.9 MMOL/L (ref 3.5–5.1)
PROT SERPL-MCNC: 7.5 G/DL (ref 6.4–8.2)
PROT UR STRIP-MCNC: NEGATIVE MG/DL
RBC # BLD AUTO: 4.56 M/UL (ref 4.1–5.7)
RBC #/AREA URNS HPF: NORMAL /HPF (ref 0–5)
SODIUM SERPL-SCNC: 139 MMOL/L (ref 136–145)
SP GR UR REFRACTOMETRY: 1.02 (ref 1–1.03)
TROPONIN I SERPL-MCNC: <0.05 NG/ML
UA: UC IF INDICATED,UAUC: NORMAL
UROBILINOGEN UR QL STRIP.AUTO: 0.2 EU/DL (ref 0.2–1)
WBC # BLD AUTO: 8.5 K/UL (ref 4.1–11.1)
WBC URNS QL MICRO: NORMAL /HPF (ref 0–4)

## 2019-05-24 PROCEDURE — 83880 ASSAY OF NATRIURETIC PEPTIDE: CPT

## 2019-05-24 PROCEDURE — 74011250636 HC RX REV CODE- 250/636: Performed by: EMERGENCY MEDICINE

## 2019-05-24 PROCEDURE — 36415 COLL VENOUS BLD VENIPUNCTURE: CPT

## 2019-05-24 PROCEDURE — 85025 COMPLETE CBC W/AUTO DIFF WBC: CPT

## 2019-05-24 PROCEDURE — 80053 COMPREHEN METABOLIC PANEL: CPT

## 2019-05-24 PROCEDURE — 81001 URINALYSIS AUTO W/SCOPE: CPT

## 2019-05-24 PROCEDURE — 71046 X-RAY EXAM CHEST 2 VIEWS: CPT

## 2019-05-24 PROCEDURE — 82550 ASSAY OF CK (CPK): CPT

## 2019-05-24 PROCEDURE — 83735 ASSAY OF MAGNESIUM: CPT

## 2019-05-24 PROCEDURE — 93005 ELECTROCARDIOGRAM TRACING: CPT

## 2019-05-24 PROCEDURE — 70450 CT HEAD/BRAIN W/O DYE: CPT

## 2019-05-24 PROCEDURE — 99284 EMERGENCY DEPT VISIT MOD MDM: CPT

## 2019-05-24 PROCEDURE — 84484 ASSAY OF TROPONIN QUANT: CPT

## 2019-05-24 RX ORDER — MECLIZINE HYDROCHLORIDE 25 MG/1
25 TABLET ORAL
Qty: 20 TAB | Refills: 0 | Status: SHIPPED | OUTPATIENT
Start: 2019-05-24 | End: 2019-07-29

## 2019-05-24 RX ORDER — MECLIZINE HCL 12.5 MG 12.5 MG/1
25 TABLET ORAL
Status: COMPLETED | OUTPATIENT
Start: 2019-05-24 | End: 2019-05-24

## 2019-05-24 RX ADMIN — MECLIZINE 25 MG: 12.5 TABLET ORAL at 19:20

## 2019-05-24 NOTE — ED NOTES
Care assumed of patient @ this time & intro with rounding done, with report from ___[facility], RN________________. Patient resting quietly on stretcher without verbal complaints.

## 2019-05-24 NOTE — LETTER
[address] 
[facility] EMERGENCY DEPT 
[address] [place] [address] [address] [id number] [phone] Work/School Note Date: 5/24/2019 To Whom It May concern: 
 
Sarita Palacio was seen and treated today in the emergency room by the following provider(s): 
Attending Provider: Dexter Olmos MD.   
 
Sarita Palacio may return to work on 5/27/19. Sincerely, 
 
 
 
 
Sharifa Henry.  MD Natalie

## 2019-05-24 NOTE — ED NOTES
Dr ___Termeer_____________________ in to talk with patient and explain plan of care with  understanding and  written & verbal instructions.

## 2019-05-24 NOTE — DISCHARGE INSTRUCTIONS
Patient Education        Dizziness: Care Instructions  Your Care Instructions  Dizziness is the feeling of unsteadiness or fuzziness in your head. It is different than having vertigo, which is a feeling that the room is spinning or that you are moving or falling. It is also different from lightheadedness, which is the feeling that you are about to faint. It can be hard to know what causes dizziness. Some people feel dizzy when they have migraine headaches. Sometimes bouts of flu can make you feel dizzy. Some medical conditions, such as heart problems or high blood pressure, can make you feel dizzy. Many medicines can cause dizziness, including medicines for high blood pressure, pain, or anxiety. If a medicine causes your symptoms, your doctor may recommend that you stop or change the medicine. If it is a problem with your heart, you may need medicine to help your heart work better. If there is no clear reason for your symptoms, your doctor may suggest watching and waiting for a while to see if the dizziness goes away on its own. Follow-up care is a key part of your treatment and safety. Be sure to make and go to all appointments, and call your doctor if you are having problems. It's also a good idea to know your test results and keep a list of the medicines you take. How can you care for yourself at home? · If your doctor recommends or prescribes medicine, take it exactly as directed. Call your doctor if you think you are having a problem with your medicine. · Do not drive while you feel dizzy. · Try to prevent falls. Steps you can take include:  ? Using nonskid mats, adding grab bars near the tub, and using night-lights. ? Clearing your home so that walkways are free of anything you might trip on.  ? Letting family and friends know that you have been feeling dizzy. This will help them know how to help you. When should you call for help? Call 911 anytime you think you may need emergency care.  For example, call if:    · You passed out (lost consciousness).     · You have dizziness along with symptoms of a heart attack. These may include:  ? Chest pain or pressure, or a strange feeling in the chest.  ? Sweating. ? Shortness of breath. ? Nausea or vomiting. ? Pain, pressure, or a strange feeling in the back, neck, jaw, or upper belly or in one or both shoulders or arms. ? Lightheadedness or sudden weakness. ? A fast or irregular heartbeat.     · You have symptoms of a stroke. These may include:  ? Sudden numbness, tingling, weakness, or loss of movement in your face, arm, or leg, especially on only one side of your body. ? Sudden vision changes. ? Sudden trouble speaking. ? Sudden confusion or trouble understanding simple statements. ? Sudden problems with walking or balance. ? A sudden, severe headache that is different from past headaches.    Call your doctor now or seek immediate medical care if:    · You feel dizzy and have a fever, headache, or ringing in your ears.     · You have new or increased nausea and vomiting.     · Your dizziness does not go away or comes back.    Watch closely for changes in your health, and be sure to contact your doctor if:    · You do not get better as expected. Where can you learn more? Go to http://amor-liam.info/. Enter J966 in the search box to learn more about \"Dizziness: Care Instructions. \"  Current as of: September 23, 2018  Content Version: 11.9  © 4946-8371 [employer]. Care instructions adapted under license by [employer] (which disclaims liability or warranty for this information). If you have questions about a medical condition or this instruction, always ask your healthcare professional. [place] [address] any warranty or liability for your use of this information.          Patient Education        Epley Maneuver for Vertigo: Exercises  Your Care Instructions  The Epley Maneuver is a series of movements your doctor may use to treat your vertigo. Here are the steps for the exercises. Your doctor or physical therapist will guide you through the movements. A single 10- to 15-minute session often is all that's needed. Crystal debris (canaliths) cause the vertigo. When your head is moved into different positions, the debris moves freely. This may cause your symptoms to stop. How to do the exercises  Step 1    1. You will sit on the doctor's exam table. Your legs will be out in front of you. The doctor or physical therapist will turn your head so that it is [facility] between looking straight ahead and looking to the side that causes the worst vertigo. 2. Without changing your head position, he or she will guide you back quickly. Your shoulders will be on the table. Your head will hang over the edge of the table. At this point, the side of your head that is causing the worst vertigo will face the floor. You'll stay in this position for 30 seconds or until your symptoms stop. Step 2    1. Then, the doctor or physical therapist will turn your head to the other side. You don't need to lift your head. The other side of your head will face the floor. You will stay in this position for 30 seconds or until your symptoms stop. Step 3    1. The doctor or physical therapist will help you roll your body in the same direction that your head is facing. You will lie on your side. (For example, if you are looking to your right, you will roll onto your right side.) The side that causes the worst symptoms should be facing up. You'll stay in this position for another 30 seconds or until your symptoms stop. Step 4    1. The doctor or physical therapist will then help you to sit back up. Your legs will hang off the table on the same side that you were facing. Follow-up care is a key part of your treatment and safety. Be sure to make and go to all appointments, and call your doctor if you are having problems.  It's also a good idea to know your test results and keep a list of the medicines you take. Where can you learn more? Go to http://amor-liam.info/. Enter L837 in the search box to learn more about \"Epley Maneuver for Vertigo: Exercises. \"  Current as of: March 27, 2018  Content Version: 11.9  © 6770-3657 [employer]. Care instructions adapted under license by [employer] (which disclaims liability or warranty for this information). If you have questions about a medical condition or this instruction, always ask your healthcare professional. [place] [address] any warranty or liability for your use of this information.

## 2019-05-24 NOTE — ED PROVIDER NOTES
EMERGENCY DEPARTMENT HISTORY AND PHYSICAL EXAM          Date: 5/24/2019  Patient Name: Geovanna Lagos    History of Presenting Illness     Chief Complaint   Patient presents with    Dizziness     patient is here from pcp office for dizziness for about 5 days patient reports that when he moves his head the dizziness gets worse patient also reports difficultly with his balance        History Provided By: Patient    HPI: Geovanna Lagos is a 46 y.o. male, pmhx HTN, CAD with stents and AICD (EF30%), who presents ambulatory to the ED c/o vertigo    Hx of vertigo and notes last episode during sinus infection. He states he did have some tinnitus earlier today but doesn't have any now and denies any current congestion or ear pain. He notes the dizziness has been present for a few days but can calm down if he rests; the symptoms are worse when he gets up in the morning. Patient specifically denies any recent fevers, chills, nausea, vomiting, diarrhea, abd pain, CP, SOB, urinary sxs, changes in BM, or headache. PCP: Kelvin Garcia MD    Allergies: NKDA  Social Hx: former tobacco, -EtOH, -Illicit Drugs    There are no other complaints, changes, or physical findings at this time. Current Outpatient Medications   Medication Sig Dispense Refill    meclizine (ANTIVERT) 25 mg tablet Take 1 Tab by mouth three (3) times daily as needed for Dizziness. 20 Tab 0    ranolazine ER (RANEXA) 500 mg SR tablet Take 1 Tab by mouth two (2) times a day. 60 Tab 1    carvedilol (COREG) 6.25 mg tablet Take 1 Tab by mouth two (2) times a day. 60 Tab 1    nicotine (NICODERM CQ) 21 mg/24 hr 1 Patch by TransDERmal route every twenty-four (24) hours.  clopidogrel (PLAVIX) 75 mg tab Take 1 Tab by mouth daily. D/C Brilinta 90 Tab 3    furosemide (LASIX) 40 mg tablet Take 0.5 Tabs by mouth daily. 90 Tab 1    atomoxetine (STRATTERA) 40 mg capsule Take 40 mg by mouth two (2) times a day.       losartan (COZAAR) 25 mg tablet TAKE 1 TABLET BY MOUTH ONCE DAILY 90 Tab 1    acetaminophen (TYLENOL) 500 mg tablet Take 2,000 mg by mouth two (2) times daily as needed for Pain.  albuterol (PROAIR HFA) 90 mcg/actuation inhaler Take 2 Puffs by inhalation every four (4) hours as needed for Wheezing.  isosorbide mononitrate ER (IMDUR) 30 mg tablet Take 1 Tab by mouth daily. 30 Tab 11    nitroglycerin (NITROSTAT) 0.4 mg SL tablet 1 Tab by SubLINGual route every five (5) minutes as needed for Chest Pain. Up to 3 doses. 1 Bottle 1    aspirin delayed-release 81 mg tablet Take 1 Tab by mouth daily. 30 Tab 11    atorvastatin (LIPITOR) 40 mg tablet Take 1 Tab by mouth nightly. 30 Tab 11    DULoxetine (CYMBALTA) 60 mg capsule Take 60 mg by mouth daily.          Past History     Past Medical History:  Past Medical History:   Diagnosis Date    ADD (attention deficit disorder) 2018    Asthma     Chest pain 2018    Dyslipidemia 2018    Fatigue 2018    HTN (hypertension) 2018    Hypertension     Other ill-defined conditions(799.89)     ADHD,BELL'S PALSY WEAKNESS  LT FACE    Psychiatric disorder     DEPRESSION    S/P coronary artery stent placement 2018 PCI/MAVIS to LAD    STEMI (ST elevation myocardial infarction) ([facility] [address] 2018    Tobacco abuse 2018       Past Surgical History:  Past Surgical History:   Procedure Laterality Date    HX GI      STOMACH ABDOMEN ACCIDENT    HX HEENT      TEETH EXTRACTION    HX ORTHOPAEDIC  1984    FRACTURE RT FEMUR    NEUROLOGICAL PROCEDURE UNLISTED      NECK       Family History:  Family History   Problem Relation Age of Onset    Hypertension Mother     Lung Disease Paternal Aunt        Social History:  Social History     Tobacco Use    Smoking status: Former Smoker     Packs/day: 1.00     Years: 40.00     Pack years: 40.00     Types: Cigarettes     Last attempt to quit: 3/26/2019     Years since quittin.1    Smokeless tobacco: Never Used    Tobacco comment: were on chantix,stopped 2 days ago due to stomachache   Substance Use Topics    Alcohol use: No     Alcohol/week: 0.0 oz     Frequency: Never    Drug use: No       Allergies: Allergies   Allergen Reactions    Bee Sting [Sting, Bee] Anaphylaxis         Review of Systems   Review of Systems   Constitutional: Negative for activity change, appetite change, chills, fever and unexpected weight change. HENT: Negative for congestion. Eyes: Negative for pain and visual disturbance. Respiratory: Negative for cough and shortness of breath. Cardiovascular: Negative for chest pain. Gastrointestinal: Negative for abdominal pain, diarrhea, nausea and vomiting. Genitourinary: Negative for dysuria. Musculoskeletal: Negative for back pain. Skin: Negative for rash. Neurological: Positive for dizziness. Negative for headaches. Physical Exam   Physical Exam   Constitutional: He is oriented to person, place, and time. He appears well-developed and well-nourished. Obese middle-aged male currently in minimal distress   HENT:   Head: Normocephalic and atraumatic. Mouth/Throat: Oropharynx is clear and moist.   Eyes: Pupils are equal, round, and reactive to light. Conjunctivae and EOM are normal. Right eye exhibits no discharge. Left eye exhibits no discharge. Neck: Normal range of motion. Neck supple. Cardiovascular: Normal rate, regular rhythm and normal heart sounds. No murmur heard. Pulmonary/Chest: Effort normal and breath sounds normal. No respiratory distress. He has no wheezes. He has no rales. Abdominal: Soft. Bowel sounds are normal. He exhibits no distension. There is no tenderness. Musculoskeletal: Normal range of motion. He exhibits no edema. Neurological: He is alert and oriented to person, place, and time. No cranial nerve deficit. He exhibits normal muscle tone. Exam without focal deficit   Skin: Skin is warm and dry. No rash noted. He is not diaphoretic. Nursing note and vitals reviewed. Diagnostic Study Results     Labs -     No results found for this or any previous visit (from the past 12 hour(s)). Radiologic Studies -   XR CHEST PA LAT   Final Result   No acute abnormality. CT HEAD WO CONT   Final Result   No Intracranial Disease Evident on Head CT. CT Results  (Last 48 hours)    None        CXR Results  (Last 48 hours)    None            Medical Decision Making   I am the first provider for this patient. I reviewed the vital signs, available nursing notes, past medical history, past surgical history, family history and social history. Vital Signs-Reviewed the patient's vital signs. No data found. Pulse Oximetry Analysis - 95% on RA    Cardiac Monitor:   Rate: 87bpm  Rhythm: Normal Sinus Rhythm      Records Reviewed: Nursing Notes, Old Medical Records, Previous electrocardiograms, Previous Radiology Studies, Previous Laboratory Studies and outpatient PCP records  Nuc stress test last week, unchanged    Provider Notes (Medical Decision Making):   MDM: Middle-aged male with hypertension presenting with dizziness. Symptoms appear to be worsened in the morning and improved by rest and sitting still. There is no evidence of nausea and vomiting or focal neuro exam deficit have low suspicion for intracranial hemorrhage. ED Course:   Initial assessment performed. The patients presenting problems have been discussed, and they are in agreement with the care plan formulated and outlined with them. I have encouraged them to ask questions as they arise throughout their visit. PROGRESS NOTE:  1950  Pt appears to be improved after medications    Discharge note:  1950  Pt re-evaluated and noted to be feeling better, ready for discharge. Updated pt on all final lab and CT findings. Will follow up as instructed. All questions have been answered, pt voiced understanding and agreement with plan.   Specific return precautions provided as well as instructions to return to the ED should sx worsen at any time. Vital signs stable for discharge. Critical Care Time:   0      Diagnosis     Clinical Impression:   1. Dizziness        PLAN:  1. Discharge Medication List as of 5/24/2019  7:31 PM      START taking these medications    Details   meclizine (ANTIVERT) 25 mg tablet Take 1 Tab by mouth three (3) times daily as needed for Dizziness., Normal, Disp-20 Tab, R-0         CONTINUE these medications which have NOT CHANGED    Details   ranolazine ER (RANEXA) 500 mg SR tablet Take 1 Tab by mouth two (2) times a day., Normal, Disp-60 Tab, R-1      carvedilol (COREG) 6.25 mg tablet Take 1 Tab by mouth two (2) times a day., Normal, Disp-60 Tab, R-1      nicotine (NICODERM CQ) 21 mg/24 hr 1 Patch by TransDERmal route every twenty-four (24) hours. , Historical Med      clopidogrel (PLAVIX) 75 mg tab Take 1 Tab by mouth daily. D/C Brilinta, Normal, Disp-90 Tab, R-3      furosemide (LASIX) 40 mg tablet Take 0.5 Tabs by mouth daily. , Normal, Disp-90 Tab, R-1      atomoxetine (STRATTERA) 40 mg capsule Take 40 mg by mouth two (2) times a day., Historical Med      losartan (COZAAR) 25 mg tablet TAKE 1 TABLET BY MOUTH ONCE DAILY, NormalPlease consider 90 day supplies to promote better adherenceDisp-90 Tab, R-1      acetaminophen (TYLENOL) 500 mg tablet Take 2,000 mg by mouth two (2) times daily as needed for Pain., Historical Med      albuterol (PROAIR HFA) 90 mcg/actuation inhaler Take 2 Puffs by inhalation every four (4) hours as needed for Wheezing., Historical Med      isosorbide mononitrate ER (IMDUR) 30 mg tablet Take 1 Tab by mouth daily. , Normal, Disp-30 Tab, R-11      nitroglycerin (NITROSTAT) 0.4 mg SL tablet 1 Tab by SubLINGual route every five (5) minutes as needed for Chest Pain. Up to 3 doses. , Normal, Disp-1 Bottle, R-1      aspirin delayed-release 81 mg tablet Take 1 Tab by mouth daily. , Print, Disp-30 Tab, R-11      atorvastatin (LIPITOR) 40 mg tablet Take 1 Tab by mouth nightly. , Print, Disp-30 Tab, R-11      DULoxetine (CYMBALTA) 60 mg capsule Take 60 mg by mouth daily. , Historical Med           2. Follow-up Information     Follow up With Specialties Details Why Contact Info    [facility] EMERGENCY DEPT Emergency Medicine  If symptoms worsen [address] [facility] [place] [facility] [address] Ramandeep Reilly MD [facility] Practice Call Monday for a recheck appointment [address] [place] [address]  [address] [address] [phone]          Return to ED if worse     Disposition:  Home      Please note, this dictation was completed with Palkion, the [employer] voice recognition software. Quite often unanticipated grammatical, syntax, homophones, and other interpretive errors are inadvertently transcribed by the computer software. Please disregard these errors. Please excuse any errors that have escaped final proof reading.

## 2019-05-26 LAB
ATRIAL RATE: 83 BPM
CALCULATED P AXIS, ECG09: 56 DEGREES
CALCULATED R AXIS, ECG10: 22 DEGREES
CALCULATED T AXIS, ECG11: 94 DEGREES
DIAGNOSIS, 93000: NORMAL
P-R INTERVAL, ECG05: 140 MS
Q-T INTERVAL, ECG07: 352 MS
QRS DURATION, ECG06: 78 MS
QTC CALCULATION (BEZET), ECG08: 413 MS
VENTRICULAR RATE, ECG03: 83 BPM

## 2019-06-17 ENCOUNTER — TELEPHONE (OUTPATIENT)
Dept: CARDIOLOGY CLINIC | Age: 54
End: 2019-06-17

## 2019-06-17 NOTE — TELEPHONE ENCOUNTER
Called, spoke to pt, two identifiers verified. Advised pt he can have an MRI with his device. Pt verbalized understanding of information discussed w/ no further questions at this time.      Lis Abbott RN

## 2019-06-17 NOTE — TELEPHONE ENCOUNTER
since still having dizziness his current /94 the ENT requesting if can have MRI due to his device? Faraz Chuodhury please advise .  Rajiv Gilman will you call to schedule with Dr Lakeshia Feng thanks was told to follow up after testing

## 2019-06-25 ENCOUNTER — HOSPITAL ENCOUNTER (OUTPATIENT)
Dept: MRI IMAGING | Age: 54
Discharge: HOME | End: 2019-06-25

## 2019-06-25 ENCOUNTER — TELEPHONE (OUTPATIENT)
Dept: CARDIAC REHAB | Age: 54
End: 2019-06-25

## 2019-06-25 DIAGNOSIS — R29.90 ABNORMAL NEUROLOGICAL EXAM: ICD-10-CM

## 2019-06-25 DIAGNOSIS — G45.0 VERTEBROBASILAR ARTERY SYNDROME: ICD-10-CM

## 2019-06-25 DIAGNOSIS — G45.0 BASILAR ARTERY SYNDROME: ICD-10-CM

## 2019-06-25 NOTE — TELEPHONE ENCOUNTER
Pt has not been here since 5/13/2019. Pt states he has been suffering with dizziness and is having testing done today. Will call back in 2 weeks and see how he is doing and if he will be returning to [facility].

## 2019-07-01 DIAGNOSIS — I25.10 ARTERIOSCLEROTIC HEART DISEASE (ASHD): ICD-10-CM

## 2019-07-01 RX ORDER — RANOLAZINE 500 MG/1
TABLET, EXTENDED RELEASE ORAL
Qty: 60 TAB | Refills: 1 | Status: SHIPPED | OUTPATIENT
Start: 2019-07-01 | End: 2019-08-08 | Stop reason: SDUPTHER

## 2019-07-02 ENCOUNTER — HOSPITAL ENCOUNTER (OUTPATIENT)
Dept: MRI IMAGING | Age: 54
Discharge: HOME | End: 2019-07-02
Payer: COMMERCIAL

## 2019-07-02 DIAGNOSIS — G45.0 VERTEBROBASILAR ARTERY SYNDROME: ICD-10-CM

## 2019-07-02 DIAGNOSIS — R29.90 ABNORMAL NEUROLOGICAL EXAM: ICD-10-CM

## 2019-07-02 PROCEDURE — 70544 MR ANGIOGRAPHY HEAD W/O DYE: CPT

## 2019-07-02 PROCEDURE — 70553 MRI BRAIN STEM W/O & W/DYE: CPT

## 2019-07-02 PROCEDURE — A9585 GADOBUTROL INJECTION: HCPCS | Performed by: INTERNAL MEDICINE

## 2019-07-02 PROCEDURE — 70549 MR ANGIOGRAPH NECK W/O&W/DYE: CPT

## 2019-07-02 RX ORDER — SODIUM CHLORIDE 0.9 % (FLUSH) 0.9 %
10 SYRINGE (ML) INJECTION
Status: COMPLETED | OUTPATIENT
Start: 2019-07-02 | End: 2019-07-02

## 2019-07-02 RX ADMIN — Medication 10 ML: at 11:00

## 2019-07-24 ENCOUNTER — OFFICE VISIT (OUTPATIENT)
Dept: CARDIOLOGY CLINIC | Age: 54
End: 2019-07-24

## 2019-07-24 DIAGNOSIS — I25.5 ISCHEMIC CARDIOMYOPATHY: ICD-10-CM

## 2019-07-24 DIAGNOSIS — Z95.810 ICD (IMPLANTABLE CARDIOVERTER-DEFIBRILLATOR) IN PLACE: Primary | ICD-10-CM

## 2019-07-29 ENCOUNTER — OFFICE VISIT (OUTPATIENT)
Dept: CARDIOLOGY CLINIC | Age: 54
End: 2019-07-29

## 2019-07-29 VITALS
DIASTOLIC BLOOD PRESSURE: 98 MMHG | BODY MASS INDEX: 32.09 KG/M2 | HEIGHT: 69 IN | RESPIRATION RATE: 18 BRPM | WEIGHT: 216.7 LBS | OXYGEN SATURATION: 96 % | SYSTOLIC BLOOD PRESSURE: 130 MMHG | HEART RATE: 105 BPM

## 2019-07-29 DIAGNOSIS — I25.10 ARTERIOSCLEROTIC HEART DISEASE (ASHD): Primary | ICD-10-CM

## 2019-07-29 DIAGNOSIS — F17.200 CURRENT SMOKER: ICD-10-CM

## 2019-07-29 DIAGNOSIS — Z95.810 ICD (IMPLANTABLE CARDIOVERTER-DEFIBRILLATOR) IN PLACE: ICD-10-CM

## 2019-07-29 DIAGNOSIS — I25.5 ISCHEMIC CARDIOMYOPATHY: Chronic | ICD-10-CM

## 2019-07-29 DIAGNOSIS — I10 ESSENTIAL HYPERTENSION: ICD-10-CM

## 2019-07-29 DIAGNOSIS — E78.5 DYSLIPIDEMIA: ICD-10-CM

## 2019-07-29 DIAGNOSIS — Z95.5 S/P CORONARY ARTERY STENT PLACEMENT: ICD-10-CM

## 2019-07-29 RX ORDER — NICOTINE 7MG/24HR
1 PATCH, TRANSDERMAL 24 HOURS TRANSDERMAL EVERY 24 HOURS
Qty: 14 PATCH | Refills: 0 | Status: SHIPPED | OUTPATIENT
Start: 2019-07-29 | End: 2019-08-12

## 2019-07-29 RX ORDER — IBUPROFEN 200 MG
1 TABLET ORAL EVERY 24 HOURS
Qty: 45 PATCH | Refills: 0 | Status: SHIPPED | OUTPATIENT
Start: 2019-07-29 | End: 2019-08-28

## 2019-07-29 RX ORDER — IBUPROFEN 200 MG
1 TABLET ORAL EVERY 24 HOURS
Qty: 14 PATCH | Refills: 0 | Status: SHIPPED | OUTPATIENT
Start: 2019-07-29 | End: 2019-08-12

## 2019-07-29 NOTE — PROGRESS NOTES
Verified patient with 2 identifiers   Reviewed record in preparation for visit and obtained necessary documentation. Chief Complaint   Patient presents with    Cardiomyopathy     Follow up     Chest Pain     with flutters and palpitations     Dizziness     constant since 5/19/19 - has had 2 MRA's and an MRI done      1. Have you been to the ER, urgent care clinic since your last visit? Hospitalized since your last visit? No     2. Have you seen or consulted any other health care providers outside of the [address] [place] [address] since your last visit? Include any pap smears or colon screening.  No     Did start smoking again - 1 ppd since 6/1/19

## 2019-07-29 NOTE — LETTER
7/29/19 Patient: Chandra Calderón YOB: 1965 Date of Visit: 7/29/2019 Gerald Barnard MD 
[address] [place] [address] [address] [phone] VIA Facsimile: [phone] Dear Gerald Barnard MD, Thank you for referring Mr. John Stokes to [address] [place] [address] [facility] for evaluation. My notes for this consultation are attached. If you have questions, please do not hesitate to call me. I look forward to following your patient along with you.  
 
 
Sincerely, 
 
Pascual Torres MD

## 2019-07-29 NOTE — PROGRESS NOTES
[address], [place], [address] [place] [address]  [phone]     Subjective:      Thuan Dunn is a 46 y.o. male presents for follow up on ASHD HTN HLD , recently seen by us in May 2019. He is c/o dizziness eversince he had the stress test in our clinic. He had been followed by his PCP who had referred him to Dr Rosa Dowd. His PCP also ordered MRI/MRA brain/neck to r/o stroke and is due to see a neurologist in September. He presented to the ED 5/24/19 for same complain, discharged on Meclizine which did not help. He still has mild sob, but has improved since off the Brilinta. He still has occasional chest discomfort even in the addition of Ranexa. He also reports occasional heart flutter when he lays down at night, unchanged from previous. The patient denies  orthopnea, PND, LE edema, syncope, or presyncope.        Patient Active Problem List    Diagnosis Date Noted    Ischemic cardiomyopathy 03/22/2019    ICD (implantable cardioverter-defibrillator) in place 03/22/2019    Syncope 01/25/2019    Fatigue 11/30/2018    Chest pain 11/30/2018    Arteriosclerotic heart disease (ASHD) 10/05/2018    STEMI (ST elevation myocardial infarction) ([facility] [address] 09/25/2018    HTN (hypertension) 09/25/2018    Dyslipidemia 09/25/2018    ADD (attention deficit disorder) 09/25/2018    Tobacco abuse 09/25/2018    S/P coronary artery stent placement 09/25/2018      Maciel Austin MD  Past Medical History:   Diagnosis Date    ADD (attention deficit disorder) 9/25/2018    Asthma     Chest pain 11/30/2018    Dyslipidemia 9/25/2018    Fatigue 11/30/2018    HTN (hypertension) 9/25/2018    Hypertension     Other ill-defined conditions(799.89)     ADHD,BELL'S PALSY WEAKNESS  LT FACE    Psychiatric disorder     DEPRESSION    S/P coronary artery stent placement 9/25/2018 9/25/18 PCI/MAVIS to LAD    STEMI (ST elevation myocardial infarction) ([facility] [address] 9/25/2018    Tobacco abuse 9/25/2018      Past Surgical History:   Procedure Laterality Date    HX GI  1984    STOMACH ABDOMEN ACCIDENT    HX HEENT      TEETH EXTRACTION    HX ORTHOPAEDIC  1984    FRACTURE RT FEMUR    NEUROLOGICAL PROCEDURE UNLISTED      NECK     Allergies   Allergen Reactions    Bee Sting [Sting, Bee] Anaphylaxis      Family History   Problem Relation Age of Onset    Hypertension Mother     Lung Disease Paternal Aunt       Social History     Socioeconomic History    Marital status:      Spouse name: Lily Giang Number of children: 2    Years of education: 15    Highest education level: 12th grade   Occupational History    Not on file   Social Needs    Financial resource strain: Not hard at all   [place] insecurity:     Worry: Never true     Inability: Never true   [employer] needs:     Medical: Patient refused     Non-medical: Patient refused   Tobacco Use    Smoking status: Current Every Day Smoker     Packs/day: 1.00     Years: 40.00     Pack years: 40.00     Types: Cigarettes     Last attempt to quit: 3/26/2019     Years since quittin.3    Smokeless tobacco: Never Used    Tobacco comment: started again  2019   Substance and Sexual Activity    Alcohol use: No     Alcohol/week: 0.0 standard drinks     Frequency: Never    Drug use: No    Sexual activity: Not Currently   Lifestyle    Physical activity:     Days per week: 0 days     Minutes per session: 0 min    Stress: Very much   Relationships    Social connections:     Talks on phone: More than three times a week     Gets together: Once a week     Attends [employer] service: Never     Active member of club or organization: No     Attends meetings of clubs or organizations: Never     Relationship status:     Intimate partner violence:     Fear of current or ex partner: No     Emotionally abused: No     Physically abused: No     Forced sexual activity: No   Other Topics Concern     Service No    Blood Transfusions Yes    Caffeine Concern Yes     Comment: coffee 1 pot    Occupational Exposure Yes     Comment: cig smoke    Hobby Hazards No    Sleep Concern Yes     Comment: does not sleep well,wakes up sweating,dog    Stress Concern Yes     Comment: high level daily -depression he states    Weight Concern Yes     Comment: more tired    Special Diet No    Back Care No     Comment: neck and low back surgery    Exercise No     Comment: does not exercise    Bike Helmet No     Comment: does not ride bike   [address] Yes     Comment: 1/2 time wears seatbelt    Self-Exams No   Social History Narrative    Not on file      Current Outpatient Medications   Medication Sig    ranolazine ER (RANEXA) 500 mg SR tablet TAKE 1 TABLET BY MOUTH TWICE DAILY    carvedilol (COREG) 6.25 mg tablet Take 1 Tab by mouth two (2) times a day.  clopidogrel (PLAVIX) 75 mg tab Take 1 Tab by mouth daily. D/C Brilinta    atomoxetine (STRATTERA) 40 mg capsule Take 40 mg by mouth daily.  losartan (COZAAR) 25 mg tablet TAKE 1 TABLET BY MOUTH ONCE DAILY    acetaminophen (TYLENOL) 500 mg tablet Take 2,000 mg by mouth two (2) times daily as needed for Pain.  albuterol (PROAIR HFA) 90 mcg/actuation inhaler Take 2 Puffs by inhalation every four (4) hours as needed for Wheezing.  isosorbide mononitrate ER (IMDUR) 30 mg tablet Take 1 Tab by mouth daily.  nitroglycerin (NITROSTAT) 0.4 mg SL tablet 1 Tab by SubLINGual route every five (5) minutes as needed for Chest Pain. Up to 3 doses.  aspirin delayed-release 81 mg tablet Take 1 Tab by mouth daily.  atorvastatin (LIPITOR) 40 mg tablet Take 1 Tab by mouth nightly.  DULoxetine (CYMBALTA) 60 mg capsule Take 60 mg by mouth daily. No current facility-administered medications for this visit.           Review of Symptoms:  11 systems reviewed, negative other than as stated in the HPI    Physical ExamPhysical Exam:    Vitals:    07/29/19 1510 07/29/19 1517 07/29/19 1520   BP: (!) 150/100 (!) 142/100 (!) 130/98 Pulse: 94 96 (!) 105   Resp: 18     SpO2: 96%     Weight: 216 lb 11.2 oz (98.3 kg)     Height: 5' 9\" (1.753 m)       Body mass index is 32 kg/m². General PE  Gen:  NAD  Mental Status - Alert. General Appearance - Not in acute distress. HEENT:  PERRL, no carotid bruits or JVD  Chest and Lung Exam   Inspection: Accessory muscles - No use of accessory muscles in breathing. Auscultation:   Breath sounds: - Normal.   Cardiovascular   Inspection: Jugular vein - Bilateral - Inspection Normal.   Palpation/Percussion:   Apical Impulse: - Normal.   Auscultation: Rhythm - Regular. Heart Sounds - S1 WNL and S2 WNL. No S3 or S4. Murmurs & Other Heart Sounds: Auscultation of the heart reveals - No Murmurs. Peripheral Vascular   Upper Extremity: Inspection - Bilateral - No Cyanotic nailbeds or Digital clubbing. Lower Extremity:   Palpation: Edema - Bilateral - No edema. Abdomen:   Soft, non-tender, bowel sounds are active. Neuro: A&O times 3, CN and motor grossly WNL    Labs:   Lab Results   Component Value Date/Time    Cholesterol, total 209 (H) 09/26/2018 03:34 AM    HDL Cholesterol 34 09/26/2018 03:34 AM    LDL, calculated 128.8 (H) 09/26/2018 03:34 AM    Triglyceride 231 (H) 09/26/2018 03:34 AM    CHOL/HDL Ratio 6.1 (H) 09/26/2018 03:34 AM     Lab Results   Component Value Date/Time    CK 78 05/24/2019 04:50 PM     Lab Results   Component Value Date/Time    Sodium 139 05/24/2019 04:50 PM    Potassium 3.9 05/24/2019 04:50 PM    Chloride 103 05/24/2019 04:50 PM    CO2 32 05/24/2019 04:50 PM    Anion gap 4 (L) 05/24/2019 04:50 PM    Glucose 85 05/24/2019 04:50 PM    BUN 17 05/24/2019 04:50 PM    Creatinine 1.21 05/24/2019 04:50 PM    BUN/Creatinine ratio 14 05/24/2019 04:50 PM    GFR est AA >60 05/24/2019 04:50 PM    GFR est non-AA >60 05/24/2019 04:50 PM    Calcium 9.2 05/24/2019 04:50 PM    Bilirubin, total 0.2 05/24/2019 04:50 PM    AST (SGOT) 22 05/24/2019 04:50 PM    Alk.  phosphatase 125 (H) 05/24/2019 04:50 PM    Protein, total 7.5 05/24/2019 04:50 PM    Albumin 3.7 05/24/2019 04:50 PM    Globulin 3.8 05/24/2019 04:50 PM    A-G Ratio 1.0 (L) 05/24/2019 04:50 PM    ALT (SGPT) 33 05/24/2019 04:50 PM       EKG:  SR     Assessment:     Assessment:      1. Arteriosclerotic heart disease (ASHD)    2. Ischemic cardiomyopathy    3. S/P coronary artery stent placement    4. Essential hypertension    5. Dyslipidemia    6. ICD (implantable cardioverter-defibrillator) in place    7. Current smoker        Orders Placed This Encounter    AMB POC EKG ROUTINE W/ 12 LEADS, INTER & REP     Order Specific Question:   Reason for Exam:     Answer:   routine        Plan:     Patient presents for follow up on ASHD HTN HLD, recently seen by us in May 2019 . He is c/o dizziness eversince he had the stress test in our clinic. He had been followed by his PCP who had referred him to Dr Nellie Cortés. His PCP also ordered MRI/MRA brain/neck to r/o stroke and is due to see a neurologist in September. He presented to the ED 5/24/19 for same complain, discharged on Meclizine which did not help. He still has mild sob, but has improved since off the Brilinta. He still has occasional chest discomfort even in the addition of Ranexa. He also reports occasional heart flutter when he lays down at night, unchanged from previous.     1. Arteriosclerotic heart disease (ASHD)  S/p anterior STEMI in 9/2018, with peak troponin 100, with MAVIS to the prox LAD. He had 80% stenosis of the proximal third of the OM1 and RPL. Fixed defect consistent with prior myocardial infarction per NST 5/19  Echo in 1/2019 with reduced LVEF 40-45% with severe hypokinesis of the apical anterior, apical inferior, apical septal and apical lateral walls. Continue with ASA, Plavix, Imdur, BB and statin therapy. Ranexa was added last OV, did not make a difference  He has not needed any nitroglycerin, has occasional mild discomfort when he lies down that he thinks his GERD.   Taking ranitidine.       2. Ischemic cardiomyopathy  Echo in 1/2019 with reduced LVEF 40-45%; on lopressor and cozaar. With NYHA Class IIb functionality. S/p ICD  Wt up 6 lbs since last OV  Continue with Coreg, losartan. He stopped his furosemide     3. Essential hypertension  Elevated but d/t underlying dizziness  Continue same for now    4. Dyslipidemia  Repeat lipids --- ordered last OV, not done yet  9/18 . On statin  Continue statin therapy and low fat, low cholesterol diet.     5. Tobacco abuse, 1PPD smoker: He is smoking again. Counseled on smoking cessation and provided literature - 5 minutes spent. Prescribed nicotine patch at his request.    6. ICD (implantable cardioverter-defibrillator) in place  S/p single chamber [employer] ICD implant in 1/2019 by Dr. Jess Holloway.  Continue with routine device interrogations in Dr. Anabela Vargas clinic. 7. FRANTZ : Not wearing CPAP    8. Vertigo: Followed by Dr. Valiant Gowers. Continue current care and f/u in 6 months.     Jo Ann Negron MD

## 2019-07-31 ENCOUNTER — TELEPHONE (OUTPATIENT)
Dept: CARDIOLOGY CLINIC | Age: 54
End: 2019-07-31

## 2019-07-31 DIAGNOSIS — E78.5 DYSLIPIDEMIA: Primary | ICD-10-CM

## 2019-07-31 DIAGNOSIS — I10 ESSENTIAL HYPERTENSION: Chronic | ICD-10-CM

## 2019-07-31 LAB
ALBUMIN SERPL-MCNC: 4 G/DL (ref 3.5–5.5)
ALBUMIN/GLOB SERPL: 1.5 {RATIO} (ref 1.2–2.2)
ALP SERPL-CCNC: 116 IU/L (ref 39–117)
ALT SERPL-CCNC: 13 IU/L (ref 0–44)
AST SERPL-CCNC: 13 IU/L (ref 0–40)
BILIRUB SERPL-MCNC: 0.3 MG/DL (ref 0–1.2)
BUN SERPL-MCNC: 13 MG/DL (ref 6–24)
BUN/CREAT SERPL: 13 (ref 9–20)
CALCIUM SERPL-MCNC: 9.4 MG/DL (ref 8.7–10.2)
CHLORIDE SERPL-SCNC: 104 MMOL/L (ref 96–106)
CHOLEST SERPL-MCNC: 165 MG/DL (ref 100–199)
CK SERPL-CCNC: 84 U/L (ref 24–204)
CO2 SERPL-SCNC: 21 MMOL/L (ref 20–29)
CREAT SERPL-MCNC: 0.99 MG/DL (ref 0.76–1.27)
GLOBULIN SER CALC-MCNC: 2.7 G/DL (ref 1.5–4.5)
GLUCOSE SERPL-MCNC: 100 MG/DL (ref 65–99)
HDLC SERPL-MCNC: 30 MG/DL
INTERPRETATION, 910389: NORMAL
LDLC SERPL CALC-MCNC: 91 MG/DL (ref 0–99)
POTASSIUM SERPL-SCNC: 4.5 MMOL/L (ref 3.5–5.2)
PROT SERPL-MCNC: 6.7 G/DL (ref 6–8.5)
SODIUM SERPL-SCNC: 142 MMOL/L (ref 134–144)
TRIGL SERPL-MCNC: 222 MG/DL (ref 0–149)
VLDLC SERPL CALC-MCNC: 44 MG/DL (ref 5–40)

## 2019-07-31 RX ORDER — ATORVASTATIN CALCIUM 80 MG/1
80 TABLET, FILM COATED ORAL DAILY
COMMUNITY
End: 2019-07-31 | Stop reason: SDUPTHER

## 2019-07-31 RX ORDER — ATORVASTATIN CALCIUM 80 MG/1
80 TABLET, FILM COATED ORAL DAILY
Qty: 90 TAB | Refills: 1 | Status: SHIPPED | OUTPATIENT
Start: 2019-07-31 | End: 2020-01-31 | Stop reason: SDUPTHER

## 2019-07-31 NOTE — PROGRESS NOTES
Jacki,    Lipids checked, LDL improved, but still not at goal < 70. Please have him increase Lipitor 80 mg daily.   Repeat lipids in 3 months    Thanks,  Jessica Rojas

## 2019-07-31 NOTE — TELEPHONE ENCOUNTER
----- Message from Gladys Dorado NP sent at 7/31/2019  1:56 PM EDT -----  Arlette Mcardle,    Lipids checked, LDL improved, but still not at goal < 70. Please have him increase Lipitor 80 mg daily.   Repeat lipids in 3 months    Thanks,  [employer]

## 2019-08-02 RX ORDER — LOSARTAN POTASSIUM 25 MG/1
TABLET ORAL
Qty: 90 TAB | Refills: 0 | Status: SHIPPED | OUTPATIENT
Start: 2019-08-02 | End: 2020-01-17

## 2019-08-08 DIAGNOSIS — I25.10 ARTERIOSCLEROTIC HEART DISEASE (ASHD): ICD-10-CM

## 2019-08-08 RX ORDER — RANOLAZINE 500 MG/1
TABLET, EXTENDED RELEASE ORAL
Qty: 180 TAB | Refills: 1 | Status: SHIPPED | OUTPATIENT
Start: 2019-08-08 | End: 2020-02-03

## 2019-08-11 PROBLEM — R55 SYNCOPE: Status: RESOLVED | Noted: 2019-01-25 | Resolved: 2019-08-11

## 2019-08-11 PROBLEM — E66.01 SEVERE OBESITY (BMI 35.0-39.9) WITH COMORBIDITY (HCC): Status: ACTIVE | Noted: 2019-08-11

## 2019-08-11 PROBLEM — I25.118 CORONARY ARTERY DISEASE OF NATIVE ARTERY OF NATIVE HEART WITH STABLE ANGINA PECTORIS (HCC): Status: ACTIVE | Noted: 2018-10-05

## 2019-08-11 PROBLEM — F17.210 SMOKES 1 PACK OF CIGARETTES PER DAY: Status: ACTIVE | Noted: 2018-09-25

## 2019-08-11 PROBLEM — E78.2 HYPERLIPIDEMIA, MIXED: Status: ACTIVE | Noted: 2018-09-25

## 2019-08-11 PROBLEM — R07.9 CHEST PAIN: Status: RESOLVED | Noted: 2018-11-30 | Resolved: 2019-08-11

## 2019-08-11 PROBLEM — R73.02 GLUCOSE INTOLERANCE (IMPAIRED GLUCOSE TOLERANCE): Status: ACTIVE | Noted: 2019-08-11

## 2019-08-11 PROBLEM — Z95.5 S/P CORONARY ARTERY STENT PLACEMENT: Status: RESOLVED | Noted: 2018-09-25 | Resolved: 2019-08-11

## 2019-08-11 PROBLEM — I21.3 STEMI (ST ELEVATION MYOCARDIAL INFARCTION) (HCC): Status: RESOLVED | Noted: 2018-09-25 | Resolved: 2019-08-11

## 2019-08-12 ENCOUNTER — OFFICE VISIT (OUTPATIENT)
Dept: INTERNAL MEDICINE CLINIC | Facility: CLINIC | Age: 54
End: 2019-08-12

## 2019-08-12 VITALS
HEART RATE: 93 BPM | OXYGEN SATURATION: 96 % | DIASTOLIC BLOOD PRESSURE: 84 MMHG | HEIGHT: 69 IN | SYSTOLIC BLOOD PRESSURE: 122 MMHG | RESPIRATION RATE: 12 BRPM | TEMPERATURE: 98.2 F | WEIGHT: 216 LBS | BODY MASS INDEX: 31.99 KG/M2

## 2019-08-12 DIAGNOSIS — E78.2 HYPERLIPIDEMIA, MIXED: ICD-10-CM

## 2019-08-12 DIAGNOSIS — I25.5 ISCHEMIC CARDIOMYOPATHY: Chronic | ICD-10-CM

## 2019-08-12 DIAGNOSIS — F17.210 SMOKES 1 PACK OF CIGARETTES PER DAY: ICD-10-CM

## 2019-08-12 DIAGNOSIS — I25.118 CORONARY ARTERY DISEASE OF NATIVE ARTERY OF NATIVE HEART WITH STABLE ANGINA PECTORIS (HCC): Primary | ICD-10-CM

## 2019-08-12 DIAGNOSIS — F33.41 RECURRENT MAJOR DEPRESSIVE DISORDER, IN PARTIAL REMISSION (HCC): ICD-10-CM

## 2019-08-12 DIAGNOSIS — Z12.11 SCREEN FOR COLON CANCER: ICD-10-CM

## 2019-08-12 DIAGNOSIS — I10 ESSENTIAL HYPERTENSION: ICD-10-CM

## 2019-08-12 DIAGNOSIS — Z11.59 NEED FOR HEPATITIS C SCREENING TEST: ICD-10-CM

## 2019-08-12 DIAGNOSIS — E66.01 SEVERE OBESITY (BMI 35.0-39.9) WITH COMORBIDITY (HCC): ICD-10-CM

## 2019-08-12 DIAGNOSIS — R73.02 GLUCOSE INTOLERANCE (IMPAIRED GLUCOSE TOLERANCE): ICD-10-CM

## 2019-08-12 DIAGNOSIS — Z95.810 ICD (IMPLANTABLE CARDIOVERTER-DEFIBRILLATOR) IN PLACE: ICD-10-CM

## 2019-08-12 RX ORDER — OLANZAPINE 2.5 MG/1
2.5 TABLET ORAL
Qty: 30 TAB | Refills: 5 | Status: SHIPPED | OUTPATIENT
Start: 2019-08-12 | End: 2019-09-13

## 2019-08-12 NOTE — PROGRESS NOTES
Michelle Gardner  Identified pt with two pt identifiers(name and ). Chief Complaint   Patient presents with   [address] [place] [address] Care     Room 1 B       Reviewed record In preparation for visit and have obtained necessary documentation. 1. Have you been to the ER, urgent care clinic or hospitalized since your last visit? 1st visit     2. Have you seen or consulted any other health care providers outside of the [address] [place] [address] since your last visit? Include any pap smears or colon screening. 1st visit     Vitals reviewed with provider. Health Maintenance reviewed:     Health Maintenance Due   Topic    Hepatitis C Screening     Pneumococcal 0-64 years (1 of 1 - PPSV23)    DTaP/Tdap/Td series (1 - Tdap)    Shingrix Vaccine Age 50> (1 of 2)    FOBT Q 1 YEAR AGE 54-65     Influenza Age 5 to Adult         Wt Readings from Last 3 Encounters:   19 216 lb 11.2 oz (98.3 kg)   19 210 lb 15.7 oz (95.7 kg)   19 211 lb (95.7 kg)      Temp Readings from Last 3 Encounters:   19 98.5 °F (36.9 °C)   19 97.7 °F (36.5 °C)   19 97.5 °F (36.4 °C)      BP Readings from Last 3 Encounters:   19 (!) 130/98   19 133/90   19 134/88      Pulse Readings from Last 3 Encounters:   19 (!) 105   19 91   19 (!) 114      There were no vitals filed for this visit.        Learning Assessment:   :     Learning Assessment 2019 10/5/2018   PRIMARY LEARNER Patient Patient   PRIMARY LANGUAGE ENGLISH ENGLISH   LEARNER PREFERENCE PRIMARY READING DEMONSTRATION   ANSWERED BY patient patient   RELATIONSHIP SELF SELF        Depression Screening:   :     3 most recent PHQ Screens 2019   Little interest or pleasure in doing things Not at all   Feeling down, depressed, irritable, or hopeless Not at all   Total Score PHQ 2 0   Trouble falling or staying asleep, or sleeping too much -   Feeling tired or having little energy -   Poor appetite, weight loss, or overeating -   Feeling bad about yourself - or that you are a failure or have let yourself or your family down -   Trouble concentrating on things such as school, work, reading, or watching TV -   Moving or speaking so slowly that other people could have noticed; or the opposite being so fidgety that others notice -   Thoughts of being better off dead, or hurting yourself in some way -   PHQ 9 Score -   How difficult have these problems made it for you to do your work, take care of your home and get along with others -        Fall Risk Assessment:   :     Fall Risk Assessment, last 12 mths 1/9/2019   Able to walk? Yes   Fall in past 12 months? No        Abuse Screening:   :     Abuse Screening Questionnaire 3/22/2019 1/9/2019   Do you ever feel afraid of your partner? N N   Are you in a relationship with someone who physically or mentally threatens you? N N   Is it safe for you to go home?  Y Y        ADL Screening:   :     ADL Assessment 1/9/2019   Feeding yourself No Help Needed   Getting from bed to chair No Help Needed   Getting dressed No Help Needed   Bathing or showering No Help Needed   Walk across the room (includes cane/walker) No Help Needed   Using the telphone No Help Needed   Taking your medications No Help Needed   Preparing meals No Help Needed   Managing money (expenses/bills) No Help Needed   Moderately strenuous housework (laundry) No Help Needed   Shopping for personal items (toiletries/medicines) No Help Needed   Shopping for groceries No Help Needed   Driving No Help Needed   Climbing a flight of stairs No Help Needed   Getting to places beyond walking distances No Help Needed

## 2019-08-12 NOTE — PATIENT INSTRUCTIONS
Remember to get your flu shot in September or October. You may call us for a nurse appointment or get this from your pharmacy. Add olanzepine 2.5 mg at bedtime for depression. Add lab to the cholesterol to be done in about 3 months. Return in one month. Sleep Apnea: Care Instructions Your Care Instructions Sleep apnea means that you frequently stop breathing for 10 seconds or longer during sleep. It can be mild to severe, based on the number of times an hour that you stop breathing or have slowed breathing. Blocked or narrowed airways in your nose, mouth, or throat can cause sleep apnea. Your airway can become blocked when your throat muscles and tongue relax during sleep. You can treat sleep apnea at home by making lifestyle changes. You also can use a CPAP breathing machine that keeps tissues in the throat from blocking your airway. Or your doctor may suggest that you use a breathing device while you sleep. It helps keep your airway open. This could be a device that you put in your mouth. In some cases, surgery may be needed to remove enlarged tissues in the throat. Follow-up care is a key part of your treatment and safety. Be sure to make and go to all appointments, and call your doctor if you are having problems. It's also a good idea to know your test results and keep a list of the medicines you take. How can you care for yourself at home? · Lose weight, if needed. It may reduce the number of times you stop breathing or have slowed breathing. · Sleep on your side. It may stop mild apnea. If you tend to roll onto your back, sew a pocket in the back of your pajama top. Put a tennis ball into the pocket, and stitch the pocket shut. This will help keep you from sleeping on your back. · Avoid alcohol and medicines such as sleeping pills and sedatives before bed. · Do not smoke. Smoking can make sleep apnea worse.  If you need help quitting, talk to your doctor about stop-smoking programs and medicines. These can increase your chances of quitting for good. · Prop up the head of your bed 4 to 6 inches by putting bricks under the legs of the bed. · Treat breathing problems, such as a stuffy nose, caused by a cold or allergies. · Try a continuous positive airway pressure (CPAP) breathing machine if your doctor recommends it. The machine keeps your airway open when you sleep. · If CPAP does not work for you, ask your doctor if you can try other breathing machines. A bilevel positive airway pressure machine uses one type of air pressure for breathing in and another type for breathing out. Another device raises or lowers air pressure as needed while you breathe. · Talk to your doctor if: 
? Your nose feels dry or bleeds when you use one of these machines. You may need to increase moisture in the air. A humidifier may help. ? Your nose is runny or stuffy from using a breathing machine. Decongestants or a corticosteroid nasal spray may help. ? You are sleepy during the day and it gets in the way of the normal things you do. Do not drive when you are drowsy. When should you call for help? Watch closely for changes in your health, and be sure to contact your doctor if: 
  · You still have sleep apnea even though you have made lifestyle changes.  
  · You are thinking of trying a device such as CPAP.  
  · You are having problems using a CPAP or similar machine. Where can you learn more? Go to http://amor-liam.info/. Enter Y977 in the search box to learn more about \"Sleep Apnea: Care Instructions. \" Current as of: September 5, 2018 Content Version: 12.1 © 0906-2389 [employer]. Care instructions adapted under license by [employer] (which disclaims liability or warranty for this information).  If you have questions about a medical condition or this instruction, always ask your healthcare professional. [place] [address] any warranty or liability for your use of this information.

## 2019-08-12 NOTE — PROGRESS NOTES
HISTORY OF PRESENT ILLNESS  Juan F Aguero is a 46 y.o. male. He is accompanied by his wife. HPI  He presents to [facility] care. He presents for follow up of hypertension, hyperlipidemia, coronary artery disease, history of prior MI, ischemic cardiomyopathy, ICD in place. status post coronary artery stenting, glucose intolerance and .obesity. Echo  showed LVEF Naveed 40-45%   severe hypokinesis of the apical anterior, apical inferior, apical septal, and apical lateral wall(s). There was akinesis of the apical wall(s). . He has gained 6 lbs since May. He has FRANTZ; did not tolerate CPAP. Has never tried dental appliance. Diet and Lifestyle: generally follows a low fat low cholesterol diet, generally follows a low sodium diet, does not rigorously follow a diabetic diet, sedentary, smoker 1 ppd, alcohol intake none  Medication compliance: compliant all of the time  Medication side effects: none  Home BP Monitorin's/90's. HR in the 100's. Cardiovascular ROS:  He complains of palpitations (lasst up to 15 min, but has gotten better with change in medication), dyspnea on exertion. Sharp chest pain that lasts for just seconds. He denies palpitations, orthopnea, exertional chest pressure/discomfort, claudication, lower extremity edema, dizziness     He is seen for followup of depression. Current therapy is with duloxetine. Took Wellbutrin in past, Zoloft and Prozac did not help, With therapy symptoms are somewhat improved. Ongoing symptoms include depressed mood, fatigue, feelings of worthlessness at times, difficulty concentrating, hopelessness (at times) and impaired memory. He denies anhedonia, recurrent thoughts of death, suicidal thoughts without plan and suicidal thoughts with specific plan. He experiences the following side effects from the treatment: none. He presents for follow up of ADD/ADHD. Current therapy consists of Strattera.  With therapy, symptoms are [facility] IMPROVED [[id number]]. He complains of inattention, need for frequent task redirection  He denies dependence on supervision, inability to follow directions, unusual risk taking, aggressive behavior  He takes medication all of the time  He experiences the following side effects palpitations    Memory problems since MI. Asks questions repeatedly, trouble finding words. Vertigo (spinning feeling)   began 2 days after [place] stressOn May 17. . Began suddenly. Doing exercises for vertigo Going to PT. Nystagmus. Saw Dr Faith Mcfadden and [facility]. Patient Active Problem List   Diagnosis Code    Essential hypertension I10    Hyperlipidemia, mixed E78.2    ADD (attention deficit disorder) F98.8    Smokes 1 pack of cigarettes per day F17.210    Coronary artery disease of native artery of native heart with stable angina pectoris ([facility] I25.118    Fatigue R53.83    Ischemic cardiomyopathy I25.5    ICD (implantable cardioverter-defibrillator) in place Z95.810    Glucose intolerance (impaired glucose tolerance) R73.02    Severe obesity (BMI 35.0-39. 9) with comorbidity ([facility] E66.01     Past Medical History:   Diagnosis Date    ADD (attention deficit disorder) 9/25/2018    Asthma     Chest pain 11/30/2018    Dyslipidemia 9/25/2018    Fatigue 11/30/2018    HTN (hypertension) 9/25/2018    Hypertension     Other ill-defined conditions(799.89)     ADHD,BELL'S PALSY WEAKNESS  LT FACE    Psychiatric disorder     DEPRESSION    S/P coronary artery stent placement 9/25/2018 9/25/18 PCI/MAVIS to LAD    STEMI (ST elevation myocardial infarction) ([facility] 9/25/2018    Syncope 1/25/2019    Tobacco abuse 9/25/2018     Past Surgical History:   Procedure Laterality Date    HX GI  1984    STOMACH ABDOMEN ACCIDENT    HX HEENT      TEETH EXTRACTION    HX ORTHOPAEDIC  1984    FRACTURE RT FEMUR    NEUROLOGICAL PROCEDURE UNLISTED      NECK     Social History     Socioeconomic History    Marital status:      Spouse name: [facility] Number of children: 2    Years of education: 15    Highest education level: 12th grade   Social Needs    Financial resource strain: Not hard at all   Izaiah insecurity:     Worry: Never true     Inability: Never true   Sympoz (dba [employer]) needs:     Medical: Patient refused     Non-medical: Patient refused   Tobacco Use    Smoking status: Current Every Day Smoker     Packs/day: 1.00     Years: 40.00     Pack years: 40.00     Types: Cigarettes     Last attempt to quit: 3/26/2019     Years since quittin.3    Smokeless tobacco: Never Used    Tobacco comment: started again  2019   Substance and Sexual Activity    Alcohol use: No     Alcohol/week: 0.0 standard drinks     Frequency: Never    Drug use: No    Sexual activity: Not Currently   Lifestyle    Physical activity:     Days per week: 0 days     Minutes per session: 0 min    Stress: Very much   Relationships    Social connections:     Talks on phone: More than three times a week     Gets together: Once a week     Attends Latter day service: Never     Active member of club or organization: No     Attends meetings of clubs or organizations: Never     Relationship status:    Other Topics Concern     Service No    Blood Transfusions Yes    Caffeine Concern Yes     Comment: coffee 1 pot    Occupational Exposure Yes     Comment: cig smoke    Hobby Hazards No    Sleep Concern Yes     Comment: does not sleep well,wakes up sweating,dog    Stress Concern Yes     Comment: high level daily -depression he states    Weight Concern Yes     Comment: more tired    Special Diet No    Back Care No     Comment: neck and low back surgery    Exercise No     Comment: does not exercise    Bike Helmet No     Comment: does not ride bike   Rock Yes     Comment: 1/2 time wears seatbelt    Self-Exams No     Family History   Problem Relation Age of Onset    Hypertension Mother     Diabetes Mother     No Known Problems Father     Lung Disease Paternal Aunt     Alcohol abuse Paternal Aunt     Cancer Sister         breast    Hypertension Sister     Hypertension Brother     Heart Disease Maternal Uncle     Hypertension Maternal Uncle     Diabetes Maternal Uncle     Alcohol abuse Paternal Uncle     Heart Disease Paternal Grandfather     Hypertension Sister     Macular Degen Sister      Allergies   Allergen Reactions    Bee Sting [Sting, Bee] Anaphylaxis     Current Outpatient Medications   Medication Sig Dispense Refill    ranolazine ER (RANEXA) 500 mg SR tablet TAKE 1 TABLET BY MOUTH TWICE DAILY 180 Tab 1    losartan (COZAAR) 25 mg tablet TAKE 1 TABLET BY MOUTH ONCE DAILY 90 Tab 0    atorvastatin (LIPITOR) 80 mg tablet Take 1 Tab by mouth daily. 90 Tab 1    carvedilol (COREG) 6.25 mg tablet Take 1 Tab by mouth two (2) times a day. 60 Tab 1    clopidogrel (PLAVIX) 75 mg tab Take 1 Tab by mouth daily. D/C Brilinta 90 Tab 3    atomoxetine (STRATTERA) 40 mg capsule Take 40 mg by mouth daily.  acetaminophen (TYLENOL) 500 mg tablet Take 2,000 mg by mouth two (2) times daily as needed for Pain.  albuterol (PROAIR HFA) 90 mcg/actuation inhaler Take 2 Puffs by inhalation every four (4) hours as needed for Wheezing.  isosorbide mononitrate ER (IMDUR) 30 mg tablet Take 1 Tab by mouth daily. 30 Tab 11    nitroglycerin (NITROSTAT) 0.4 mg SL tablet 1 Tab by SubLINGual route every five (5) minutes as needed for Chest Pain. Up to 3 doses. 1 Bottle 1    aspirin delayed-release 81 mg tablet Take 1 Tab by mouth daily. 30 Tab 11    DULoxetine (CYMBALTA) 60 mg capsule Take 60 mg by mouth daily.  nicotine (NICODERM CQ) 21 mg/24 hr 1 Patch by TransDERmal route every twenty-four (24) hours for 30 days. 45 Patch 0    nicotine (NICODERM CQ) 14 mg/24 hr patch 1 Patch by TransDERmal route every twenty-four (24) hours for 14 days.  To begin after 45 days of high-dose patch 14 Patch 0    nicotine (NICODERM CQ) 7 mg/24 hr 1 Patch by TransDERmal route every twenty-four (24) hours for 14 days. To begin after 2 weeks of medium intensity patch 14 Patch 0             Review of Systems   Constitutional: Negative for malaise/fatigue and weight loss. HENT: Negative for congestion. Eyes: Negative for blurred vision and double vision. Respiratory: Negative for cough and shortness of breath. Cardiovascular: Positive for chest pain. Negative for palpitations, orthopnea, claudication and leg swelling. Gastrointestinal: Negative for abdominal pain, diarrhea and heartburn. Genitourinary: Negative for frequency and urgency. Musculoskeletal: Positive for back pain. Negative for joint pain. Skin: Negative for itching and rash. Neurological: Positive for tremors (in hands comes and goes) and focal weakness. Negative for dizziness, tingling and headaches. Weakness: in arms. Endo/Heme/Allergies: Negative for environmental allergies. Psychiatric/Behavioral: Positive for depression. The patient is nervous/anxious. Visit Vitals  /84 (BP 1 Location: Left arm, BP Patient Position: Sitting)   Pulse 93   Temp 98.2 °F (36.8 °C) (Oral)   Resp 12   Ht 5' 9\" (1.753 m)   Wt 216 lb (98 kg)   SpO2 96%   BMI 31.90 kg/m²     Physical Exam   Constitutional: He is oriented to person, place, and time. He appears well-developed and well-nourished. HENT:   Head: Normocephalic and atraumatic. Eyes: Pupils are equal, round, and reactive to light. Conjunctivae are normal.   Neck: Neck supple. Carotid bruit is not present. No thyromegaly present. Cardiovascular: Normal rate, regular rhythm and normal heart sounds. PMI is not displaced. Exam reveals no gallop. No murmur heard. Pulses:       Dorsalis pedis pulses are 2+ on the right side, and 2+ on the left side. Posterior tibial pulses are 2+ on the right side, and 2+ on the left side. Pulmonary/Chest: Effort normal. He has no wheezes. He has no rhonchi. He has no rales. Abdominal: Soft.  Normal appearance. He exhibits no abdominal bruit and no mass. There is no hepatosplenomegaly. There is no tenderness. Musculoskeletal: He exhibits no edema. Lymphadenopathy:     He has no cervical adenopathy. Right: No supraclavicular adenopathy present. Left: No supraclavicular adenopathy present. Neurological: He is alert and oriented to person, place, and time. No sensory deficit. Skin: Skin is warm, dry and intact. No rash noted. Psychiatric: He has a normal mood and affect. His behavior is normal.   Nursing note and vitals reviewed. Mini-Mental Status Examination (MMSE)    Max Score     Score                                             ORIENTATION         5              5             1. What is the (year) (season) (date) (day) (month)? 5              5             2. Where are we now: (state) (county) (town or city) (building)                                                                                                            (floor)                                        [address]         3              3              Name 3 unrelated objects \"apple,\" \"table,\" \"caroline. \"  Score first                                                 repetition, but repeat until can name all three up to 6 trials                                        ATTENTION AND CALCULATION         5              1/5              Serial 7's up to 5 subtractions, If unable to do spell \"WORLD\"                                             Backwards. RECALL         3                3            Repeat 3 items                                        LANGUAGE         2                2             Naming: \"wristwatch,\" \"pencil\"         1                1             Repetition:  \"No ifs, ands, or buts\"         3               3              3-Stage Command \"Take piece of paper in your right hand, fold it                                                                        in half and place it on the floor. 1               1              Reading: Large printed \"CLOSE YOUR EYES. \" Read and do                                                      what it says. 1               1              Writing: Write a sentence. Must contain a subject, verb and be                                                    sensible. 1               1              Copying: Copy figure of intersecting pentagons. Must contain all                                                     10 angles and intersect to form a 4 sided figure          30              30           21 or greater = Mild cognitive impairment                                        10-20 = Moderate cognitive impairment                                        9 or less = Severe cognitive impairment            Lab Results   Component Value Date/Time    Hemoglobin A1c 5.7 09/27/2018 02:43 AM     Lab Results   Component Value Date/Time    Cholesterol, total 165 07/30/2019 08:38 AM    HDL Cholesterol 30 (L) 07/30/2019 08:38 AM    LDL, calculated 91 07/30/2019 08:38 AM    VLDL, calculated 44 (H) 07/30/2019 08:38 AM    Triglyceride 222 (H) 07/30/2019 08:38 AM    CHOL/HDL Ratio 6.1 (H) 09/26/2018 03:34 AM     Lab Results   Component Value Date/Time    Sodium 142 07/30/2019 08:38 AM    Potassium 4.5 07/30/2019 08:38 AM    Chloride 104 07/30/2019 08:38 AM    CO2 21 07/30/2019 08:38 AM    Anion gap 4 (L) 05/24/2019 04:50 PM    Glucose 100 (H) 07/30/2019 08:38 AM    BUN 13 07/30/2019 08:38 AM    Creatinine 0.99 07/30/2019 08:38 AM    BUN/Creatinine ratio 13 07/30/2019 08:38 AM    GFR est AA 99 07/30/2019 08:38 AM    GFR est non-AA 86 07/30/2019 08:38 AM    Calcium 9.4 07/30/2019 08:38 AM    Bilirubin, total 0.3 07/30/2019 08:38 AM    AST (SGOT) 13 07/30/2019 08:38 AM    Alk.  phosphatase 116 07/30/2019 08:38 AM    Protein, total 6.7 07/30/2019 08:38 AM    Albumin 4.0 07/30/2019 08:38 AM    Globulin 3.8 05/24/2019 04:50 PM    A-G Ratio 1.5 07/30/2019 08:38 AM    ALT (SGPT) 13 07/30/2019 08:38 AM       ASSESSMENT and PLAN    ICD-10-CM ICD-9-CM    1. Coronary artery disease of native artery of native heart with stable angina pectoris [facility] I25.118 414.01      413.9    2. Essential hypertension I10 401.9    3. Hyperlipidemia, mixed E78.2 272.2    4. Ischemic cardiomyopathy I25.5 414.8    5. Glucose intolerance (impaired glucose tolerance) R73.02 790.22 HEMOGLOBIN A1C WITH EAG   6. ICD (implantable cardioverter-defibrillator) in place Z95.810 V45.02    7. Smokes 1 pack of cigarettes per day F17.210 305.1    8. Screen for colon cancer Z12.11 V76.51 REFERRAL TO GASTROENTEROLOGY   9. Severe obesity (BMI 35.0-39. 9) with comorbidity ([facility] E66.01 278.01    10. Recurrent major depressive disorder, in partial remission (HCC) F33.41 296.35 OLANZapine (ZYPREXA) 2.5 mg tablet   11. Need for hepatitis C screening test Z11.59 V73.89 HEPATITIS C AB     Diagnoses and all orders for this visit:    1. Coronary artery disease of native artery of native heart with stable angina pectoris ([facility]  Stable taking antiplatelet agent and statin. 2. Essential hypertension  Hypertension is controlled. 3. Hyperlipidemia, mixed  Hyperlipidemia is not controlled, but atorvastatin dose recently increased. Has order from cardiologist to repeat.lpid panel. 4. Ischemic cardiomyopathy  Stable taking antiplatelet agent and statin. 5. Glucose intolerance (impaired glucose tolerance)  Only one A1c at this point. Diet discussed. -     HEMOGLOBIN A1C WITH EAG; Future    6. ICD (implantable cardioverter-defibrillator) in place    7. Smokes 1 pack of cigarettes per day  Encouraged to stop smoking to decrease risk of cancer, pulmonary, and cardiovascular disease. 8. Screen for colon cancer  -     REFERRAL TO GASTROENTEROLOGY    9. Severe obesity (BMI 35.0-39. 9) with comorbidity ([facility]  Discussed using smaller plate for portion control, keeping a food diary for awareness of food consumed, checking weight often, and increasing physical activity. Will re-evaluate next visit. 10. Recurrent major depressive disorder, in partial remission ([address]  He has had benefit from duloxetine. Cannot add quetiapine or risperidone due to drug interactions. Will need to watch cholesterol and glucose. -     OLANZapine (ZYPREXA) 2.5 mg tablet; Take 1 Tab by mouth nightly. 11. Need for hepatitis C screening test  -     HEPATITIS C AB; Future    Will discuss FRANTZ in more detail next time. This is probably explanation behind memory complaints. Might benefit from dental appliance. Discussed not sleeping on back and elevating HOB at night. Follow-up and Dispositions    · Return in about 1 month (around 9/12/2019) for depression, FRANTZ, .       lab results and schedule of future lab studies reviewed with patient  reviewed diet, exercise and weight control  I have discussed the diagnosis, evaluation and treatment options and the intended plan with the patient. Patient understands and is in agreement. The patient has received an after-visit summary and questions were answered concerning future plans. I have discussed side effects and warnings of any new medications with the patient as well.

## 2019-08-12 NOTE — PROGRESS NOTES
HISTORY OF PRESENT ILLNESS Vivi Dillard is a 46 y.o. male. HPI 
 
ROS Physical Exam 
 
ASSESSMENT and PLAN 
{ASSESSMENT/PLAN:[id number]}

## 2019-08-15 ENCOUNTER — TELEPHONE (OUTPATIENT)
Dept: INTERNAL MEDICINE CLINIC | Facility: CLINIC | Age: 54
End: 2019-08-15

## 2019-08-15 NOTE — TELEPHONE ENCOUNTER
Please inform patient that I contacted radiologist. 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 If is a different, but not abnormal vein It grew that way prior or birth and is of no consequence. No stroke.

## 2019-09-03 ENCOUNTER — TELEPHONE (OUTPATIENT)
Dept: CARDIOLOGY CLINIC | Age: 54
End: 2019-09-03

## 2019-09-03 RX ORDER — CARVEDILOL 6.25 MG/1
6.25 TABLET ORAL 2 TIMES DAILY
Qty: 180 TAB | Refills: 3 | Status: SHIPPED | OUTPATIENT
Start: 2019-09-03 | End: 2020-09-25

## 2019-09-13 ENCOUNTER — OFFICE VISIT (OUTPATIENT)
Dept: INTERNAL MEDICINE CLINIC | Facility: CLINIC | Age: 54
End: 2019-09-13

## 2019-09-13 VITALS
HEART RATE: 96 BPM | OXYGEN SATURATION: 95 % | HEIGHT: 69 IN | RESPIRATION RATE: 12 BRPM | WEIGHT: 216 LBS | TEMPERATURE: 98.1 F | DIASTOLIC BLOOD PRESSURE: 81 MMHG | BODY MASS INDEX: 31.99 KG/M2 | SYSTOLIC BLOOD PRESSURE: 128 MMHG

## 2019-09-13 DIAGNOSIS — F32.4 MAJOR DEPRESSIVE DISORDER WITH SINGLE EPISODE, IN PARTIAL REMISSION (HCC): ICD-10-CM

## 2019-09-13 DIAGNOSIS — G47.33 OSA (OBSTRUCTIVE SLEEP APNEA): ICD-10-CM

## 2019-09-13 DIAGNOSIS — L84 PRE-ULCERATIVE CORN OR CALLOUS: ICD-10-CM

## 2019-09-13 DIAGNOSIS — Z23 ENCOUNTER FOR IMMUNIZATION: ICD-10-CM

## 2019-09-13 DIAGNOSIS — R26.89 BALANCE PROBLEM: ICD-10-CM

## 2019-09-13 PROBLEM — G47.30 OBSERVED SLEEP APNEA: Status: ACTIVE | Noted: 2019-09-13

## 2019-09-13 NOTE — PROGRESS NOTES
HISTORY OF PRESENT ILLNESS  Avi Zurita is a 53 y.o. male. HPI  He has hypertension, hyperlipidemia, coronary artery disease, history of prior MI, ischemic cardiomyopathy, ICD in place. status post coronary artery stenting, glucose intolerance . obesity, depression and ADD . He was diagnosed with FRANTZ in 2005 or 2006. He has CPAP, but it makes him feel like he is not able to breathe. He tries to sleep on his back. Wife notes that when h rolls onto back, he snores and has episode of apnea. He has some daytime drowsiness, mostly atr the end of the day. He has not had any sleep medicine follow up. He is seen for followup of depression. Current therapy is with duloxetine which results in partial remission. Olanzapine was added last month. With olanzapine he could not sleep and stopped taking it. .In the past he took Wellbutrin, Zoloft, and Prozac, none of which helped. With therapy symptoms are somewhat improved. Ongoing symptoms include depressed mood and difficulty concentrating. These symptoms are not all the time. He thinks depression symptoms are due to equilibrium problem, eye problems (cannot focus eyes) more so than depression. He has not been able to drive or work due to this. He continues in physical therapy. Testing by the therapist suggested this is a vestibular problem. He has seen Dr Nasrin Ramires, but not returned or re-evaluation. He denies anhedonia, insomnia, feelings of worthlessness/guilt, hopelessness and recurrent thoughts of death. He experiences the following side effects from the treatment: none. Has only been taking Strattera 40 mg once a day, because he has not been working or driving due to equilibrium problems. .Has seen Dr Nasrin Ramires. He has a callous on heel of foot. He has been scraping it, but it dose not go away.      Patient Active Problem List   Diagnosis Code    Essential hypertension I10    Hyperlipidemia, mixed E78.2    ADD (attention deficit disorder) F98.8    Smokes 1 pack of cigarettes per day F17.210    Coronary artery disease of native artery of native heart with stable angina pectoris (HCC) I25.118    Fatigue R53.83    Ischemic cardiomyopathy I25.5    ICD (implantable cardioverter-defibrillator) in place Z95.810    Glucose intolerance (impaired glucose tolerance) R73.02    Severe obesity (BMI 35.0-39. 9) with comorbidity ([facility] E66.01    FRANTZ (obstructive sleep apnea) G47.33    Major depressive disorder with single episode, in partial remission ([facility] F32.4    Balance problem R26.89     Past Medical History:   Diagnosis Date    ADD (attention deficit disorder) 2018    Asthma     Chest pain 2018    Dyslipidemia 2018    Fatigue 2018    HTN (hypertension) 2018    Hypertension     Other ill-defined conditions(799.89)     ADHD,BELL'S PALSY WEAKNESS  LT FACE    Psychiatric disorder     DEPRESSION    S/P coronary artery stent placement 2018 PCI/MAVIS to LAD    STEMI (ST elevation myocardial infarction) ([facility] 2018    Syncope 2019    Tobacco abuse 2018     Past Surgical History:   Procedure Laterality Date    HX GI  1984    STOMACH ABDOMEN ACCIDENT    HX HEENT      TEETH EXTRACTION    HX ORTHOPAEDIC  1984    FRACTURE RT FEMUR    NEUROLOGICAL PROCEDURE UNLISTED      NECK     Social History     Socioeconomic History    Marital status:      Spouse name: Louis Sinha Number of children: 2    Years of education: 15    Highest education level: 12th grade   Social Needs    Financial resource strain: Not hard at all   [place] insecurity:     Worry: Never true     Inability: Never true    Transportation needs:     Medical: Patient refused     Non-medical: Patient refused   Tobacco Use    Smoking status: Current Every Day Smoker     Packs/day: 1.00     Years: 40.00     Pack years: 40.00     Types: Cigarettes     Last attempt to quit: 3/26/2019     Years since quittin.4    Smokeless tobacco: Never Used    Tobacco comment: started again  6/1/2019   Substance and Sexual Activity    Alcohol use: No     Alcohol/week: 0.0 standard drinks     Frequency: Never    Drug use: No    Sexual activity: Not Currently   Lifestyle    Physical activity:     Days per week: 0 days     Minutes per session: 0 min    Stress: Very much   Relationships    Social connections:     Talks on phone: More than three times a week     Gets together: Once a week     Attends [employer] service: Never     Active member of club or organization: No     Attends meetings of clubs or organizations: Never     Relationship status:    Other Topics Concern     Service No    Blood Transfusions Yes    Caffeine Concern Yes     Comment: coffee 1 pot    Occupational Exposure Yes     Comment: cig smoke    Hobby Hazards No    Sleep Concern Yes     Comment: does not sleep well,wakes up sweating,dog    Stress Concern Yes     Comment: high level daily -depression he states    Weight Concern Yes     Comment: more tired    Special Diet No    Back Care No     Comment: neck and low back surgery    Exercise No     Comment: does not exercise    Bike Helmet No     Comment: does not ride bike   [place] Yes     Comment: 1/2 time wears seatbelt    Self-Exams No     Family History   Problem Relation Age of Onset    Hypertension Mother     Diabetes Mother     No Known Problems Father     Lung Disease Paternal Aunt     Alcohol abuse Paternal Aunt     Cancer Sister         breast    Hypertension Sister     Hypertension Brother     Heart Disease Maternal Uncle     Hypertension Maternal Uncle     Diabetes Maternal Uncle     Alcohol abuse Paternal Uncle     Heart Disease Paternal Grandfather     Hypertension Sister     Macular Degen Sister      Allergies   Allergen Reactions    Bee Sting [Sting, Bee] Anaphylaxis     Current Outpatient Medications   Medication Sig Dispense Refill    carvedilol (COREG) 6.25 mg tablet Take 1 Tab by mouth two (2) times a day. 180 Tab 3    ranolazine ER (RANEXA) 500 mg SR tablet TAKE 1 TABLET BY MOUTH TWICE DAILY 180 Tab 1    losartan (COZAAR) 25 mg tablet TAKE 1 TABLET BY MOUTH ONCE DAILY 90 Tab 0    atorvastatin (LIPITOR) 80 mg tablet Take 1 Tab by mouth daily. 90 Tab 1    clopidogrel (PLAVIX) 75 mg tab Take 1 Tab by mouth daily. D/C Brilinta 90 Tab 3    atomoxetine (STRATTERA) 40 mg capsule Take 40 mg by mouth daily.  acetaminophen (TYLENOL) 500 mg tablet Take 2,000 mg by mouth two (2) times daily as needed for Pain.  albuterol (PROAIR HFA) 90 mcg/actuation inhaler Take 2 Puffs by inhalation every four (4) hours as needed for Wheezing.  isosorbide mononitrate ER (IMDUR) 30 mg tablet Take 1 Tab by mouth daily. 30 Tab 11    nitroglycerin (NITROSTAT) 0.4 mg SL tablet 1 Tab by SubLINGual route every five (5) minutes as needed for Chest Pain. Up to 3 doses. 1 Bottle 1    aspirin delayed-release 81 mg tablet Take 1 Tab by mouth daily. 30 Tab 11    DULoxetine (CYMBALTA) 60 mg capsule Take 60 mg by mouth daily. ROS       Visit Vitals  /81 (BP 1 Location: Left arm, BP Patient Position: Sitting)   Pulse 96   Temp 98.1 °F (36.7 °C) (Oral)   Resp 12   Ht 5' 9\" (1.753 m)   Wt 216 lb (98 kg)   SpO2 95%   BMI 31.90 kg/m²     Physical Exam   Constitutional: He is oriented to person, place, and time. He appears well-developed and well-nourished. HENT:   Head: Normocephalic and atraumatic. Eyes: Pupils are equal, round, and reactive to light. Conjunctivae are normal.   Neck: Neck supple. Carotid bruit is not present. Cardiovascular: Normal rate, regular rhythm, S1 normal, S2 normal and normal heart sounds. Exam reveals no gallop. No murmur heard. Pulmonary/Chest: Effort normal and breath sounds normal. He has no wheezes. He has no rhonchi. He has no rales. Musculoskeletal: He exhibits no edema.    Neurological: He is alert and oriented to person, place, and time. He has normal strength. No cranial nerve deficit. He displays a negative Romberg sign. Reflex Scores:       Patellar reflexes are 2+ on the right side and 2+ on the left side. Gait is normal and he can walk on toes and heels. Tandem gait is hesitant with a couple of wobbles. Skin: Skin is warm, dry and intact. Psychiatric: He has a normal mood and affect. His behavior is normal.   Nursing note and vitals reviewed. Lab Results   Component Value Date/Time    Cholesterol, total 165 07/30/2019 08:38 AM    HDL Cholesterol 30 (L) 07/30/2019 08:38 AM    LDL, calculated 91 07/30/2019 08:38 AM    VLDL, calculated 44 (H) 07/30/2019 08:38 AM    Triglyceride 222 (H) 07/30/2019 08:38 AM    CHOL/HDL Ratio 6.1 (H) 09/26/2018 03:34 AM     Lab Results   Component Value Date/Time    Sodium 142 07/30/2019 08:38 AM    Potassium 4.5 07/30/2019 08:38 AM    Chloride 104 07/30/2019 08:38 AM    CO2 21 07/30/2019 08:38 AM    Anion gap 4 (L) 05/24/2019 04:50 PM    Glucose 100 (H) 07/30/2019 08:38 AM    BUN 13 07/30/2019 08:38 AM    Creatinine 0.99 07/30/2019 08:38 AM    BUN/Creatinine ratio 13 07/30/2019 08:38 AM    GFR est AA 99 07/30/2019 08:38 AM    GFR est non-AA 86 07/30/2019 08:38 AM    Calcium 9.4 07/30/2019 08:38 AM    Bilirubin, total 0.3 07/30/2019 08:38 AM    AST (SGOT) 13 07/30/2019 08:38 AM    Alk. phosphatase 116 07/30/2019 08:38 AM    Protein, total 6.7 07/30/2019 08:38 AM    Albumin 4.0 07/30/2019 08:38 AM    Globulin 3.8 05/24/2019 04:50 PM    A-G Ratio 1.5 07/30/2019 08:38 AM    ALT (SGPT) 13 07/30/2019 08:38 AM         ASSESSMENT and PLAN    ICD-10-CM ICD-9-CM    1. Major depressive disorder with single episode, in partial remission ([address] F32.4 296.25    2. Balance problem R26.89 781.99    3. FRANTZ (obstructive sleep apnea) G47.33 327.23 SLEEP MEDICINE REFERRAL   4.  Pre-ulcerative corn or callous L84 700 REFERRAL TO PODIATRY   5. Encounter for immunization Z23 V03.89 PNEUMOCOCCAL POLYSACCHARIDE VACCINE, 23-VALENT, ADULT OR IMMUNOSUPPRESSED PT DOSE,      IA IMMUNIZ ADMIN,1 SINGLE/COMB VAC/TOXOID      TETANUS, DIPHTHERIA TOXOIDS AND ACELLULAR PERTUSSIS VACCINE (TDAP), IN INDIVIDS. >=7, IM     Diagnoses and all orders for this visit:    1. Major depressive disorder with single episode, in partial remission ([facility]  He has had significant,but not complete improvement from duloxetine. Will continue same. Not being able to work is certainly a contribuiing factor. 2. Balance problem  The testing PT did points to vestibular problem. Suggest he continue PT, but also schedule a follow up appointment with Dr Kavitha Hayes. 3. FRANTZ (obstructive sleep apnea)  Need re-evaluation with sleep medicine.   -     SLEEP MEDICINE REFERRAL    4. Pre-ulcerative corn or callous  -     REFERRAL TO PODIATRY    5. Encounter for immunization  -     PNEUMOCOCCAL POLYSACCHARIDE VACCINE, 23-VALENT, ADULT OR IMMUNOSUPPRESSED PT DOSE,  -     IA IMMUNIZ ADMIN,1 SINGLE/COMB VAC/TOXOID  -     TETANUS, DIPHTHERIA TOXOIDS AND ACELLULAR PERTUSSIS VACCINE (TDAP), IN INDIVIDS. >=7, IM      Follow-up and Dispositions    · Return in about 3 months (around 12/13/2019) for HTN, CAD, FRANTZ, CM   Fasting lab one week prior  . lab results and schedule of future lab studies reviewed with patient  reviewed diet, exercise and weight control  I have discussed the diagnosis, evaluation and treatment options and the intended plan with the patient. Patient understands and is in agreement. The patient has received an after-visit summary and questions were answered concerning future plans. I have discussed side effects and warnings of any new medications with the patient as well.

## 2019-09-13 NOTE — PROGRESS NOTES
Teri Treviño  Identified pt with two pt identifiers(name and ). Chief Complaint   Patient presents with    Depression       Reviewed record In preparation for visit and have obtained necessary documentation. Has info on advanced directive but has not filled them out. 1. Have you been to the ER, urgent care clinic or hospitalized since your last visit? No     2. Have you seen or consulted any other health care providers outside of the [address] [place] [address] since your last visit? Include any pap smears or colon screening. ENT    Vitals reviewed with provider. Health Maintenance reviewed: After verbal order read back of , patient received Pneumococcal 23 in right arm. [address]  [id number] lot  [id number] exp 02/10/21. TDAP  in left arm. Boostrix 0.5ml NDC  [id number] lot [id number] exp 10/31/2021. Patient tolerated procedure without complaints and received VIS. Health Maintenance Due   Topic    Hepatitis C Screening     Pneumococcal 0-64 years (1 of 1 - PPSV23)    DTaP/Tdap/Td series (1 - Tdap)    Shingrix Vaccine Age 50> (1 of 2)    FOBT Q 1 YEAR AGE 54-65     Influenza Age 5 to Adult         Wt Readings from Last 3 Encounters:   19 216 lb (98 kg)   19 216 lb 11.2 oz (98.3 kg)   19 210 lb 15.7 oz (95.7 kg)      Temp Readings from Last 3 Encounters:   19 98.2 °F (36.8 °C) (Oral)   19 98.5 °F (36.9 °C)   19 97.7 °F (36.5 °C)      BP Readings from Last 3 Encounters:   19 122/84   19 (!) 130/98   19 133/90      Pulse Readings from Last 3 Encounters:   19 93   19 (!) 105   19 91      There were no vitals filed for this visit.        Learning Assessment:   :     Learning Assessment 2019 10/5/2018   PRIMARY LEARNER Patient Patient   PRIMARY LANGUAGE ENGLISH ENGLISH   LEARNER PREFERENCE PRIMARY READING DEMONSTRATION   ANSWERED BY patient patient   RELATIONSHIP SELF SELF        Depression Screening:   :     3 most recent PHQ Screens 7/29/2019   Little interest or pleasure in doing things Not at all   Feeling down, depressed, irritable, or hopeless Not at all   Total Score PHQ 2 0   Trouble falling or staying asleep, or sleeping too much -   Feeling tired or having little energy -   Poor appetite, weight loss, or overeating -   Feeling bad about yourself - or that you are a failure or have let yourself or your family down -   Trouble concentrating on things such as school, work, reading, or watching TV -   Moving or speaking so slowly that other people could have noticed; or the opposite being so fidgety that others notice -   Thoughts of being better off dead, or hurting yourself in some way -   PHQ 9 Score -   How difficult have these problems made it for you to do your work, take care of your home and get along with others -        Fall Risk Assessment:   :     Fall Risk Assessment, last 12 mths 1/9/2019   Able to walk? Yes   Fall in past 12 months? No        Abuse Screening:   :     Abuse Screening Questionnaire 3/22/2019 1/9/2019   Do you ever feel afraid of your partner? N N   Are you in a relationship with someone who physically or mentally threatens you? N N   Is it safe for you to go home?  Y Y        ADL Screening:   :     ADL Assessment 1/9/2019   Feeding yourself No Help Needed   Getting from bed to chair No Help Needed   Getting dressed No Help Needed   Bathing or showering No Help Needed   Walk across the room (includes cane/walker) No Help Needed   Using the telphone No Help Needed   Taking your medications No Help Needed   Preparing meals No Help Needed   Managing money (expenses/bills) No Help Needed   Moderately strenuous housework (laundry) No Help Needed   Shopping for personal items (toiletries/medicines) No Help Needed   Shopping for groceries No Help Needed   Driving No Help Needed   Climbing a flight of stairs No Help Needed   Getting to places beyond walking distances No Help Needed

## 2019-09-13 NOTE — PATIENT INSTRUCTIONS
Office visit in 3 months with fasting lab a week before visit. Vaccine Information Statement    Pneumococcal Polysaccharide Vaccine: What You Need to Know    Many Vaccine Information Statements are available in [place] and other languages. See www.immunize.org/vis. Hojas de información Sobre Vacunas están disponibles en español y en muchos otros idiomas. Visite [place].si. 1. Why get vaccinated? Vaccination can protect older adults (and some children and younger adults) from pneumococcal disease. Pneumococcal disease is caused by bacteria that can spread from person to person through close contact. It can cause ear infections, and it can also lead to more serious infections of the:   Lungs (pneumonia),   Blood (bacteremia), and   Covering of the brain and spinal cord (meningitis). Meningitis can cause deafness and brain damage, and it can be fatal.      Anyone can get pneumococcal disease, but children under 3years of age, people with certain medical conditions, adults over 72years of age, and cigarette smokers are at the highest risk. About 18,000 older adults die each year from pneumococcal disease in the [place]. Treatment of pneumococcal infections with penicillin and other drugs used to be more effective. But some strains of the disease have become resistant to these drugs. This makes prevention of the disease, through vaccination, even more important. 2. Pneumococcal polysaccharide vaccine (PPSV23)    Pneumococcal polysaccharide vaccine (PPSV23) protects against 23 types of pneumococcal bacteria. It will not prevent all pneumococcal disease. PPSV23 is recommended for:   All adults 72years of age and older,   Anyone 2 through 59years of age with certain long-term health problems,   Anyone 2 through 59years of age with a weakened immune system,   Adults 23 through 59years of age who smoke cigarettes or have asthma.      Most people need only one dose of PPSV. A second dose is recommended for certain high-risk groups. People 72 and older should get a dose even if they have gotten one or more doses of the vaccine before they turned 65. Your healthcare provider can give you more information about these recommendations. Most healthy adults develop protection within 2 to 3 weeks of getting the shot. 3. Some people should not get this vaccine     Anyone who has had a life-threatening allergic reaction to PPSV should not get another dose.  Anyone who has a severe allergy to any component of PPSV should not receive it. Tell your provider if you have any severe allergies.  Anyone who is moderately or severely ill when the shot is scheduled may be asked to wait until they recover before getting the vaccine. Someone with a mild illness can usually be vaccinated.  Children less than 3years of age should not receive this vaccine.  There is no evidence that PPSV is harmful to either a pregnant woman or to her fetus. However, as a precaution, women who need the vaccine should be vaccinated before becoming pregnant, if possible. 4. Risks of a vaccine reaction    With any medicine, including vaccines, there is a chance of side effects. These are usually mild and go away on their own, but serious reactions are also possible. About half of people who get PPSV have mild side effects, such as redness or pain where the shot is given, which go away within about two days. Less than 1 out of 100 people develop a fever, muscle aches, or more severe local reactions. Problems that could happen after any vaccine:     People sometimes faint after a medical procedure, including vaccination. Sitting or lying down for about 15 minutes can help prevent fainting, and injuries caused by a fall. Tell your doctor if you feel dizzy, or have vision changes or ringing in the ears.      Some people get severe pain in the shoulder and have difficulty moving the arm where a shot was given. This happens very rarely.  Any medication can cause a severe allergic reaction. Such reactions from a vaccine are very rare, estimated at about 1 in a million doses, and would happen within a few minutes to a few hours after the vaccination. As with any medicine, there is a very remote chance of a vaccine causing a serious injury or death. The safety of vaccines is always being monitored. For more information, visit: www.cdc.gov/vaccinesafety/     5. What if there is a serious reaction? What should I look for? Look for anything that concerns you, such as signs of a severe allergic reaction, very high fever, or unusual behavior. Signs of a severe allergic reaction can include hives, swelling of the face and throat, difficulty breathing, a fast heartbeat, dizziness, and weakness. These would usually start a few minutes to a few hours after the vaccination. What should I do? If you think it is a severe allergic reaction or other emergency that cant wait, call 9-1-1 or get to the nearest hospital. Otherwise, call your doctor. Afterward, the reaction should be reported to the Vaccine Adverse Event Reporting System (VAERS). Your doctor might file this report, or you can do it yourself through the VAERS web site at www.vaers. hhs.gov, or by calling 4-771.862.5895. VAERS does not give medical advice. 6. How can I learn more?  Ask your doctor. He or she can give you the vaccine package insert or suggest other sources of information.  Call your local or state health department.    Contact the Centers for Disease Control and Prevention (CDC):  - Call 0-764.530.9524 (1-800-CDC-INFO) or  - Visit CDCs website at [address] 04/24/2015    [facility] and [facility] for Disease Control and Prevention    Office Use Only    Vaccine Information Statement     Tdap (Tetanus, Diphtheria, Pertussis) Vaccine: What You Need to Know    Many Vaccine Information Statements are available in [place] and other languages. See www.immunize.org/vis. Hojas de Información Sobre Vacunas están disponibles en español y en muchos otros idiomas. Visite [email or web address].si    1. Why get vaccinated? Tetanus, diphtheria, and pertussis are very serious diseases. Tdap vaccine can protect us from these diseases. And, Tdap vaccine given to pregnant women can protect  babies against pertussis. TETANUS (Lockjaw) is rare in the [facility] today. It causes painful muscle tightening and stiffness, usually all over the body.  It can lead to tightening of muscles in the head and neck so you cant open your mouth, swallow, or sometimes even breathe. Tetanus kills about 1 out of 10 people who are infected even after receiving the best medical care. DIPHTHERIA is also rare in the [facility] today. It can cause a thick coating to form in the back of the throat.  It can lead to breathing problems, heart failure, paralysis, and death. PERTUSSIS (Whooping Cough) causes severe coughing spells, which can cause difficulty breathing, vomiting, and disturbed sleep.  It can also lead to weight loss, incontinence, and rib fractures. Up to 2 in 100 adolescents and 5 in 100 adults with pertussis are hospitalized or have complications, which could include pneumonia or death. These diseases are caused by bacteria. Diphtheria and pertussis are spread from person to person through secretions from coughing or sneezing. Tetanus enters the body through cuts, scratches, or wounds. Before vaccines, as many as 200,000 cases of diphtheria, 200,000 cases of pertussis, and hundreds of cases of tetanus, were reported in the [place] each year. Since vaccination began, reports of cases for tetanus and diphtheria have dropped by about 99% and for pertussis by about 80%.     2. Tdap vaccine    Tdap vaccine can protect adolescents and adults from tetanus, diphtheria, and pertussis. One dose of Tdap is routinely given at age 6 or 15. People who did not get Tdap at that age should get it as soon as possible. Tdap is especially important for health care professionals and anyone having close contact with a baby younger than 12 months. Pregnant women should get a dose of Tdap during every pregnancy, to protect the  from pertussis. Infants are most at risk for severe, life-threatening complications from pertussis. Another vaccine, called Td, protects against tetanus and diphtheria, but not pertussis. A Td booster should be given every 10 years. Tdap may be given as one of these boosters if you have never gotten Tdap before. Tdap may also be given after a severe cut or burn to prevent tetanus infection. Your doctor or the person giving you the vaccine can give you more information. Tdap may safely be given at the same time as other vaccines. 3. Some people should not get this vaccine     A person who has ever had a life-threatening allergic reaction after a previous dose of any diphtheria, tetanus or pertussis containing vaccine, OR has a severe allergy to any part of this vaccine, should not get Tdap vaccine. Tell the person giving the vaccine about any severe allergies.  Anyone who had coma or long repeated seizures within 7 days after a childhood dose of DTP or DTaP, or a previous dose of Tdap, should not get Tdap, unless a cause other than the vaccine was found. They can still get Td.  Talk to your doctor if you:  - have seizures or another nervous system problem,  - had severe pain or swelling after any vaccine containing diphtheria, tetanus or pertussis,   - ever had a condition called Guillain Barré Syndrome (GBS),  - arent feeling well on the day the shot is scheduled. 4. Risks    With any medicine, including vaccines, there is a chance of side effects.  These are usually mild and go away on their own. Serious reactions are also possible but are rare. Most people who get Tdap vaccine do not have any problems with it. Mild Problems following Tdap  (Did not interfere with activities)   Pain where the shot was given (about 3 in 4 adolescents or 2 in 3 adults)   Redness or swelling where the shot was given (about 1 person in 5)   Mild fever of at least 100.4°F (up to about 1 in 25 adolescents or 1 in 100 adults)   Headache (about 3 or 4 people in 10)   Tiredness (about 1 person in 3 or 4)   Nausea, vomiting, diarrhea, stomach ache (up to 1 in 4 adolescents or 1 in 10 adults)   Chills,  sore joints (about 1 person in 10)   Body aches (about 1 person in 3 or 4)    Rash, swollen glands (uncommon)    Moderate Problems following Tdap  (Interfered with activities, but did not require medical attention)   Pain where the shot was given (up to 1 in 5 or 6)    Redness or swelling where the shot was given (up to about 1 in 16 adolescents or 1 in 12 adults)   Fever over 102°F (about 1 in 100 adolescents or 1 in 250 adults)   Headache (about 1 in 7 adolescents or 1 in 10 adults)   Nausea, vomiting, diarrhea, stomach ache (up to 1 or 3 people in 100)   Swelling of the entire arm where the shot was given (up to about 1 in 500). Severe Problems following Tdap  (Unable to perform usual activities; required medical attention)   Swelling, severe pain, bleeding, and redness in the arm where the shot was given (rare). Problems that could happen after any vaccine:     People sometimes faint after a medical procedure, including vaccination. Sitting or lying down for about 15 minutes can help prevent fainting, and injuries caused by a fall. Tell your doctor if you feel dizzy, or have vision changes or ringing in the ears.  Some people get severe pain in the shoulder and have difficulty moving the arm where a shot was given. This happens very rarely.      Any medication can cause a severe allergic reaction. Such reactions from a vaccine are very rare, estimated at fewer than 1 in a million doses, and would happen within a few minutes to a few hours after the vaccination. As with any medicine, there is a very remote chance of a vaccine causing a serious injury or death. The safety of vaccines is always being monitored. For more information, visit: www.cdc.gov/vaccinesafety/    5. What if there is a serious problem? What should I look for?  Look for anything that concerns you, such as signs of a severe allergic reaction, very high fever, or unusual behavior.  Signs of a severe allergic reaction can include hives, swelling of the face and throat, difficulty breathing, a fast heartbeat, dizziness, and weakness. These would usually start a few minutes to a few hours after the vaccination. What should I do?  If you think it is a severe allergic reaction or other emergency that cant wait, call 9-1-1 or get the person to the nearest hospital. Otherwise, call your doctor.  Afterward, the reaction should be reported to the Vaccine Adverse Event Reporting System (VAERS). Your doctor might file this report, or you can do it yourself through the VAERS web site at www.vaers. [facility].gov, or by calling 4-739.619.2756. [facility] does not give medical advice. 6. The National Vaccine Injury Compensation Program    The [facility] Vaccine Injury Compensation Program (VICP) is a federal program that was created to compensate people who may have been injured by certain vaccines. Persons who believe they may have been injured by a vaccine can learn about the program and about filing a claim by calling 1-902.109.7476 or visiting the [employer] website at www.[facility].gov/vaccinecompensation. There is a time limit to file a claim for compensation. 7. How can I learn more?  Ask your doctor.  He or she can give you the vaccine package insert or suggest other sources of information.  Call your local or state health department.  Contact the Centers for Disease Control and Prevention (CDC):  - Call 5-356.314.6720 (1-800-CDC-INFO) or  - Visit CDCs website at www.cdc.gov/vaccines      Vaccine Information Statement   Tdap Vaccine  (2/24/2015)  [address] [id number]    Department of Health and Human Services  Centers for Disease Control and Prevention    Office Use Only

## 2019-09-15 PROBLEM — R26.89 BALANCE PROBLEM: Status: ACTIVE | Noted: 2019-09-15

## 2019-09-15 PROBLEM — F32.4 MAJOR DEPRESSIVE DISORDER WITH SINGLE EPISODE, IN PARTIAL REMISSION (HCC): Status: ACTIVE | Noted: 2019-09-15

## 2019-10-01 ENCOUNTER — TELEPHONE (OUTPATIENT)
Dept: CARDIOLOGY CLINIC | Age: 54
End: 2019-10-01

## 2019-10-01 NOTE — TELEPHONE ENCOUNTER
Pt need a refill on these medications. Please send to [address] [facility] in [address] [facility]. Pt do not have any medication left at this time.        aspirin delayed-release 81 mg tablet    isosorbide mononitrate ER (IMDUR) 30 mg tablet      Thanks

## 2019-10-02 RX ORDER — ASPIRIN 81 MG/1
81 TABLET ORAL DAILY
Qty: 30 TAB | Refills: 11 | Status: SHIPPED | OUTPATIENT
Start: 2019-10-02 | End: 2020-07-15

## 2019-10-02 RX ORDER — ISOSORBIDE MONONITRATE 30 MG/1
30 TABLET, EXTENDED RELEASE ORAL DAILY
Qty: 30 TAB | Refills: 11 | Status: SHIPPED | OUTPATIENT
Start: 2019-10-02 | End: 2020-07-15

## 2019-10-16 LAB
ALBUMIN SERPL-MCNC: 4.2 G/DL (ref 3.5–5.5)
ALBUMIN/GLOB SERPL: 1.8 {RATIO} (ref 1.2–2.2)
ALP SERPL-CCNC: 111 IU/L (ref 39–117)
ALT SERPL-CCNC: 12 IU/L (ref 0–44)
AST SERPL-CCNC: 11 IU/L (ref 0–40)
BILIRUB SERPL-MCNC: 0.2 MG/DL (ref 0–1.2)
BUN SERPL-MCNC: 14 MG/DL (ref 6–24)
BUN/CREAT SERPL: 13 (ref 9–20)
CALCIUM SERPL-MCNC: 9.5 MG/DL (ref 8.7–10.2)
CHLORIDE SERPL-SCNC: 104 MMOL/L (ref 96–106)
CHOLEST SERPL-MCNC: 154 MG/DL (ref 100–199)
CK SERPL-CCNC: 90 U/L (ref 24–204)
CO2 SERPL-SCNC: 25 MMOL/L (ref 20–29)
CREAT SERPL-MCNC: 1.12 MG/DL (ref 0.76–1.27)
EST. AVERAGE GLUCOSE BLD GHB EST-MCNC: 123 MG/DL
GLOBULIN SER CALC-MCNC: 2.4 G/DL (ref 1.5–4.5)
GLUCOSE SERPL-MCNC: 102 MG/DL (ref 65–99)
HBA1C MFR BLD: 5.9 % (ref 4.8–5.6)
HCV AB S/CO SERPL IA: <0.1 S/CO RATIO (ref 0–0.9)
HDLC SERPL-MCNC: 32 MG/DL
INTERPRETATION, 910389: NORMAL
LDLC SERPL CALC-MCNC: 88 MG/DL (ref 0–99)
POTASSIUM SERPL-SCNC: 4.9 MMOL/L (ref 3.5–5.2)
PROT SERPL-MCNC: 6.6 G/DL (ref 6–8.5)
SODIUM SERPL-SCNC: 144 MMOL/L (ref 134–144)
TRIGL SERPL-MCNC: 172 MG/DL (ref 0–149)
VLDLC SERPL CALC-MCNC: 34 MG/DL (ref 5–40)

## 2019-10-17 ENCOUNTER — TELEPHONE (OUTPATIENT)
Dept: CARDIOLOGY CLINIC | Age: 54
End: 2019-10-17

## 2019-10-17 RX ORDER — EZETIMIBE 10 MG/1
10 TABLET ORAL DAILY
Qty: 30 TAB | Refills: 3 | Status: SHIPPED | OUTPATIENT
Start: 2019-10-17 | End: 2020-02-03

## 2019-10-17 RX ORDER — EZETIMIBE 10 MG/1
TABLET ORAL
COMMUNITY
End: 2019-10-17 | Stop reason: SDUPTHER

## 2019-10-17 NOTE — TELEPHONE ENCOUNTER
Patient informed will try 30 day supply @ [facility] and work on diet & exercise will call for labs slip prior to next appt. Kassi Rothman

## 2019-10-17 NOTE — TELEPHONE ENCOUNTER
----- Message from Jose Manuel Cobb NP sent at 10/17/2019  7:38 AM EDT -----  Labs are good. LDL improved to 88 ( was 91) since we increased his atorvastatin to 80 mg. However, LDL goal is at  70. I would like to add Zetia 10 mg to his therapy, another cholesterol lowering agent but works in a different mechanism than statin. If he is not in agreement with this, he can further improve on his diet by choosing food that are low in saturated fat,  Increase fiber in his diet, do regular exercise at least 30 minutes, 5 days a week. We can repeat his labs in 3 mos and see where hes at.

## 2019-10-17 NOTE — TELEPHONE ENCOUNTER
Labs are good. LDL improved to 88 ( was 91) since we increased his atorvastatin to 80 mg. However, LDL goal is at  70. I would like to add Zetia 10 mg to his therapy, another cholesterol lowering agent but works in a different mechanism than statin. If he is not in agreement with this, he can further improve on his diet by choosing food that are low in saturated fat,  Increase fiber in his diet, do regular exercise at least 30 minutes, 5 days a week. We can repeat his labs in 3 mos and see where hes at.

## 2019-10-21 ENCOUNTER — OFFICE VISIT (OUTPATIENT)
Dept: URGENT CARE | Age: 54
End: 2019-10-21

## 2019-10-21 VITALS
HEIGHT: 69 IN | HEART RATE: 88 BPM | TEMPERATURE: 97.9 F | RESPIRATION RATE: 19 BRPM | SYSTOLIC BLOOD PRESSURE: 181 MMHG | OXYGEN SATURATION: 99 % | WEIGHT: 216 LBS | DIASTOLIC BLOOD PRESSURE: 98 MMHG | BODY MASS INDEX: 31.99 KG/M2

## 2019-10-21 DIAGNOSIS — R10.9 ACUTE LEFT FLANK PAIN: Primary | ICD-10-CM

## 2019-10-21 DIAGNOSIS — N39.0 URINARY TRACT INFECTION WITH HEMATURIA, SITE UNSPECIFIED: ICD-10-CM

## 2019-10-21 DIAGNOSIS — R31.9 URINARY TRACT INFECTION WITH HEMATURIA, SITE UNSPECIFIED: ICD-10-CM

## 2019-10-21 LAB
BILIRUB UR QL STRIP: NEGATIVE
GLUCOSE UR-MCNC: NEGATIVE MG/DL
KETONES P FAST UR STRIP-MCNC: NEGATIVE MG/DL
PH UR STRIP: 6.5 [PH] (ref 4.6–8)
PROT UR QL STRIP: NORMAL
SP GR UR STRIP: 1.01 (ref 1–1.03)
UA UROBILINOGEN AMB POC: NORMAL (ref 0.2–1)
URINALYSIS CLARITY POC: NORMAL
URINALYSIS COLOR POC: NORMAL
URINE BLOOD POC: NORMAL
URINE LEUKOCYTES POC: NORMAL
URINE NITRITES POC: NEGATIVE

## 2019-10-21 RX ORDER — TAMSULOSIN HYDROCHLORIDE 0.4 MG/1
0.4 CAPSULE ORAL DAILY
Qty: 15 CAP | Refills: 0 | Status: SHIPPED | OUTPATIENT
Start: 2019-10-21 | End: 2020-01-31

## 2019-10-21 RX ORDER — CIPROFLOXACIN 500 MG/1
500 TABLET ORAL 2 TIMES DAILY
Qty: 20 TAB | Refills: 0 | Status: SHIPPED | OUTPATIENT
Start: 2019-10-21 | End: 2019-12-30

## 2019-10-21 RX ORDER — TRAMADOL HYDROCHLORIDE 50 MG/1
50 TABLET ORAL
Qty: 12 TAB | Refills: 0 | Status: SHIPPED | OUTPATIENT
Start: 2019-10-21 | End: 2019-10-24

## 2019-10-21 NOTE — PATIENT INSTRUCTIONS
Kidney Stone: Care Instructions  Your Care Instructions    Kidney stones are formed when salts, minerals, and other substances normally found in the urine clump together. They can be as small as grains of sand or, rarely, as large as golf balls. While the stone is traveling through the ureter, which is the tube that carries urine from the kidney to the bladder, you will probably feel pain. The pain may be mild or very severe. You may also have some blood in your urine. As soon as the stone reaches the bladder, any intense pain should go away. If a stone is too large to pass on its own, you may need a medical procedure to help you pass the stone. The doctor has checked you carefully, but problems can develop later. If you notice any problems or new symptoms, get medical treatment right away. Follow-up care is a key part of your treatment and safety. Be sure to make and go to all appointments, and call your doctor if you are having problems. It's also a good idea to know your test results and keep a list of the medicines you take. How can you care for yourself at home? · Drink plenty of fluids, enough so that your urine is light yellow or clear like water. If you have kidney, heart, or liver disease and have to limit fluids, talk with your doctor before you increase the amount of fluids you drink. · Take pain medicines exactly as directed. Call your doctor if you think you are having a problem with your medicine. ? If the doctor gave you a prescription medicine for pain, take it as prescribed. ? If you are not taking a prescription pain medicine, ask your doctor if you can take an over-the-counter medicine. Read and follow all instructions on the label. · Your doctor may ask you to strain your urine so that you can collect your kidney stone when it passes. You can use a kitchen strainer or a tea strainer to catch the stone. Store it in a plastic bag until you see your doctor again.   Preventing future kidney stones  Some changes in your diet may help prevent kidney stones. Depending on the cause of your stones, your doctor may recommend that you:  · Drink plenty of fluids, enough so that your urine is light yellow or clear like water. If you have kidney, heart, or liver disease and have to limit fluids, talk with your doctor before you increase the amount of fluids you drink. · Limit coffee, tea, and alcohol. Also avoid grapefruit juice. · Do not take more than the recommended daily dose of vitamins C and D.  · Avoid antacids such as Gaviscon, Maalox, Mylanta, or Tums. · Limit the amount of salt (sodium) in your diet. · Eat a balanced diet that is not too high in protein. · Limit foods that are high in a substance called oxalate, which can cause kidney stones. These foods include dark green vegetables, rhubarb, chocolate, wheat bran, nuts, cranberries, and beans. When should you call for help? Call your doctor now or seek immediate medical care if:    · You cannot keep down fluids.     · Your pain gets worse.     · You have a fever or chills.     · You have new or worse pain in your back just below your rib cage (the flank area).     · You have new or more blood in your urine.    Watch closely for changes in your health, and be sure to contact your doctor if:    · You do not get better as expected. Where can you learn more? Go to http://amor-liam.info/. Enter A587 in the search box to learn more about \"Kidney Stone: Care Instructions. \"  Current as of: October 31, 2018  Content Version: 12.2  © 1978-9635 [employer]. Care instructions adapted under license by [employer] (which disclaims liability or warranty for this information). If you have questions about a medical condition or this instruction, always ask your healthcare professional. [address] any warranty or liability for your use of this information.

## 2019-10-21 NOTE — PROGRESS NOTES
Flank Pain   This is a new problem. The current episode started 2 days ago. The problem occurs daily. The problem has not changed since onset. Associated symptoms include abdominal pain (left lower abdomen - on scale 7/10intermittentoff and on in waves). Nothing aggravates the symptoms. Nothing relieves the symptoms. He has tried nothing for the symptoms.         Past Medical History:   Diagnosis Date    ADD (attention deficit disorder) 9/25/2018    Asthma     Chest pain 11/30/2018    Dyslipidemia 9/25/2018    Fatigue 11/30/2018    HTN (hypertension) 9/25/2018    Hypertension     Other ill-defined conditions(799.89)     ADHD,BELL'S PALSY WEAKNESS  LT FACE    Psychiatric disorder     DEPRESSION    S/P coronary artery stent placement 9/25/2018 9/25/18 PCI/MAVIS to LAD    STEMI (ST elevation myocardial infarction) ([facility] [address] 9/25/2018    Syncope 1/25/2019    Tobacco abuse 9/25/2018        Past Surgical History:   Procedure Laterality Date    HX GI  1984    STOMACH ABDOMEN ACCIDENT    HX HEENT      TEETH EXTRACTION    HX ORTHOPAEDIC  1984    FRACTURE RT FEMUR    NEUROLOGICAL PROCEDURE UNLISTED      NECK         Family History   Problem Relation Age of Onset    Hypertension Mother     Diabetes Mother     No Known Problems Father     Lung Disease Paternal Aunt     Alcohol abuse Paternal Aunt     Cancer Sister         breast    Hypertension Sister     Hypertension Brother     Heart Disease Maternal Uncle     Hypertension Maternal Uncle     Diabetes Maternal Uncle     Alcohol abuse Paternal Uncle     Heart Disease Paternal Grandfather     Hypertension Sister     Macular Degen Sister         Social History     Socioeconomic History    Marital status:      Spouse name: Robert Mitchell Number of children: 2    Years of education: 15    Highest education level: 12th grade   Occupational History    Not on file   Social Needs    Financial resource strain: Not hard at all   [place] insecurity: Worry: Never true     Inability: Never true    Transportation needs:     Medical: Patient refused     Non-medical: Patient refused   Tobacco Use    Smoking status: Current Every Day Smoker     Packs/day: 1.00     Years: 40.00     Pack years: 40.00     Types: Cigarettes     Last attempt to quit: 3/26/2019     Years since quittin.5    Smokeless tobacco: Never Used    Tobacco comment: started again  2019   Substance and Sexual Activity    Alcohol use: No     Alcohol/week: 0.0 standard drinks     Frequency: Never    Drug use: No    Sexual activity: Not Currently   Lifestyle    Physical activity:     Days per week: 0 days     Minutes per session: 0 min    Stress: Very much   Relationships    Social connections:     Talks on phone: More than three times a week     Gets together: Once a week     Attends Caodaism service: Never     Active member of club or organization: No     Attends meetings of clubs or organizations: Never     Relationship status:     Intimate partner violence:     Fear of current or ex partner: No     Emotionally abused: No     Physically abused: No     Forced sexual activity: No   Other Topics Concern     Service No    Blood Transfusions Yes    Caffeine Concern Yes     Comment: coffee 1 pot    Occupational Exposure Yes     Comment: cig smoke    Hobby Hazards No    Sleep Concern Yes     Comment: does not sleep well,wakes up sweating,dog    Stress Concern Yes     Comment: high level daily -depression he states    Weight Concern Yes     Comment: more tired    Special Diet No    Back Care No     Comment: neck and low back surgery    Exercise No     Comment: does not exercise    Bike Helmet No     Comment: does not ride bike   [address] Yes     Comment: 1/2 time wears seatbelt    Self-Exams No   Social History Narrative    Not on file                ALLERGIES: Bee sting [sting, bee]    Review of Systems   Constitutional: Negative for chills and fatigue. Gastrointestinal: Positive for abdominal pain (left lower abdomen - on scale 7/10intermittentoff and on in waves). Negative for anal bleeding, constipation, diarrhea, nausea, rectal pain and vomiting. Genitourinary: Positive for difficulty urinating, flank pain, hematuria, testicular pain and urgency. Negative for discharge. Vitals:    10/21/19 1730   BP: (!) 181/98   Pulse: 88   Resp: 19   Temp: 97.9 °F (36.6 °C)   SpO2: 99%   Weight: 216 lb (98 kg)   Height: 5' 9\" (1.753 m)       Physical Exam   Constitutional: No distress. HENT:   Mouth/Throat: No oropharyngeal exudate. Eyes: No scleral icterus. Abdominal: Soft. Bowel sounds are normal. He exhibits no distension and no mass. There is no tenderness. There is no rebound and no guarding. Hernia confirmed negative in the right inguinal area and confirmed negative in the left inguinal area. Genitourinary: Penis normal. Right testis shows no mass, no swelling and no tenderness. Left testis shows no mass, no swelling and no tenderness. Circumcised. No penile erythema or penile tenderness. No discharge found. Lymphadenopathy:        Right: No inguinal adenopathy present. Left: No inguinal adenopathy present. Skin: No rash noted. Nursing note and vitals reviewed. MDM    Procedures        ICD-10-CM ICD-9-CM    1. Acute left flank pain R10.9 789.09 AMB POC URINALYSIS DIP STICK AUTO W/O MICRO     338.19 traMADol (ULTRAM) 50 mg tablet    ? renal stone   2. Urinary tract infection with hematuria, site unspecified N39.0 599.0     R31.9 599.70      D/d- renal stone- prostatitis  Acute cystitis  If bleeding worsen- go to ED  Follow with Urologist  D/w wife  Medications Ordered Today   Medications    ciprofloxacin HCl (CIPRO) 500 mg tablet     Sig: Take 1 Tab by mouth two (2) times a day. Dispense:  20 Tab     Refill:  0    tamsulosin (FLOMAX) 0.4 mg capsule     Sig: Take 1 Cap by mouth daily.      Dispense:  15 Cap     Refill:  0    traMADol (ULTRAM) 50 mg tablet     Sig: Take 1 Tab by mouth every six (6) hours as needed for Pain for up to 3 days. Max Daily Amount: 200 mg. Dispense:  12 Tab     Refill:  0     Results for orders placed or performed in visit on 10/21/19   AMB POC URINALYSIS DIP STICK AUTO W/O MICRO   Result Value Ref Range    Color (UA POC)      Clarity (UA POC)      Glucose (UA POC) Negative Negative    Bilirubin (UA POC) Negative Negative    Ketones (UA POC) Negative Negative    Specific gravity (UA POC) 1.010 1.001 - 1.035    Blood (UA POC) 3+ Negative    pH (UA POC) 6.5 4.6 - 8.0    Protein (UA POC) 1+ Negative    Urobilinogen (UA POC) 0.2 mg/dL 0.2 - 1    Nitrites (UA POC) Negative Negative    Leukocyte esterase (UA POC) Trace Negative     The patients condition was discussed with the patient and they understand. The patient is to follow up with primary care doctor. If signs and symptoms become worse the pt is to go to the ER. The patient is to take medications as prescribed.

## 2019-10-23 ENCOUNTER — OFFICE VISIT (OUTPATIENT)
Dept: CARDIOLOGY CLINIC | Age: 54
End: 2019-10-23

## 2019-10-23 DIAGNOSIS — I25.5 ISCHEMIC CARDIOMYOPATHY: ICD-10-CM

## 2019-10-23 DIAGNOSIS — Z95.810 ICD (IMPLANTABLE CARDIOVERTER-DEFIBRILLATOR) IN PLACE: Primary | ICD-10-CM

## 2019-10-24 ENCOUNTER — HOSPITAL ENCOUNTER (OUTPATIENT)
Dept: CT IMAGING | Age: 54
Discharge: HOME | End: 2019-10-24
Attending: UROLOGY
Payer: COMMERCIAL

## 2019-10-24 DIAGNOSIS — R10.32 LLQ PAIN: ICD-10-CM

## 2019-10-24 PROCEDURE — 74176 CT ABD & PELVIS W/O CONTRAST: CPT

## 2019-10-29 ENCOUNTER — TELEPHONE (OUTPATIENT)
Dept: CARDIAC REHAB | Age: 54
End: 2019-10-29

## 2019-10-31 DIAGNOSIS — E78.5 DYSLIPIDEMIA: ICD-10-CM

## 2019-10-31 DIAGNOSIS — I10 ESSENTIAL HYPERTENSION: Chronic | ICD-10-CM

## 2019-12-30 ENCOUNTER — OFFICE VISIT (OUTPATIENT)
Dept: URGENT CARE | Age: 54
End: 2019-12-30

## 2019-12-30 VITALS
TEMPERATURE: 98.6 F | HEART RATE: 101 BPM | HEIGHT: 69 IN | OXYGEN SATURATION: 98 % | BODY MASS INDEX: 31.7 KG/M2 | WEIGHT: 214 LBS | SYSTOLIC BLOOD PRESSURE: 146 MMHG | DIASTOLIC BLOOD PRESSURE: 69 MMHG | RESPIRATION RATE: 18 BRPM

## 2019-12-30 DIAGNOSIS — J06.9 VIRAL URI WITH COUGH: Primary | ICD-10-CM

## 2019-12-30 RX ORDER — FLUTICASONE PROPIONATE 50 MCG
2 SPRAY, SUSPENSION (ML) NASAL DAILY
Qty: 1 BOTTLE | Refills: 0 | Status: SHIPPED | OUTPATIENT
Start: 2019-12-30 | End: 2020-02-05

## 2019-12-30 RX ORDER — BENZONATATE 200 MG/1
200 CAPSULE ORAL
Qty: 21 CAP | Refills: 0 | Status: SHIPPED | OUTPATIENT
Start: 2019-12-30 | End: 2020-01-06

## 2019-12-30 RX ORDER — PROMETHAZINE HYDROCHLORIDE AND DEXTROMETHORPHAN HYDROBROMIDE 6.25; 15 MG/5ML; MG/5ML
5 SYRUP ORAL
Qty: 60 ML | Refills: 0 | Status: SHIPPED | OUTPATIENT
Start: 2019-12-30 | End: 2020-01-31

## 2019-12-31 NOTE — PROGRESS NOTES
Cold Symptoms   The history is provided by the patient. This is a new problem. The current episode started more than 2 days ago. The problem occurs constantly. The problem has not changed since onset. The cough is non-productive. There has been no fever. Associated symptoms include headaches, rhinorrhea and sore throat. He has tried nothing for the symptoms. He is not a smoker. His past medical history does not include pneumonia or asthma.         Past Medical History:   Diagnosis Date    ADD (attention deficit disorder) 9/25/2018    Asthma     Chest pain 11/30/2018    Dyslipidemia 9/25/2018    Fatigue 11/30/2018    HTN (hypertension) 9/25/2018    Hypertension     Other ill-defined conditions(799.89)     ADHD,BELL'S PALSY WEAKNESS  LT FACE    Psychiatric disorder     DEPRESSION    S/P coronary artery stent placement 9/25/2018 9/25/18 PCI/MAVIS to LAD    STEMI (ST elevation myocardial infarction) ([facility] [address] 9/25/2018    Syncope 1/25/2019    Tobacco abuse 9/25/2018        Past Surgical History:   Procedure Laterality Date    HX GI  1984    STOMACH ABDOMEN ACCIDENT    HX HEENT      TEETH EXTRACTION    HX ORTHOPAEDIC  1984    FRACTURE RT FEMUR    NEUROLOGICAL PROCEDURE UNLISTED      NECK         Family History   Problem Relation Age of Onset    Hypertension Mother     Diabetes Mother     No Known Problems Father     Lung Disease Paternal Aunt     Alcohol abuse Paternal Aunt     Cancer Sister         breast    Hypertension Sister     Hypertension Brother     Heart Disease Maternal Uncle     Hypertension Maternal Uncle     Diabetes Maternal Uncle     Alcohol abuse Paternal Uncle     Heart Disease Paternal Grandfather     Hypertension Sister     Macular Degen Sister         Social History     Socioeconomic History    Marital status:      Spouse name: Julio Cesar Mitchell Number of children: 2    Years of education: 15    Highest education level: 12th grade   Occupational History    Not on file Social Needs    Financial resource strain: Not hard at all   Izaiah insecurity:     Worry: Never true     Inability: Never true   [employer] needs:     Medical: Patient refused     Non-medical: Patient refused   Tobacco Use    Smoking status: Current Every Day Smoker     Packs/day: 1.00     Years: 40.00     Pack years: 40.00     Types: Cigarettes     Last attempt to quit: 3/26/2019     Years since quittin.7    Smokeless tobacco: Never Used    Tobacco comment: started again  2019   Substance and Sexual Activity    Alcohol use: No     Alcohol/week: 0.0 standard drinks     Frequency: Never    Drug use: No    Sexual activity: Not Currently   Lifestyle    Physical activity:     Days per week: 0 days     Minutes per session: 0 min    Stress: Very much   Relationships    Social connections:     Talks on phone: More than three times a week     Gets together: Once a week     Attends Confucianist service: Never     Active member of club or organization: No     Attends meetings of clubs or organizations: Never     Relationship status:     Intimate partner violence:     Fear of current or ex partner: No     Emotionally abused: No     Physically abused: No     Forced sexual activity: No   Other Topics Concern     Service No    Blood Transfusions Yes    Caffeine Concern Yes     Comment: coffee 1 pot    Occupational Exposure Yes     Comment: cig smoke    Hobby Hazards No    Sleep Concern Yes     Comment: does not sleep well,wakes up sweating,dog    Stress Concern Yes     Comment: high level daily -depression he states    Weight Concern Yes     Comment: more tired    Special Diet No    Back Care No     Comment: neck and low back surgery    Exercise No     Comment: does not exercise    Bike Helmet No     Comment: does not ride bike   [place] Yes     Comment: 1/2 time wears seatbelt    Self-Exams No   Social History Narrative    Not on file                ALLERGIES: Bee sting [sting, bee]    Review of Systems   HENT: Positive for congestion, postnasal drip, rhinorrhea, sneezing and sore throat. Respiratory: Positive for cough. Neurological: Positive for headaches. Vitals:    19 1838 19 1840   BP: 155/74 146/69   Pulse: (!) 101    Resp: 18    Temp: 98.6 °F (37 °C)    SpO2: 98%    Weight: 214 lb (97.1 kg)    Height: 5' 9\" (1.753 m)        Physical Exam  Vitals signs and nursing note reviewed. Constitutional:       General: He is not in acute distress. HENT:      Right Ear: Tympanic membrane and ear canal normal.      Left Ear: Tympanic membrane and ear canal normal.      Nose: Nose normal.      Mouth/Throat:      Pharynx: No oropharyngeal exudate or posterior oropharyngeal erythema. Eyes:      General:         Right eye: No discharge. Left eye: No discharge. Conjunctiva/sclera: Conjunctivae normal.   Neck:      Musculoskeletal: Neck supple. Pulmonary:      Effort: Pulmonary effort is normal. No respiratory distress. Breath sounds: Normal breath sounds. No wheezing or rales. Lymphadenopathy:      Cervical: No cervical adenopathy. Skin:     Findings: No rash. MDM    Procedures      ICD-10-CM ICD-9-CM    1. Viral URI with cough J06.9 465.9     B97.89       Medications Ordered Today   Medications    benzonatate (TESSALON) 200 mg capsule     Sig: Take 1 Cap by mouth three (3) times daily as needed for Cough for up to 7 days. Dispense:  21 Cap     Refill:  0    fluticasone propionate (FLONASE) 50 mcg/actuation nasal spray     Si Sprays by Both Nostrils route daily. Dispense:  1 Bottle     Refill:  0    promethazine-dextromethorphan (PROMETHAZINE-DM) 6.25-15 mg/5 mL syrup     Sig: Take 5 mL by mouth nightly as needed for Cough. Dispense:  60 mL     Refill:  0     No results found for any visits on 19. The patients condition was discussed with the patient and they understand.   The patient is to follow up with primary care doctor. If signs and symptoms become worse the pt is to go to the ER. The patient is to take medications as prescribed.

## 2020-01-14 NOTE — ED NOTES
Cath lab alerted Take medication as prescribed. Follow-up with your primary care physician within 2 days for reassessment. Bring the results from this visit with you for their review. Return to the ED immediately for any new, worsening, or persistent symptoms, including chest pain, shortness of breath, or any other medical concerns. Elevated Blood Pressure: Care Instructions  Your Care Instructions    Blood pressure is a measure of how hard the blood pushes against the walls of your arteries. It's normal for blood pressure to go up and down throughout the day. But if it stays up over time, you have high blood pressure. Two numbers tell you your blood pressure. The first number is the systolic pressure. It shows how hard the blood pushes when your heart is pumping. The second number is the diastolic pressure. It shows how hard the blood pushes between heartbeats, when your heart is relaxed and filling with blood. An ideal blood pressure in adults is less than 120/80 (say \"120 over 80\"). High blood pressure is 140/90 or higher. You have high blood pressure if your top number is 140 or higher or your bottom number is 90 or higher, or both. The main test for high blood pressure is simple, fast, and painless. To diagnose high blood pressure, your doctor will test your blood pressure at different times. After testing your blood pressure, your doctor may ask you to test it again when you are home. If you are diagnosed with high blood pressure, you can work with your doctor to make a long-term plan to manage it. Follow-up care is a key part of your treatment and safety. Be sure to make and go to all appointments, and call your doctor if you are having problems. It's also a good idea to know your test results and keep a list of the medicines you take. How can you care for yourself at home? · Do not smoke. Smoking increases your risk for heart attack and stroke.  If you need help quitting, talk to your doctor about stop-smoking programs and medicines. These can increase your chances of quitting for good. · Stay at a healthy weight. · Try to limit how much sodium you eat to less than 2,300 milligrams (mg) a day. Your doctor may ask you to try to eat less than 1,500 mg a day. · Be physically active. Get at least 30 minutes of exercise on most days of the week. Walking is a good choice. You also may want to do other activities, such as running, swimming, cycling, or playing tennis or team sports. · Avoid or limit alcohol. Talk to your doctor about whether you can drink any alcohol. · Eat plenty of fruits, vegetables, and low-fat dairy products. Eat less saturated and total fats. · Learn how to check your blood pressure at home. When should you call for help? Call your doctor now or seek immediate medical care if:  ? · Your blood pressure is much higher than normal (such as 180/110 or higher). ? · You think high blood pressure is causing symptoms such as:  ¨ Severe headache. ¨ Blurry vision. ? Watch closely for changes in your health, and be sure to contact your doctor if:  ? · You do not get better as expected. Where can you learn more? Go to http://star-jean.info/. Enter E214 in the search box to learn more about \"Elevated Blood Pressure: Care Instructions. \"  Current as of: September 21, 2016  Content Version: 11.4  © 9707-1902 [employer]. Care instructions adapted under license by [employer] (which disclaims liability or warranty for this information). If you have questions about a medical condition or this instruction, always ask your healthcare professional. [place] [address] any warranty or liability for your use of this information. Patient Education        Upper Respiratory Infection (Cold): Care Instructions  Your Care Instructions    An upper respiratory infection, or URI, is an infection of the nose, sinuses, or throat.  URIs are spread by coughs, sneezes, and direct contact. The common cold is the most frequent kind of URI. The flu and sinus infections are other kinds of URIs. Almost all URIs are caused by viruses. Antibiotics won't cure them. But you can treat most infections with home care. This may include drinking lots of fluids and taking over-the-counter pain medicine. You will probably feel better in 4 to 10 days. The doctor has checked you carefully, but problems can develop later. If you notice any problems or new symptoms, get medical treatment right away. Follow-up care is a key part of your treatment and safety. Be sure to make and go to all appointments, and call your doctor if you are having problems. It's also a good idea to know your test results and keep a list of the medicines you take. How can you care for yourself at home? · To prevent dehydration, drink plenty of fluids, enough so that your urine is light yellow or clear like water. Choose water and other caffeine-free clear liquids until you feel better. If you have kidney, heart, or liver disease and have to limit fluids, talk with your doctor before you increase the amount of fluids you drink. · Take an over-the-counter pain medicine, such as acetaminophen (Tylenol), ibuprofen (Advil, Motrin), or naproxen (Aleve). Read and follow all instructions on the label. · Before you use cough and cold medicines, check the label. These medicines may not be safe for young children or for people with certain health problems. · Be careful when taking over-the-counter cold or flu medicines and Tylenol at the same time. Many of these medicines have acetaminophen, which is Tylenol. Read the labels to make sure that you are not taking more than the recommended dose. Too much acetaminophen (Tylenol) can be harmful. · Get plenty of rest.  · Do not smoke or allow others to smoke around you. If you need help quitting, talk to your doctor about stop-smoking programs and medicines.  These can increase your chances of quitting for good. When should you call for help? Call 911 anytime you think you may need emergency care. For example, call if:    · You have severe trouble breathing.    Call your doctor now or seek immediate medical care if:    · You seem to be getting much sicker.     · You have new or worse trouble breathing.     · You have a new or higher fever.     · You have a new rash.    Watch closely for changes in your health, and be sure to contact your doctor if:    · You have a new symptom, such as a sore throat, an earache, or sinus pain.     · You cough more deeply or more often, especially if you notice more mucus or a change in the color of your mucus.     · You do not get better as expected. Where can you learn more? Go to http://star-jean.info/. Enter R346 in the search box to learn more about \"Upper Respiratory Infection (Cold): Care Instructions. \"  Current as of: June 9, 2019  Content Version: 12.2  © 1399-2258 [employer]. Care instructions adapted under license by [employer] (which disclaims liability or warranty for this information). If you have questions about a medical condition or this instruction, always ask your healthcare professional. [address] any warranty or liability for your use of this information.

## 2020-01-17 RX ORDER — LOSARTAN POTASSIUM 25 MG/1
TABLET ORAL
Qty: 90 TAB | Refills: 0 | Status: SHIPPED | OUTPATIENT
Start: 2020-01-17 | End: 2020-04-13 | Stop reason: SDUPTHER

## 2020-01-22 ENCOUNTER — OFFICE VISIT (OUTPATIENT)
Dept: CARDIOLOGY CLINIC | Age: 55
End: 2020-01-22

## 2020-01-22 DIAGNOSIS — Z95.810 ICD (IMPLANTABLE CARDIOVERTER-DEFIBRILLATOR) IN PLACE: Primary | ICD-10-CM

## 2020-01-22 DIAGNOSIS — I25.5 ISCHEMIC CARDIOMYOPATHY: ICD-10-CM

## 2020-01-31 ENCOUNTER — OFFICE VISIT (OUTPATIENT)
Dept: INTERNAL MEDICINE CLINIC | Facility: CLINIC | Age: 55
End: 2020-01-31

## 2020-01-31 VITALS
SYSTOLIC BLOOD PRESSURE: 136 MMHG | RESPIRATION RATE: 12 BRPM | WEIGHT: 216 LBS | DIASTOLIC BLOOD PRESSURE: 89 MMHG | BODY MASS INDEX: 31.99 KG/M2 | HEART RATE: 92 BPM | TEMPERATURE: 97.7 F | OXYGEN SATURATION: 96 % | HEIGHT: 69 IN

## 2020-01-31 DIAGNOSIS — I25.118 CORONARY ARTERY DISEASE OF NATIVE ARTERY OF NATIVE HEART WITH STABLE ANGINA PECTORIS (HCC): ICD-10-CM

## 2020-01-31 DIAGNOSIS — I25.5 ISCHEMIC CARDIOMYOPATHY: Chronic | ICD-10-CM

## 2020-01-31 DIAGNOSIS — E78.2 HYPERLIPIDEMIA, MIXED: ICD-10-CM

## 2020-01-31 DIAGNOSIS — E66.01 SEVERE OBESITY (BMI 35.0-39.9) WITH COMORBIDITY (HCC): ICD-10-CM

## 2020-01-31 DIAGNOSIS — R73.02 GLUCOSE INTOLERANCE (IMPAIRED GLUCOSE TOLERANCE): ICD-10-CM

## 2020-01-31 DIAGNOSIS — F17.210 SMOKES 1 PACK OF CIGARETTES PER DAY: ICD-10-CM

## 2020-01-31 DIAGNOSIS — Z13.31 SCREENING FOR DEPRESSION: ICD-10-CM

## 2020-01-31 DIAGNOSIS — I10 ESSENTIAL HYPERTENSION: Primary | ICD-10-CM

## 2020-01-31 RX ORDER — ALBUTEROL SULFATE 90 UG/1
2 AEROSOL, METERED RESPIRATORY (INHALATION)
Qty: 3 INHALER | Refills: 3 | Status: SHIPPED | OUTPATIENT
Start: 2020-01-31

## 2020-01-31 RX ORDER — ATORVASTATIN CALCIUM 80 MG/1
80 TABLET, FILM COATED ORAL DAILY
Qty: 14 TAB | Refills: 0 | Status: SHIPPED | OUTPATIENT
Start: 2020-01-31 | End: 2020-02-05 | Stop reason: SDUPTHER

## 2020-01-31 RX ORDER — ATORVASTATIN CALCIUM 80 MG/1
80 TABLET, FILM COATED ORAL DAILY
Qty: 90 TAB | Refills: 3 | Status: SHIPPED | OUTPATIENT
Start: 2020-01-31 | End: 2021-02-02 | Stop reason: SDUPTHER

## 2020-01-31 NOTE — PROGRESS NOTES
HISTORY OF PRESENT ILLNESS  Jeff Larios is a 53 y.o. male. HPI   He presents for follow up of hypertension, hyperlipidemia, coronary artery disease, history of prior MI, ischemic cardiomyopathy, ICD in place. status post coronary artery stenting, glucose intolerance and .obesity. Echo  showed LVEF Naveed 40-45%   severe hypokinesis of the apical anterior, apical inferior, apical septal, and apical lateral wall(s). Diet and Lifestyle: generally follows a low fat low cholesterol diet, generally follows a low sodium diet, does not rigorously follow a diabetic diet, sedentary, smoker 1 ppd  Medication compliance: compliant all of the time  Medication side effects: none  Home BP Monitorin's/80's  Cardiovascular ROS:  He complains of palpitations, dizziness. He denies orthopnea, exertional chest pressure/discomfort, claudication, lower extremity edema, dyspnea on exertion       Patient Active Problem List   Diagnosis Code    Essential hypertension I10    Hyperlipidemia, mixed E78.2    ADD (attention deficit disorder) F98.8    Smokes 1 pack of cigarettes per day F17.210    Coronary artery disease of native artery of native heart with stable angina pectoris ([facility] I25.118    Fatigue R53.83    Ischemic cardiomyopathy I25.5    ICD (implantable cardioverter-defibrillator) in place Z95.810    Glucose intolerance (impaired glucose tolerance) R73.02    Severe obesity (BMI 35.0-39. 9) with comorbidity ([address] E66.01    FRANTZ (obstructive sleep apnea) G47.33    Major depressive disorder with single episode, in partial remission ([address] F32.4    Balance problem R26.89     Past Medical History:   Diagnosis Date    ADD (attention deficit disorder) 2018    Asthma     Chest pain 2018    Dyslipidemia 2018    Fatigue 2018    HTN (hypertension) 2018    Hypertension     Other ill-defined conditions(799.89)     ADHD,BELL'S PALSY WEAKNESS  LT FACE    Psychiatric disorder DEPRESSION    S/P coronary artery stent placement 2018 PCI/MAVIS to LAD    STEMI (ST elevation myocardial infarction) ([facility] 2018    Syncope 2019    Tobacco abuse 2018     Past Surgical History:   Procedure Laterality Date    HX GI  1984    STOMACH ABDOMEN ACCIDENT    HX HEENT      TEETH EXTRACTION    HX ORTHOPAEDIC  1984    FRACTURE RT FEMUR    NEUROLOGICAL PROCEDURE UNLISTED      NECK     Social History     Socioeconomic History    Marital status:      Spouse name: Alexa Zavala Number of children: 2    Years of education: 15    Highest education level: 12th grade   Social Needs    Financial resource strain: Not hard at all   [employer] insecurity:     Worry: Never true     Inability: Never true   [employer] needs:     Medical: Patient refused     Non-medical: Patient refused   Tobacco Use    Smoking status: Current Every Day Smoker     Packs/day: 1.00     Years: 40.00     Pack years: 40.00     Types: Cigarettes     Last attempt to quit: 3/26/2019     Years since quittin.8    Smokeless tobacco: Never Used    Tobacco comment: started again  2019   Substance and Sexual Activity    Alcohol use: No     Alcohol/week: 0.0 standard drinks     Frequency: Never    Drug use: No    Sexual activity: Not Currently   Lifestyle    Physical activity:     Days per week: 0 days     Minutes per session: 0 min    Stress: Very much   Relationships    Social connections:     Talks on phone: More than three times a week     Gets together: Once a week     Attends [employer] service: Never     Active member of club or organization: No     Attends meetings of clubs or organizations: Never     Relationship status:    Other Topics Concern     Service No    Blood Transfusions Yes    Caffeine Concern Yes     Comment: coffee 1 pot    Occupational Exposure Yes     Comment: cig smoke    Hobby Hazards No    Sleep Concern Yes     Comment: does not sleep well,wakes up sweating,dog    Stress Concern Yes     Comment: high level daily -depression he states    Weight Concern Yes     Comment: more tired    Special Diet No    Back Care No     Comment: neck and low back surgery    Exercise No     Comment: does not exercise    Bike Helmet No     Comment: does not ride bike   [address] Yes     Comment: 1/2 time wears seatbelt    Self-Exams No     Family History   Problem Relation Age of Onset    Hypertension Mother     Diabetes Mother     No Known Problems Father     Lung Disease Paternal Aunt     Alcohol abuse Paternal Aunt     Cancer Sister         breast    Hypertension Sister     Hypertension Brother     Heart Disease Maternal Uncle     Hypertension Maternal Uncle     Diabetes Maternal Uncle     Alcohol abuse Paternal Uncle     Heart Disease Paternal Grandfather     Hypertension Sister     Macular Degen Sister      Allergies   Allergen Reactions    Bee Sting [Sting, Bee] Anaphylaxis     Current Outpatient Medications   Medication Sig Dispense Refill    losartan (COZAAR) 25 mg tablet TAKE 1 TABLET BY MOUTH ONCE DAILY. GENERIC EQUIVALENT FOR COZAAR 90 Tab 0    fluticasone propionate (FLONASE) 50 mcg/actuation nasal spray 2 Sprays by Both Nostrils route daily. 1 Bottle 0    ezetimibe (ZETIA) 10 mg tablet Take 1 Tab by mouth daily. 30 Tab 3    aspirin delayed-release 81 mg tablet Take 1 Tab by mouth daily. 30 Tab 11    isosorbide mononitrate ER (IMDUR) 30 mg tablet Take 1 Tab by mouth daily. 30 Tab 11    carvedilol (COREG) 6.25 mg tablet Take 1 Tab by mouth two (2) times a day. 180 Tab 3    ranolazine ER (RANEXA) 500 mg SR tablet TAKE 1 TABLET BY MOUTH TWICE DAILY 180 Tab 1    atorvastatin (LIPITOR) 80 mg tablet Take 1 Tab by mouth daily. 90 Tab 1    clopidogrel (PLAVIX) 75 mg tab Take 1 Tab by mouth daily. D/C Brilinta 90 Tab 3    atomoxetine (STRATTERA) 40 mg capsule Take 40 mg by mouth daily.       acetaminophen (TYLENOL) 500 mg tablet Take 2,000 mg by mouth two (2) times daily as needed for Pain.  albuterol (PROAIR HFA) 90 mcg/actuation inhaler Take 2 Puffs by inhalation every four (4) hours as needed for Wheezing.  nitroglycerin (NITROSTAT) 0.4 mg SL tablet 1 Tab by SubLINGual route every five (5) minutes as needed for Chest Pain. Up to 3 doses. 1 Bottle 1    DULoxetine (CYMBALTA) 60 mg capsule Take 60 mg by mouth daily. Review of Systems   Constitutional: Positive for malaise/fatigue. Negative for weight loss. Gastrointestinal: Negative for constipation and diarrhea. Musculoskeletal: Positive for back pain. Negative for joint pain. Neurological: Negative for tingling and focal weakness. Visit Vitals  /89 (BP 1 Location: Left arm, BP Patient Position: Sitting)   Pulse 92   Temp 97.7 °F (36.5 °C) (Oral)   Resp 12   Ht 5' 9\" (1.753 m)   Wt 216 lb (98 kg)   SpO2 96%   BMI 31.90 kg/m²     Physical Exam  Vitals signs and nursing note reviewed. Constitutional:       Appearance: Normal appearance. He is well-developed. HENT:      Head: Normocephalic and atraumatic. Eyes:      Conjunctiva/sclera: Conjunctivae normal.      Pupils: Pupils are equal, round, and reactive to light. Neck:      Musculoskeletal: Neck supple. Thyroid: No thyromegaly. Vascular: No carotid bruit. Cardiovascular:      Rate and Rhythm: Normal rate and regular rhythm. Chest Wall: PMI is not displaced. Pulses:           Dorsalis pedis pulses are 2+ on the right side and 2+ on the left side. Posterior tibial pulses are 2+ on the right side and 2+ on the left side. Heart sounds: Normal heart sounds. No murmur. No gallop. Pulmonary:      Effort: Pulmonary effort is normal.      Breath sounds: No wheezing, rhonchi or rales. Abdominal:      General: Bowel sounds are normal. There is no distension or abdominal bruit. Palpations: Abdomen is soft. There is no hepatomegaly, splenomegaly or mass.       Tenderness: There is no abdominal tenderness. Musculoskeletal:      Right lower leg: No edema. Left lower leg: No edema. Lymphadenopathy:      Cervical: No cervical adenopathy. Upper Body:      Right upper body: No supraclavicular adenopathy. Left upper body: No supraclavicular adenopathy. Skin:     General: Skin is warm and dry. Findings: No rash. Neurological:      Mental Status: He is alert and oriented to person, place, and time. Sensory: No sensory deficit. Motor: Motor function is intact. Gait: Gait is intact. Psychiatric:         Attention and Perception: Attention normal.         Mood and Affect: Mood normal.         Speech: Speech normal.         Behavior: Behavior normal.       Lab Results   Component Value Date/Time    Hemoglobin A1c 5.9 (H) 10/15/2019 08:43 AM     Lab Results   Component Value Date/Time    Cholesterol, total 154 10/15/2019 08:42 AM    HDL Cholesterol 32 (L) 10/15/2019 08:42 AM    LDL, calculated 88 10/15/2019 08:42 AM    VLDL, calculated 34 10/15/2019 08:42 AM    Triglyceride 172 (H) 10/15/2019 08:42 AM    CHOL/HDL Ratio 6.1 (H) 09/26/2018 03:34 AM     Lab Results   Component Value Date/Time    Sodium 144 10/15/2019 08:42 AM    Potassium 4.9 10/15/2019 08:42 AM    Chloride 104 10/15/2019 08:42 AM    CO2 25 10/15/2019 08:42 AM    Anion gap 4 (L) 05/24/2019 04:50 PM    Glucose 102 (H) 10/15/2019 08:42 AM    BUN 14 10/15/2019 08:42 AM    Creatinine 1.12 10/15/2019 08:42 AM    BUN/Creatinine ratio 13 10/15/2019 08:42 AM    GFR est AA 85 10/15/2019 08:42 AM    GFR est non-AA 74 10/15/2019 08:42 AM    Calcium 9.5 10/15/2019 08:42 AM    Bilirubin, total 0.2 10/15/2019 08:42 AM    AST (SGOT) 11 10/15/2019 08:42 AM    Alk.  phosphatase 111 10/15/2019 08:42 AM    Protein, total 6.6 10/15/2019 08:42 AM    Albumin 4.2 10/15/2019 08:42 AM    Globulin 3.8 05/24/2019 04:50 PM    A-G Ratio 1.8 10/15/2019 08:42 AM    ALT (SGPT) 12 10/15/2019 08:42 AM         ASSESSMENT and PLAN    ICD-10-CM ICD-9-CM    1. Essential hypertension I10 401.9    2. Coronary artery disease of native artery of native heart with stable angina pectoris ([facility] I25.118 414.01      413.9    3. Glucose intolerance (impaired glucose tolerance) R73.02 790.22 HEMOGLOBIN A1C WITH EAG   4. Hyperlipidemia, mixed E78.2 272.2 atorvastatin (LIPITOR) 80 mg tablet      atorvastatin (LIPITOR) 80 mg tablet      LIPID PANEL   5. Ischemic cardiomyopathy I25.5 414.8    6. Smokes 1 pack of cigarettes per day F17.210 305.1    7. Severe obesity (BMI 35.0-39. 9) with comorbidity ([facility] E66.01 278.01    8. Screening for depression Z13.31 V79.0 BEHAV ASSMT W/SCORE & DOCD/STAND INSTRUMENT     Diagnoses and all orders for this visit:    1. Essential hypertension  Hypertension is borderline controlled. 2. Coronary artery disease of native artery of native heart with stable angina pectoris ([facility]  Stable taking antiplatelet agent and statin. 3. Glucose intolerance (impaired glucose tolerance)  A1c stable in mid prediabetes zone. -     HEMOGLOBIN A1C WITH EAG; Future    4. Hyperlipidemia, mixed  Hyperlipidemia is borderline controlled. Zetia added after above lab was done. -     atorvastatin (LIPITOR) 80 mg tablet; Take 1 Tab by mouth daily. -     atorvastatin (LIPITOR) 80 mg tablet; Take 1 Tab by mouth daily for 14 days.  -     LIPID PANEL; Future    5. Ischemic cardiomyopathy  Stable taking antiplatelet agent and statin. 6. Smokes 1 pack of cigarettes per day  Encouraged to stop smoking to decrease risk of cancer, pulmonary, and cardiovascular disease. 7. Severe obesity (BMI 35.0-39. 9) with comorbidity ([facility]  Discussed using smaller plate for portion control, keeping a food diary for awareness of food consumed, checking weight often, and increasing physical activity. Will re-evaluate next visit.      8. Screening for depression-negative  -     BEHAV ASSMT W/SCORE & DOCD/STAND INSTRUMENT      Other orders  -     albuterol (PROAIR HFA) 90 mcg/actuation inhaler; Take 2 Puffs by inhalation every four (4) hours as needed for Wheezing. Follow-up and Dispositions    · Return in about 6 months (around 7/31/2020) for HTN, chol, CAD, gluc   Fasting lab one week proir  . lab results and schedule of future lab studies reviewed with patient  reviewed diet, exercise and weight control  I have discussed the diagnosis, evaluation and treatment options and the intended plan with the patient. Patient understands and is in agreement. The patient has received an after-visit summary and questions were answered concerning future plans. I have discussed side effects and warnings of any new medications with the patient as well.

## 2020-01-31 NOTE — PROGRESS NOTES
Shahid Patterson  Identified pt with two pt identifiers(name and ). Chief Complaint   Patient presents with    Hypertension    Coronary Artery Disease       Reviewed record In preparation for visit and have obtained necessary documentation. Has info on advanced directive but has not filled them out. 1. Have you been to the ER, urgent care clinic or hospitalized since your last visit? [facility], 10/21/19    2. Have you seen or consulted any other health care providers outside of the [address] [place] [address] since your last visit? Include any pap smears or colon screening. Cardiology     Vitals reviewed with provider.     Health Maintenance reviewed:     Health Maintenance Due   Topic    FOBT Q 1 YEAR AGE 50-75           Wt Readings from Last 3 Encounters:   20 216 lb (98 kg)   19 214 lb (97.1 kg)   10/21/19 216 lb (98 kg)        Temp Readings from Last 3 Encounters:   20 97.7 °F (36.5 °C) (Oral)   19 98.6 °F (37 °C)   10/21/19 97.9 °F (36.6 °C)        BP Readings from Last 3 Encounters:   20 136/89   19 146/69   10/21/19 (!) 181/98        Pulse Readings from Last 3 Encounters:   20 92   19 (!) 101   10/21/19 88        Vitals:    20 1329   BP: 136/89   Pulse: 92   Resp: 12   Temp: 97.7 °F (36.5 °C)   TempSrc: Oral   SpO2: 96%   Weight: 216 lb (98 kg)   Height: 5' 9\" (1.753 m)   PainSc:   0 - No pain          Learning Assessment:   :       Learning Assessment 2019 10/5/2018   PRIMARY LEARNER Patient Patient   PRIMARY LANGUAGE ENGLISH ENGLISH   LEARNER PREFERENCE PRIMARY READING DEMONSTRATION   ANSWERED BY patient patient   RELATIONSHIP SELF SELF        Depression Screening:   :       3 most recent PHQ Screens 2020   Little interest or pleasure in doing things Not at all   Feeling down, depressed, irritable, or hopeless Not at all   Total Score PHQ 2 0   Trouble falling or staying asleep, or sleeping too much -   Feeling tired or having little energy -   Poor appetite, weight loss, or overeating -   Feeling bad about yourself - or that you are a failure or have let yourself or your family down -   Trouble concentrating on things such as school, work, reading, or watching TV -   Moving or speaking so slowly that other people could have noticed; or the opposite being so fidgety that others notice -   Thoughts of being better off dead, or hurting yourself in some way -   PHQ 9 Score -   How difficult have these problems made it for you to do your work, take care of your home and get along with others -        Fall Risk Assessment:   :       Fall Risk Assessment, last 12 mths 1/9/2019   Able to walk? Yes   Fall in past 12 months? No        Abuse Screening:   :       Abuse Screening Questionnaire 3/22/2019 1/9/2019   Do you ever feel afraid of your partner? N N   Are you in a relationship with someone who physically or mentally threatens you? N N   Is it safe for you to go home?  Y Y        ADL Screening:   :       ADL Assessment 1/9/2019   Feeding yourself No Help Needed   Getting from bed to chair No Help Needed   Getting dressed No Help Needed   Bathing or showering No Help Needed   Walk across the room (includes cane/walker) No Help Needed   Using the telphone No Help Needed   Taking your medications No Help Needed   Preparing meals No Help Needed   Managing money (expenses/bills) No Help Needed   Moderately strenuous housework (laundry) No Help Needed   Shopping for personal items (toiletries/medicines) No Help Needed   Shopping for groceries No Help Needed   Driving No Help Needed   Climbing a flight of stairs No Help Needed   Getting to places beyond walking distances No Help Needed

## 2020-02-05 ENCOUNTER — OFFICE VISIT (OUTPATIENT)
Dept: CARDIOLOGY CLINIC | Age: 55
End: 2020-02-05

## 2020-02-05 VITALS
WEIGHT: 216 LBS | DIASTOLIC BLOOD PRESSURE: 84 MMHG | BODY MASS INDEX: 31.99 KG/M2 | OXYGEN SATURATION: 97 % | RESPIRATION RATE: 18 BRPM | HEIGHT: 69 IN | HEART RATE: 94 BPM | SYSTOLIC BLOOD PRESSURE: 130 MMHG

## 2020-02-05 DIAGNOSIS — I25.5 ISCHEMIC CARDIOMYOPATHY: Chronic | ICD-10-CM

## 2020-02-05 DIAGNOSIS — I50.22 CHRONIC SYSTOLIC CONGESTIVE HEART FAILURE (HCC): ICD-10-CM

## 2020-02-05 DIAGNOSIS — I10 ESSENTIAL HYPERTENSION: ICD-10-CM

## 2020-02-05 DIAGNOSIS — E78.5 DYSLIPIDEMIA: ICD-10-CM

## 2020-02-05 DIAGNOSIS — I25.10 ARTERIOSCLEROTIC HEART DISEASE (ASHD): Primary | ICD-10-CM

## 2020-02-05 DIAGNOSIS — Z95.810 ICD (IMPLANTABLE CARDIOVERTER-DEFIBRILLATOR) IN PLACE: ICD-10-CM

## 2020-02-05 DIAGNOSIS — Z95.5 S/P CORONARY ARTERY STENT PLACEMENT: ICD-10-CM

## 2020-02-05 NOTE — PROGRESS NOTES
Verified patient with 2 identifiers   Reviewed record in preparation for visit and obtained necessary documentation. Chief Complaint   Patient presents with    Cardiomyopathy     6 Month follow up -    Palpitations     flutters and palpitations three to four times a week     Shortness of Breath     upon excitementn     1. Have you been to the ER, urgent care clinic since your last visit? Hospitalized since your last visit? Yes [id number] 3/23/19 for four days for syncope     2. Have you seen or consulted any other health care providers outside of the [address] [place] [address] since your last visit? Include any pap smears or colon screening.  No

## 2020-02-05 NOTE — PROGRESS NOTES
[address] [place] [address], [address] [facility]  [phone]     Subjective:      Pedro Mckeon is a 53 y.o. male is here for routine f/u. Wt has been stable since last OV. He is trying to quit smoking, down to less than 1 PPD. He doesn't engage in formal exercise d/t balance problem. Unchanged intermittent palpitation at night, doesn't last.  Holter in 1/29 showed SR throughout. The patient denies chest pain/ shortness of breath, orthopnea, PND, LE edema, syncope, or presyncope. Patient Active Problem List    Diagnosis Date Noted    Major depressive disorder with single episode, in partial remission ([facility] 09/15/2019    Balance problem 09/15/2019    FRANTZ (obstructive sleep apnea) 09/13/2019    Glucose intolerance (impaired glucose tolerance) 08/11/2019    Severe obesity (BMI 35.0-39. 9) with comorbidity ([address] 08/11/2019    Ischemic cardiomyopathy 03/22/2019    ICD (implantable cardioverter-defibrillator) in place 03/22/2019    Fatigue 11/30/2018    Coronary artery disease of native artery of native heart with stable angina pectoris ([facility] [address] 10/05/2018    Essential hypertension 09/25/2018    Hyperlipidemia, mixed 09/25/2018    ADD (attention deficit disorder) 09/25/2018    Smokes 1 pack of cigarettes per day 09/25/2018      Ross Leung MD  Past Medical History:   Diagnosis Date    ADD (attention deficit disorder) 9/25/2018    Asthma     Chest pain 11/30/2018    Dyslipidemia 9/25/2018    Fatigue 11/30/2018    HTN (hypertension) 9/25/2018    Hypertension     Other ill-defined conditions(799.89)     ADHD,BELL'S PALSY WEAKNESS  LT FACE    Psychiatric disorder     DEPRESSION    S/P coronary artery stent placement 9/25/2018 9/25/18 PCI/MAVIS to LAD    STEMI (ST elevation myocardial infarction) ([facility] [address] 9/25/2018    Syncope 1/25/2019    Tobacco abuse 9/25/2018      Past Surgical History:   Procedure Laterality Date    HX GI  1984    STOMACH ABDOMEN ACCIDENT    HX HEENT      TEETH EXTRACTION    HX ORTHOPAEDIC  1984    FRACTURE RT FEMUR    NEUROLOGICAL PROCEDURE UNLISTED      NECK     Allergies   Allergen Reactions    Bee Sting [Sting, Bee] Anaphylaxis      Family History   Problem Relation Age of Onset    Hypertension Mother     Diabetes Mother     No Known Problems Father     Lung Disease Paternal Aunt     Alcohol abuse Paternal Aunt     Cancer Sister         breast    Hypertension Sister     Hypertension Brother     Heart Disease Maternal Uncle     Hypertension Maternal Uncle     Diabetes Maternal Uncle     Alcohol abuse Paternal Uncle     Heart Disease Paternal Grandfather     Hypertension Sister     Macular Degen Sister       Social History     Socioeconomic History    Marital status:      Spouse name: Freddy Martin Number of children: 2    Years of education: 15    Highest education level: 12th grade   Occupational History    Not on file   Social Needs    Financial resource strain: Not hard at all   [email or web address] insecurity:     Worry: Never true     Inability: Never true   [employer] needs:     Medical: Patient refused     Non-medical: Patient refused   Tobacco Use    Smoking status: Current Every Day Smoker     Packs/day: 1.00     Years: 40.00     Pack years: 40.00     Types: Cigarettes     Last attempt to quit: 3/26/2019     Years since quittin.8    Smokeless tobacco: Never Used    Tobacco comment: started again  2019   Substance and Sexual Activity    Alcohol use: No     Alcohol/week: 0.0 standard drinks     Frequency: Never    Drug use: No    Sexual activity: Not Currently   Lifestyle    Physical activity:     Days per week: 0 days     Minutes per session: 0 min    Stress: Very much   Relationships    Social connections:     Talks on phone: More than three times a week     Gets together: Once a week     Attends Buddhist service: Never     Active member of club or organization: No     Attends meetings of clubs or organizations: Never     Relationship status:     Intimate partner violence:     Fear of current or ex partner: No     Emotionally abused: No     Physically abused: No     Forced sexual activity: No   Other Topics Concern     Service No    Blood Transfusions Yes    Caffeine Concern Yes     Comment: coffee 1 pot    Occupational Exposure Yes     Comment: cig smoke    Hobby Hazards No    Sleep Concern Yes     Comment: does not sleep well,wakes up sweating,dog    Stress Concern Yes     Comment: high level daily -depression he states    Weight Concern Yes     Comment: more tired    Special Diet No    Back Care No     Comment: neck and low back surgery    Exercise No     Comment: does not exercise    Bike Helmet No     Comment: does not ride bike   [address] Yes     Comment: 1/2 time wears seatbelt    Self-Exams No   Social History Narrative    Not on file      Current Outpatient Medications   Medication Sig    ranolazine ER (RANEXA) 500 mg SR tablet TAKE 1 TABLET BY MOUTH TWICE DAILY    ezetimibe (ZETIA) 10 mg tablet TAKE 1 TABLET BY MOUTH DAILY    atorvastatin (LIPITOR) 80 mg tablet Take 1 Tab by mouth daily.  albuterol (PROAIR HFA) 90 mcg/actuation inhaler Take 2 Puffs by inhalation every four (4) hours as needed for Wheezing.  losartan (COZAAR) 25 mg tablet TAKE 1 TABLET BY MOUTH ONCE DAILY. GENERIC EQUIVALENT FOR COZAAR    aspirin delayed-release 81 mg tablet Take 1 Tab by mouth daily.  isosorbide mononitrate ER (IMDUR) 30 mg tablet Take 1 Tab by mouth daily.  carvedilol (COREG) 6.25 mg tablet Take 1 Tab by mouth two (2) times a day.  atomoxetine (STRATTERA) 40 mg capsule Take 40 mg by mouth daily.  acetaminophen (TYLENOL) 500 mg tablet Take 2,000 mg by mouth two (2) times daily as needed for Pain.  nitroglycerin (NITROSTAT) 0.4 mg SL tablet 1 Tab by SubLINGual route every five (5) minutes as needed for Chest Pain. Up to 3 doses.     DULoxetine (CYMBALTA) 60 mg capsule Take 60 mg by mouth daily. No current facility-administered medications for this visit. Review of Symptoms:  11 systems reviewed, negative other than as stated in the HPI    Physical ExamPhysical Exam:    Vitals:    02/05/20 1501 02/05/20 1516   BP: 122/82 130/84   Pulse: 94    Resp: 18    SpO2: 97%    Weight: 216 lb (98 kg)    Height: 5' 9\" (1.753 m)      Body mass index is 31.9 kg/m². General PE  Gen:  NAD  Mental Status - Alert. General Appearance - Not in acute distress. HEENT:  PERRL, no carotid bruits or JVD  Chest and Lung Exam   Inspection: Accessory muscles - No use of accessory muscles in breathing. Auscultation:   Breath sounds: - Normal.   Cardiovascular   Inspection: Jugular vein - Bilateral - Inspection Normal.   Palpation/Percussion:   Apical Impulse: - Normal.   Auscultation: Rhythm - Regular. Heart Sounds - S1 WNL and S2 WNL. No S3 or S4. Murmurs & Other Heart Sounds: Auscultation of the heart reveals - No Murmurs. Peripheral Vascular   Upper Extremity: Inspection - Bilateral - No Cyanotic nailbeds or Digital clubbing. Lower Extremity:   Palpation: Edema - Bilateral - No edema. Abdomen:   Soft, non-tender, bowel sounds are active.   Neuro: A&O times 3, CN and motor grossly WNL    Labs:   Lab Results   Component Value Date/Time    Cholesterol, total 154 10/15/2019 08:42 AM    Cholesterol, total 165 07/30/2019 08:38 AM    Cholesterol, total 209 (H) 09/26/2018 03:34 AM    HDL Cholesterol 32 (L) 10/15/2019 08:42 AM    HDL Cholesterol 30 (L) 07/30/2019 08:38 AM    HDL Cholesterol 34 09/26/2018 03:34 AM    LDL, calculated 88 10/15/2019 08:42 AM    LDL, calculated 91 07/30/2019 08:38 AM    LDL, calculated 128.8 (H) 09/26/2018 03:34 AM    Triglyceride 172 (H) 10/15/2019 08:42 AM    Triglyceride 222 (H) 07/30/2019 08:38 AM    Triglyceride 231 (H) 09/26/2018 03:34 AM    CHOL/HDL Ratio 6.1 (H) 09/26/2018 03:34 AM     Lab Results   Component Value Date/Time    CK 78 05/24/2019 04:50 PM     Lab Results   Component Value Date/Time    Sodium 144 10/15/2019 08:42 AM    Potassium 4.9 10/15/2019 08:42 AM    Chloride 104 10/15/2019 08:42 AM    CO2 25 10/15/2019 08:42 AM    Anion gap 4 (L) 05/24/2019 04:50 PM    Glucose 102 (H) 10/15/2019 08:42 AM    BUN 14 10/15/2019 08:42 AM    Creatinine 1.12 10/15/2019 08:42 AM    BUN/Creatinine ratio 13 10/15/2019 08:42 AM    GFR est AA 85 10/15/2019 08:42 AM    GFR est non-AA 74 10/15/2019 08:42 AM    Calcium 9.5 10/15/2019 08:42 AM    Bilirubin, total 0.2 10/15/2019 08:42 AM    AST (SGOT) 11 10/15/2019 08:42 AM    Alk. phosphatase 111 10/15/2019 08:42 AM    Protein, total 6.6 10/15/2019 08:42 AM    Albumin 4.2 10/15/2019 08:42 AM    Globulin 3.8 05/24/2019 04:50 PM    A-G Ratio 1.8 10/15/2019 08:42 AM    ALT (SGPT) 12 10/15/2019 08:42 AM       EKG:  SR     Assessment:     Assessment:      1. Arteriosclerotic heart disease (ASHD)    2. Ischemic cardiomyopathy    3. S/P coronary artery stent placement    4. Dyslipidemia    5. Essential hypertension    6. ICD (implantable cardioverter-defibrillator) in place    7. Chronic systolic congestive heart failure ([address]        Orders Placed This Encounter    CK    LIPID PANEL    METABOLIC PANEL, COMPREHENSIVE    AMB POC EKG ROUTINE W/ 12 LEADS, INTER & REP     Order Specific Question:   Reason for Exam:     Answer:   routine        Plan:     Patient presents for follow up, last seen by us in 7/19. Wt has been stable since last OV. He is trying to quit smoking, down to less than 1 PPD. He doesn't engage in formal exercise d/t balance problem. Unchanged intermittent palpitation at night, doesn't last.  Holter in 1/29 showed SR throughout. ASHD  S/p anterior STEMI in 9/2018, with peak troponin 100, with MAVIS to the prox LAD.  He had 80% stenosis of the proximal third of the OM1 and RPL.   Fixed defect consistent with prior myocardial infarction per NST 5/19  Echo in 1/2019 with reduced LVEF 40-45% with severe hypokinesis of the apical anterior, apical inferior, apical septal and apical lateral walls. Continue with ASA,  Imdur, BB Ranexa and statin therapy. May stop plavix now       ICM, s/p ICD  Echo in 1/2019 with reduced LVEF 40-45%    No events noted per device check 1/2020  Wt stable since last OV  Continue with Coreg, losartan. HTN  Controlled with current therapy    HLD  10/19 LDL at 88. On statin. Added zetia to therapy 10/19  Will reorder labs     Tobacco abuse, down to less than 1PPD :   Counseled on smoking cessation --- trying to quit    FRANTZ  Not wearing CPAP     Hx vertigo  Previously Followed by Dr. Melissa Keita on diet and exercise- eventual goal of 30-60 minutes 5-7 times a week as per AHA guidelines.        Continue current care and f/u in 6 months.       Candelario Bellamy MD

## 2020-04-13 RX ORDER — LOSARTAN POTASSIUM 25 MG/1
TABLET ORAL
Qty: 90 TAB | Refills: 0 | Status: SHIPPED | OUTPATIENT
Start: 2020-04-13 | End: 2020-07-14

## 2020-04-22 ENCOUNTER — OFFICE VISIT (OUTPATIENT)
Dept: CARDIOLOGY CLINIC | Age: 55
End: 2020-04-22

## 2020-04-22 DIAGNOSIS — Z95.810 ICD (IMPLANTABLE CARDIOVERTER-DEFIBRILLATOR) IN PLACE: Primary | ICD-10-CM

## 2020-04-22 DIAGNOSIS — I25.5 ISCHEMIC CARDIOMYOPATHY: ICD-10-CM

## 2020-04-27 RX ORDER — EZETIMIBE 10 MG/1
TABLET ORAL
Qty: 90 TAB | Refills: 0 | Status: SHIPPED | OUTPATIENT
Start: 2020-04-27 | End: 2020-07-30

## 2020-07-21 ENCOUNTER — OFFICE VISIT (OUTPATIENT)
Dept: INTERNAL MEDICINE CLINIC | Facility: CLINIC | Age: 55
End: 2020-07-21

## 2020-07-21 ENCOUNTER — TELEPHONE (OUTPATIENT)
Dept: CARDIOLOGY CLINIC | Age: 55
End: 2020-07-21

## 2020-07-21 VITALS
RESPIRATION RATE: 18 BRPM | BODY MASS INDEX: 32.2 KG/M2 | WEIGHT: 217.4 LBS | HEART RATE: 93 BPM | TEMPERATURE: 97.7 F | HEIGHT: 69 IN | OXYGEN SATURATION: 96 % | SYSTOLIC BLOOD PRESSURE: 107 MMHG | DIASTOLIC BLOOD PRESSURE: 76 MMHG

## 2020-07-21 DIAGNOSIS — R73.02 IGT (IMPAIRED GLUCOSE TOLERANCE): ICD-10-CM

## 2020-07-21 DIAGNOSIS — I50.22 CHRONIC SYSTOLIC CONGESTIVE HEART FAILURE (HCC): ICD-10-CM

## 2020-07-21 DIAGNOSIS — Z12.11 SCREENING FOR COLON CANCER: ICD-10-CM

## 2020-07-21 DIAGNOSIS — I25.118 CORONARY ARTERY DISEASE OF NATIVE ARTERY OF NATIVE HEART WITH STABLE ANGINA PECTORIS (HCC): ICD-10-CM

## 2020-07-21 DIAGNOSIS — R42 VERTIGO: ICD-10-CM

## 2020-07-21 DIAGNOSIS — F17.210 SMOKES 1 PACK OF CIGARETTES PER DAY: ICD-10-CM

## 2020-07-21 DIAGNOSIS — I10 ESSENTIAL HYPERTENSION: Primary | ICD-10-CM

## 2020-07-21 DIAGNOSIS — E78.2 HYPERLIPIDEMIA, MIXED: ICD-10-CM

## 2020-07-21 DIAGNOSIS — R31.0 GROSS HEMATURIA: ICD-10-CM

## 2020-07-21 DIAGNOSIS — F98.8 ATTENTION DEFICIT DISORDER (ADD) WITHOUT HYPERACTIVITY: ICD-10-CM

## 2020-07-21 LAB
ALBUMIN SERPL-MCNC: 4 G/DL (ref 3.8–4.9)
ALBUMIN/GLOB SERPL: 1.7 {RATIO} (ref 1.2–2.2)
ALP SERPL-CCNC: 115 IU/L (ref 39–117)
ALT SERPL-CCNC: 11 IU/L (ref 0–44)
AST SERPL-CCNC: 12 IU/L (ref 0–40)
BILIRUB SERPL-MCNC: 0.2 MG/DL (ref 0–1.2)
BUN SERPL-MCNC: 11 MG/DL (ref 6–24)
BUN/CREAT SERPL: 11 (ref 9–20)
CALCIUM SERPL-MCNC: 9.3 MG/DL (ref 8.7–10.2)
CHLORIDE SERPL-SCNC: 104 MMOL/L (ref 96–106)
CHOLEST SERPL-MCNC: 129 MG/DL (ref 100–199)
CHOLEST SERPL-MCNC: 129 MG/DL (ref 100–199)
CK SERPL-CCNC: 79 U/L (ref 41–331)
CO2 SERPL-SCNC: 23 MMOL/L (ref 20–29)
CREAT SERPL-MCNC: 1.01 MG/DL (ref 0.76–1.27)
EST. AVERAGE GLUCOSE BLD GHB EST-MCNC: 128 MG/DL
GLOBULIN SER CALC-MCNC: 2.3 G/DL (ref 1.5–4.5)
GLUCOSE SERPL-MCNC: 111 MG/DL (ref 65–99)
HBA1C MFR BLD: 6.1 % (ref 4.8–5.6)
HDLC SERPL-MCNC: 34 MG/DL
HDLC SERPL-MCNC: 34 MG/DL
INTERPRETATION, 910389: NORMAL
LDLC SERPL CALC-MCNC: 66 MG/DL (ref 0–99)
LDLC SERPL CALC-MCNC: 66 MG/DL (ref 0–99)
POTASSIUM SERPL-SCNC: 4.3 MMOL/L (ref 3.5–5.2)
PROT SERPL-MCNC: 6.3 G/DL (ref 6–8.5)
SODIUM SERPL-SCNC: 141 MMOL/L (ref 134–144)
TRIGL SERPL-MCNC: 144 MG/DL (ref 0–149)
TRIGL SERPL-MCNC: 146 MG/DL (ref 0–149)
VLDLC SERPL CALC-MCNC: 29 MG/DL (ref 5–40)
VLDLC SERPL CALC-MCNC: 29 MG/DL (ref 5–40)

## 2020-07-21 NOTE — PROGRESS NOTES
Identified pt with two pt identifiers(name and ). Reviewed record in preparation for visit and have obtained necessary documentation. Chief Complaint   Patient presents with    Hypertension    Cholesterol Problem    Coronary Artery Disease    Blood sugar problem      Vitals:    20 0833 20 0837   BP: 142/89 107/76   BP 1 Location: Left arm Left arm   BP Patient Position: Sitting Sitting   Pulse: 93    Resp: 18    Temp: 97.7 °F (36.5 °C)    TempSrc: Oral    SpO2: 96%    Weight: 217 lb 6.4 oz (98.6 kg)    Height: 5' 9\" (1.753 m)        Health Maintenance Due   Topic    FOBT Q1Y Age 54-65        Coordination of Care Questionnaire:  :   1) Have you been to an emergency room, urgent care, or hospitalized since your last visit? If yes, where when, and reason for visit? NO      2. Have seen or consulted any other health care provider since your last visit? If yes, where when, and reason for visit? NO      Patient is accompanied by wife I have received verbal consent from Silvio Khan to discuss any/all medical information while they are present in the room.

## 2020-07-21 NOTE — PATIENT INSTRUCTIONS
Learning About Self-Care for Heart Failure  What is self-care for heart failure? Heart failure usually gets worse over time. But there are many things you can do to feel better, avoid the hospital, and live longer. Self-care means managing your health by doing certain things every day, like weighing yourself. It's about knowing which symptoms to watch for so you can avoid getting worse. When you practice good self-care, you know when it's time to call your doctor and when your heart failure has turned into an emergency. The list below can help. Top 5 self-care tips for every day   1. Take your medicines as prescribed. This gives them the best chance of helping you. 2. Weigh yourself every day. This helps you watch for signs that you're getting worse. Weight gain may be a sign that your body is holding on to too much fluid. Weigh yourself at the same time each day, using the same scale, on a hard, flat surface. The best time is in the morning after you go to the bathroom and before you eat or drink anything. 3. Keep a daily record of your symptoms. Checking your symptoms helps you see what symptoms are normal for you and if they change or get worse. 4. Limit sodium. This helps keep fluid from building up and may help you feel better. Your doctor can tell you how much sodium is right for you. An example is less than 3,000 milligrams (mg) a day. Try limiting the salt you eat at home, and by watching for \"hidden\" sodium when you eat out or shop for food. 5. Try to exercise throughout the week. Exercise makes your heart stronger and can help you avoid symptoms. Walking is a great way to get exercise. If your doctor says it's safe, start out with some short walks. Then slowly build up to longer ones. Some people with heart failure also may need to limit how much fluid they drink each day. Ask your doctor if this is true for you and, if it is, how much fluid is safe for you to drink each day.   When should you act? Try to become familiar with signs that mean your heart failure is getting worse. If you need help, talk with your doctor about making a personal plan. Here are some things to watch for as you practice your daily self-care. Call your doctor if:  · You have sudden weight gain, such as more than 2 to 3 pounds in a day or 5 pounds in a week. (Your doctor may suggest a different range of weight gain.)  · You have new or worse swelling in your feet, ankles, or legs. · Your breathing gets worse. Activities that did not make you short of breath before are hard for you now. · Your breathing when you lie down is worse than usual, or you wake up at night needing to catch your breath. Be sure to make and go to all of your follow-up appointments. And it's always a good idea to call your doctor anytime you have a sudden change in symptoms. When is it an emergency? Sometimes the symptoms get worse very quickly. This is called sudden heart failure. It causes fluid to build up in your lungs. Sudden heart failure is an emergency. If you have any of these symptoms, you need care right away. [id number]:   · You have severe shortness of breath. · You have an irregular or fast heartbeat. · You cough up foamy, pink mucus. What else can you do to stay healthy? There are other things you can do to take care of your body and your heart. These things will help you feel better. And they will also reduce your risk of heart attack and stroke. · Try to stay at a healthy weight. Eat a healthy diet with lots of fresh fruit, vegetables, and whole grains. · If you smoke, quit. · Limit the amount of alcohol you drink. · Keep high blood pressure and diabetes under control. If you need help, talk with your doctor. If your doctor has not set you up with a cardiac rehabilitation (rehab) program, talk to him or her about whether that is right for you.  Cardiac rehab includes exercise, help with diet and lifestyle changes, and emotional support. Also let your doctor know if:  · You're having trouble sleeping. Sleep is important to your well-being. It also helps your heart work the way it's supposed to. Your doctor can help you decide if you need treatment for sleep problems. · You're feeling sad or hopeless much of the time, or you are worried and anxious. Heart failure can be hard on your emotions. Treatment with counseling and medicine can help. And when you feel better, you're more likely to take care of yourself. · You think you may have a problem with alcohol or drug use. You and your doctor can decide if you have a problem and what type of treatment might help you. Follow-up care is a key part of your treatment and safety. Be sure to make and go to all appointments, and call your doctor if you are having problems. It's also a good idea to know your test results and keep a list of the medicines you take. Where can you learn more? Go to http://amor-liam.info/  Enter H262 in the search box to learn more about \"Learning About Self-Care for Heart Failure. \"  Current as of: December 16, 2019               Content Version: 12.5  © 9921-2591 [employer]. Care instructions adapted under license by [employer] (which disclaims liability or warranty for this information). If you have questions about a medical condition or this instruction, always ask your healthcare professional. [place] [address] any warranty or liability for your use of this information.

## 2020-07-21 NOTE — PROGRESS NOTES
Lipids at goal with the addition of zetia--continue zetia and statin  Lytes, kidney/liver fxn stable

## 2020-07-21 NOTE — PROGRESS NOTES
CC:   Chief Complaint   Patient presents with    Hypertension    Cholesterol Problem    Coronary Artery Disease    Blood sugar problem       HISTORY OF PRESENT ILLNESS  Sourav Hicks is a 53 y.o. male. Accompanied by his wife. Presents to establish care with Dr. Branden Morrison. Former PCP was Dr. Mary Carmen Mast at this clinic who is retiring soon. Last saw her 6 months ago. He has hypertension, hyperlipidemia, coronary artery disease, history of MI in 9/18 and cardiac stent (MAVIS to proximal LAD), ischemic cardiomyopathy, CHF, ICD in place, glucose intolerance, tobacco use, FRANTZ not on CPAP, vertigo, and obesity. He complains of dizzy spells over the past year. Saw Dr. Zachariah Cochran and later a neurologist, diagnosed as vertigo, had vestibular rehabilitation with no improvement. Has tried meclizine twice before with no improvement. Dizziness occurs when he lies down or stands up too fast.  Also complains of SOB, decreased exercise tolerance, and occasional heart flutters. Has next Cardiology appointment on 8/12/20. Also complains of having episodes of blood in his urine and right lower back pain that he thinks are due to kidney stones. Has history of kidney stones. Cardiovascular Review  The patient has hypertension, hyperlipidemia, coronary artery disease, history of prior MI and CHF. He reports taking medications as instructed, no medication side effects noted. Diet and Lifestyle: generally follows a low fat low cholesterol diet, generally follows a low sodium diet, sedentary. Lab review: awaiting lab results done yesterday. Echo (TTE) on 1/9/19: LVEF 40-45%, severe hypokinesis of the apical anterior, apical inferior, apical septal, and apical lateral wall(s), akinesis of apical wall, mild concentric hypertrophy. Nuclear stress test 5/17/19: fixed defect consistent with previous MI. Other Providers: Dr. Stephanie Gould NP (Cardiology), Dr. Ibeth Alexandra (Cardiology-EP), Dr. Bunny Moya Hx  .  Has 2 stepsons, no grandchildren. 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 Smokes 1 ppd. Stays active but no formal exercise. Health Maintenance  Colonoscopy: due for FIT      ROS  A complete review of systems was performed and is negative except for those mentioned in the HPI. Patient Active Problem List   Diagnosis Code    Essential hypertension I10    Hyperlipidemia, mixed E78.2    ADD (attention deficit disorder) F98.8    Smokes 1 pack of cigarettes per day F17.210    Coronary artery disease of native artery of native heart with stable angina pectoris ([facility] [address] I25.118    Fatigue R53.83    Ischemic cardiomyopathy I25.5    ICD (implantable cardioverter-defibrillator) in place Z95.810    Glucose intolerance (impaired glucose tolerance) R73.02    Severe obesity (BMI 35.0-39. 9) with comorbidity ([facility] [address] E66.01    FRANTZ (obstructive sleep apnea) G47.33    Major depressive disorder with single episode, in partial remission ([facility] [address] F32.4    Balance problem R26.89     Past Medical History:   Diagnosis Date    ADD (attention deficit disorder) 9/25/2018    Asthma     Chest pain 11/30/2018    Dyslipidemia 9/25/2018    Fatigue 11/30/2018    HTN (hypertension) 9/25/2018    Hypertension     Other ill-defined conditions(799.89)     ADHD,BELL'S PALSY WEAKNESS  LT FACE    Psychiatric disorder     DEPRESSION    S/P coronary artery stent placement 9/25/2018 9/25/18 PCI/MAVIS to LAD    STEMI (ST elevation myocardial infarction) ([facility] [address] 9/25/2018    Syncope 1/25/2019    Tobacco abuse 9/25/2018     Allergies   Allergen Reactions    Bee Sting [Sting, Bee] Anaphylaxis       Current Outpatient Medications   Medication Sig Dispense Refill    isosorbide mononitrate ER (IMDUR) 30 mg tablet TAKE 1 TABLET BY MOUTH EVERY DAY 90 Tab 0    aspirin delayed-release 81 mg tablet TAKE 1 TABLET BY MOUTH EVERY DAY 90 Tab 0    losartan (COZAAR) 25 mg tablet TAKE 1 TABLET BY MOUTH DAILY GENERIC EQUIVALENT FOR COZAAR 90 Tab 3    ezetimibe (ZETIA) 10 mg tablet TAKE 1 TABLET BY MOUTH DAILY 90 Tab 0    ranolazine ER (RANEXA) 500 mg SR tablet TAKE 1 TABLET BY MOUTH TWICE DAILY 180 Tab 1    atorvastatin (LIPITOR) 80 mg tablet Take 1 Tab by mouth daily. 90 Tab 3    albuterol (PROAIR HFA) 90 mcg/actuation inhaler Take 2 Puffs by inhalation every four (4) hours as needed for Wheezing. 3 Inhaler 3    carvedilol (COREG) 6.25 mg tablet Take 1 Tab by mouth two (2) times a day. 180 Tab 3    atomoxetine (STRATTERA) 40 mg capsule Take 40 mg by mouth daily.  acetaminophen (TYLENOL) 500 mg tablet Take 2,000 mg by mouth two (2) times daily as needed for Pain.  nitroglycerin (NITROSTAT) 0.4 mg SL tablet 1 Tab by SubLINGual route every five (5) minutes as needed for Chest Pain. Up to 3 doses. 1 Bottle 1    DULoxetine (CYMBALTA) 60 mg capsule Take 60 mg by mouth daily. PHYSICAL EXAM  Visit Vitals  /76 (BP 1 Location: Left arm, BP Patient Position: Sitting)   Pulse 93   Temp 97.7 °F (36.5 °C) (Oral)   Resp 18   Ht 5' 9\" (1.753 m)   Wt 217 lb 6.4 oz (98.6 kg)   SpO2 96%   BMI 32.10 kg/m²       General: Obese, no distress. HEENT:  Head normocephalic/atraumatic, no scleral icterus  Neck: Supple. No carotid bruits, JVD, lymphadenopathy, or thyromegaly. Lungs:  Clear to ausculation bilaterally. Good air movement. Heart:  Regular rate and rhythm, normal S1 and S2, no murmur, gallop, or rub  Abdomen: Soft, non-distended, normal bowel sounds, no tenderness, no guarding, masses, rebound tenderness, or HSM. Extremities: No clubbing, cyanosis, or edema. Neurological: Alert and oriented. Psychiatric: Normal mood and affect. Behavior is normal.         ASSESSMENT AND PLAN    ICD-10-CM ICD-9-CM    1. Essential hypertension  I10 401.9    2. Hyperlipidemia, mixed  E78.2 272.2    3. Coronary artery disease of native artery of native heart with stable angina pectoris ([facility] [address]  I25.118 414.01      413.9    4.  Chronic systolic congestive heart failure (HCC)  I50.22 428.22      428.0    5. Smokes 1 pack of cigarettes per day  F17.210 305.1    6. Attention deficit disorder (ADD) without hyperactivity  F98.8 314.00    7. Vertigo  R42 780.4    8. Gross hematuria  R31.0 599.71 REFERRAL TO UROLOGY   9. Screening for colon cancer  Z12.11 V76.51 OCCULT BLOOD IMMUNOASSAY,DIAGNOSTIC     Diagnoses and all orders for this visit:    1. Essential hypertension  Controlled on losartan and carvedilol. 2. Hyperlipidemia, mixed  Await lipid panel results. Continue atorvastatin 80 mg daily. 3. Coronary artery disease of native artery of native heart with stable angina pectoris (HCC)  Asymptomatic. Continue ASA, carvedilol, Imdur, Ranexa, and atorvastatin. 4. Chronic systolic congestive heart failure (HCC)  Symptomatic with SOB and decreased exercise tolerance. Continue carvedilol and losartan. Follow up with Cardiology. 5. IGT (impaired glucose tolerance)    6. Smokes 1 pack of cigarettes per day  Counseled on smoking cessation. He wants to continue trying to cut down on his own. Did not tolerate Wellbutrin in the past; caused personality change. Discussed option of Chantix with nicotine patches in the future if he is not able to quit on his own. 7. Attention deficit disorder (ADD) without hyperactivity  Stable on Strattera. 8. Vertigo  Continue doing vestibular exercises at home. 9. Gross hematuria  -     REFERRAL TO UROLOGY    10. Screening for colon cancer  -     OCCULT BLOOD IMMUNOASSAY,DIAGNOSTIC; Future    Greater than 40 mins direct face-to-face time spent with patient. Greater than 50% of time spent on counseling and coordination of care. Follow-up and Dispositions    · Return in about 4 months (around 11/21/2020), or if symptoms worsen or fail to improve, for HTN, hyperlipidemia. I have discussed the diagnosis with the patient and the intended plan as seen in the above orders. Patient is in agreement.   The patient has received an after-visit summary and questions were answered concerning future plans. I have discussed medication side effects and warnings with the patient as well.

## 2020-07-21 NOTE — TELEPHONE ENCOUNTER
----- Message from Tashia Bullard NP sent at 7/21/2020  1:52 PM EDT -----  Lipids at goal with the addition of zetia--continue zetia and statin  Lytes, kidney/liver fxn stable

## 2020-07-23 ENCOUNTER — OFFICE VISIT (OUTPATIENT)
Dept: CARDIOLOGY CLINIC | Age: 55
End: 2020-07-23

## 2020-07-23 DIAGNOSIS — I25.5 ISCHEMIC CARDIOMYOPATHY: ICD-10-CM

## 2020-07-23 DIAGNOSIS — Z95.810 ICD (IMPLANTABLE CARDIOVERTER-DEFIBRILLATOR) IN PLACE: Primary | ICD-10-CM

## 2020-08-12 ENCOUNTER — CLINICAL SUPPORT (OUTPATIENT)
Dept: CARDIOLOGY CLINIC | Age: 55
End: 2020-08-12

## 2020-08-12 ENCOUNTER — OFFICE VISIT (OUTPATIENT)
Dept: CARDIOLOGY CLINIC | Age: 55
End: 2020-08-12
Payer: COMMERCIAL

## 2020-08-12 VITALS
SYSTOLIC BLOOD PRESSURE: 90 MMHG | WEIGHT: 214.6 LBS | DIASTOLIC BLOOD PRESSURE: 60 MMHG | RESPIRATION RATE: 18 BRPM | BODY MASS INDEX: 31.78 KG/M2 | OXYGEN SATURATION: 95 % | HEIGHT: 69 IN | HEART RATE: 82 BPM

## 2020-08-12 DIAGNOSIS — I10 ESSENTIAL HYPERTENSION: ICD-10-CM

## 2020-08-12 DIAGNOSIS — Z95.810 ICD (IMPLANTABLE CARDIOVERTER-DEFIBRILLATOR) IN PLACE: ICD-10-CM

## 2020-08-12 DIAGNOSIS — Z95.5 S/P CORONARY ARTERY STENT PLACEMENT: ICD-10-CM

## 2020-08-12 DIAGNOSIS — I25.5 ISCHEMIC CARDIOMYOPATHY: Primary | ICD-10-CM

## 2020-08-12 DIAGNOSIS — I95.1 ORTHOSTATIC HYPOTENSION: ICD-10-CM

## 2020-08-12 DIAGNOSIS — Z72.0 TOBACCO ABUSE: ICD-10-CM

## 2020-08-12 DIAGNOSIS — E78.5 DYSLIPIDEMIA: ICD-10-CM

## 2020-08-12 DIAGNOSIS — Z95.810 ICD (IMPLANTABLE CARDIOVERTER-DEFIBRILLATOR) IN PLACE: Primary | ICD-10-CM

## 2020-08-12 DIAGNOSIS — E66.01 SEVERE OBESITY (BMI 35.0-39.9) WITH COMORBIDITY (HCC): ICD-10-CM

## 2020-08-12 DIAGNOSIS — I25.5 ISCHEMIC CARDIOMYOPATHY: ICD-10-CM

## 2020-08-12 DIAGNOSIS — F17.200 CURRENT SMOKER: ICD-10-CM

## 2020-08-12 DIAGNOSIS — Z45.018 PACEMAKER REPROGRAMMING/CHECK: ICD-10-CM

## 2020-08-12 PROCEDURE — 99214 OFFICE O/P EST MOD 30 MIN: CPT | Performed by: INTERNAL MEDICINE

## 2020-08-12 PROCEDURE — 93000 ELECTROCARDIOGRAM COMPLETE: CPT | Performed by: NURSE PRACTITIONER

## 2020-08-12 PROCEDURE — 99215 OFFICE O/P EST HI 40 MIN: CPT | Performed by: NURSE PRACTITIONER

## 2020-08-12 RX ORDER — LOSARTAN POTASSIUM 25 MG/1
12.5 TABLET ORAL DAILY
Qty: 90 TAB | Refills: 3
Start: 2020-08-12 | End: 2021-12-28 | Stop reason: SDUPTHER

## 2020-08-12 NOTE — LETTER
8/12/20 Patient: Michael Ayers YOB: 1965 Date of Visit: 8/12/2020 Benji Streeter MD 
[address] [address] VIA In Basket Dear Benji Streeter MD, Thank you for referring Mr. Feliciano Goncalves to [address] [place] [address] for evaluation. My notes for this consultation are attached. If you have questions, please do not hesitate to call me. I look forward to following your patient along with you.  
 
 
Sincerely, 
 
Sean Garcia MD

## 2020-08-12 NOTE — PROGRESS NOTES
Subjective:      Onel Huffman is a 53 y.o. male is here for EP follow up. The patient denies chest pain,, orthopnea, PND, LE edema, palpitations, syncope, presyncope or fatigue. Notes OLEARY. Aware of positional dizziness. No falls. Patient Active Problem List    Diagnosis Date Noted    Orthostatic hypotension 08/12/2020    Chronic systolic congestive heart failure ([address] 07/21/2020    Major depressive disorder with single episode, in partial remission ([address] 09/15/2019    Balance problem 09/15/2019    FRANTZ (obstructive sleep apnea) 09/13/2019    Glucose intolerance (impaired glucose tolerance) 08/11/2019    Severe obesity (BMI 35.0-39. 9) with comorbidity ([address] 08/11/2019    Ischemic cardiomyopathy 03/22/2019    ICD (implantable cardioverter-defibrillator) in place 03/22/2019    Fatigue 11/30/2018    Coronary artery disease of native artery of native heart with stable angina pectoris ([facility] [address] 10/05/2018    Essential hypertension 09/25/2018    Hyperlipidemia, mixed 09/25/2018    ADD (attention deficit disorder) 09/25/2018    Smokes 1 pack of cigarettes per day 09/25/2018      Venus Valerio MD  Past Medical History:   Diagnosis Date    ADD (attention deficit disorder) 9/25/2018    Asthma     Chest pain 11/30/2018    Dyslipidemia 9/25/2018    Fatigue 11/30/2018    HTN (hypertension) 9/25/2018    Hypertension     Other ill-defined conditions(799.89)     ADHD,BELL'S PALSY WEAKNESS  LT FACE    Psychiatric disorder     DEPRESSION    S/P coronary artery stent placement 9/25/2018 9/25/18 PCI/MAVIS to LAD    STEMI (ST elevation myocardial infarction) ([facility] [address] 9/25/2018    Syncope 1/25/2019    Tobacco abuse 9/25/2018      Past Surgical History:   Procedure Laterality Date    HX GI  1984    STOMACH ABDOMEN ACCIDENT    HX HEENT      TEETH EXTRACTION    HX ORTHOPAEDIC  1984    FRACTURE RT FEMUR    NEUROLOGICAL PROCEDURE UNLISTED      NECK     Allergies   Allergen Reactions    Bee Sting [Sting, Bee] Anaphylaxis      Family History   Problem Relation Age of Onset    Hypertension Mother     Diabetes Mother     No Known Problems Father     Lung Disease Paternal Aunt     Alcohol abuse Paternal Rhianna Nares Sister         breast    Hypertension Sister     Hypertension Brother     Heart Disease Maternal Uncle     Hypertension Maternal Uncle     Diabetes Maternal Uncle     Alcohol abuse Paternal Uncle     Heart Disease Paternal Grandfather     Hypertension Sister     Macular Degen Sister     negative for cardiac disease  Social History     Socioeconomic History    Marital status:      Spouse name: Nadia Collins Number of children: 2    Years of education: 15    Highest education level: 12th grade   Social Needs    Financial resource strain: Not hard at all   [employer] insecurity     Worry: Never true     Inability: Never true   [employer] needs     Medical: Patient refused     Non-medical: Patient refused   Tobacco Use    Smoking status: Current Every Day Smoker     Packs/day: 1.00     Years: 40.00     Pack years: 40.00     Types: Cigarettes    Smokeless tobacco: Never Used    Tobacco comment: started again  6/1/2019   Substance and Sexual Activity    Alcohol use: No     Alcohol/week: 0.0 standard drinks     Frequency: Never    Drug use: No    Sexual activity: Not Currently   Lifestyle    Physical activity     Days per week: 0 days     Minutes per session: 0 min    Stress: Very much   Relationships    Social connections     Talks on phone: More than three times a week     Gets together: Once a week     Attends Oriental orthodox service: Never     Active member of club or organization: No     Attends meetings of clubs or organizations: Never     Relationship status:    Other Topics Concern     Service No    Blood Transfusions Yes    Caffeine Concern Yes     Comment: coffee 1 pot    Occupational Exposure Yes     Comment: cig smoke    Hobby Hazards No    Sleep Concern Yes     Comment: does not sleep well,wakes up sweating,dog    Stress Concern Yes     Comment: high level daily -depression he states    Weight Concern Yes     Comment: more tired    Special Diet No    Back Care No     Comment: neck and low back surgery    Exercise No     Comment: does not exercise    Bike Helmet No     Comment: does not ride bike   [address] [facility] Yes     Comment: 1/2 time wears seatbelt    Self-Exams No     Current Outpatient Medications   Medication Sig    losartan (COZAAR) 25 mg tablet Take 0.5 Tabs by mouth daily.  ranolazine ER (RANEXA) 500 mg SR tablet TAKE 1 TABLET BY MOUTH TWICE DAILY    ezetimibe (ZETIA) 10 mg tablet TAKE 1 TABLET BY MOUTH DAILY    isosorbide mononitrate ER (IMDUR) 30 mg tablet TAKE 1 TABLET BY MOUTH EVERY DAY    aspirin delayed-release 81 mg tablet TAKE 1 TABLET BY MOUTH EVERY DAY    atorvastatin (LIPITOR) 80 mg tablet Take 1 Tab by mouth daily.  albuterol (PROAIR HFA) 90 mcg/actuation inhaler Take 2 Puffs by inhalation every four (4) hours as needed for Wheezing.  carvedilol (COREG) 6.25 mg tablet Take 1 Tab by mouth two (2) times a day.  atomoxetine (STRATTERA) 40 mg capsule Take 40 mg by mouth daily.  acetaminophen (TYLENOL) 500 mg tablet Take 2,000 mg by mouth two (2) times daily as needed for Pain.  nitroglycerin (NITROSTAT) 0.4 mg SL tablet 1 Tab by SubLINGual route every five (5) minutes as needed for Chest Pain. Up to 3 doses.  DULoxetine (CYMBALTA) 60 mg capsule Take 60 mg by mouth daily. No current facility-administered medications for this visit. Vitals:    08/12/20 1344 08/12/20 1355 08/12/20 1357   BP: 130/80 100/70 90/60   Pulse: 82     Resp: 18     SpO2: 95%     Weight: 214 lb 9.6 oz (97.3 kg)     Height: 5' 9\" (1.753 m)         I have reviewed the nurses notes, vitals, problem list, allergy list, medical history, family, social history and medications.     Review of Symptoms:    General: Pt denies excessive weight gain or loss. Pt is able to conduct ADL's  HEENT: Denies blurred vision, headaches, epistaxis and difficulty swallowing. Respiratory: Denies shortness of breath, OLEARY, wheezing or stridor. Cardiovascular: Denies precordial pain, palpitations, edema or PND  Gastrointestinal: Denies poor appetite, indigestion, abdominal pain or blood in stool  Urinary: Denies dysuria, pyuria  Musculoskeletal: Denies pain or swelling from muscles or joints  Neurologic: Denies tremor, paresthesias, or sensory motor disturbance  Skin: Denies rash, itching or texture change. Psych: Denies depression      Physical Exam:      General: Well developed, in no acute distress. HEENT: Eyes - PERRL, no jvd  Heart:  Normal S1/S2 negative S3 or S4. Regular, no murmur, gallop or rub. Respiratory: Clear bilaterally x 4, no wheezing or rales  Extremities:  No edema, normal cap refill, no cyanosis. Musculoskeletal: No clubbing  Neuro: A&Ox3, speech clear, gait stable. Skin: Skin color is normal. No rashes or lesions. Non diaphoretic. no ulcers  Vascular: 2+ pulses symmetric in all extremities  Psych - judgement intact and orientation is wnl       Cardiographics    Ekg: Sinus  Rhythm   Low voltage in limb leads.      Results for orders placed or performed during the hospital encounter of 05/24/19   EKG, 12 LEAD, INITIAL   Result Value Ref Range    Ventricular Rate 83 BPM    Atrial Rate 83 BPM    P-R Interval 140 ms    QRS Duration 78 ms    Q-T Interval 352 ms    QTC Calculation (Bezet) 413 ms    Calculated P Axis 56 degrees    Calculated R Axis 22 degrees    Calculated T Axis 94 degrees    Diagnosis       Sinus rhythm with premature atrial complexes  Anteroseptal infarct (cited on or before 25-SEP-2018)  When compared with ECG of 23-MAR-2019 12:00,  premature atrial complexes are now present  Nonspecific T wave abnormality no longer evident in Anterior leads  Confirmed by Lorna Silva ([id number]) on 5/26/2019 12:16:39 PM Results for orders placed or performed in visit on 01/09/19   CARDIAC HOLTER MONITOR, 24 HOURS    Narrative    ECG Monitor/24 hours, Complete    Reason for Holter Monitor  SYNCOPE    Heartbeat    Slowest 60  Average 83  Fastest  113      Results:   Underlying Rhythm: Normal sinus rhythm      Atrial Arrhythmias: premature atrial contractions; occasional            AV Conduction: normal    Ventricular Arrhythmias: premature ventricular contractions; occasional     ST Segment Analysis:normal     Symptom Correlation:  none    Comment:   Sinus rhythm throughout - no symptoms     Orma MD Melony, FACC, FHRS        Echo 1/9/19:  Left ventricle: Systolic function was mildly to moderately reduced. Ejection fraction was estimated in the range of 40 % to 45 %. There was  severe hypokinesis of the apical anterior, apical inferior, apical septal,  and apical lateral wall(s). There was akinesis of the apical wall(s). There was mild concentric hypertrophy. Lab Results   Component Value Date/Time    WBC 8.5 05/24/2019 04:50 PM    HGB 13.4 05/24/2019 04:50 PM    HCT 40.0 05/24/2019 04:50 PM    PLATELET 072 37/47/5817 04:50 PM    MCV 87.7 05/24/2019 04:50 PM      Lab Results   Component Value Date/Time    Sodium 141 07/20/2020 08:17 AM    Potassium 4.3 07/20/2020 08:17 AM    Chloride 104 07/20/2020 08:17 AM    CO2 23 07/20/2020 08:17 AM    Anion gap 4 (L) 05/24/2019 04:50 PM    Glucose 111 (H) 07/20/2020 08:17 AM    BUN 11 07/20/2020 08:17 AM    Creatinine 1.01 07/20/2020 08:17 AM    BUN/Creatinine ratio 11 07/20/2020 08:17 AM    GFR est AA 96 07/20/2020 08:17 AM    GFR est non-AA 83 07/20/2020 08:17 AM    Calcium 9.3 07/20/2020 08:17 AM    Bilirubin, total 0.2 07/20/2020 08:17 AM    Alk.  phosphatase 115 07/20/2020 08:17 AM    Protein, total 6.3 07/20/2020 08:17 AM    Albumin 4.0 07/20/2020 08:17 AM    Globulin 3.8 05/24/2019 04:50 PM    A-G Ratio 1.7 07/20/2020 08:17 AM    ALT (SGPT) 11 07/20/2020 08:17 AM      Lab Results   Component Value Date/Time    TSH 0.57 03/25/2019 10:34 AM        Assessment:           ICD-10-CM ICD-9-CM    1. Ischemic cardiomyopathy  I25.5 414.8 ECHO ADULT COMPLETE      losartan (COZAAR) 25 mg tablet   2. Essential hypertension  I10 401.9 ECHO ADULT COMPLETE      losartan (COZAAR) 25 mg tablet   3. Severe obesity (BMI 35.0-39. 9) with comorbidity (HCC)  E66.01 278.01 ECHO ADULT COMPLETE      losartan (COZAAR) 25 mg tablet   4. ICD (implantable cardioverter-defibrillator) in place  Z95.810 V45.02 ECHO ADULT COMPLETE      losartan (COZAAR) 25 mg tablet   5. Tobacco abuse  Z72.0 305.1 ECHO ADULT COMPLETE      losartan (COZAAR) 25 mg tablet   6. Pacemaker reprogramming/check  Z45.018 V53.31 AMB POC EKG ROUTINE W/ 12 LEADS, INTER & REP      ECHO ADULT COMPLETE      losartan (COZAAR) 25 mg tablet     Orders Placed This Encounter    AMB POC EKG ROUTINE W/ 12 LEADS, INTER & REP     Order Specific Question:   Reason for Exam:     Answer:   routine    losartan (COZAAR) 25 mg tablet     Sig: Take 0.5 Tabs by mouth daily. Dispense:  90 Tab     Refill:  3        Plan:     Mr. Devon Carrillo is here for annual device follow up s/p ICD 1/25/2019. He is <1% RVP with 15 years batter remaining. No arrhythmias noted. Echo 1/19 with EF 40 % to 45 %. EKG normal sinus. He is orthostatic. I will lower his arb - discussed with Dr Citlaly Cortez. Repeat echo with c/o OLEARY. He is enrolled in remote monitoring and I will see him back in 1 year. Continue medical management for CAD, ICM, HTN. Thank you for allowing me to participate in Luzma Uriostegui 's care.       Berenice Caldwell NP

## 2020-08-12 NOTE — PROGRESS NOTES
1. Have you been to the ER, urgent care clinic since your last visit? Hospitalized since your last visit? No    2. Have you seen or consulted any other health care providers outside of the [address] [place] [address] since your last visit? Include any pap smears or colon screening. No      Chief Complaint   Patient presents with    Pacemaker Check     C/O palps, SOB rest or exertion. C/O dizziness.

## 2020-08-13 DIAGNOSIS — I25.5 ISCHEMIC CARDIOMYOPATHY: ICD-10-CM

## 2020-08-13 DIAGNOSIS — Z45.018 PACEMAKER REPROGRAMMING/CHECK: ICD-10-CM

## 2020-08-13 DIAGNOSIS — Z95.810 ICD (IMPLANTABLE CARDIOVERTER-DEFIBRILLATOR) IN PLACE: ICD-10-CM

## 2020-08-13 DIAGNOSIS — E66.01 SEVERE OBESITY (BMI 35.0-39.9) WITH COMORBIDITY (HCC): ICD-10-CM

## 2020-08-13 DIAGNOSIS — Z72.0 TOBACCO ABUSE: ICD-10-CM

## 2020-08-13 DIAGNOSIS — I10 ESSENTIAL HYPERTENSION: ICD-10-CM

## 2020-08-18 RX ORDER — DULOXETIN HYDROCHLORIDE 60 MG/1
60 CAPSULE, DELAYED RELEASE ORAL DAILY
Qty: 90 CAP | Refills: 1 | Status: SHIPPED | OUTPATIENT
Start: 2020-08-18 | End: 2021-02-16 | Stop reason: SDUPTHER

## 2020-08-18 NOTE — TELEPHONE ENCOUNTER
Pt's wife, Mirza Bridges, called to request a refill of   Requested Prescriptions     Pending Prescriptions Disp Refills    DULoxetine (CYMBALTA) 60 mg capsule        Sig: Take 1 Cap by mouth daily. Be called into the [facility] pharmacy on file.

## 2020-09-04 ENCOUNTER — ANCILLARY PROCEDURE (OUTPATIENT)
Dept: CARDIOLOGY CLINIC | Age: 55
End: 2020-09-04
Payer: COMMERCIAL

## 2020-09-04 VITALS
BODY MASS INDEX: 31.7 KG/M2 | SYSTOLIC BLOOD PRESSURE: 90 MMHG | DIASTOLIC BLOOD PRESSURE: 60 MMHG | HEIGHT: 69 IN | WEIGHT: 214 LBS

## 2020-09-04 LAB
ECHO AO ASC DIAM: 2.99 CM
ECHO AO ROOT DIAM: 3.34 CM
ECHO AV AREA PEAK VELOCITY: 2.82 CM2
ECHO AV AREA PEAK VELOCITY: 2.97 CM2
ECHO AV PEAK GRADIENT: 4.23 MMHG
ECHO AV PEAK VELOCITY: 102.79 CM/S
ECHO LA AREA 4C: 17.78 CM2
ECHO LA MAJOR AXIS: 3.69 CM
ECHO LA MINOR AXIS: 1.74 CM
ECHO LA VOL 2C: 38.79 ML (ref 18–58)
ECHO LA VOL 4C: 49.16 ML (ref 18–58)
ECHO LA VOL BP: 46.9 ML (ref 18–58)
ECHO LA VOL/BSA BIPLANE: 22.06 ML/M2 (ref 16–28)
ECHO LA VOLUME INDEX A2C: 18.25 ML/M2 (ref 16–28)
ECHO LA VOLUME INDEX A4C: 23.12 ML/M2 (ref 16–28)
ECHO LV E' LATERAL VELOCITY: 4.89 CM/S
ECHO LV E' SEPTAL VELOCITY: 5.64 CM/S
ECHO LV INTERNAL DIMENSION DIASTOLIC: 4.65 CM (ref 4.2–5.9)
ECHO LV INTERNAL DIMENSION SYSTOLIC: 3.34 CM
ECHO LV IVSD: 0.95 CM (ref 0.6–1)
ECHO LV MASS 2D: 155.1 G (ref 88–224)
ECHO LV MASS INDEX 2D: 72.9 G/M2 (ref 49–115)
ECHO LV POSTERIOR WALL DIASTOLIC: 0.99 CM (ref 0.6–1)
ECHO LVOT DIAM: 2.11 CM
ECHO LVOT PEAK GRADIENT: 2.74 MMHG
ECHO LVOT PEAK GRADIENT: 3.04 MMHG
ECHO LVOT PEAK VELOCITY: 82.74 CM/S
ECHO LVOT PEAK VELOCITY: 87.12 CM/S
ECHO LVOT SV: 61.8 ML
ECHO LVOT VTI: 17.64 CM
ECHO MV A VELOCITY: 64.57 CM/S
ECHO MV AREA PHT: 3.1 CM2
ECHO MV E DECELERATION TIME (DT): 0.24 S
ECHO MV E VELOCITY: 38.24 CM/S
ECHO MV E/A RATIO: 0.59
ECHO MV E/E' LATERAL: 7.82
ECHO MV E/E' RATIO (AVERAGED): 7.3
ECHO MV E/E' SEPTAL: 6.78
ECHO MV PRESSURE HALF TIME (PHT): 0.07 S
ECHO RV TAPSE: 1.85 CM (ref 1.5–2)

## 2020-09-04 PROCEDURE — 93306 TTE W/DOPPLER COMPLETE: CPT | Performed by: INTERNAL MEDICINE

## 2020-09-08 ENCOUNTER — ANCILLARY PROCEDURE (OUTPATIENT)
Dept: CARDIOLOGY CLINIC | Age: 55
End: 2020-09-08
Payer: COMMERCIAL

## 2020-09-08 PROCEDURE — 93015 CV STRESS TEST SUPVJ I&R: CPT | Performed by: INTERNAL MEDICINE

## 2020-09-08 PROCEDURE — 78452 HT MUSCLE IMAGE SPECT MULT: CPT | Performed by: INTERNAL MEDICINE

## 2020-09-08 PROCEDURE — A9502 TC99M TETROFOSMIN: HCPCS

## 2020-09-11 ENCOUNTER — TELEPHONE (OUTPATIENT)
Dept: CARDIOLOGY CLINIC | Age: 55
End: 2020-09-11

## 2020-09-11 NOTE — TELEPHONE ENCOUNTER
Verified patient with 2 identifiers   Left message on Identified VM regarding normal results and to call office at [phone] with any questions.

## 2020-09-14 ENCOUNTER — APPOINTMENT (OUTPATIENT)
Dept: CARDIOLOGY CLINIC | Age: 55
End: 2020-09-14

## 2020-09-14 LAB
STRESS BASELINE DIAS BP: 92 MMHG
STRESS BASELINE HR: 86 BPM
STRESS BASELINE SYS BP: 140 MMHG
STRESS PEAK DIAS BP: 92 MMHG
STRESS PEAK SYS BP: 140 MMHG
STRESS PERCENT HR ACHIEVED: 66 %
STRESS POST PEAK HR: 109 BPM
STRESS RATE PRESSURE PRODUCT: NORMAL BPM*MMHG
STRESS ST DEPRESSION: 0 MM
STRESS ST ELEVATION: 0 MM
STRESS TARGET HR: 165 BPM

## 2020-09-15 NOTE — PROGRESS NOTES
Stress test showed an old scar on the front of the heart from his prior heart attack, but no new active blood flow problems. Heart function stable to slightly improved by echocardiogram.  In case he looks at his online results, heart function estimate given on the stress test is likely inaccurate due to early heartbeats, which make calculation of the ejection fraction inaccurate. Hopefully he discussed the medication Strattera which sometimes contributes to dizziness with the prescribing physician. Follow-up as planned with nurse practitioner Gilford Prime approximately 2 months from the last office visit.

## 2020-09-16 ENCOUNTER — TELEPHONE (OUTPATIENT)
Dept: CARDIOLOGY CLINIC | Age: 55
End: 2020-09-16

## 2020-09-16 NOTE — TELEPHONE ENCOUNTER
----- Message from Mateo Burnett MD sent at 9/15/2020  1:24 PM EDT -----  Stress test showed an old scar on the front of the heart from his prior heart attack, but no new active blood flow problems. Heart function stable to slightly improved by echocardiogram.  In case he looks at his online results, heart function estimate given on the stress test is likely inaccurate due to early heartbeats, which make calculation of the ejection fraction inaccurate. Hopefully he discussed the medication Strattera which sometimes contributes to dizziness with the prescribing physician. Follow-up as planned with nurse practitioner Gilford Prime approximately 2 months from the last office visit.

## 2020-09-30 NOTE — PROGRESS NOTES
[address] [facility], [address] [place] [address], [address] [facility]  [phone]     Subjective:      Maris Escobar is a 53 y.o. male is here for routine f/u. The patient complains of dizziness, fatigue with exertion, and decreased exercise tolerance denies chest pain/ orthopnea, PND, LE edema, palpitations, syncope, or presyncope. Patient Active Problem List    Diagnosis Date Noted    Orthostatic hypotension 08/12/2020    Chronic systolic congestive heart failure ([facility] 07/21/2020    Major depressive disorder with single episode, in partial remission ([address] 09/15/2019    Balance problem 09/15/2019    FRANTZ (obstructive sleep apnea) 09/13/2019    Glucose intolerance (impaired glucose tolerance) 08/11/2019    Severe obesity (BMI 35.0-39. 9) with comorbidity ([facility] 08/11/2019    Ischemic cardiomyopathy 03/22/2019    ICD (implantable cardioverter-defibrillator) in place 03/22/2019    Fatigue 11/30/2018    Coronary artery disease of native artery of native heart with stable angina pectoris ([facility] 10/05/2018    Essential hypertension 09/25/2018    Hyperlipidemia, mixed 09/25/2018    ADD (attention deficit disorder) 09/25/2018    Smokes 1 pack of cigarettes per day 09/25/2018      Eyad Warner MD  Past Medical History:   Diagnosis Date    ADD (attention deficit disorder) 9/25/2018    Asthma     Chest pain 11/30/2018    Dyslipidemia 9/25/2018    Fatigue 11/30/2018    HTN (hypertension) 9/25/2018    Hypertension     Other ill-defined conditions(799.89)     ADHD,BELL'S PALSY WEAKNESS  LT FACE    Psychiatric disorder     DEPRESSION    S/P coronary artery stent placement 9/25/2018 9/25/18 PCI/MAVIS to LAD    STEMI (ST elevation myocardial infarction) ([facility] 9/25/2018    Syncope 1/25/2019    Tobacco abuse 9/25/2018      Past Surgical History:   Procedure Laterality Date    HX GI  1984    STOMACH ABDOMEN ACCIDENT    HX HEENT      TEETH EXTRACTION    HX ORTHOPAEDIC  1984    FRACTURE RT FEMUR Problem: SAFETY  Goal: Free from accidental physical injury  Outcome: Met This Shift  NEUROLOGICAL PROCEDURE UNLISTED      NECK     Allergies   Allergen Reactions    Bee Sting [Sting, Bee] Anaphylaxis      Family History   Problem Relation Age of Onset    Hypertension Mother     Diabetes Mother     No Known Problems Father     Lung Disease Paternal Aunt     Alcohol abuse Paternal Aunt     Cancer Sister         breast    Hypertension Sister     Hypertension Brother     Heart Disease Maternal Uncle     Hypertension Maternal Uncle     Diabetes Maternal Uncle     Alcohol abuse Paternal Uncle     Heart Disease Paternal Grandfather     Hypertension Sister     Macular Degen Sister       Social History     Socioeconomic History    Marital status:      Spouse name: Aidan Woods Number of children: 2    Years of education: 15    Highest education level: 12th grade   Occupational History    Not on file   Social Needs    Financial resource strain: Not hard at all   [employer] insecurity     Worry: Never true     Inability: Never true   [employer] needs     Medical: Patient refused     Non-medical: Patient refused   Tobacco Use    Smoking status: Current Every Day Smoker     Packs/day: 1.00     Years: 40.00     Pack years: 40.00     Types: Cigarettes    Smokeless tobacco: Never Used    Tobacco comment: started again  6/1/2019   Substance and Sexual Activity    Alcohol use: No     Alcohol/week: 0.0 standard drinks     Frequency: Never    Drug use: No    Sexual activity: Not Currently   Lifestyle    Physical activity     Days per week: 0 days     Minutes per session: 0 min    Stress: Very much   Relationships    Social connections     Talks on phone: More than three times a week     Gets together: Once a week     Attends Judaism service: Never     Active member of club or organization: No     Attends meetings of clubs or organizations: Never     Relationship status:     Intimate partner violence     Fear of current or ex partner: No     Emotionally abused:  No Physically abused: No     Forced sexual activity: No   Other Topics Concern     Service No    Blood Transfusions Yes    Caffeine Concern Yes     Comment: coffee 1 pot    Occupational Exposure Yes     Comment: cig smoke    Hobby Hazards No    Sleep Concern Yes     Comment: does not sleep well,wakes up sweating,dog    Stress Concern Yes     Comment: high level daily -depression he states    Weight Concern Yes     Comment: more tired    Special Diet No    Back Care No     Comment: neck and low back surgery    Exercise No     Comment: does not exercise    Bike Helmet No     Comment: does not ride bike   [address] [facility] Yes     Comment: 1/2 time wears seatbelt    Self-Exams No   Social History Narrative    Not on file      Current Outpatient Medications   Medication Sig    losartan (COZAAR) 25 mg tablet Take 0.5 Tabs by mouth daily.  ranolazine ER (RANEXA) 500 mg SR tablet TAKE 1 TABLET BY MOUTH TWICE DAILY    ezetimibe (ZETIA) 10 mg tablet TAKE 1 TABLET BY MOUTH DAILY    isosorbide mononitrate ER (IMDUR) 30 mg tablet TAKE 1 TABLET BY MOUTH EVERY DAY    aspirin delayed-release 81 mg tablet TAKE 1 TABLET BY MOUTH EVERY DAY    atorvastatin (LIPITOR) 80 mg tablet Take 1 Tab by mouth daily.  albuterol (PROAIR HFA) 90 mcg/actuation inhaler Take 2 Puffs by inhalation every four (4) hours as needed for Wheezing.  carvedilol (COREG) 6.25 mg tablet Take 1 Tab by mouth two (2) times a day.  atomoxetine (STRATTERA) 40 mg capsule Take 40 mg by mouth daily.  acetaminophen (TYLENOL) 500 mg tablet Take 2,000 mg by mouth two (2) times daily as needed for Pain.  nitroglycerin (NITROSTAT) 0.4 mg SL tablet 1 Tab by SubLINGual route every five (5) minutes as needed for Chest Pain. Up to 3 doses.  DULoxetine (CYMBALTA) 60 mg capsule Take 60 mg by mouth daily. No current facility-administered medications for this visit.           Review of Symptoms:  11 systems reviewed, negative other than as stated in the HPI    Physical ExamPhysical Exam:    There were no vitals filed for this visit. There is no height or weight on file to calculate BMI. General PE  Gen:  NAD  Mental Status - Alert. General Appearance - Not in acute distress. HEENT:  PERRL, no carotid bruits or JVD  Chest and Lung Exam   Inspection: Accessory muscles - No use of accessory muscles in breathing. Auscultation:   Breath sounds: - Normal.   Cardiovascular   Inspection: Jugular vein - Bilateral - Inspection Normal.   Palpation/Percussion:   Apical Impulse: - Normal.   Auscultation: Rhythm - Regular. Heart Sounds - S1 WNL and S2 WNL. No S3 or S4. Murmurs & Other Heart Sounds: Auscultation of the heart reveals - No Murmurs. Peripheral Vascular   Upper Extremity: Inspection - Bilateral - No Cyanotic nailbeds or Digital clubbing. Lower Extremity:   Palpation: Edema - Bilateral - No edema. Abdomen:   Soft, non-tender, bowel sounds are active.   Neuro: A&O times 3, CN and motor grossly WNL    Labs:   Lab Results   Component Value Date/Time    Cholesterol, total 129 07/20/2020 08:19 AM    Cholesterol, total 129 07/20/2020 08:17 AM    Cholesterol, total 154 10/15/2019 08:42 AM    Cholesterol, total 165 07/30/2019 08:38 AM    Cholesterol, total 209 (H) 09/26/2018 03:34 AM    HDL Cholesterol 34 (L) 07/20/2020 08:19 AM    HDL Cholesterol 34 (L) 07/20/2020 08:17 AM    HDL Cholesterol 32 (L) 10/15/2019 08:42 AM    HDL Cholesterol 30 (L) 07/30/2019 08:38 AM    HDL Cholesterol 34 09/26/2018 03:34 AM    LDL, calculated 66 07/20/2020 08:19 AM    LDL, calculated 66 07/20/2020 08:17 AM    LDL, calculated 88 10/15/2019 08:42 AM    LDL, calculated 91 07/30/2019 08:38 AM    LDL, calculated 128.8 (H) 09/26/2018 03:34 AM    Triglyceride 144 07/20/2020 08:19 AM    Triglyceride 146 07/20/2020 08:17 AM    Triglyceride 172 (H) 10/15/2019 08:42 AM    Triglyceride 222 (H) 07/30/2019 08:38 AM    Triglyceride 231 (H) 09/26/2018 03:34 AM    CHOL/HDL Ratio 6.1 (H) 09/26/2018 03:34 AM     Lab Results   Component Value Date/Time    CK 78 05/24/2019 04:50 PM     Lab Results   Component Value Date/Time    Sodium 141 07/20/2020 08:17 AM    Potassium 4.3 07/20/2020 08:17 AM    Chloride 104 07/20/2020 08:17 AM    CO2 23 07/20/2020 08:17 AM    Anion gap 4 (L) 05/24/2019 04:50 PM    Glucose 111 (H) 07/20/2020 08:17 AM    BUN 11 07/20/2020 08:17 AM    Creatinine 1.01 07/20/2020 08:17 AM    BUN/Creatinine ratio 11 07/20/2020 08:17 AM    GFR est AA 96 07/20/2020 08:17 AM    GFR est non-AA 83 07/20/2020 08:17 AM    Calcium 9.3 07/20/2020 08:17 AM    Bilirubin, total 0.2 07/20/2020 08:17 AM    Alk. phosphatase 115 07/20/2020 08:17 AM    Protein, total 6.3 07/20/2020 08:17 AM    Albumin 4.0 07/20/2020 08:17 AM    Globulin 3.8 05/24/2019 04:50 PM    A-G Ratio 1.7 07/20/2020 08:17 AM    ALT (SGPT) 11 07/20/2020 08:17 AM       EKG:  NSR      Assessment:        1. Ischemic cardiomyopathy    2. Essential hypertension    3. ICD (implantable cardioverter-defibrillator) in place    4. S/P coronary artery stent placement    5. Tobacco abuse    6. Dyslipidemia    7. Orthostatic hypotension    8. Current smoker        No orders of the defined types were placed in this encounter. Plan:     Patient presents for follow up, last seen by us in 2/2020. Due to dizziness, orthostatics were checked at electrophysiology appointment today and he was noted to be significantly orthostatic, dropping from 190-130 with standing. Losartan decreased by EP and echo ordered. Orthostatic hypotension/ supine HTN:  Unclear cause. Electrophysiology decreased losartan today. Reviewed medications and noted that Strattera has about a 2% incidence of orthostatic hypotension and a 5 to 8% incidence of dizziness. Will advise to discuss trial of weaning/discontinuation with prescribing physician if physician agrees. Cymbalta appears to have less than 1% incidence of orthostatic hypotension.   Advised to continue with aggressive hydration. Must accept some supine hypertension to avoid excessive hypotension with standing. If this does not improve by the time he comes to follow-up, consider referral to Dr. Musa Hatfiedl of neurology who specializes in orthostatic hypotension/autonomic neuropathy.     ASHD/OLEARY/fatigue:  S/p anterior STEMI in 9/2018, with peak troponin 100, with MAVIS to the prox LAD.  He had 80% stenosis of the proximal third of the OM1 and RPL. Fixed defect consistent with prior myocardial infarction per NST 5/19  Echo in 1/2019 with reduced LVEF 40-45% with severe hypokinesis of the apical anterior, apical inferior, apical septal and apical lateral walls. Continue with ASA,  Imdur, BB Ranexa and statin therapy. Echo ordered by electrophysiology today. Order [place] due to OLEARY and fatigue.        ICM, s/p ICD  Echo in 1/2019 with reduced LVEF 40-45%    No events noted per device check 1/2020  Wt stable since last OV  Continue with Coreg, losartan.       HLD  LDL 66 in July 2020. On high intensity statin.     Tobacco abuse, down to less than 1PPD :   Counseled on smoking cessation --- trying to quit     FRANTZ  Not wearing CPAP     Hx vertigo  Previously Followed by Dr. Juan Jose Curtis on diet and exercise- eventual goal of 30-60 minutes 5-7 times a week as per AHA guidelines if stress test OK.       Continue current care and f/u in 2 months if stress test negative with nurse practitioner for reassessment of orthostatic hypotension, and in 3 months with me. Consider referral at follow-up as noted above.     Fady Eller MD

## 2020-10-16 ENCOUNTER — OFFICE VISIT (OUTPATIENT)
Dept: CARDIOLOGY CLINIC | Age: 55
End: 2020-10-16
Payer: COMMERCIAL

## 2020-10-16 VITALS
DIASTOLIC BLOOD PRESSURE: 90 MMHG | SYSTOLIC BLOOD PRESSURE: 130 MMHG | HEART RATE: 89 BPM | WEIGHT: 214.6 LBS | HEIGHT: 69 IN | BODY MASS INDEX: 31.78 KG/M2 | RESPIRATION RATE: 18 BRPM | OXYGEN SATURATION: 96 %

## 2020-10-16 DIAGNOSIS — I10 ESSENTIAL HYPERTENSION: ICD-10-CM

## 2020-10-16 DIAGNOSIS — I25.5 ISCHEMIC CARDIOMYOPATHY: Primary | ICD-10-CM

## 2020-10-16 DIAGNOSIS — Z95.5 S/P CORONARY ARTERY STENT PLACEMENT: ICD-10-CM

## 2020-10-16 DIAGNOSIS — I25.118 CORONARY ARTERY DISEASE OF NATIVE ARTERY OF NATIVE HEART WITH STABLE ANGINA PECTORIS (HCC): ICD-10-CM

## 2020-10-16 DIAGNOSIS — E78.5 DYSLIPIDEMIA: ICD-10-CM

## 2020-10-16 DIAGNOSIS — Z95.810 ICD (IMPLANTABLE CARDIOVERTER-DEFIBRILLATOR) IN PLACE: ICD-10-CM

## 2020-10-16 DIAGNOSIS — I25.10 ARTERIOSCLEROTIC HEART DISEASE (ASHD): ICD-10-CM

## 2020-10-16 DIAGNOSIS — Z72.0 TOBACCO ABUSE: ICD-10-CM

## 2020-10-16 DIAGNOSIS — I50.22 CHRONIC SYSTOLIC CONGESTIVE HEART FAILURE (HCC): ICD-10-CM

## 2020-10-16 PROCEDURE — 99406 BEHAV CHNG SMOKING 3-10 MIN: CPT | Performed by: INTERNAL MEDICINE

## 2020-10-16 PROCEDURE — 99214 OFFICE O/P EST MOD 30 MIN: CPT | Performed by: INTERNAL MEDICINE

## 2020-10-16 NOTE — PROGRESS NOTES
1. Have you been to the ER, urgent care clinic since your last visit? Hospitalized since your last visit? No    2. Have you seen or consulted any other health care providers outside of the [address] [place] [address] since your last visit? Include any pap smears or colon screening. No    Chief Complaint   Patient presents with    Hypertension     2 mo f/u BP check     othos done.

## 2020-10-16 NOTE — PROGRESS NOTES
[address] [place] [address], [address] [facility]  [phone]     Subjective:      Nereyda Giles is a 63 y.o. male is here for routine f/u. Last seen by us in 8/2020. Recent NST in 9/2020 negative for ischemia, Echo showed improved EF 45-50% from 40-45% in 2019. Dizziness is better, thinks likely d/t ear / balance issues. Nonorthostatic since decreased dose of Losartan. No longer on Strattera. He continues to smoke  1 PPD. The patient denies chest pain/ shortness of breath, orthopnea, PND, LE edema, palpitations, syncope, or presyncope. Patient Active Problem List    Diagnosis Date Noted    Orthostatic hypotension 08/12/2020    Chronic systolic congestive heart failure ([address] 07/21/2020    Major depressive disorder with single episode, in partial remission ([address] 09/15/2019    Balance problem 09/15/2019    FRANTZ (obstructive sleep apnea) 09/13/2019    Glucose intolerance (impaired glucose tolerance) 08/11/2019    Severe obesity (BMI 35.0-39. 9) with comorbidity ([address] 08/11/2019    Ischemic cardiomyopathy 03/22/2019    ICD (implantable cardioverter-defibrillator) in place 03/22/2019    Fatigue 11/30/2018    Coronary artery disease of native artery of native heart with stable angina pectoris ([address] 10/05/2018    Essential hypertension 09/25/2018    Hyperlipidemia, mixed 09/25/2018    ADD (attention deficit disorder) 09/25/2018    Smokes 1 pack of cigarettes per day 09/25/2018      Ivette Agee MD  Past Medical History:   Diagnosis Date    ADD (attention deficit disorder) 9/25/2018    Asthma     Chest pain 11/30/2018    Dyslipidemia 9/25/2018    Fatigue 11/30/2018    HTN (hypertension) 9/25/2018    Hypertension     Other ill-defined conditions(799.89)     ADHD,BELL'S PALSY WEAKNESS  LT FACE    Psychiatric disorder     DEPRESSION    S/P coronary artery stent placement 9/25/2018 9/25/18 PCI/MAVIS to LAD    STEMI (ST elevation myocardial infarction) ([address] 9/25/2018    Syncope 1/25/2019    Tobacco abuse 9/25/2018      Past Surgical History:   Procedure Laterality Date    HX GI  1984    STOMACH ABDOMEN ACCIDENT    HX HEENT      TEETH EXTRACTION    HX ORTHOPAEDIC  1984    FRACTURE RT FEMUR    NEUROLOGICAL PROCEDURE UNLISTED      NECK     Allergies   Allergen Reactions    Bee Sting [Sting, Bee] Anaphylaxis      Family History   Problem Relation Age of Onset    Hypertension Mother     Diabetes Mother     No Known Problems Father     Lung Disease Paternal Aunt     Alcohol abuse Paternal Aunt     Cancer Sister         breast    Hypertension Sister     Hypertension Brother     Heart Disease Maternal Uncle     Hypertension Maternal Uncle     Diabetes Maternal Uncle     Alcohol abuse Paternal Uncle     Heart Disease Paternal Grandfather     Hypertension Sister     Macular Degen Sister       Social History     Socioeconomic History    Marital status:      Spouse name: Carlo Townsend Number of children: 2    Years of education: 15    Highest education level: 12th grade   Occupational History    Not on file   Social Needs    Financial resource strain: Not hard at all   [employer] insecurity     Worry: Never true     Inability: Never true   [employer] needs     Medical: Patient refused     Non-medical: Patient refused   Tobacco Use    Smoking status: Current Every Day Smoker     Packs/day: 1.00     Years: 40.00     Pack years: 40.00     Types: Cigarettes    Smokeless tobacco: Never Used    Tobacco comment: started again  6/1/2019   Substance and Sexual Activity    Alcohol use: No     Alcohol/week: 0.0 standard drinks     Frequency: Never    Drug use: No    Sexual activity: Not Currently   Lifestyle    Physical activity     Days per week: 0 days     Minutes per session: 0 min    Stress: Very much   Relationships    Social connections     Talks on phone: More than three times a week     Gets together: Once a week     Attends [employer] service: Never     Active member of club or organization: No     Attends meetings of clubs or organizations: Never     Relationship status:     Intimate partner violence     Fear of current or ex partner: No     Emotionally abused: No     Physically abused: No     Forced sexual activity: No   Other Topics Concern     Service No    Blood Transfusions Yes    Caffeine Concern Yes     Comment: coffee 1 pot    Occupational Exposure Yes     Comment: cig smoke    Hobby Hazards No    Sleep Concern Yes     Comment: does not sleep well,wakes up sweating,dog    Stress Concern Yes     Comment: high level daily -depression he states    Weight Concern Yes     Comment: more tired    Special Diet No    Back Care No     Comment: neck and low back surgery    Exercise No     Comment: does not exercise    Bike Helmet No     Comment: does not ride bike   [address] Yes     Comment: 1/2 time wears seatbelt    Self-Exams No   Social History Narrative    Not on file      Current Outpatient Medications   Medication Sig    carvediloL (COREG) 6.25 mg tablet TAKE 1 TABLET BY MOUTH 2 TIMES A DAY.  DULoxetine (CYMBALTA) 60 mg capsule Take 1 Cap by mouth daily.  losartan (COZAAR) 25 mg tablet Take 0.5 Tabs by mouth daily.  ranolazine ER (RANEXA) 500 mg SR tablet TAKE 1 TABLET BY MOUTH TWICE DAILY    ezetimibe (ZETIA) 10 mg tablet TAKE 1 TABLET BY MOUTH DAILY    isosorbide mononitrate ER (IMDUR) 30 mg tablet TAKE 1 TABLET BY MOUTH EVERY DAY    aspirin delayed-release 81 mg tablet TAKE 1 TABLET BY MOUTH EVERY DAY    atorvastatin (LIPITOR) 80 mg tablet Take 1 Tab by mouth daily.  albuterol (PROAIR HFA) 90 mcg/actuation inhaler Take 2 Puffs by inhalation every four (4) hours as needed for Wheezing.  acetaminophen (TYLENOL) 500 mg tablet Take 2,000 mg by mouth two (2) times daily as needed for Pain.     nitroglycerin (NITROSTAT) 0.4 mg SL tablet 1 Tab by SubLINGual route every five (5) minutes as needed for Chest Pain. Up to 3 doses. No current facility-administered medications for this visit. Review of Symptoms:  11 systems reviewed, negative other than as stated in the HPI    Physical ExamPhysical Exam:    Vitals:    10/16/20 1050 10/16/20 1059 10/16/20 1100   BP: (!) 140/92 120/88 (!) 130/90   Pulse: 87 86 89   Resp: 18     SpO2: 96% 96% 96%   Weight: 214 lb 9.6 oz (97.3 kg)     Height: 5' 9\" (1.753 m)       Body mass index is 31.69 kg/m². General PE  Gen:  NAD  Mental Status - Alert. General Appearance - Not in acute distress. HEENT:  PERRL, no carotid bruits or JVD  Chest and Lung Exam   Inspection: Accessory muscles - No use of accessory muscles in breathing. Auscultation:   Breath sounds: - Normal.   Cardiovascular   Inspection: Jugular vein - Bilateral - Inspection Normal.   Palpation/Percussion:   Apical Impulse: - Normal.   Auscultation: Rhythm - Regular. Heart Sounds - S1 WNL and S2 WNL. No S3 or S4. Murmurs & Other Heart Sounds: Auscultation of the heart reveals - No Murmurs. Peripheral Vascular   Upper Extremity: Inspection - Bilateral - No Cyanotic nailbeds or Digital clubbing. Lower Extremity:   Palpation: Edema - Bilateral - No edema. Abdomen:   Soft, non-tender, bowel sounds are active.   Neuro: A&O times 3, CN and motor grossly WNL    Labs:   Lab Results   Component Value Date/Time    Cholesterol, total 129 07/20/2020 08:19 AM    Cholesterol, total 129 07/20/2020 08:17 AM    Cholesterol, total 154 10/15/2019 08:42 AM    Cholesterol, total 165 07/30/2019 08:38 AM    Cholesterol, total 209 (H) 09/26/2018 03:34 AM    HDL Cholesterol 34 (L) 07/20/2020 08:19 AM    HDL Cholesterol 34 (L) 07/20/2020 08:17 AM    HDL Cholesterol 32 (L) 10/15/2019 08:42 AM    HDL Cholesterol 30 (L) 07/30/2019 08:38 AM    HDL Cholesterol 34 09/26/2018 03:34 AM    LDL, calculated 66 07/20/2020 08:19 AM    LDL, calculated 66 07/20/2020 08:17 AM    LDL, calculated 88 10/15/2019 08:42 AM    LDL, calculated 91 07/30/2019 08:38 AM    LDL, calculated 128.8 (H) 09/26/2018 03:34 AM    Triglyceride 144 07/20/2020 08:19 AM    Triglyceride 146 07/20/2020 08:17 AM    Triglyceride 172 (H) 10/15/2019 08:42 AM    Triglyceride 222 (H) 07/30/2019 08:38 AM    Triglyceride 231 (H) 09/26/2018 03:34 AM    CHOL/HDL Ratio 6.1 (H) 09/26/2018 03:34 AM     Lab Results   Component Value Date/Time    CK 78 05/24/2019 04:50 PM     Lab Results   Component Value Date/Time    Sodium 141 07/20/2020 08:17 AM    Potassium 4.3 07/20/2020 08:17 AM    Chloride 104 07/20/2020 08:17 AM    CO2 23 07/20/2020 08:17 AM    Anion gap 4 (L) 05/24/2019 04:50 PM    Glucose 111 (H) 07/20/2020 08:17 AM    BUN 11 07/20/2020 08:17 AM    Creatinine 1.01 07/20/2020 08:17 AM    BUN/Creatinine ratio 11 07/20/2020 08:17 AM    GFR est AA 96 07/20/2020 08:17 AM    GFR est non-AA 83 07/20/2020 08:17 AM    Calcium 9.3 07/20/2020 08:17 AM    Bilirubin, total 0.2 07/20/2020 08:17 AM    Alk. phosphatase 115 07/20/2020 08:17 AM    Protein, total 6.3 07/20/2020 08:17 AM    Albumin 4.0 07/20/2020 08:17 AM    Globulin 3.8 05/24/2019 04:50 PM    A-G Ratio 1.7 07/20/2020 08:17 AM    ALT (SGPT) 11 07/20/2020 08:17 AM       EKG:       Assessment:     Assessment:      1. Ischemic cardiomyopathy    2. Essential hypertension    3. ICD (implantable cardioverter-defibrillator) in place    4. S/P coronary artery stent placement    5. Tobacco abuse    6. Dyslipidemia    7. Chronic systolic congestive heart failure ([facility]    8. Arteriosclerotic heart disease (ASHD)    9. Coronary artery disease of native artery of native heart with stable angina pectoris ([facility]        No orders of the defined types were placed in this encounter. Plan:     Orthostatic hypotension/ supine HTN  Resolved upon dc Straterra and decreased dose of Losartan  Recall: Reviewed medications and noted that Strattera has about a 2% incidence of orthostatic hypotension and a 5 to 8% incidence of dizziness.   Will advise to discuss trial of weaning/discontinuation with prescribing physician if physician agrees. Cymbalta appears to have less than 1% incidence of orthostatic hypotension. Advised to continue with aggressive hydration.      CAD  S/p anterior STEMI in 9/2018, with peak troponin 100, with MAVIS to the prox LAD.  He had 80% stenosis of the proximal third of the OM1 and RPL. Fixed defect consistent with prior myocardial infarction per NST 9/2020; 5/19  Continue with ASA,  Imdur, BB Ranexa and statin therapy.        ICM, s/p ICD  EF 45-50% in 9/2020  Echo in 1/2019 with reduced LVEF 40-45%    No events noted per device check 1/2020  Wt stable since last OV  Continue with Coreg, losartan.        HLD  7/2020 LDL 66   On high intensity statin and Zetia  Labs and lipids per PCP     Tobacco abuse, down to less than 1PPD :     Counseled on smoking cessation using strict sequential weaning with a quit date using nicotine losenges - 5 minutes spent. Advised smoking is especially and immediately life-threatening in the setting of the COVID-19 pandemic.        FRANTZ  Not wearing CPAP     Hx vertigo  Previously Followed by Dr. Tex Montoya on diet and exercise- eventual goal of 30-60 minutes 5-7 times a week as per AHA guidelines. Continue current care and f/u in 6 months.     Karissa Angulo MD

## 2020-10-16 NOTE — LETTER
10/16/20 Patient: Kesha Mota YOB: 1965 Date of Visit: 10/16/2020 Markus Hernandez MD 
[address] [address] VIA In Basket Dear Markus Hernandez MD, Thank you for referring Mr. Sanford Castillo to [address] [place] [address] for evaluation. My notes for this consultation are attached. If you have questions, please do not hesitate to call me. I look forward to following your patient along with you.  
 
 
Sincerely, 
 
Sara Vee MD

## 2020-11-04 ENCOUNTER — TELEPHONE (OUTPATIENT)
Dept: CARDIOLOGY CLINIC | Age: 55
End: 2020-11-04

## 2020-11-04 NOTE — TELEPHONE ENCOUNTER
Please call Daisy at [facility] looking for cardiac clearance on patient having surgery on 11/6/20.    [phone] ext [phone]    Thanks  Ana Jaffe

## 2020-11-05 NOTE — TELEPHONE ENCOUNTER
Yoselyn returned your call and she said if you could fax over the written clearance that would be great.  Their fax # is [phone] w/ attn to yoselyn    thanks

## 2020-11-12 ENCOUNTER — OFFICE VISIT (OUTPATIENT)
Dept: CARDIOLOGY CLINIC | Age: 55
End: 2020-11-12
Payer: COMMERCIAL

## 2020-11-12 DIAGNOSIS — I25.5 ISCHEMIC CARDIOMYOPATHY: ICD-10-CM

## 2020-11-12 DIAGNOSIS — Z95.810 ICD (IMPLANTABLE CARDIOVERTER-DEFIBRILLATOR) IN PLACE: Primary | ICD-10-CM

## 2020-11-12 PROCEDURE — 93295 DEV INTERROG REMOTE 1/2/MLT: CPT | Performed by: INTERNAL MEDICINE

## 2020-11-12 PROCEDURE — 93296 REM INTERROG EVL PM/IDS: CPT | Performed by: INTERNAL MEDICINE

## 2020-11-17 ENCOUNTER — TRANSCRIBE ORDER (OUTPATIENT)
Dept: INTERNAL MEDICINE CLINIC | Age: 55
End: 2020-11-17

## 2020-11-18 ENCOUNTER — CLINICAL SUPPORT (OUTPATIENT)
Dept: INTERNAL MEDICINE CLINIC | Age: 55
End: 2020-11-18
Payer: COMMERCIAL

## 2020-11-18 DIAGNOSIS — Z23 NEEDS FLU SHOT: Primary | ICD-10-CM

## 2020-11-18 PROCEDURE — 90471 IMMUNIZATION ADMIN: CPT

## 2020-11-18 PROCEDURE — 90686 IIV4 VACC NO PRSV 0.5 ML IM: CPT

## 2020-11-18 NOTE — PROGRESS NOTES
After verbal order read back of Dr. Trace Alexandra, patient received Flu Shot in left deltoid. JoyceBaldemar Gonzalez 47: [id number] Lot: [address] Exp 6/30/21. Patient tolerated procedure without complaints and received VIS.

## 2020-11-18 NOTE — PATIENT INSTRUCTIONS
Vaccine Information Statement Influenza (Flu) Vaccine (Inactivated or Recombinant): What You Need to Know Many Vaccine Information Statements are available in Romansh and other languages. See www.immunize.org/vis Hojas de información sobre vacunas están disponibles en español y en muchos otros idiomas. Visite www.immunize.org/vis 1. Why get vaccinated? Influenza vaccine can prevent influenza (flu). Flu is a contagious disease that spreads around the [facility] every year, usually between October and May. Anyone can get the flu, but it is more dangerous for some people. Infants and young children, people 72years of age and older, pregnant women, and people with certain health conditions or a weakened immune system are at greatest risk of flu complications. Pneumonia, bronchitis, sinus infections and ear infections are examples of flu-related complications. If you have a medical condition, such as heart disease, cancer or diabetes, flu can make it worse. Flu can cause fever and chills, sore throat, muscle aches, fatigue, cough, headache, and runny or stuffy nose. Some people may have vomiting and diarrhea, though this is more common in children than adults. Each year thousands of people in the [facility] die from flu, and many more are hospitalized. Flu vaccine prevents millions of illnesses and flu-related visits to the doctor each year. 2. Influenza vaccines CDC recommends everyone 10months of age and older get vaccinated every flu season. Children 6 months through 6years of age may need 2 doses during a single flu season. Everyone else needs only 1 dose each flu season. It takes about 2 weeks for protection to develop after vaccination. There are many flu viruses, and they are always changing. Each year a new flu vaccine is made to protect against three or four viruses that are likely to cause disease in the upcoming flu season.  Even when the vaccine doesnt exactly match these viruses, it may still provide some protection. Influenza vaccine does not cause flu. Influenza vaccine may be given at the same time as other vaccines. 3. Talk with your health care provider Tell your vaccine provider if the person getting the vaccine: 
 Has had an allergic reaction after a previous dose of influenza vaccine, or has any severe, life-threatening allergies.  Has ever had Guillain-Barré Syndrome (also called GBS). In some cases, your health care provider may decide to postpone influenza vaccination to a future visit. People with minor illnesses, such as a cold, may be vaccinated. People who are moderately or severely ill should usually wait until they recover before getting influenza vaccine. Your health care provider can give you more information. 4. Risks of a reaction  Soreness, redness, and swelling where shot is given, fever, muscle aches, and headache can happen after influenza vaccine.  There may be a very small increased risk of Guillain-Barré Syndrome (GBS) after inactivated influenza vaccine (the flu shot). Kaylee Gaming children who get the flu shot along with pneumococcal vaccine (PCV13), and/or DTaP vaccine at the same time might be slightly more likely to have a seizure caused by fever. Tell your health care provider if a child who is getting flu vaccine has ever had a seizure. People sometimes faint after medical procedures, including vaccination. Tell your provider if you feel dizzy or have vision changes or ringing in the ears. As with any medicine, there is a very remote chance of a vaccine causing a severe allergic reaction, other serious injury, or death. 5. What if there is a serious problem? An allergic reaction could occur after the vaccinated person leaves the clinic.  If you see signs of a severe allergic reaction (hives, swelling of the face and throat, difficulty breathing, a fast heartbeat, dizziness, or weakness), call 9-1-1 and get the person to the nearest hospital. 
 
For other signs that concern you, call your health care provider. Adverse reactions should be reported to the Vaccine Adverse Event Reporting System (VAERS). Your health care provider will usually file this report, or you can do it yourself. Visit the VAERS website at www.vaers. hhs.gov or call 2-280.178.9012. VAERS is only for reporting reactions, and VAERS staff do not give medical advice. 6. The National Vaccine Injury Compensation Program 
 
The [facility] Vaccine Injury Compensation Program (VICP) is a federal program that was created to compensate people who may have been injured by certain vaccines. Visit the VICP website at www.[facility].gov/vaccinecompensation or call 0-492.245.6017 to learn about the program and about filing a claim. There is a time limit to file a claim for compensation. 7. How can I learn more?  Ask your health care provider.  Call your local or state health department.  Contact the Centers for Disease Control and Prevention (CDC): 
- Call 6-394.258.6273 (1-800-CDC-INFO) or 
- Visit CDCs influenza website at www.cdc.gov/flu Vaccine Information Statement (Interim) Inactivated Influenza Vaccine 8/15/2019 
42 LINDA Simms 038OH-32 Department of Health and [employer] Centers for Disease Control and Prevention Office Use Only

## 2020-11-29 PROBLEM — N20.0 NEPHROLITHIASIS: Status: ACTIVE | Noted: 2020-11-29

## 2020-12-23 ENCOUNTER — TELEPHONE (OUTPATIENT)
Dept: CARDIOLOGY CLINIC | Age: 55
End: 2020-12-23

## 2020-12-23 NOTE — TELEPHONE ENCOUNTER
Spoke with pt's wife (who handles his prescriptions) regarding his Losartan - in August he had an rx for 12.5mg daily - 25mg tabs, 90 tabs, 3 refills. Pt's wife verbalized understanding.

## 2020-12-23 NOTE — TELEPHONE ENCOUNTER
Patient needs new script for Losartan 12.5 mg called into pharmacy      [facility]    Thanks  Jadiel Jerry

## 2021-02-02 DIAGNOSIS — E78.2 HYPERLIPIDEMIA, MIXED: ICD-10-CM

## 2021-02-02 RX ORDER — ATORVASTATIN CALCIUM 80 MG/1
80 TABLET, FILM COATED ORAL DAILY
Qty: 90 TAB | Refills: 0 | Status: SHIPPED | OUTPATIENT
Start: 2021-02-02 | End: 2021-05-13 | Stop reason: SDUPTHER

## 2021-02-02 NOTE — TELEPHONE ENCOUNTER
[facility] sent a fax requesting a refill of   Requested Prescriptions     Pending Prescriptions Disp Refills    atorvastatin (Lipitor) 80 mg tablet 90 Tab 3     Sig: Take 1 Tab by mouth daily.

## 2021-02-02 NOTE — TELEPHONE ENCOUNTER
PCP: Silvia Woods MD     Last appt: 11/18/2020   Future Appointments   Date Time Provider Peewee Varela   2/11/2021 11:00 AM [facility] OSWALDO ALONSO   4/16/2021 10:45 AM Jose Ramon Connors MD [facility]        Requested Prescriptions     Pending Prescriptions Disp Refills    atorvastatin (Lipitor) 80 mg tablet 90 Tab 0     Sig: Take 1 Tab by mouth daily. Please call office [phone] to schedule an appointment.

## 2021-02-11 ENCOUNTER — OFFICE VISIT (OUTPATIENT)
Dept: CARDIOLOGY CLINIC | Age: 56
End: 2021-02-11
Payer: COMMERCIAL

## 2021-02-11 DIAGNOSIS — I25.5 ISCHEMIC CARDIOMYOPATHY: ICD-10-CM

## 2021-02-11 DIAGNOSIS — Z95.810 ICD (IMPLANTABLE CARDIOVERTER-DEFIBRILLATOR) IN PLACE: Primary | ICD-10-CM

## 2021-02-11 PROCEDURE — 93295 DEV INTERROG REMOTE 1/2/MLT: CPT | Performed by: INTERNAL MEDICINE

## 2021-02-11 PROCEDURE — 93296 REM INTERROG EVL PM/IDS: CPT | Performed by: INTERNAL MEDICINE

## 2021-02-16 RX ORDER — DULOXETIN HYDROCHLORIDE 60 MG/1
60 CAPSULE, DELAYED RELEASE ORAL DAILY
Qty: 90 CAP | Refills: 1 | Status: SHIPPED | OUTPATIENT
Start: 2021-02-16 | End: 2021-08-18 | Stop reason: SDUPTHER

## 2021-02-16 NOTE — TELEPHONE ENCOUNTER
REFILL     PCP: Gabe Manzo MD     Last appt: 11/18/2020   Future Appointments   Date Time Provider Peewee Varela   4/16/2021 10:45 AM MD OSWALDO Nelson   5/13/2021  2:00 PM [facility] OSWALDO ALONSO        Requested Prescriptions     Pending Prescriptions Disp Refills    DULoxetine (CYMBALTA) 60 mg capsule 90 Cap 1     Sig: Take 1 Cap by mouth daily.

## 2021-02-16 NOTE — TELEPHONE ENCOUNTER
[facility] sent a fax requesting a refill of   Requested Prescriptions     Pending Prescriptions Disp Refills    DULoxetine (CYMBALTA) 60 mg capsule 90 Cap 1     Sig: Take 1 Cap by mouth daily.

## 2021-02-22 ENCOUNTER — OFFICE VISIT (OUTPATIENT)
Dept: INTERNAL MEDICINE CLINIC | Age: 56
End: 2021-02-22
Payer: COMMERCIAL

## 2021-02-22 VITALS
BODY MASS INDEX: 31.7 KG/M2 | SYSTOLIC BLOOD PRESSURE: 99 MMHG | OXYGEN SATURATION: 95 % | WEIGHT: 214 LBS | HEART RATE: 97 BPM | RESPIRATION RATE: 18 BRPM | TEMPERATURE: 97.9 F | DIASTOLIC BLOOD PRESSURE: 63 MMHG | HEIGHT: 69 IN

## 2021-02-22 DIAGNOSIS — F32.4 MAJOR DEPRESSIVE DISORDER WITH SINGLE EPISODE, IN PARTIAL REMISSION (HCC): ICD-10-CM

## 2021-02-22 DIAGNOSIS — F17.210 SMOKES 1 PACK OF CIGARETTES PER DAY: ICD-10-CM

## 2021-02-22 DIAGNOSIS — I25.118 CORONARY ARTERY DISEASE OF NATIVE ARTERY OF NATIVE HEART WITH STABLE ANGINA PECTORIS (HCC): ICD-10-CM

## 2021-02-22 DIAGNOSIS — I10 ESSENTIAL HYPERTENSION: Primary | ICD-10-CM

## 2021-02-22 DIAGNOSIS — E78.2 HYPERLIPIDEMIA, MIXED: ICD-10-CM

## 2021-02-22 DIAGNOSIS — R73.03 PREDIABETES: ICD-10-CM

## 2021-02-22 DIAGNOSIS — I50.22 CHRONIC SYSTOLIC CONGESTIVE HEART FAILURE (HCC): ICD-10-CM

## 2021-02-22 PROBLEM — J45.20 MILD INTERMITTENT ASTHMA WITHOUT COMPLICATION: Status: ACTIVE | Noted: 2021-02-22

## 2021-02-22 PROCEDURE — 99214 OFFICE O/P EST MOD 30 MIN: CPT | Performed by: INTERNAL MEDICINE

## 2021-02-22 NOTE — PROGRESS NOTES
Identified pt with two pt identifiers. Reviewed record in preparation for visit and have obtained necessary documentation. All patient medications has been reviewed. Chief Complaint   Patient presents with    Hypertension    Cholesterol Problem     Additional information about chief complaint:    Visit Vitals  BP 99/63 (BP 1 Location: Left upper arm, BP Patient Position: Sitting, BP Cuff Size: Large adult long)   Pulse 97   Temp 97.9 °F (36.6 °C) (Oral)   Resp 18   Ht 5' 9\" (1.753 m)   Wt 214 lb (97.1 kg)   SpO2 95%   BMI 31.60 kg/m²       Health Maintenance Due   Topic    COVID-19 Vaccine (1 of 2)    Shingrix Vaccine Age 50> (1 of 2)    Colorectal Cancer Screening Combo        1. Have you been to the ER, urgent care clinic since your last visit? Hospitalized since your last visit? No  2. Have you seen or consulted any other health care providers outside of the [address] [place] [address] since your last visit? Include any pap smears or colon screening.  No     bdg

## 2021-02-22 NOTE — PROGRESS NOTES
CC:   Chief Complaint   Patient presents with    Hypertension    Cholesterol Problem       HISTORY OF PRESENT ILLNESS  Pratibha Early is a 63 y.o. male. Presents for follow up evaluation. Last seen 9 months ago. He has hypertension, hyperlipidemia, coronary artery disease, history of MI in 9/18 and cardiac stent (MAVIS to proximal LAD), ischemic cardiomyopathy, CHF, ICD in place, glucose intolerance, tobacco use, FRANTZ not on CPAP, vertigo, and obesity. Patient complains of feeling very tired. Occasional lightheadedness, off balance, occasional white blurry vision. Had kidney stones removed in summer 2020. Brought in Wellness Form to be completed. Patient Active Problem List   Diagnosis Code    Essential hypertension I10    Hyperlipidemia, mixed E78.2    ADD (attention deficit disorder) F98.8    Smokes 1 pack of cigarettes per day F17.210    Coronary artery disease of native artery of native heart with stable angina pectoris ([facility] I25.118    Fatigue R53.83    Ischemic cardiomyopathy I25.5    ICD (implantable cardioverter-defibrillator) in place Z95.810    Glucose intolerance (impaired glucose tolerance) R73.02    Severe obesity (BMI 35.0-39. 9) with comorbidity ([address] E66.01    FRANTZ (obstructive sleep apnea) G47.33    Major depressive disorder with single episode, in partial remission ([address] F32.4    Balance problem R26.89    Chronic systolic congestive heart failure (HCC) I50.22    Orthostatic hypotension I95.1    Nephrolithiasis N20.0     Past Medical History:   Diagnosis Date    ADD (attention deficit disorder) 9/25/2018    Asthma     Chest pain 11/30/2018    Dyslipidemia 9/25/2018    Fatigue 11/30/2018    HTN (hypertension) 9/25/2018    Hypertension     Other ill-defined conditions(799.89)     ADHD,BELL'S PALSY WEAKNESS  LT FACE    Psychiatric disorder     DEPRESSION    S/P coronary artery stent placement 9/25/2018 9/25/18 PCI/MAVIS to LAD    STEMI (ST elevation myocardial infarction) ([facility] 9/25/2018    Syncope 1/25/2019    Tobacco abuse 9/25/2018     Allergies   Allergen Reactions    Bee Sting [Sting, Bee] Anaphylaxis       Current Outpatient Medications   Medication Sig Dispense Refill    DULoxetine (CYMBALTA) 60 mg capsule Take 1 Cap by mouth daily. 90 Cap 1    atorvastatin (Lipitor) 80 mg tablet Take 1 Tab by mouth daily. Please call office [phone] to schedule an appointment. 90 Tab 0    ranolazine ER (RANEXA) 500 mg SR tablet TAKE 1 TABLET BY MOUTH TWICE DAILY 180 Tab 3    ezetimibe (ZETIA) 10 mg tablet TAKE 1 TABLET BY MOUTH DAILY 90 Tab 3    aspirin delayed-release 81 mg tablet TAKE 1 TABLET BY MOUTH EVERY DAY 90 Tab 1    isosorbide mononitrate ER (IMDUR) 30 mg tablet TAKE 1 TABLET BY MOUTH EVERY DAY 90 Tab 1    carvediloL (COREG) 6.25 mg tablet TAKE 1 TABLET BY MOUTH 2 TIMES A DAY. 180 Tab 3    losartan (COZAAR) 25 mg tablet Take 0.5 Tabs by mouth daily. 90 Tab 3    albuterol (PROAIR HFA) 90 mcg/actuation inhaler Take 2 Puffs by inhalation every four (4) hours as needed for Wheezing. 3 Inhaler 3    acetaminophen (TYLENOL) 500 mg tablet Take 2,000 mg by mouth two (2) times daily as needed for Pain.  nitroglycerin (NITROSTAT) 0.4 mg SL tablet 1 Tab by SubLINGual route every five (5) minutes as needed for Chest Pain. Up to 3 doses. 1 Bottle 1         PHYSICAL EXAM  Visit Vitals  BP 99/63 (BP 1 Location: Left upper arm, BP Patient Position: Sitting, BP Cuff Size: Large adult long)   Pulse 97   Temp 97.9 °F (36.6 °C) (Oral)   Resp 18   Ht 5' 9\" (1.753 m)   Wt 214 lb (97.1 kg)   SpO2 95%   BMI 31.60 kg/m²       General: Well-developed and well-nourished, no distress. HEENT:  Head normocephalic/atraumatic, no scleral icterus  Lungs:  Clear to ausculation bilaterally. Good air movement. Heart:  Regular rate and rhythm, normal S1 and S2, no murmur, gallop, or rub  Extremities: No clubbing, cyanosis, or edema.    Neurological: Alert and oriented. Psychiatric: Normal mood and affect. Behavior is normal.       ASSESSMENT AND PLAN    ICD-10-CM ICD-9-CM    1. Essential hypertension  J47 166.3 METABOLIC PANEL, COMPREHENSIVE      CBC WITH AUTOMATED DIFF   2. Hyperlipidemia, mixed  E78.2 272.2 LIPID PANEL   3. Prediabetes  R73.03 790.29 HEMOGLOBIN A1C WITH EAG   4. Major depressive disorder with single episode, in partial remission ([facility]  F32.4 296.25    5. Chronic systolic congestive heart failure (HCC)  I50.22 428.22      428.0    6. Coronary artery disease of native artery of native heart with stable angina pectoris ([facility]  I25.118 414.01      413.9    7. Smokes 1 pack of cigarettes per day  F17.210 305.1      Diagnoses and all orders for this visit:    1. Essential hypertension  Possible low at 99/63 today but not symptomatic. Continue losartan and carvedilol  -     METABOLIC PANEL, COMPREHENSIVE  -     CBC WITH AUTOMATED DIFF    2. Hyperlipidemia, mixed  Continue atorvastatin 80 mg daily.   -     LIPID PANEL    3. Prediabetes  Counseled on diet, exercise, and weight loss. -     HEMOGLOBIN A1C WITH EAG    4. Major depressive disorder with single episode, in partial remission ([facility]  Controlled on Cymbalta. No longer on Strattera for ADD due to orthostatic hypotension. 5. Chronic systolic congestive heart failure ([facility]    6. Coronary artery disease of native artery of native heart with stable angina pectoris (HCC)  Asymptomatic. Continue ASA, carvedilol, Imdur, Ranexa, and atorvastatin. 7. Smokes 1 pack of cigarettes per day  Counseled on smoking cessation. Says he has prescription for nicotine patches but has not filled it yet. He wants to continue trying to cut down on his own. Did not tolerate Wellbutrin in the past; caused personality change. Discussed option of Chantix with nicotine patches in the future if he is not able to quit on his own.       Follow-up and Dispositions    · Return in about 1 month (around 3/22/2021), or if symptoms worsen or fail to improve, for HTN (low BP). I have discussed the diagnosis with the patient and the intended plan as seen in the above orders. Patient is in agreement. The patient has received an after-visit summary and questions were answered concerning future plans. I have discussed medication side effects and warnings with the patient as well.

## 2021-03-03 LAB
ALBUMIN SERPL-MCNC: 4.1 G/DL (ref 3.8–4.9)
ALBUMIN/GLOB SERPL: 1.7 {RATIO} (ref 1.2–2.2)
ALP SERPL-CCNC: 125 IU/L (ref 39–117)
ALT SERPL-CCNC: 13 IU/L (ref 0–44)
AST SERPL-CCNC: 15 IU/L (ref 0–40)
BASOPHILS # BLD AUTO: 0.1 X10E3/UL (ref 0–0.2)
BASOPHILS NFR BLD AUTO: 1 %
BILIRUB SERPL-MCNC: 0.2 MG/DL (ref 0–1.2)
BUN SERPL-MCNC: 15 MG/DL (ref 6–24)
BUN/CREAT SERPL: 15 (ref 9–20)
CALCIUM SERPL-MCNC: 9.5 MG/DL (ref 8.7–10.2)
CHLORIDE SERPL-SCNC: 104 MMOL/L (ref 96–106)
CHOLEST SERPL-MCNC: 143 MG/DL (ref 100–199)
CO2 SERPL-SCNC: 27 MMOL/L (ref 20–29)
CREAT SERPL-MCNC: 0.97 MG/DL (ref 0.76–1.27)
EOSINOPHIL # BLD AUTO: 0.3 X10E3/UL (ref 0–0.4)
EOSINOPHIL NFR BLD AUTO: 3 %
ERYTHROCYTE [DISTWIDTH] IN BLOOD BY AUTOMATED COUNT: 13.5 % (ref 11.6–15.4)
EST. AVERAGE GLUCOSE BLD GHB EST-MCNC: 126 MG/DL
GLOBULIN SER CALC-MCNC: 2.4 G/DL (ref 1.5–4.5)
GLUCOSE SERPL-MCNC: 105 MG/DL (ref 65–99)
HBA1C MFR BLD: 6 % (ref 4.8–5.6)
HCT VFR BLD AUTO: 42.1 % (ref 37.5–51)
HDLC SERPL-MCNC: 35 MG/DL
HGB BLD-MCNC: 14.3 G/DL (ref 13–17.7)
IMM GRANULOCYTES # BLD AUTO: 0 X10E3/UL (ref 0–0.1)
IMM GRANULOCYTES NFR BLD AUTO: 1 %
LDLC SERPL CALC-MCNC: 81 MG/DL (ref 0–99)
LYMPHOCYTES # BLD AUTO: 2.3 X10E3/UL (ref 0.7–3.1)
LYMPHOCYTES NFR BLD AUTO: 26 %
MCH RBC QN AUTO: 29.8 PG (ref 26.6–33)
MCHC RBC AUTO-ENTMCNC: 34 G/DL (ref 31.5–35.7)
MCV RBC AUTO: 88 FL (ref 79–97)
MONOCYTES # BLD AUTO: 0.6 X10E3/UL (ref 0.1–0.9)
MONOCYTES NFR BLD AUTO: 7 %
NEUTROPHILS # BLD AUTO: 5.4 X10E3/UL (ref 1.4–7)
NEUTROPHILS NFR BLD AUTO: 62 %
PLATELET # BLD AUTO: 245 X10E3/UL (ref 150–450)
POTASSIUM SERPL-SCNC: 4.8 MMOL/L (ref 3.5–5.2)
PROT SERPL-MCNC: 6.5 G/DL (ref 6–8.5)
RBC # BLD AUTO: 4.8 X10E6/UL (ref 4.14–5.8)
SODIUM SERPL-SCNC: 142 MMOL/L (ref 134–144)
TRIGL SERPL-MCNC: 152 MG/DL (ref 0–149)
VLDLC SERPL CALC-MCNC: 27 MG/DL (ref 5–40)
WBC # BLD AUTO: 8.6 X10E3/UL (ref 3.4–10.8)

## 2021-03-04 NOTE — PROGRESS NOTES
Letter sent: Your labs showed normal kidney and liver tests and normal blood counts. You have prediabetes that is about the same as it was a year ago. To improve this, cut down on sweets, soft drinks, juices, and starchy foods. Also work on exercise and weight loss. Lastly, your LDL or bad cholesterol was high at 81. It should be less than 70. Make sure you are taking atorvastatin and Zetia (ezetimibe) every day and staying on a low-fat diet.

## 2021-03-31 ENCOUNTER — OFFICE VISIT (OUTPATIENT)
Dept: INTERNAL MEDICINE CLINIC | Age: 56
End: 2021-03-31
Payer: COMMERCIAL

## 2021-03-31 VITALS
BODY MASS INDEX: 32.14 KG/M2 | HEIGHT: 69 IN | WEIGHT: 217 LBS | HEART RATE: 87 BPM | OXYGEN SATURATION: 96 % | RESPIRATION RATE: 12 BRPM | TEMPERATURE: 97.8 F | DIASTOLIC BLOOD PRESSURE: 98 MMHG | SYSTOLIC BLOOD PRESSURE: 139 MMHG

## 2021-03-31 DIAGNOSIS — E78.2 HYPERLIPIDEMIA, MIXED: ICD-10-CM

## 2021-03-31 DIAGNOSIS — I95.1 ORTHOSTATIC HYPOTENSION: ICD-10-CM

## 2021-03-31 DIAGNOSIS — I10 ESSENTIAL HYPERTENSION: Primary | ICD-10-CM

## 2021-03-31 PROCEDURE — 99213 OFFICE O/P EST LOW 20 MIN: CPT | Performed by: INTERNAL MEDICINE

## 2021-03-31 NOTE — PATIENT INSTRUCTIONS

## 2021-03-31 NOTE — PROGRESS NOTES
Higinio Leventhal  Identified pt with two pt identifiers(name and ). Chief Complaint   Patient presents with    Hypertension       Reviewed record In preparation for visit and have obtained necessary documentation. 1. Have you been to the ER, urgent care clinic or hospitalized since your last visit? No     2. Have you seen or consulted any other health care providers outside of the [address] [place] [address] since your last visit? Include any pap smears or colon screening. No    Vitals reviewed with provider.     Health Maintenance reviewed:     Health Maintenance Due   Topic    COVID-19 Vaccine (1)    Shingrix Vaccine Age 49> (1 of 2)    Colorectal Cancer Screening Combo           Wt Readings from Last 3 Encounters:   21 217 lb (98.4 kg)   21 214 lb (97.1 kg)   10/16/20 214 lb 9.6 oz (97.3 kg)        Temp Readings from Last 3 Encounters:   21 97.8 °F (36.6 °C) (Oral)   21 97.9 °F (36.6 °C) (Oral)   20 97.7 °F (36.5 °C) (Oral)        BP Readings from Last 3 Encounters:   21 (!) 139/98   21 99/63   10/16/20 (!) 130/90        Pulse Readings from Last 3 Encounters:   21 87   21 97   10/16/20 89        Vitals:    21 1114 21 1127   BP: (!) 163/104 (!) 139/98   Pulse: 87    Resp: 12    Temp: 97.8 °F (36.6 °C)    TempSrc: Oral    SpO2: 96%    Weight: 217 lb (98.4 kg)    Height: 5' 9\" (1.753 m)    PainSc:   0 - No pain           Learning Assessment:   :       Learning Assessment 2019 10/5/2018   PRIMARY LEARNER Patient Patient   PRIMARY LANGUAGE ENGLISH ENGLISH   LEARNER PREFERENCE PRIMARY READING DEMONSTRATION   ANSWERED BY patient patient   RELATIONSHIP SELF SELF        Depression Screening:   :       3 most recent PHQ Screens 3/31/2021   PHQ Not Done Active Diagnosis of Depression or Bipolar Disorder   Little interest or pleasure in doing things -   Feeling down, depressed, irritable, or hopeless -   Total Score PHQ 2 -   Trouble falling or staying asleep, or sleeping too much -   Feeling tired or having little energy -   Poor appetite, weight loss, or overeating -   Feeling bad about yourself - or that you are a failure or have let yourself or your family down -   Trouble concentrating on things such as school, work, reading, or watching TV -   Moving or speaking so slowly that other people could have noticed; or the opposite being so fidgety that others notice -   Thoughts of being better off dead, or hurting yourself in some way -   PHQ 9 Score -   How difficult have these problems made it for you to do your work, take care of your home and get along with others -        Fall Risk Assessment:   :       Fall Risk Assessment, last 12 mths 2/22/2021   Able to walk? Yes   Fall in past 12 months? 0   Do you feel unsteady? 0   Are you worried about falling 0        Abuse Screening:   :       Abuse Screening Questionnaire 3/22/2019 1/9/2019   Do you ever feel afraid of your partner? N N   Are you in a relationship with someone who physically or mentally threatens you? N N   Is it safe for you to go home?  Y Y        ADL Screening:   :       ADL Assessment 1/9/2019   Feeding yourself No Help Needed   Getting from bed to chair No Help Needed   Getting dressed No Help Needed   Bathing or showering No Help Needed   Walk across the room (includes cane/walker) No Help Needed   Using the telphone No Help Needed   Taking your medications No Help Needed   Preparing meals No Help Needed   Managing money (expenses/bills) No Help Needed   Moderately strenuous housework (laundry) No Help Needed   Shopping for personal items (toiletries/medicines) No Help Needed   Shopping for groceries No Help Needed   Driving No Help Needed   Climbing a flight of stairs No Help Needed   Getting to places beyond walking distances No Help Needed

## 2021-03-31 NOTE — PROGRESS NOTES
CC:   Chief Complaint   Patient presents with    Hypertension       HISTORY OF PRESENT ILLNESS  Darlin Hashimoto is a 63 y.o. male. Accompanied by his wife. Presents for evaluation of HTN. BP low at 99/63 at last clinic visit. Was continued on losartan 25 mg 1/2 tab daily and carvedilol 6.25 mg BID. Reports home SBP varies from 's. Has chronic lightheadedness that is unchanged over the past 3 months. Has problems with balance. Near falls in shower but has been able to catch himself before falling. Denies CP, SOB, leg swelling, or syncope. Patient Active Problem List   Diagnosis Code    Essential hypertension I10    Hyperlipidemia, mixed E78.2    ADD (attention deficit disorder) F98.8    Smokes 1 pack of cigarettes per day F17.210    Coronary artery disease of native artery of native heart with stable angina pectoris ([facility] [address] I25.118    Fatigue R53.83    Ischemic cardiomyopathy I25.5    ICD (implantable cardioverter-defibrillator) in place Z95.810    Glucose intolerance (impaired glucose tolerance) R73.02    Severe obesity (BMI 35.0-39. 9) with comorbidity ([facility] [address] E66.01    FRANTZ (obstructive sleep apnea) G47.33    Major depressive disorder with single episode, in partial remission ([address] F32.4    Balance problem R26.89    Chronic systolic congestive heart failure (HCC) I50.22    Orthostatic hypotension I95.1    Nephrolithiasis N20.0    Mild intermittent asthma without complication S75.49     Past Medical History:   Diagnosis Date    ADD (attention deficit disorder) 9/25/2018    Asthma     Chest pain 11/30/2018    Dyslipidemia 9/25/2018    Fatigue 11/30/2018    HTN (hypertension) 9/25/2018    Hypertension     Other ill-defined conditions(799.89)     ADHD,BELL'S PALSY WEAKNESS  LT FACE    Psychiatric disorder     DEPRESSION    S/P coronary artery stent placement 9/25/2018 9/25/18 PCI/MAVIS to LAD    STEMI (ST elevation myocardial infarction) ([facility] [address] 9/25/2018    Syncope 1/25/2019    Tobacco abuse 9/25/2018     Allergies   Allergen Reactions    Bee Sting [Sting, Bee] Anaphylaxis       Current Outpatient Medications   Medication Sig Dispense Refill    DULoxetine (CYMBALTA) 60 mg capsule Take 1 Cap by mouth daily. 90 Cap 1    atorvastatin (Lipitor) 80 mg tablet Take 1 Tab by mouth daily. Please call office [phone] to schedule an appointment. 90 Tab 0    ranolazine ER (RANEXA) 500 mg SR tablet TAKE 1 TABLET BY MOUTH TWICE DAILY 180 Tab 3    ezetimibe (ZETIA) 10 mg tablet TAKE 1 TABLET BY MOUTH DAILY 90 Tab 3    aspirin delayed-release 81 mg tablet TAKE 1 TABLET BY MOUTH EVERY DAY 90 Tab 1    isosorbide mononitrate ER (IMDUR) 30 mg tablet TAKE 1 TABLET BY MOUTH EVERY DAY 90 Tab 1    carvediloL (COREG) 6.25 mg tablet TAKE 1 TABLET BY MOUTH 2 TIMES A DAY. 180 Tab 3    losartan (COZAAR) 25 mg tablet Take 0.5 Tabs by mouth daily. 90 Tab 3    albuterol (PROAIR HFA) 90 mcg/actuation inhaler Take 2 Puffs by inhalation every four (4) hours as needed for Wheezing. 3 Inhaler 3    acetaminophen (TYLENOL) 500 mg tablet Take 2,000 mg by mouth two (2) times daily as needed for Pain.  nitroglycerin (NITROSTAT) 0.4 mg SL tablet 1 Tab by SubLINGual route every five (5) minutes as needed for Chest Pain. Up to 3 doses. 1 Bottle 1         PHYSICAL EXAM  Visit Vitals  BP (!) 139/98 (BP 1 Location: Left upper arm, BP Patient Position: Sitting)   Pulse 87   Temp 97.8 °F (36.6 °C) (Oral)   Resp 12   Ht 5' 9\" (1.753 m)   Wt 217 lb (98.4 kg)   SpO2 96%   BMI 32.05 kg/m²       General: Well-developed and well-nourished, no distress. HEENT:  Head normocephalic/atraumatic, no scleral icterus  Lungs:  Clear to ausculation bilaterally. Good air movement. Heart:  Regular rate and rhythm, normal S1 and S2, no murmur, gallop, or rub  Extremities: No clubbing, cyanosis, or edema. Neurological: Alert and oriented. Psychiatric: Normal mood and affect.  Behavior is normal.         ASSESSMENT AND PLAN    ICD-10-CM ICD-9-CM    1. Essential hypertension  I10 401.9    2. Orthostatic hypotension  I95.1 458.0    3. Hyperlipidemia, mixed  E78.2 272.2      Diagnoses and all orders for this visit:    1. Essential hypertension  BP mildly elevated today but will make no changes. Has wide variations in SBP at home. 2. Orthostatic hypotension  Chronic lightheadedness may be from orthostatic hypotension or vestibular vertigo. If worsens, plan to refer to PT for vestibular rehab.    3. Hyperlipidemia, mixed  LDL elevated at 81 on 3/2/21; not at goal of <70, was previously better controlled. Counseled on low-fat diet. Continue atorvastatin and Zetia. Follow-up and Dispositions    · Return in about 4 months (around 7/31/2021), or if symptoms worsen or fail to improve, for HTN, lipid, IGT. I have discussed the diagnosis with the patient and the intended plan as seen in the above orders. Patient is in agreement. The patient has received an after-visit summary and questions were answered concerning future plans. I have discussed medication side effects and warnings with the patient as well.

## 2021-04-12 ENCOUNTER — OFFICE VISIT (OUTPATIENT)
Dept: CARDIOLOGY CLINIC | Age: 56
End: 2021-04-12
Payer: COMMERCIAL

## 2021-04-12 VITALS
HEIGHT: 69 IN | WEIGHT: 215.1 LBS | OXYGEN SATURATION: 95 % | RESPIRATION RATE: 18 BRPM | HEART RATE: 84 BPM | DIASTOLIC BLOOD PRESSURE: 90 MMHG | BODY MASS INDEX: 31.86 KG/M2 | SYSTOLIC BLOOD PRESSURE: 130 MMHG

## 2021-04-12 DIAGNOSIS — Z95.810 PRESENCE OF AUTOMATIC CARDIOVERTER/DEFIBRILLATOR (AICD): ICD-10-CM

## 2021-04-12 DIAGNOSIS — I25.10 CORONARY ARTERY DISEASE INVOLVING NATIVE CORONARY ARTERY OF NATIVE HEART WITHOUT ANGINA PECTORIS: Primary | ICD-10-CM

## 2021-04-12 DIAGNOSIS — I50.22 CHRONIC SYSTOLIC CONGESTIVE HEART FAILURE (HCC): ICD-10-CM

## 2021-04-12 DIAGNOSIS — E78.5 DYSLIPIDEMIA: ICD-10-CM

## 2021-04-12 DIAGNOSIS — I10 ESSENTIAL HYPERTENSION: ICD-10-CM

## 2021-04-12 PROCEDURE — 99214 OFFICE O/P EST MOD 30 MIN: CPT | Performed by: NURSE PRACTITIONER

## 2021-04-12 PROCEDURE — 93000 ELECTROCARDIOGRAM COMPLETE: CPT | Performed by: NURSE PRACTITIONER

## 2021-04-12 NOTE — PROGRESS NOTES
1. Have you been to the ER, urgent care clinic since your last visit? Hospitalized since your last visit? No    2. Have you seen or consulted any other health care providers outside of the [address] [place] [address] since your last visit? Include any pap smears or colon screening. No    Chief Complaint   Patient presents with    Cardiomyopathy     6 mo appt. C/O Dizziness, high and low BP.

## 2021-04-12 NOTE — PROGRESS NOTES
[address], [place], [address] [facility]  [phone]     Subjective:      Donna Bean is a 63 y.o. male is here for a f/u appt. Last seen in our office 10/16/2020. He reports the lightheadedness had improved, but seems to happen more often again. Recent appts with PCP his BP was low, then at f/u appt it was elevated. He has seen ENT who told him he does have vertigo, did p/t which didn't help. He has seen neurology who felt was more ear related. Het denies chest pain, SOB/OLEARY, orthopnea, PND, or LE edema. He denies palpitations, syncope, or presyncope. Patient Active Problem List    Diagnosis Date Noted    Mild intermittent asthma without complication 99/65/2999    Nephrolithiasis 11/29/2020    Orthostatic hypotension 08/12/2020    Chronic systolic congestive heart failure ([address] 07/21/2020    Major depressive disorder with single episode, in partial remission ([address] 09/15/2019    Balance problem 09/15/2019    FRANTZ (obstructive sleep apnea) 09/13/2019    Glucose intolerance (impaired glucose tolerance) 08/11/2019    Severe obesity (BMI 35.0-39. 9) with comorbidity ([address] 08/11/2019    Ischemic cardiomyopathy 03/22/2019    ICD (implantable cardioverter-defibrillator) in place 03/22/2019    Fatigue 11/30/2018    Coronary artery disease of native artery of native heart with stable angina pectoris ([address] 10/05/2018    Essential hypertension 09/25/2018    Hyperlipidemia, mixed 09/25/2018    ADD (attention deficit disorder) 09/25/2018    Smokes 1 pack of cigarettes per day 09/25/2018      Silvia Massey MD  Past Medical History:   Diagnosis Date    ADD (attention deficit disorder) 9/25/2018    Asthma     Chest pain 11/30/2018    Congestive heart failure ([address]     Diabetes ([address]     Dyslipidemia 9/25/2018    Fatigue 11/30/2018    HTN (hypertension) 9/25/2018    Hypertension     ICD (implantable cardioverter-defibrillator) in place     Other ill-defined conditions(799.89)     ADHD,BELL'S PALSY WEAKNESS  LT FACE    Psychiatric disorder     DEPRESSION    S/P coronary artery stent placement 9/25/2018 9/25/18 PCI/MAVIS to LAD    STEMI (ST elevation myocardial infarction) ([facility] [address] 9/25/2018    Syncope 1/25/2019    Tobacco abuse 9/25/2018      Past Surgical History:   Procedure Laterality Date    HX CORONARY STENT PLACEMENT      HX GI  1984    STOMACH ABDOMEN ACCIDENT    HX HEART CATHETERIZATION      HX HEENT      TEETH EXTRACTION    HX ORTHOPAEDIC  1984    FRACTURE RT FEMUR    HX PTCA      NEUROLOGICAL PROCEDURE UNLISTED      NECK     Allergies   Allergen Reactions    Bee Sting [Sting, Bee] Anaphylaxis      Family History   Problem Relation Age of Onset    Hypertension Mother     Diabetes Mother     No Known Problems Father     Lung Disease Paternal Aunt     Alcohol abuse Paternal Aunt     Cancer Sister         breast    Hypertension Sister     Hypertension Brother     Heart Disease Maternal Uncle     Hypertension Maternal Uncle     Diabetes Maternal Uncle     Alcohol abuse Paternal Uncle     Heart Disease Paternal Grandfather     Hypertension Sister     Macular Degen Sister       Social History     Socioeconomic History    Marital status:      Spouse name: Blade Cabezas Number of children: 2    Years of education: 15    Highest education level: 12th grade   Occupational History    Not on file   Social Needs    Financial resource strain: Not hard at all   [employer] insecurity     Worry: Never true     Inability: Never true   [employer] needs     Medical: Patient refused     Non-medical: Patient refused   Tobacco Use    Smoking status: Current Every Day Smoker     Packs/day: 1.00     Years: 40.00     Pack years: 40.00     Types: Cigarettes    Smokeless tobacco: Never Used    Tobacco comment: started again  6/1/2019   Substance and Sexual Activity    Alcohol use: No     Alcohol/week: 0.0 standard drinks     Frequency: Never    Drug use: No    Sexual activity: Not Currently   Lifestyle    Physical activity     Days per week: 0 days     Minutes per session: 0 min    Stress: Very much   Relationships    Social connections     Talks on phone: More than three times a week     Gets together: Once a week     Attends Sikh service: Never     Active member of club or organization: No     Attends meetings of clubs or organizations: Never     Relationship status:     Intimate partner violence     Fear of current or ex partner: No     Emotionally abused: No     Physically abused: No     Forced sexual activity: No   Other Topics Concern     Service No    Blood Transfusions Yes    Caffeine Concern Yes     Comment: coffee 1 pot    Occupational Exposure Yes     Comment: cig smoke    Hobby Hazards No    Sleep Concern Yes     Comment: does not sleep well,wakes up sweating,dog    Stress Concern Yes     Comment: high level daily -depression he states    Weight Concern Yes     Comment: more tired    Special Diet No    Back Care No     Comment: neck and low back surgery    Exercise No     Comment: does not exercise    Bike Helmet No     Comment: does not ride bike   [address] Yes     Comment: 1/2 time wears seatbelt    Self-Exams No   Social History Narrative    Not on file      Current Outpatient Medications   Medication Sig    DULoxetine (CYMBALTA) 60 mg capsule Take 1 Cap by mouth daily.  atorvastatin (Lipitor) 80 mg tablet Take 1 Tab by mouth daily. Please call office [phone] to schedule an appointment.  ranolazine ER (RANEXA) 500 mg SR tablet TAKE 1 TABLET BY MOUTH TWICE DAILY    ezetimibe (ZETIA) 10 mg tablet TAKE 1 TABLET BY MOUTH DAILY    aspirin delayed-release 81 mg tablet TAKE 1 TABLET BY MOUTH EVERY DAY    isosorbide mononitrate ER (IMDUR) 30 mg tablet TAKE 1 TABLET BY MOUTH EVERY DAY    carvediloL (COREG) 6.25 mg tablet TAKE 1 TABLET BY MOUTH 2 TIMES A DAY.     losartan (COZAAR) 25 mg tablet Take 0.5 Tabs by mouth daily.  albuterol (PROAIR HFA) 90 mcg/actuation inhaler Take 2 Puffs by inhalation every four (4) hours as needed for Wheezing.  acetaminophen (TYLENOL) 500 mg tablet Take 2,000 mg by mouth two (2) times daily as needed for Pain.  nitroglycerin (NITROSTAT) 0.4 mg SL tablet 1 Tab by SubLINGual route every five (5) minutes as needed for Chest Pain. Up to 3 doses. No current facility-administered medications for this visit. Review of Symptoms:  11 systems reviewed, negative other than as stated in the HPI    Physical ExamPhysical Exam:    Vitals:    04/12/21 0901 04/12/21 0916 04/12/21 0918   BP: (!) 144/100 (!) 120/90 (!) 130/90   Pulse: 80 84    Resp: 18     SpO2: 95%     Weight: 215 lb 1.6 oz (97.6 kg)     Height: 5' 9\" (1.753 m)       Body mass index is 31.76 kg/m². General PE  Gen:  NAD  Mental Status - Alert. General Appearance - Not in acute distress. HEENT:  PERRL, no carotid bruits or JVD  Chest and Lung Exam   Inspection: Accessory muscles - No use of accessory muscles in breathing. Auscultation:   Breath sounds: - Normal.   Cardiovascular   Inspection: Jugular vein - Bilateral - Inspection Normal.   Palpation/Percussion:   Apical Impulse: - Normal.   Auscultation: Rhythm - Regular. Heart Sounds - S1 WNL and S2 WNL. No S3 or S4. Murmurs & Other Heart Sounds: Auscultation of the heart reveals - No Murmurs. Peripheral Vascular   Upper Extremity: Inspection - Bilateral - No Cyanotic nailbeds or Digital clubbing. Lower Extremity:   Palpation: Edema - Bilateral - No edema. Abdomen:   Soft, non-tender, bowel sounds are active.   Neuro: A&O times 3, CN and motor grossly WNL    Labs:   Lab Results   Component Value Date/Time    Cholesterol, total 143 03/02/2021 09:34 AM    Cholesterol, total 129 07/20/2020 08:19 AM    Cholesterol, total 129 07/20/2020 08:17 AM    Cholesterol, total 154 10/15/2019 08:42 AM    Cholesterol, total 165 07/30/2019 08:38 AM    HDL Cholesterol 35 (L) 03/02/2021 09:34 AM    HDL Cholesterol 34 (L) 07/20/2020 08:19 AM    HDL Cholesterol 34 (L) 07/20/2020 08:17 AM    HDL Cholesterol 32 (L) 10/15/2019 08:42 AM    HDL Cholesterol 30 (L) 07/30/2019 08:38 AM    LDL, calculated 81 03/02/2021 09:34 AM    LDL, calculated 66 07/20/2020 08:19 AM    LDL, calculated 66 07/20/2020 08:17 AM    LDL, calculated 88 10/15/2019 08:42 AM    LDL, calculated 91 07/30/2019 08:38 AM    LDL, calculated 128.8 (H) 09/26/2018 03:34 AM    Triglyceride 152 (H) 03/02/2021 09:34 AM    Triglyceride 144 07/20/2020 08:19 AM    Triglyceride 146 07/20/2020 08:17 AM    Triglyceride 172 (H) 10/15/2019 08:42 AM    Triglyceride 222 (H) 07/30/2019 08:38 AM    CHOL/HDL Ratio 6.1 (H) 09/26/2018 03:34 AM     Lab Results   Component Value Date/Time    CK 78 05/24/2019 04:50 PM     Lab Results   Component Value Date/Time    Sodium 142 03/02/2021 09:34 AM    Potassium 4.8 03/02/2021 09:34 AM    Chloride 104 03/02/2021 09:34 AM    CO2 27 03/02/2021 09:34 AM    Anion gap 4 (L) 05/24/2019 04:50 PM    Glucose 105 (H) 03/02/2021 09:34 AM    BUN 15 03/02/2021 09:34 AM    Creatinine 0.97 03/02/2021 09:34 AM    BUN/Creatinine ratio 15 03/02/2021 09:34 AM    GFR est  03/02/2021 09:34 AM    GFR est non-AA 87 03/02/2021 09:34 AM    Calcium 9.5 03/02/2021 09:34 AM    Bilirubin, total 0.2 03/02/2021 09:34 AM    Alk. phosphatase 125 (H) 03/02/2021 09:34 AM    Protein, total 6.5 03/02/2021 09:34 AM    Albumin 4.1 03/02/2021 09:34 AM    Globulin 3.8 05/24/2019 04:50 PM    A-G Ratio 1.7 03/02/2021 09:34 AM    ALT (SGPT) 13 03/02/2021 09:34 AM       EKG:Sinus  Rhythm    anterior infarct -age undetermined. NS changes       Assessment:     Assessment:      1. Coronary artery disease involving native coronary artery of native heart without angina pectoris    2. Chronic systolic congestive heart failure ([address]    3. Essential hypertension    4. Dyslipidemia    5.  Presence of automatic cardioverter/defibrillator (AICD)        Orders Placed This Encounter    AMB POC EKG ROUTINE W/ 12 LEADS, INTER & REP     Order Specific Question:   Reason for Exam:     Answer:   routine        Plan:     CAD  Hx anterior STEMI in 9/2018, with peak troponin 100. Cardiac cath with MAVIS to the prox LAD.  He had 80% stenosis of the prox third of the OM1 and RPL. Fixed defect consistent with prior myocardial infarction per NST 9/8/2020;   Denies angina or anginal equivalent symptoms. EKG SR today without significant changes noted c/w EKG 8/13/20  Continue with ASA,  Imdur 30mg, Coreg 6.25mg bid, Ranexa 500mg bid,and statin therapy.        ICM, s/p ICD  EF 45-50% in 9/4/20  Echo in 1/2019 with reduced LVEF 40-45%    No events noted per device check, device with appropriate function on 11/12/20  Wt today is is improved, 215lbs. Appears compensated, euvolemic  Creatinine stable at 0.97 3/2/21  Continue with Coreg 6.25mg bid, losartan 12.5mg.        HLD  7/2020 LDL 66   On Lipitor 80mg daily and Zetia 10mg. 3/2/21 LDL increased to 81  Labs and lipids per PCP. Discussed diet low in fat/carbs and improving exercise frequency. Goal LDL 70 or lower.      Tobacco abuse, back to 1PPD. Not interested in smoking cessation     FRANTZ  Not wearing CPAP    Orthostatic hypotension/ supine HTN  Previously had resolved upon d/c Straterra and decreased dose of Losartan  He has seen ENT who diagnosed him with vertigo  BP was not significantly orthostatic today nor was he symptomatic   Advised to continue with aggressive hydration, use compression stockings. Continue current care and f/u in 6 months with Dr Ashok Aleman.     Sylvain Adam, MERLINE

## 2021-05-13 ENCOUNTER — OFFICE VISIT (OUTPATIENT)
Dept: CARDIOLOGY CLINIC | Age: 56
End: 2021-05-13
Payer: COMMERCIAL

## 2021-05-13 DIAGNOSIS — Z95.810 ICD (IMPLANTABLE CARDIOVERTER-DEFIBRILLATOR) IN PLACE: Primary | ICD-10-CM

## 2021-05-13 DIAGNOSIS — I50.22 CHRONIC SYSTOLIC CONGESTIVE HEART FAILURE (HCC): ICD-10-CM

## 2021-05-13 DIAGNOSIS — E78.2 HYPERLIPIDEMIA, MIXED: ICD-10-CM

## 2021-05-13 PROCEDURE — 93295 DEV INTERROG REMOTE 1/2/MLT: CPT | Performed by: INTERNAL MEDICINE

## 2021-05-13 PROCEDURE — 93296 REM INTERROG EVL PM/IDS: CPT | Performed by: INTERNAL MEDICINE

## 2021-05-13 RX ORDER — ATORVASTATIN CALCIUM 80 MG/1
80 TABLET, FILM COATED ORAL DAILY
Qty: 90 TAB | Refills: 3 | Status: SHIPPED | OUTPATIENT
Start: 2021-05-13 | End: 2022-05-02 | Stop reason: SDUPTHER

## 2021-05-13 NOTE — TELEPHONE ENCOUNTER
PCP: Ricardo Molina MD     Last appt: 3/31/2021   Future Appointments   Date Time Provider Peewee Varela   7/28/2021 11:10 AM Ricardo Molina MD [facility]   8/12/2021 11:00 AM REMOTE_[facility]   11/11/2021  9:00 AM Malka Connors MD [facility] BS AMB        Requested Prescriptions     Pending Prescriptions Disp Refills    atorvastatin (Lipitor) 80 mg tablet 90 Tab 3     Sig: Take 1 Tab by mouth daily.

## 2021-07-28 ENCOUNTER — OFFICE VISIT (OUTPATIENT)
Dept: INTERNAL MEDICINE CLINIC | Age: 56
End: 2021-07-28
Payer: COMMERCIAL

## 2021-07-28 VITALS
HEIGHT: 69 IN | HEART RATE: 93 BPM | RESPIRATION RATE: 16 BRPM | SYSTOLIC BLOOD PRESSURE: 119 MMHG | DIASTOLIC BLOOD PRESSURE: 82 MMHG | TEMPERATURE: 97.7 F | WEIGHT: 217.4 LBS | OXYGEN SATURATION: 94 % | BODY MASS INDEX: 32.2 KG/M2

## 2021-07-28 DIAGNOSIS — E78.2 HYPERLIPIDEMIA, MIXED: ICD-10-CM

## 2021-07-28 DIAGNOSIS — Z12.11 SCREENING FOR COLON CANCER: ICD-10-CM

## 2021-07-28 DIAGNOSIS — R73.03 PREDIABETES: ICD-10-CM

## 2021-07-28 DIAGNOSIS — I25.118 CORONARY ARTERY DISEASE OF NATIVE ARTERY OF NATIVE HEART WITH STABLE ANGINA PECTORIS (HCC): ICD-10-CM

## 2021-07-28 DIAGNOSIS — F17.210 SMOKES 1 PACK OF CIGARETTES PER DAY: ICD-10-CM

## 2021-07-28 DIAGNOSIS — G47.33 OSA (OBSTRUCTIVE SLEEP APNEA): ICD-10-CM

## 2021-07-28 DIAGNOSIS — R42 VERTIGO: ICD-10-CM

## 2021-07-28 DIAGNOSIS — I10 ESSENTIAL HYPERTENSION: Primary | ICD-10-CM

## 2021-07-28 PROBLEM — E66.9 OBESITY (BMI 30-39.9): Status: ACTIVE | Noted: 2019-08-11

## 2021-07-28 PROBLEM — I95.1 ORTHOSTATIC HYPOTENSION: Status: RESOLVED | Noted: 2020-08-12 | Resolved: 2021-07-28

## 2021-07-28 PROBLEM — R53.83 FATIGUE: Status: RESOLVED | Noted: 2018-11-30 | Resolved: 2021-07-28

## 2021-07-28 PROBLEM — R73.02 GLUCOSE INTOLERANCE (IMPAIRED GLUCOSE TOLERANCE): Status: RESOLVED | Noted: 2019-08-11 | Resolved: 2021-07-28

## 2021-07-28 PROBLEM — R26.89 BALANCE PROBLEM: Status: RESOLVED | Noted: 2019-09-15 | Resolved: 2021-07-28

## 2021-07-28 PROBLEM — F98.8 ADD (ATTENTION DEFICIT DISORDER): Status: RESOLVED | Noted: 2018-09-25 | Resolved: 2021-07-28

## 2021-07-28 PROCEDURE — 99214 OFFICE O/P EST MOD 30 MIN: CPT | Performed by: INTERNAL MEDICINE

## 2021-07-28 RX ORDER — NITROGLYCERIN 0.4 MG/1
0.4 TABLET SUBLINGUAL
Qty: 1 BOTTLE | Refills: 1 | Status: CANCELLED | OUTPATIENT
Start: 2021-07-28

## 2021-07-28 RX ORDER — NITROGLYCERIN 0.4 MG/1
0.4 TABLET SUBLINGUAL
Qty: 1 BOTTLE | Refills: 1 | Status: SHIPPED | OUTPATIENT
Start: 2021-07-28 | End: 2022-07-28 | Stop reason: SDUPTHER

## 2021-07-28 NOTE — PROGRESS NOTES
Diana Lazo  Identified pt with two pt identifiers(name and ). Chief Complaint   Patient presents with    Hypertension     Room 3B //     Other     IGT    Cholesterol Problem       Reviewed record In preparation for visit and have obtained necessary documentation. 1. Have you been to the ER, urgent care clinic or hospitalized since your last visit? No     2. Have you seen or consulted any other health care providers outside of the [address] [place] [address] since your last visit? Include any pap smears or colon screening. No    Patient does not have an advance directive. Vitals reviewed with provider. Health Maintenance reviewed:     Health Maintenance Due   Topic    Colorectal Cancer Screening Combo     Shingrix Vaccine Age 49> (1 of 2)    LDCT Smoker 30 Pack Years     COVID-19 Vaccine (2 - Pfizer 2-dose series)     Abuse Screening Questionnaire 3/22/2019 2019   Do you ever feel afraid of your partner? N N   Are you in a relationship with someone who physically or mentally threatens you?  N N     Temp Readings from Last 3 Encounters:   21 97.7 °F (36.5 °C) (Oral)   21 97.8 °F (36.6 °C) (Oral)   21 97.9 °F (36.6 °C) (Oral)   kin  BP Readings from Last 3 Encounters:   21 119/82   21 (!) 130/90   21 (!) 139/98    No  Pulse Readings from Last 3 Encounters:   21 93   21 84   21 87   or   Vitals:    21 1057 21 1106   BP: (!) 133/92 119/82   Pulse: 93    Resp: 16    Temp: 97.7 °F (36.5 °C)    TempSrc: Oral    SpO2: 94%    Weight: 217 lb 6.4 oz (98.6 kg)    Height: 5' 9\" (1.753 m)    PainSc:   0 - No pain    buse Screening Questionnaire 3/  Learning Assessment 2019 10/5/2018   PRIMARY LEARNER Patient Patient   PRIMARY LANGUAGE ENGLISH ENGLISH   LEARNER PREFERENCE PRIMARY READING DEMONSTRATION   ANSWERED BY patient patient   RELATIONSHIP SELF SELF   ast 12 mths 2021   Able to   3 most recent PHQ Screens 2021   PHQ Not Done Active Diagnosis of Depression or Bipolar Disorder   Little interest or pleasure in doing things -   Feeling down, depressed, irritable, or hopeless -   Total Score PHQ 2 -   Trouble falling or staying asleep, or sleeping too much -   Feeling tired or having little energy -   Poor appetite, weight loss, or overeating -   Feeling bad about yourself - or that you are a failure or have let yourself or your family down -   Trouble concentrating on things such as school, work, reading, or watching TV -   Moving or speaking so slowly that other people could have noticed; or the opposite being so fidgety that others notice -   Thoughts of being better off dead, or hurting yourself in some way -   PHQ 9 Score -   How difficult have these problems made it for you to do your work, take care of your home and get along with others -

## 2021-07-28 NOTE — PROGRESS NOTES
CC:   Chief Complaint   Patient presents with    Hypertension     Room 3B //     Other     IGT    Cholesterol Problem       HISTORY OF PRESENT ILLNESS  Katerin Levy is a 63 y.o. male. Presents for 4 month follow up evaluation. He has hypertension, hyperlipidemia, coronary artery disease, history of MI with cardiac stent (MAVIS to proximal LAD) in 9/18, ischemic cardiomyopathy, CHF, ICD in place, major depression, prediabetes, tobacco use, FRANTZ not on CPAP, vertigo, and obesity. Complains of unchanged dizziness. Saw a neurologist, Dr. Kris Rodriguez, in the past; diagnosed with vestibular vertigo. PT vestibular exercises helped a little but dizziness never went away. Recalls Dr. Hawkins Brothers telling him that the dizziness was something he would likely have to live with. Has occasional mild CP. Last episode about 3 days ago. Left-sided, radiated to left arm, lasted less than 5 mins. Denies SOB, heart palpitations, leg swelling, orthopnea, or PND. Smokes 1 ppd. COVID: had 2/2 vaccines    ROS  A complete review of systems was performed and is negative except for those mentioned in the HPI. Patient Active Problem List   Diagnosis Code    Essential hypertension I10    Hyperlipidemia, mixed E78.2    ADD (attention deficit disorder) F98.8    Smokes 1 pack of cigarettes per day F17.210    Coronary artery disease of native artery of native heart with stable angina pectoris ([facility] I25.118    Fatigue R53.83    Ischemic cardiomyopathy I25.5    ICD (implantable cardioverter-defibrillator) in place Z95.810    Glucose intolerance (impaired glucose tolerance) R73.02    Severe obesity (BMI 35.0-39. 9) with comorbidity ([facility] [address] E66.01    FRANTZ (obstructive sleep apnea) G47.33    Major depressive disorder with single episode, in partial remission (HCC) F32.4    Balance problem R26.89    Chronic systolic congestive heart failure (HCC) I50.22    Orthostatic hypotension I95.1    Nephrolithiasis N20.0    Mild intermittent asthma without complication L66.87     Past Medical History:   Diagnosis Date    ADD (attention deficit disorder) 9/25/2018    Asthma     Chest pain 11/30/2018    Congestive heart failure ([facility]     Diabetes ([facility] [facility]     Dyslipidemia 9/25/2018    Fatigue 11/30/2018    HTN (hypertension) 9/25/2018    Hypertension     ICD (implantable cardioverter-defibrillator) in place     Other ill-defined conditions(799.89)     ADHD,BELL'S PALSY WEAKNESS  LT FACE    Psychiatric disorder     DEPRESSION    S/P coronary artery stent placement 9/25/2018 9/25/18 PCI/MAVIS to LAD    STEMI (ST elevation myocardial infarction) ([facility] [facility] 9/25/2018    Syncope 1/25/2019    Tobacco abuse 9/25/2018     Allergies   Allergen Reactions    Bee Sting [Sting, Bee] Anaphylaxis       Current Outpatient Medications   Medication Sig Dispense Refill    atorvastatin (Lipitor) 80 mg tablet Take 1 Tab by mouth daily. 90 Tab 3    aspirin delayed-release 81 mg tablet TAKE 1 TABLET BY MOUTH EVERY DAY 90 Tab 1    isosorbide mononitrate ER (IMDUR) 30 mg tablet TAKE 1 TABLET BY MOUTH EVERY DAY 90 Tab 1    DULoxetine (CYMBALTA) 60 mg capsule Take 1 Cap by mouth daily. 90 Cap 1    ranolazine ER (RANEXA) 500 mg SR tablet TAKE 1 TABLET BY MOUTH TWICE DAILY 180 Tab 3    ezetimibe (ZETIA) 10 mg tablet TAKE 1 TABLET BY MOUTH DAILY 90 Tab 3    carvediloL (COREG) 6.25 mg tablet TAKE 1 TABLET BY MOUTH 2 TIMES A DAY. 180 Tab 3    losartan (COZAAR) 25 mg tablet Take 0.5 Tabs by mouth daily. 90 Tab 3    albuterol (PROAIR HFA) 90 mcg/actuation inhaler Take 2 Puffs by inhalation every four (4) hours as needed for Wheezing. 3 Inhaler 3    acetaminophen (TYLENOL) 500 mg tablet Take 2,000 mg by mouth two (2) times daily as needed for Pain.  nitroglycerin (NITROSTAT) 0.4 mg SL tablet 1 Tab by SubLINGual route every five (5) minutes as needed for Chest Pain. Up to 3 doses.  1 Bottle 1         PHYSICAL EXAM  Visit Vitals  /82 (BP 1 Location: Left upper arm, BP Patient Position: Sitting, BP Cuff Size: Adult)   Pulse 93   Temp 97.7 °F (36.5 °C) (Oral)   Resp 16   Ht 5' 9\" (1.753 m)   Wt 217 lb 6.4 oz (98.6 kg)   SpO2 94%   BMI 32.10 kg/m²       General: Obese, no distress. HEENT:  Head normocephalic/atraumatic, no scleral icterus  Neck: Supple. No JVD, lymphadenopathy, or thyromegaly. Lungs:  Clear to ausculation bilaterally. Good air movement. Heart:  Regular rate and rhythm, normal S1 and S2, no murmur, gallop, or rub  Abdomen: Soft, non-distended, normal bowel sounds, no tenderness, no guarding, masses, rebound tenderness, or HSM. Extremities: No clubbing, cyanosis, or edema. Neurological: Alert and oriented. Psychiatric: Normal mood and affect. Behavior is normal.         ASSESSMENT AND PLAN    ICD-10-CM ICD-9-CM    1. Essential hypertension  I10 401.9    2. Hyperlipidemia, mixed  E78.2 272.2    3. Prediabetes  R73.03 790.29    4. Vertigo  R42 780.4    5. Coronary artery disease of native artery of native heart with stable angina pectoris (HCC)  I25.118 414.01 nitroglycerin (NITROSTAT) 0.4 mg SL tablet     413.9    6. Smokes 1 pack of cigarettes per day  F17.210 305.1    7. Screening for colon cancer  Z12.11 V76.51 COLOGUARD TEST (FECAL DNA COLORECTAL CANCER SCREENING)     Diagnoses and all orders for this visit:    1. Essential hypertension  Well-controlled on losartan and carvedilol. 2. Hyperlipidemia, mixed  LDL 81 on 3/2/21, up from 66 on 7/20/20. Continue atorvastatin 80 mg nightly and Zetia 10 mg daily. Counseled on low-fat diet. 3. Prediabetes  A1c 6.0% on 3/2/21. Counseled on diet, exercise, and weight loss. 4. Vertigo  Patient declines referral back to Neurology. Will monitor for now. 5. Coronary artery disease of native artery of native heart with stable angina pectoris ([facility] [address]  Instructed him to use SL NTG as needed. Continue ASA, Imdur, Ranexa, beta blocker, and statin.    -     Refill nitroglycerin (NITROSTAT) 0.4 mg SL tablet; 1 Tablet by SubLINGual route every five (5) minutes as needed for Chest Pain. Up to 3 doses. 6. Smokes 1 pack of cigarettes per day  Counseled strongly on smoking cessation. He declined low dose CT lung cancer screening at this time. 7. Screening for colon cancer  -     COLOGUARD TEST (FECAL DNA COLORECTAL CANCER SCREENING)      Follow-up and Dispositions    · Return in about 6 months (around 1/28/2022), or if symptoms worsen or fail to improve, for HTN, vertigo, chol. I have discussed the diagnosis with the patient and the intended plan as seen in the above orders. Patient is in agreement. The patient has received an after-visit summary and questions were answered concerning future plans. I have discussed medication side effects and warnings with the patient as well.

## 2021-08-12 ENCOUNTER — OFFICE VISIT (OUTPATIENT)
Dept: CARDIOLOGY CLINIC | Age: 56
End: 2021-08-12
Payer: COMMERCIAL

## 2021-08-12 DIAGNOSIS — I50.22 CHRONIC SYSTOLIC CONGESTIVE HEART FAILURE (HCC): ICD-10-CM

## 2021-08-12 DIAGNOSIS — Z95.810 ICD (IMPLANTABLE CARDIOVERTER-DEFIBRILLATOR) IN PLACE: Primary | ICD-10-CM

## 2021-08-12 PROCEDURE — 93295 DEV INTERROG REMOTE 1/2/MLT: CPT | Performed by: INTERNAL MEDICINE

## 2021-08-12 PROCEDURE — 93296 REM INTERROG EVL PM/IDS: CPT | Performed by: INTERNAL MEDICINE

## 2021-08-18 RX ORDER — DULOXETIN HYDROCHLORIDE 60 MG/1
60 CAPSULE, DELAYED RELEASE ORAL DAILY
Qty: 90 CAPSULE | Refills: 3 | Status: SHIPPED | OUTPATIENT
Start: 2021-08-18 | End: 2022-08-16

## 2021-08-18 NOTE — TELEPHONE ENCOUNTER
Requested Prescriptions     Pending Prescriptions Disp Refills    DULoxetine (CYMBALTA) 60 mg capsule 90 Capsule 1     Sig: Take 1 Capsule by mouth daily.

## 2021-09-23 ENCOUNTER — TRANSCRIBE ORDER (OUTPATIENT)
Dept: SCHEDULING | Age: 56
End: 2021-09-23

## 2021-09-23 DIAGNOSIS — N20.0 URIC ACID NEPHROLITHIASIS: Primary | ICD-10-CM

## 2021-09-28 ENCOUNTER — OFFICE VISIT (OUTPATIENT)
Dept: CARDIOLOGY CLINIC | Age: 56
End: 2021-09-28
Payer: COMMERCIAL

## 2021-09-28 ENCOUNTER — OFFICE VISIT (OUTPATIENT)
Dept: CARDIOLOGY CLINIC | Age: 56
End: 2021-09-28

## 2021-09-28 VITALS
BODY MASS INDEX: 31.87 KG/M2 | DIASTOLIC BLOOD PRESSURE: 78 MMHG | HEIGHT: 69 IN | SYSTOLIC BLOOD PRESSURE: 114 MMHG | WEIGHT: 215.2 LBS | OXYGEN SATURATION: 98 % | RESPIRATION RATE: 18 BRPM | HEART RATE: 74 BPM

## 2021-09-28 DIAGNOSIS — I50.22 CHRONIC SYSTOLIC CONGESTIVE HEART FAILURE (HCC): ICD-10-CM

## 2021-09-28 DIAGNOSIS — Z95.810 ICD (IMPLANTABLE CARDIOVERTER-DEFIBRILLATOR) IN PLACE: ICD-10-CM

## 2021-09-28 DIAGNOSIS — E78.2 HYPERLIPIDEMIA, MIXED: ICD-10-CM

## 2021-09-28 DIAGNOSIS — Z95.810 ICD (IMPLANTABLE CARDIOVERTER-DEFIBRILLATOR) IN PLACE: Primary | ICD-10-CM

## 2021-09-28 DIAGNOSIS — I10 ESSENTIAL HYPERTENSION: ICD-10-CM

## 2021-09-28 DIAGNOSIS — I25.10 CORONARY ARTERY DISEASE INVOLVING NATIVE CORONARY ARTERY OF NATIVE HEART WITHOUT ANGINA PECTORIS: ICD-10-CM

## 2021-09-28 DIAGNOSIS — I25.5 ISCHEMIC CARDIOMYOPATHY: Primary | ICD-10-CM

## 2021-09-28 PROCEDURE — 99213 OFFICE O/P EST LOW 20 MIN: CPT | Performed by: INTERNAL MEDICINE

## 2021-09-28 PROCEDURE — 93282 PRGRMG EVAL IMPLANTABLE DFB: CPT | Performed by: INTERNAL MEDICINE

## 2021-09-28 PROCEDURE — 93000 ELECTROCARDIOGRAM COMPLETE: CPT | Performed by: INTERNAL MEDICINE

## 2021-09-28 RX ORDER — CARVEDILOL 6.25 MG/1
TABLET ORAL
Qty: 180 TABLET | Refills: 3 | Status: SHIPPED | OUTPATIENT
Start: 2021-09-28 | End: 2022-10-03 | Stop reason: SDUPTHER

## 2021-09-28 NOTE — PROGRESS NOTES
ELECTROPHYSIOLOGY        Subjective:      Corina Harrington is a 61 y.o. male is here for EP follow up. He continue to feels short of breath and weak. He started cardiac rehab prior to [facility]. Did not complete. Denies chest pain, pressure, tightness or edema.    Patient Active Problem List    Diagnosis Date Noted    Vertigo 07/28/2021    Prediabetes 07/28/2021    Mild intermittent asthma without complication 30/25/4465    Nephrolithiasis 11/29/2020    Chronic systolic congestive heart failure ([address] 07/21/2020    Major depressive disorder with single episode, in partial remission ([address] 09/15/2019    FRANTZ (obstructive sleep apnea) 09/13/2019    Obesity (BMI 30-39.9) 08/11/2019    Ischemic cardiomyopathy 03/22/2019    ICD (implantable cardioverter-defibrillator) in place 03/22/2019    Coronary artery disease of native artery of native heart with stable angina pectoris ([address] 10/05/2018    Essential hypertension 09/25/2018    Hyperlipidemia, mixed 09/25/2018    Smokes 1 pack of cigarettes per day 09/25/2018      Chris Carballo MD  Past Medical History:   Diagnosis Date    ADD (attention deficit disorder) 9/25/2018    Asthma     Balance problem 9/15/2019    Chest pain 11/30/2018    Congestive heart failure ([address]     Diabetes ([address]     Dyslipidemia 9/25/2018    Fatigue 11/30/2018    Glucose intolerance (impaired glucose tolerance) 8/11/2019    HTN (hypertension) 9/25/2018    Hypertension     ICD (implantable cardioverter-defibrillator) in place     Other ill-defined conditions(799.89)     ADHD,BELL'S PALSY WEAKNESS  LT FACE    Psychiatric disorder     DEPRESSION    S/P coronary artery stent placement 9/25/2018 9/25/18 PCI/MAVIS to LAD    STEMI (ST elevation myocardial infarction) ([address] 9/25/2018    Syncope 1/25/2019    Tobacco abuse 9/25/2018      Past Surgical History:   Procedure Laterality Date    HX CORONARY STENT PLACEMENT      HX GI  1984    STOMACH ABDOMEN ACCIDENT    HX HEART CATHETERIZATION      HX HEENT      TEETH EXTRACTION    HX ORTHOPAEDIC  1984    FRACTURE RT FEMUR    HX PTCA      NEUROLOGICAL PROCEDURE UNLISTED      NECK     Allergies   Allergen Reactions    Bee Sting [Sting, Bee] Anaphylaxis    Flomax [Tamsulosin] Other (comments)     Passed out      Family History   Problem Relation Age of Onset    Hypertension Mother     Diabetes Mother     No Known Problems Father     Lung Disease Paternal Aunt     Alcohol abuse Paternal Aunt    Jeyson Emerson Sister         breast    Hypertension Sister     Hypertension Brother     Heart Disease Maternal Uncle     Hypertension Maternal Uncle     Diabetes Maternal Uncle     Alcohol abuse Paternal Uncle     Heart Disease Paternal Grandfather     Hypertension Sister     Macular Degen Sister     negative for cardiac disease  Social History     Socioeconomic History    Marital status:      Spouse name: Esdras Madsen Number of children: 2    Years of education: 15    Highest education level: 12th grade   Tobacco Use    Smoking status: Current Every Day Smoker     Packs/day: 1.00     Years: 40.00     Pack years: 40.00     Types: Cigarettes    Smokeless tobacco: Never Used    Tobacco comment: started again  6/1/2019   Vaping Use    Vaping Use: Never used   Substance and Sexual Activity    Alcohol use: No     Alcohol/week: 0.0 standard drinks    Drug use: No    Sexual activity: Not Currently   Other Topics Concern     Service No    Blood Transfusions Yes    Caffeine Concern Yes     Comment: coffee 1 pot    Occupational Exposure Yes     Comment: cig smoke    Hobby Hazards No    Sleep Concern Yes     Comment: does not sleep well,wakes up sweating,dog    Stress Concern Yes     Comment: high level daily -depression he states    Weight Concern Yes     Comment: more tired    Special Diet No    Back Care No     Comment: neck and low back surgery    Exercise No     Comment: does not exercise    Bike Helmet No     Comment: does not ride bike   [address] [facility] Yes     Comment: 1/2 time wears seatbelt    Self-Exams No     Social Determinants of Health     Financial Resource Strain:     Difficulty of Paying Living Expenses:    Food Insecurity:     Worried About Running Out of Food in the Last Year:     [address] in the Last Year:    Transportation Needs:     Lack of Transportation (Medical):  Lack of Transportation (Non-Medical):    Physical Activity:     Days of Exercise per Week:     Minutes of Exercise per Session:    Stress:     Feeling of Stress :    Social Connections:     Frequency of Communication with Friends and Family:     Frequency of Social Gatherings with Friends and Family:     Attends Confucianist Services:     Active Member of Clubs or Organizations:     Attends Club or Organization Meetings:     Marital Status:      Current Outpatient Medications   Medication Sig    DULoxetine (CYMBALTA) 60 mg capsule Take 1 Capsule by mouth daily.  nitroglycerin (NITROSTAT) 0.4 mg SL tablet 1 Tablet by SubLINGual route every five (5) minutes as needed for Chest Pain. Up to 3 doses.  atorvastatin (Lipitor) 80 mg tablet Take 1 Tab by mouth daily.  aspirin delayed-release 81 mg tablet TAKE 1 TABLET BY MOUTH EVERY DAY    isosorbide mononitrate ER (IMDUR) 30 mg tablet TAKE 1 TABLET BY MOUTH EVERY DAY    ranolazine ER (RANEXA) 500 mg SR tablet TAKE 1 TABLET BY MOUTH TWICE DAILY    ezetimibe (ZETIA) 10 mg tablet TAKE 1 TABLET BY MOUTH DAILY    carvediloL (COREG) 6.25 mg tablet TAKE 1 TABLET BY MOUTH 2 TIMES A DAY.  losartan (COZAAR) 25 mg tablet Take 0.5 Tabs by mouth daily.  acetaminophen (TYLENOL) 500 mg tablet Take 2,000 mg by mouth two (2) times daily as needed for Pain.  albuterol (PROAIR HFA) 90 mcg/actuation inhaler Take 2 Puffs by inhalation every four (4) hours as needed for Wheezing. No current facility-administered medications for this visit.       Vitals:    09/28/21 8704 BP: 114/78   Pulse: 74   Resp: 18   SpO2: 98%   Weight: 215 lb 3.2 oz (97.6 kg)   Height: 5' 9\" (1.753 m)       I have reviewed the nurses notes, vitals, problem list, allergy list, medical history, family, social history and medications. Review of Symptoms:  11 systems reviewed, negative other than as stated in the HPI      Physical Exam:      General: Well developed, in no acute distress. HEENT: Eyes - PERRL  Heart:  Normal S1/S2 negative S3 or S4. Regular, no murmur  Respiratory: Clear bilaterally x 4, no wheezing or rales  Extremities:  No edema, no cyanosis. Musculoskeletal: No clubbing  Neuro: A&Ox3, speech clear  Skin: No visible rashes or lesions. Non diaphoretic.  No visible ulcers  Vascular: 2+ pulses symmetric in all extremities  Psych - judgement intact and orientation is wnl       Cardiographics    Ek21  Normal sinus rhythm 74    Results for orders placed or performed during the hospital encounter of 19   EKG, 12 LEAD, INITIAL   Result Value Ref Range    Ventricular Rate 83 BPM    Atrial Rate 83 BPM    P-R Interval 140 ms    QRS Duration 78 ms    Q-T Interval 352 ms    QTC Calculation (Bezet) 413 ms    Calculated P Axis 56 degrees    Calculated R Axis 22 degrees    Calculated T Axis 94 degrees    Diagnosis       Sinus rhythm with premature atrial complexes  Anteroseptal infarct (cited on or before 25-SEP-2018)  When compared with ECG of 23-MAR-2019 12:00,  premature atrial complexes are now present  Nonspecific T wave abnormality no longer evident in Anterior leads  Confirmed by Tanda Blizzard ([id number]) on 2019 12:16:39 PM     Results for orders placed or performed in visit on 19   CARDIAC HOLTER MONITOR, 24 HOURS    Narrative    ECG Monitor/24 hours, Complete    Reason for Holter Monitor  SYNCOPE    Heartbeat    Slowest 60  Average 83  Fastest  113      Results:   Underlying Rhythm: Normal sinus rhythm      Atrial Arrhythmias: premature atrial contractions; occasional AV Conduction: normal    Ventricular Arrhythmias: premature ventricular contractions; occasional     ST Segment Analysis:normal     Symptom Correlation:  none    Comment:   Sinus rhythm throughout - no symptoms     Kimberlyn Bowden MD, [facility]            Lab Results   Component Value Date/Time    WBC 8.6 03/02/2021 09:34 AM    HGB 14.3 03/02/2021 09:34 AM    HCT 42.1 03/02/2021 09:34 AM    PLATELET 609 46/34/7797 09:34 AM    MCV 88 03/02/2021 09:34 AM      Lab Results   Component Value Date/Time    Sodium 142 03/02/2021 09:34 AM    Potassium 4.8 03/02/2021 09:34 AM    Chloride 104 03/02/2021 09:34 AM    CO2 27 03/02/2021 09:34 AM    Anion gap 4 (L) 05/24/2019 04:50 PM    Glucose 105 (H) 03/02/2021 09:34 AM    BUN 15 03/02/2021 09:34 AM    Creatinine 0.97 03/02/2021 09:34 AM    BUN/Creatinine ratio 15 03/02/2021 09:34 AM    GFR est  03/02/2021 09:34 AM    GFR est non-AA 87 03/02/2021 09:34 AM    Calcium 9.5 03/02/2021 09:34 AM    Bilirubin, total 0.2 03/02/2021 09:34 AM    Alk. phosphatase 125 (H) 03/02/2021 09:34 AM    Protein, total 6.5 03/02/2021 09:34 AM    Albumin 4.1 03/02/2021 09:34 AM    Globulin 3.8 05/24/2019 04:50 PM    A-G Ratio 1.7 03/02/2021 09:34 AM    ALT (SGPT) 13 03/02/2021 09:34 AM      Lab Results   Component Value Date/Time    TSH 0.57 03/25/2019 10:34 AM           Assessment:           ICD-10-CM ICD-9-CM    1. Ischemic cardiomyopathy  I25.5 414.8 AMB POC EKG ROUTINE W/ 12 LEADS, INTER & REP   2. Coronary artery disease involving native coronary artery of native heart without angina pectoris  I25.10 414.01    3. Chronic systolic congestive heart failure (HCC)  I50.22 428.22      428.0    4. ICD (implantable cardioverter-defibrillator) in place  Z95.810 V45.02    5. Essential hypertension  I10 401.9    6. BMI 31.0-31.9,adult  Z68.31 V85.31    7.  Hyperlipidemia, mixed  E78.2 272.2      Orders Placed This Encounter    AMB POC EKG ROUTINE W/ 12 LEADS, INTER & REP     Order Specific Question:   Reason for Exam:     Answer:   routine        Plan:     Mr. Ragini Garcia is here for annual device follow up s/p ICD 1/25/2019. He is <1% RVP with 14 years batter remaining. No arrhythmias noted. Echo 1/19 with EF 40 % to 45 %. EKG normal sinus. Echo 9/4/20 with EF 45-50%. During this visit,  the patient and I had a comprehensive discussion of device management using principles of shared decision making. we reviewed device therapy, including the potential risks and benefits of device management. These risks include death, myocardial infarction, stroke, cardiac perforation, vascular injury, injury, pacing induced cardiomyopathy, inappropriate shocks (defibrillator) and other less severe complications. The patient demonstrated a clear understanding of these issues during out discussion. Our plans, determined together after thorough consideration, are outlined else where in this note. Enrolled in remote monitoring and I will see him back in 1 year. Addressed all patient questions and concerns at this visit. Continue medical management for Cardiomyopathy. Discussed side effects, efficacy and good medication compliance with pt re: carvedilol and losartan. Thank you for allowing me to participate in Cayetano Tong 's care. Tuan Agustin NP      Patient seen and examined by me with nurse practitioner. I personally performed all components of the history, physical, and medical decision making and agree with the assessment and plan with minor modifications as noted. Stable and compliant with med rx for cardiomyopathy, htn and hyperlipidemia. F/u in one year    Shanika Nicole MD, [facility] [place], [facility]              Patient was made aware during visit today that all testing completed would be instantaneously available on their MyChart for review. Discussed that these results will be made available to the provider at the same time.   They were advised to wait at least 3 business days to allow for provider's interpretation of results with follow-up before calling our office with concerns about their results.

## 2021-09-28 NOTE — TELEPHONE ENCOUNTER
Received medication refill request from [facility] for carvedilol 6.25 mg tabs, pending sig, sent to prescriber Soledad New, Verified patient ID through  and name.

## 2021-09-28 NOTE — LETTER
9/28/2021    Patient: Bryson Mccormick   YOB: 1965   Date of Visit: 9/28/2021     Kenyon Sorensen MD  [facility] [phone] [id number]  [address], [address] [address]  Via Fax: [phone]    Dear MD Erasto Miller MD,      Thank you for referring Mr. Stewart Ty to [address] [place], [place] [address]kate for evaluation. My notes for this consultation are attached. If you have questions, please do not hesitate to call me. I look forward to following your patient along with you.       Sincerely,    Bandar Cohen MD

## 2021-09-28 NOTE — PROGRESS NOTES
1. Have you been to the ER, urgent care clinic since your last visit? Hospitalized since your last visit? No    2. Have you seen or consulted any other health care providers outside of the [address] [place] [address] since your last visit? Include any pap smears or colon screening. No      Chief Complaint   Patient presents with    Cardiomyopathy     yearly appt.    C/O palps at rest.

## 2021-10-11 RX ORDER — ASPIRIN 81 MG/1
81 TABLET ORAL DAILY
Qty: 90 TABLET | Refills: 0 | Status: SHIPPED | OUTPATIENT
Start: 2021-10-11 | End: 2022-01-11

## 2021-10-11 RX ORDER — ISOSORBIDE MONONITRATE 30 MG/1
30 TABLET, EXTENDED RELEASE ORAL DAILY
Qty: 90 TABLET | Refills: 0 | Status: SHIPPED | OUTPATIENT
Start: 2021-10-11 | End: 2022-01-11

## 2021-11-02 RX ORDER — EZETIMIBE 10 MG/1
10 TABLET ORAL DAILY
Qty: 90 TABLET | Refills: 3 | Status: SHIPPED | OUTPATIENT
Start: 2021-11-02 | End: 2022-10-31

## 2021-11-08 NOTE — PROGRESS NOTES
[address], [address] [place] [address] Ne, [address] [facility]  [phone]     Subjective:      Darlin Hashimoto is a 61 y.o. male is here for routine f/u. Pmhx CAD, ICM s/p ICD, HTN. HLD, FRANTZ and tobacco abuse. Last OV 4/2021  He continues to smoke. Since last OV, he noticed intermittent nonexertional precordial pain and sob. He also reports fatigue,  Has sleep apnea but not wearing CPAP. The patient denies  orthopnea, PND, LE edema, palpitations, syncope, or presyncope.        Patient Active Problem List    Diagnosis Date Noted    Vertigo 07/28/2021    Prediabetes 07/28/2021    Mild intermittent asthma without complication 68/88/9269    Nephrolithiasis 11/29/2020    Chronic systolic congestive heart failure ([address] 07/21/2020    Major depressive disorder with single episode, in partial remission ([address] 09/15/2019    FRANTZ (obstructive sleep apnea) 09/13/2019    Obesity (BMI 30-39.9) 08/11/2019    Ischemic cardiomyopathy 03/22/2019    ICD (implantable cardioverter-defibrillator) in place 03/22/2019    Coronary artery disease of native artery of native heart with stable angina pectoris ([address] 10/05/2018    Essential hypertension 09/25/2018    Hyperlipidemia, mixed 09/25/2018    Smokes 1 pack of cigarettes per day 09/25/2018      Doris Wilburn MD  Past Medical History:   Diagnosis Date    ADD (attention deficit disorder) 9/25/2018    Asthma     Balance problem 9/15/2019    Chest pain 11/30/2018    Congestive heart failure ([address]     Diabetes ([address]     Dyslipidemia 9/25/2018    Fatigue 11/30/2018    Glucose intolerance (impaired glucose tolerance) 8/11/2019    HTN (hypertension) 9/25/2018    Hypertension     ICD (implantable cardioverter-defibrillator) in place     Other ill-defined conditions(799.89)     ADHD,BELL'S PALSY WEAKNESS  LT FACE    Psychiatric disorder     DEPRESSION    S/P coronary artery stent placement 9/25/2018 9/25/18 PCI/MAVIS to LAD    STEMI (ST elevation myocardial infarction) ([facility] 9/25/2018    Syncope 1/25/2019    Tobacco abuse 9/25/2018      Past Surgical History:   Procedure Laterality Date    HX CORONARY STENT PLACEMENT      HX GI  1984    STOMACH ABDOMEN ACCIDENT    HX HEART CATHETERIZATION      HX HEENT      TEETH EXTRACTION    HX ORTHOPAEDIC  1984    FRACTURE RT FEMUR    HX PTCA      NEUROLOGICAL PROCEDURE UNLISTED      NECK     Allergies   Allergen Reactions    Bee Sting [Sting, Bee] Anaphylaxis    Flomax [Tamsulosin] Other (comments)     [de-identified] out      Family History   Problem Relation Age of Onset    Hypertension Mother     Diabetes Mother     No Known Problems Father     Lung Disease Paternal Aunt     Alcohol abuse Paternal Aunt     Cancer Sister         breast    Hypertension Sister     Hypertension Brother     Heart Disease Maternal Uncle     Hypertension Maternal Uncle     Diabetes Maternal Uncle     Alcohol abuse Paternal Uncle     Heart Disease Paternal Grandfather     Hypertension Sister     Macular Degen Sister       Social History     Socioeconomic History    Marital status:      Spouse name: Brent Joy Number of children: 2    Years of education: 15    Highest education level: 12th grade   Occupational History    Not on file   Tobacco Use    Smoking status: Current Every Day Smoker     Packs/day: 1.00     Years: 40.00     Pack years: 40.00     Types: Cigarettes    Smokeless tobacco: Never Used    Tobacco comment: started again  6/1/2019   Vaping Use    Vaping Use: Never used   Substance and Sexual Activity    Alcohol use: No     Alcohol/week: 0.0 standard drinks    Drug use: No    Sexual activity: Not Currently   Other Topics Concern     Service No    Blood Transfusions Yes    Caffeine Concern Yes     Comment: coffee 1 pot    Occupational Exposure Yes     Comment: cig smoke    Hobby Hazards No    Sleep Concern Yes     Comment: does not sleep well,wakes up sweating,dog    Stress Concern Yes Comment: high level daily -depression he states    Weight Concern Yes     Comment: more tired    Special Diet No    Back Care No     Comment: neck and low back surgery    Exercise No     Comment: does not exercise    Bike Helmet No     Comment: does not ride bike   [address] Yes     Comment: 1/2 time wears seatbelt    Self-Exams No   Social History Narrative    Not on file     Social Determinants of Health     Financial Resource Strain:     Difficulty of Paying Living Expenses: Not on file   Food Insecurity:     Worried About Running Out of Food in the Last Year: Not on file    Isela of Food in the Last Year: Not on file   Transportation Needs:     Lack of Transportation (Medical): Not on file    Lack of Transportation (Non-Medical): Not on file   Physical Activity:     Days of Exercise per Week: Not on file    Minutes of Exercise per Session: Not on file   Stress:     Feeling of Stress : Not on file   Social Connections:     Frequency of Communication with Friends and Family: Not on file    Frequency of Social Gatherings with Friends and Family: Not on file    Attends Gnosticist Services: Not on file    Active Member of [address] [place] [address] or Organizations: Not on file    Attends Club or Organization Meetings: Not on file    Marital Status: Not on file   Intimate Partner Violence:     Fear of Current or Ex-Partner: Not on file    Emotionally Abused: Not on file    Physically Abused: Not on file    Sexually Abused: Not on file   Housing Stability:     Unable to Pay for Housing in the Last Year: Not on file    Number of [place] in the Last Year: Not on file    Unstable Housing in the Last Year: Not on file      Current Outpatient Medications   Medication Sig    ranolazine ER (Ranexa) 1,000 mg Take 1 Tablet by mouth two (2) times a day.  ezetimibe (ZETIA) 10 mg tablet Take 1 Tablet by mouth daily.  Indications: high cholesterol    aspirin delayed-release 81 mg tablet Take 1 Tablet by mouth daily.    isosorbide mononitrate ER (IMDUR) 30 mg tablet Take 1 Tablet by mouth daily.  carvediloL (COREG) 6.25 mg tablet TAKE 1 TABLET BY MOUTH 2 TIMES A DAY.  DULoxetine (CYMBALTA) 60 mg capsule Take 1 Capsule by mouth daily.  nitroglycerin (NITROSTAT) 0.4 mg SL tablet 1 Tablet by SubLINGual route every five (5) minutes as needed for Chest Pain. Up to 3 doses.  atorvastatin (Lipitor) 80 mg tablet Take 1 Tab by mouth daily.  losartan (COZAAR) 25 mg tablet Take 0.5 Tabs by mouth daily.  albuterol (PROAIR HFA) 90 mcg/actuation inhaler Take 2 Puffs by inhalation every four (4) hours as needed for Wheezing.  acetaminophen (TYLENOL) 500 mg tablet Take 2,000 mg by mouth two (2) times daily as needed for Pain. No current facility-administered medications for this visit. Review of Symptoms:  11 systems reviewed, negative other than as stated in the HPI    Physical ExamPhysical Exam:    Vitals:    11/09/21 1417   BP: 130/78   Pulse: 84   Resp: 18   SpO2: 97%   Weight: 211 lb 14.4 oz (96.1 kg)   Height: 5' 9\" (1.753 m)     Body mass index is 31.29 kg/m². General PE  Gen:  NAD  Mental Status - Alert. General Appearance - Not in acute distress. HEENT:  PERRL, no carotid bruits or JVD  Chest and Lung Exam   Inspection: Accessory muscles - No use of accessory muscles in breathing. Auscultation:   Breath sounds: - Normal.   Cardiovascular   Inspection: Jugular vein - Bilateral - Inspection Normal.   Palpation/Percussion:   Apical Impulse: - Normal.   Auscultation: Rhythm - Regular. Heart Sounds - S1 WNL and S2 WNL. No S3 or S4. Murmurs & Other Heart Sounds: Auscultation of the heart reveals - No Murmurs. Peripheral Vascular   Upper Extremity: Inspection - Bilateral - No Cyanotic nailbeds or Digital clubbing. Lower Extremity:   Palpation: Edema - Bilateral - No edema. Abdomen:   Soft, non-tender, bowel sounds are active.   Neuro: A&O times 3, CN and motor grossly WNL    Labs:   Lab Results   Component Value Date/Time    Cholesterol, total 143 03/02/2021 09:34 AM    Cholesterol, total 129 07/20/2020 08:19 AM    Cholesterol, total 129 07/20/2020 08:17 AM    Cholesterol, total 154 10/15/2019 08:42 AM    Cholesterol, total 165 07/30/2019 08:38 AM    HDL Cholesterol 35 (L) 03/02/2021 09:34 AM    HDL Cholesterol 34 (L) 07/20/2020 08:19 AM    HDL Cholesterol 34 (L) 07/20/2020 08:17 AM    HDL Cholesterol 32 (L) 10/15/2019 08:42 AM    HDL Cholesterol 30 (L) 07/30/2019 08:38 AM    LDL, calculated 81 03/02/2021 09:34 AM    LDL, calculated 66 07/20/2020 08:19 AM    LDL, calculated 66 07/20/2020 08:17 AM    LDL, calculated 88 10/15/2019 08:42 AM    LDL, calculated 91 07/30/2019 08:38 AM    LDL, calculated 128.8 (H) 09/26/2018 03:34 AM    Triglyceride 152 (H) 03/02/2021 09:34 AM    Triglyceride 144 07/20/2020 08:19 AM    Triglyceride 146 07/20/2020 08:17 AM    Triglyceride 172 (H) 10/15/2019 08:42 AM    Triglyceride 222 (H) 07/30/2019 08:38 AM    CHOL/HDL Ratio 6.1 (H) 09/26/2018 03:34 AM     Lab Results   Component Value Date/Time    CK 78 05/24/2019 04:50 PM     Lab Results   Component Value Date/Time    Sodium 142 03/02/2021 09:34 AM    Potassium 4.8 03/02/2021 09:34 AM    Chloride 104 03/02/2021 09:34 AM    CO2 27 03/02/2021 09:34 AM    Anion gap 4 (L) 05/24/2019 04:50 PM    Glucose 105 (H) 03/02/2021 09:34 AM    BUN 15 03/02/2021 09:34 AM    Creatinine 0.97 03/02/2021 09:34 AM    BUN/Creatinine ratio 15 03/02/2021 09:34 AM    GFR est  03/02/2021 09:34 AM    GFR est non-AA 87 03/02/2021 09:34 AM    Calcium 9.5 03/02/2021 09:34 AM    Bilirubin, total 0.2 03/02/2021 09:34 AM    Alk. phosphatase 125 (H) 03/02/2021 09:34 AM    Protein, total 6.5 03/02/2021 09:34 AM    Albumin 4.1 03/02/2021 09:34 AM    Globulin 3.8 05/24/2019 04:50 PM    A-G Ratio 1.7 03/02/2021 09:34 AM    ALT (SGPT) 13 03/02/2021 09:34 AM       EKG:         Assessment:     Assessment:        ICD-10-CM ICD-9-CM    1.  Coronary artery disease involving native coronary artery of native heart without angina pectoris  I25.10 414.01 AMB POC EKG ROUTINE W/ 12 LEADS, INTER & REP      ECHO ADULT COMPLETE      NUCLEAR CARDIAC STRESS TEST   2. Essential hypertension  I10 401.9 AMB POC EKG ROUTINE W/ 12 LEADS, INTER & REP      ECHO ADULT COMPLETE      NUCLEAR CARDIAC STRESS TEST   3. Ischemic cardiomyopathy  I25.5 414.8 AMB POC EKG ROUTINE W/ 12 LEADS, INTER & REP      ECHO ADULT COMPLETE      NUCLEAR CARDIAC STRESS TEST   4. Hyperlipidemia, mixed  E78.2 272.2 AMB POC EKG ROUTINE W/ 12 LEADS, INTER & REP      ECHO ADULT COMPLETE      NUCLEAR CARDIAC STRESS TEST   5. ICD (implantable cardioverter-defibrillator) in place  Z95.810 V45.02 AMB POC EKG ROUTINE W/ 12 LEADS, INTER & REP      ECHO ADULT COMPLETE      NUCLEAR CARDIAC STRESS TEST   6. Smokes 1 pack of cigarettes per day  F17.210 305.1 AMB POC EKG ROUTINE W/ 12 LEADS, INTER & REP      ECHO ADULT COMPLETE      NUCLEAR CARDIAC STRESS TEST   7. Arteriosclerotic heart disease (ASHD)  I25.10 414.00 ECHO ADULT COMPLETE      NUCLEAR CARDIAC STRESS TEST   8. FRANTZ (obstructive sleep apnea)  G47.33 327.23 ECHO ADULT COMPLETE      NUCLEAR CARDIAC STRESS TEST   9. OLEARY (dyspnea on exertion)  R06.00 786.09 ECHO ADULT COMPLETE      NUCLEAR CARDIAC STRESS TEST   10. Precordial pain  R07.2 786.51 ECHO ADULT COMPLETE      NUCLEAR CARDIAC STRESS TEST       Orders Placed This Encounter    AMB POC EKG ROUTINE W/ 12 LEADS, INTER & REP     Order Specific Question:   Reason for Exam:     Answer:   Routine    ranolazine ER (Ranexa) 1,000 mg     Sig: Take 1 Tablet by mouth two (2) times a day. Dispense:  180 Tablet     Refill:  1        Plan:        CAD  Hx anterior STEMI in 9/2018, with peak troponin 100. Cardiac cath with MAVIS to the prox LAD.  He had 80% stenosis of the prox third of the OM1 and RPL.   Fixed defect consistent with prior myocardial infarction per NST 9/8/2020;   Continue with ASA,  Imdur 30mg, Coreg 6.25mg bid, statin therapy. Will increase Ranexa to 1000 mg BID  Repeat Kathy         ICM, s/p ICD  EF 45-50% in 9/2020  Echo in 1/2019 with reduced LVEF 40-45%    No events noted per device check, device with appropriate function on 9/2021  Wt down 4 lbs. Continue with Coreg 6.25 mg BID, Losartan 12.5 mg daily  Stable kidney fxn Serum Cr 0.97 in 3/2021  Last OV with EP 9/28/2021: annual device follow up s/p ICD 1/25/2019.   He is <1% RVP with 14 years batter remaining. No arrhythmias noted. Echo 1/19 with EF 40 % to 45 %.   EKG normal sinus. Echo 9/4/20 with EF 45-50  Repeating echo now       HLD  3/2/21 LDL increased to 81  7/2020 LDL 66   On Lipitor 80mg daily and Zetia 10mg. Labs and lipids per PCP. Discussed diet low in fat/carbs and improving exercise frequency. Goal LDL 70 or lower.      Tobacco abuse, back to 1PPD.  Not interested in smoking cessation     FRANTZ  Not wearing CPAP      Orthostatic hypotension/ supine HTN  Previously had resolved upon d/c Straterra and decreased dose of Losartan  He has seen ENT who diagnosed him with vertigo  BP was not significantly orthostatic today nor was he symptomatic   Advised to continue with aggressive hydration, use compression stockings        Continue current care and f/u in 3 mos    Jacinto Everett NP

## 2021-11-09 ENCOUNTER — OFFICE VISIT (OUTPATIENT)
Dept: CARDIOLOGY CLINIC | Age: 56
End: 2021-11-09
Payer: COMMERCIAL

## 2021-11-09 VITALS
OXYGEN SATURATION: 97 % | WEIGHT: 211.9 LBS | HEART RATE: 84 BPM | SYSTOLIC BLOOD PRESSURE: 130 MMHG | HEIGHT: 69 IN | DIASTOLIC BLOOD PRESSURE: 78 MMHG | RESPIRATION RATE: 18 BRPM | BODY MASS INDEX: 31.39 KG/M2

## 2021-11-09 DIAGNOSIS — G47.33 OSA (OBSTRUCTIVE SLEEP APNEA): ICD-10-CM

## 2021-11-09 DIAGNOSIS — R06.09 DOE (DYSPNEA ON EXERTION): ICD-10-CM

## 2021-11-09 DIAGNOSIS — E78.2 HYPERLIPIDEMIA, MIXED: ICD-10-CM

## 2021-11-09 DIAGNOSIS — I25.10 CORONARY ARTERY DISEASE INVOLVING NATIVE CORONARY ARTERY OF NATIVE HEART WITHOUT ANGINA PECTORIS: Primary | ICD-10-CM

## 2021-11-09 DIAGNOSIS — R07.2 PRECORDIAL PAIN: ICD-10-CM

## 2021-11-09 DIAGNOSIS — I25.5 ISCHEMIC CARDIOMYOPATHY: ICD-10-CM

## 2021-11-09 DIAGNOSIS — I10 ESSENTIAL HYPERTENSION: ICD-10-CM

## 2021-11-09 DIAGNOSIS — Z95.810 ICD (IMPLANTABLE CARDIOVERTER-DEFIBRILLATOR) IN PLACE: ICD-10-CM

## 2021-11-09 DIAGNOSIS — F17.210 SMOKES 1 PACK OF CIGARETTES PER DAY: ICD-10-CM

## 2021-11-09 DIAGNOSIS — I25.10 ARTERIOSCLEROTIC HEART DISEASE (ASHD): ICD-10-CM

## 2021-11-09 PROCEDURE — 99214 OFFICE O/P EST MOD 30 MIN: CPT | Performed by: NURSE PRACTITIONER

## 2021-11-09 PROCEDURE — 93000 ELECTROCARDIOGRAM COMPLETE: CPT | Performed by: NURSE PRACTITIONER

## 2021-11-09 RX ORDER — RANOLAZINE 1000 MG/1
1000 TABLET, EXTENDED RELEASE ORAL 2 TIMES DAILY
Qty: 180 TABLET | Refills: 1
Start: 2021-11-09 | End: 2022-01-31

## 2021-11-09 NOTE — PROGRESS NOTES
Chief Complaint   Patient presents with    Coronary Artery Disease     6 Month F/U; c/o chest pain, shortness of breath; Dx with vertigo by ENT and fatigue/ Lack of energy    Hypertension    Hyperlipidemia       Visit Vitals  /78 (BP 1 Location: Right arm, BP Patient Position: Sitting, BP Cuff Size: Adult)   Pulse 84   Resp 18   Ht 5' 9\" (1.753 m)   Wt 211 lb 14.4 oz (96.1 kg)   SpO2 97%   BMI 31.29 kg/m²     1. Have you been to the ER, urgent care clinic since your last visit? Hospitalized since your last visit? No    2. Have you seen or consulted any other health care providers outside of the [address] [place] [address] since your last visit? Include any pap smears or colon screening.  No

## 2021-12-27 DIAGNOSIS — I25.5 ISCHEMIC CARDIOMYOPATHY: ICD-10-CM

## 2021-12-27 DIAGNOSIS — Z45.018 PACEMAKER REPROGRAMMING/CHECK: ICD-10-CM

## 2021-12-27 DIAGNOSIS — E66.01 SEVERE OBESITY (BMI 35.0-39.9) WITH COMORBIDITY (HCC): ICD-10-CM

## 2021-12-27 DIAGNOSIS — I10 ESSENTIAL HYPERTENSION: ICD-10-CM

## 2021-12-27 DIAGNOSIS — Z95.810 ICD (IMPLANTABLE CARDIOVERTER-DEFIBRILLATOR) IN PLACE: ICD-10-CM

## 2021-12-27 DIAGNOSIS — Z72.0 TOBACCO ABUSE: ICD-10-CM

## 2021-12-27 NOTE — TELEPHONE ENCOUNTER
Received refill request for Losartan/HCTZ from [facility]  Per chart patient taking only losartan.  Please confirm

## 2021-12-28 NOTE — TELEPHONE ENCOUNTER
This writer contacted patient, two patient identifiers verified. Patient was informed RX Request for Losartan-HCTZ was received from [facility]. Medication reconciliation conducted. Patient is taking Losartan 25mg tablets 1/2 tablet (12.5mg) by mouth daily. Patient states when he went to submit RX refill request through [facility] online, Losartan was not an option, so he chose the closest medication.  Patient is requesting a refill for Losartan sent to [facility]

## 2021-12-29 RX ORDER — LOSARTAN POTASSIUM 25 MG/1
12.5 TABLET ORAL DAILY
Qty: 45 TABLET | Refills: 3 | Status: SHIPPED | OUTPATIENT
Start: 2021-12-29 | End: 2022-02-11 | Stop reason: ALTCHOICE

## 2022-01-06 ENCOUNTER — OFFICE VISIT (OUTPATIENT)
Dept: CARDIOLOGY CLINIC | Age: 57
End: 2022-01-06
Payer: COMMERCIAL

## 2022-01-06 DIAGNOSIS — Z95.810 ICD (IMPLANTABLE CARDIOVERTER-DEFIBRILLATOR) IN PLACE: Primary | ICD-10-CM

## 2022-01-06 DIAGNOSIS — I50.22 CHRONIC SYSTOLIC CONGESTIVE HEART FAILURE (HCC): ICD-10-CM

## 2022-01-06 DIAGNOSIS — I25.5 ISCHEMIC CARDIOMYOPATHY: ICD-10-CM

## 2022-01-06 PROCEDURE — 93296 REM INTERROG EVL PM/IDS: CPT | Performed by: INTERNAL MEDICINE

## 2022-01-06 PROCEDURE — 93295 DEV INTERROG REMOTE 1/2/MLT: CPT | Performed by: INTERNAL MEDICINE

## 2022-01-11 ENCOUNTER — TELEPHONE (OUTPATIENT)
Dept: CARDIOLOGY CLINIC | Age: 57
End: 2022-01-11

## 2022-01-11 RX ORDER — ASPIRIN 81 MG/1
TABLET ORAL
Qty: 90 TABLET | Refills: 3 | Status: SHIPPED | OUTPATIENT
Start: 2022-01-11

## 2022-01-11 RX ORDER — ISOSORBIDE MONONITRATE 30 MG/1
TABLET, EXTENDED RELEASE ORAL
Qty: 90 TABLET | Refills: 3 | Status: SHIPPED | OUTPATIENT
Start: 2022-01-11

## 2022-01-11 NOTE — TELEPHONE ENCOUNTER
Spoke with patient he never increased the Ranexa still taking 500 mg BID and feeling fine .  Will need a call for scheduling his nuclear and echo order in system

## 2022-01-11 NOTE — TELEPHONE ENCOUNTER
Patient is calling in regards to the Ranexa 1,000 mg tablets. Patient is supposed to be taking 500 mg tablets. Patient would not give me any more information. Patient requested that Jacki call him for further information. Please callback and advise.           Callback Number: [phone]          Thanks  Margarita Monet

## 2022-01-28 ENCOUNTER — VIRTUAL VISIT (OUTPATIENT)
Dept: INTERNAL MEDICINE CLINIC | Age: 57
End: 2022-01-28
Payer: COMMERCIAL

## 2022-01-28 DIAGNOSIS — Z12.5 SCREENING FOR PROSTATE CANCER: ICD-10-CM

## 2022-01-28 DIAGNOSIS — R73.03 PREDIABETES: ICD-10-CM

## 2022-01-28 DIAGNOSIS — F32.4 MAJOR DEPRESSIVE DISORDER WITH SINGLE EPISODE, IN PARTIAL REMISSION (HCC): ICD-10-CM

## 2022-01-28 DIAGNOSIS — I25.118 CORONARY ARTERY DISEASE OF NATIVE ARTERY OF NATIVE HEART WITH STABLE ANGINA PECTORIS (HCC): ICD-10-CM

## 2022-01-28 DIAGNOSIS — I10 ESSENTIAL HYPERTENSION: Primary | ICD-10-CM

## 2022-01-28 DIAGNOSIS — E78.2 HYPERLIPIDEMIA, MIXED: ICD-10-CM

## 2022-01-28 DIAGNOSIS — I50.22 CHRONIC SYSTOLIC CONGESTIVE HEART FAILURE (HCC): ICD-10-CM

## 2022-01-28 PROCEDURE — 99443 PR PHYS/QHP TELEPHONE EVALUATION 21-30 MIN: CPT | Performed by: INTERNAL MEDICINE

## 2022-01-28 NOTE — PROGRESS NOTES
David Dacosta  Identified pt with two pt identifiers(name and ). Chief Complaint   Patient presents with    Hypertension    Sinus Infection     Ongoing for about 2 weeks        Reviewed record In preparation for visit and have obtained necessary documentation. 1. Have you been to the ER, urgent care clinic or hospitalized since your last visit? No     2. Have you seen or consulted any other health care providers outside of the [address] [place] [address] since your last visit? Include any pap smears or colon screening. No    Patient does not have an advance directive. Vitals reviewed with provider. Health Maintenance reviewed:     Health Maintenance Due   Topic    Colorectal Cancer Screening Combo     Low dose CT lung screening    afe   Wt Readings from Last 3 Encounters:   21 211 lb 14.4 oz (96.1 kg)   21 215 lb 3.2 oz (97.6 kg)   21 217 lb 6.4 oz (98.6 kg)   for you to go home?  Deidra Cochran  Temp Readings from Last 3 Encounters:   21 97.7 °F (36.5 °C) (Oral)   21 97.8 °F (36.6 °C) (Oral)   21 97.9 °F (36.6 °C) (Oral)   cat  BP Readings from Last 3 Encounters:   21 130/78   21 114/78   21 119/82   Q Sc  Pulse Readings from Last 3 Encounters:   21 84   21 74   21 93   reens 2022   PHQ Not Done -   Little interest or pleasure in doing things Not at all   Feeling down, depressed, irritable, or hopeless Not at all   Total Score PHQ 2 0   Trouble falling or staying asleep, or sleeping too much -   Feeling tired or having little energy -   Poor appetite, weight loss, or overeating -   Feeling bad about yourself - or that you are a failure or have let yourself or your family down -   Trouble concentrating on things such as school, work, reading, or watching TV -   Moving or speaking so slowly that other people could have noticed; or the opposite being so fidgety that others notice -   Thoughts of being better off dead, or hurting yourself in some way -   PHQ 9 Score -   How difficult have these problems made it for you to do your work, take care of your home and get along with others -

## 2022-01-28 NOTE — PROGRESS NOTES
Nicole Carranza is a 61 y.o. male, evaluated via audio-only technology on 1/28/2022 for Hypertension and Sinus Infection (Ongoing for about 2 weeks). Assessment & Plan:     Diagnoses and all orders for this visit:    1. Essential hypertension  Controlled on losartan and carvedilol.  -     METABOLIC PANEL, COMPREHENSIVE; Future  -     CBC WITH AUTOMATED DIFF; Future    2. Hyperlipidemia, mixed  On 3/2/21: tot chol 143, LDL 81 (not at goal of <70). Will recheck. Continue atorvastatin 80 mg and Zetia 10 mg daily.  -     LIPID PANEL; Future    3. Prediabetes  A1c 6.0% on 3/2/21. Counseled on diet, exercise, and weight loss. Will recheck A1c.  -     HEMOGLOBIN A1C WITH EAG; Future    4. Chronic systolic congestive heart failure ([facility]  Has ICD in place. Continue carvedilol and losartan. Scheduled for echo on 2/3/22. 5. Coronary artery disease of native artery of native heart with stable angina pectoris ([facility]  Counseled strongly on smoking cessation but not motivated to quit at this time. Continue ASA, Imdur, Ranexa, beta blocker, and statin. Scheduled for nuclear stress test on 2/3/22. 6. Major depressive disorder with single episode, in partial remission ([facility]    7. Screening for prostate cancer  -     PSA SCREENING (SCREENING); Future      Follow-up and Dispositions    · Return in about 6 months (around 7/28/2022), or if symptoms worsen or fail to improve, for HTN, chol, prediabetes; have fasting labs done within next 4 weeks. 12  Subjective:     Presents for 6 month follow up evaluation. He has hypertension, hyperlipidemia, coronary artery disease, history of MI with cardiac stent (MAVIS to proximal LAD) in 9/18, ischemic cardiomyopathy, CHF, ICD in place, major depression, prediabetes, tobacco use, FRANTZ not on CPAP, vertigo, kidney stones, and obesity. Complains of sinus congestion, nasal congestion, and runny nose initially yellow with small streaks of blood now clear for the past 2 weeks.  Denies fevers or chills. Taking Coricidin HBP. Symptoms have been improving. He does not want an antibiotic. Has intermittent dizziness due to vertigo. Denies HA's, CP, SOB, or leg swelling. Home BP monitoring: has but has not checked BP recently. Soc Hx  . Has 2 stepsons, no grandchildren. 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 Smokes 1 ppd since age 8 (48 pack yr hx). Stays active but no formal exercise. Health Maintenance  COVID booster vaccine: due for this  Shingrix vaccine: due for this  Colon cancer screening: has Deepthi ordered 7/28/21 at home, instructed to complete it  Low-dose CT scan: declined    Prior to Admission medications    Medication Sig Start Date End Date Taking? Authorizing Provider   isosorbide mononitrate ER (IMDUR) 30 mg tablet TAKE 1 TABLET DAILY 1/11/22  Yes Singh LINTON NP   aspirin delayed-release 81 mg tablet TAKE 1 TABLET DAILY 1/11/22  Yes Love Calvillo NP   losartan (COZAAR) 25 mg tablet Take 0.5 Tablets by mouth daily. 12/29/21  Yes Korin Donald MD   ranolazine ER (Ranexa) 1,000 mg Take 1 Tablet by mouth two (2) times a day. 11/9/21  Yes Love Calvillo NP   ezetimibe (ZETIA) 10 mg tablet Take 1 Tablet by mouth daily. Indications: high cholesterol 11/2/21  Yes Agustina Marroquin MD   carvediloL (COREG) 6.25 mg tablet TAKE 1 TABLET BY MOUTH 2 TIMES A DAY. 9/28/21  Yes Korin Donald MD   DULoxetine (CYMBALTA) 60 mg capsule Take 1 Capsule by mouth daily. 8/18/21  Yes Mathew Marroquin MD   nitroglycerin (NITROSTAT) 0.4 mg SL tablet 1 Tablet by SubLINGual route every five (5) minutes as needed for Chest Pain. Up to 3 doses. 7/28/21  Yes Mathew Marroquin MD   atorvastatin (Lipitor) 80 mg tablet Take 1 Tab by mouth daily.  5/13/21  Yes Shavon Bonner MD   albuterol (PROAIR HFA) 90 mcg/actuation inhaler Take 2 Puffs by inhalation every four (4) hours as needed for Wheezing. 1/31/20  Yes Last Chambers MD   acetaminophen (TYLENOL) 500 mg tablet Take 2,000 mg by mouth two (2) times daily as needed for Pain. Yes Other, MD Keke     Patient Active Problem List   Diagnosis Code    Essential hypertension I10    Hyperlipidemia, mixed E78.2    Smokes 1 pack of cigarettes per day F17.210    Coronary artery disease of native artery of native heart with stable angina pectoris ([facility] I25.118    Ischemic cardiomyopathy I25.5    ICD (implantable cardioverter-defibrillator) in place Z95.810    Obesity (BMI 30-39. 9) E66.9    FRANTZ (obstructive sleep apnea) G47.33    Major depressive disorder with single episode, in partial remission (HCC) F32.4    Chronic systolic congestive heart failure (HCC) I50.22    Nephrolithiasis N20.0    Mild intermittent asthma without complication V97.55    Vertigo R42    Prediabetes R73.03       No data recorded     Ruben Benitez, who was evaluated through a patient-initiated, synchronous (real-time) audio only encounter, and/or her healthcare decision maker, is aware that it is a billable service, which includes applicable co-pays, with coverage as determined by his insurance carrier. He provided verbal consent to proceed. He has not had a related appointment within my department in the past 7 days or scheduled within the next 24 hours. The patient was located at home in a state where the provider was licensed to provide care. On this date 01/28/2022 I have spent 28 minutes reviewing previous notes, test results and face to face (virtual) with the patient discussing the diagnosis and importance of compliance with the treatment plan as well as documenting on the day of the visit.     Dina Clay MD

## 2022-01-31 RX ORDER — RANOLAZINE 500 MG/1
TABLET, EXTENDED RELEASE ORAL
Qty: 180 TABLET | Refills: 1 | OUTPATIENT
Start: 2022-01-31

## 2022-01-31 RX ORDER — RANOLAZINE 500 MG/1
500 TABLET, EXTENDED RELEASE ORAL 2 TIMES DAILY
Qty: 180 TABLET | Refills: 1 | Status: SHIPPED | OUTPATIENT
Start: 2022-01-31 | End: 2022-02-02 | Stop reason: SDUPTHER

## 2022-02-01 RX ORDER — RANOLAZINE 500 MG/1
500 TABLET, EXTENDED RELEASE ORAL 2 TIMES DAILY
Qty: 180 TABLET | Refills: 1 | Status: CANCELLED | OUTPATIENT
Start: 2022-02-01

## 2022-02-01 NOTE — TELEPHONE ENCOUNTER
Pt needs a prescription on. Please call pt they sent him on for 1000,pt needs on for 500 mg.  Please call him at [phone]      Thanks Kristy

## 2022-02-02 RX ORDER — RANOLAZINE 500 MG/1
500 TABLET, EXTENDED RELEASE ORAL 2 TIMES DAILY
Qty: 180 TABLET | Refills: 0 | Status: SHIPPED | OUTPATIENT
Start: 2022-02-02 | End: 2022-05-02

## 2022-02-02 NOTE — TELEPHONE ENCOUNTER
Pt called stating that the prescription for ranexa was sent to the wrong place. He stated it needs to be sent to [facility].     Callback is [phone]    Thanks  Jereld Castleman

## 2022-02-03 ENCOUNTER — ANCILLARY PROCEDURE (OUTPATIENT)
Dept: CARDIOLOGY CLINIC | Age: 57
End: 2022-02-03
Payer: COMMERCIAL

## 2022-02-03 VITALS
WEIGHT: 211 LBS | BODY MASS INDEX: 31.25 KG/M2 | DIASTOLIC BLOOD PRESSURE: 78 MMHG | HEIGHT: 69 IN | SYSTOLIC BLOOD PRESSURE: 130 MMHG

## 2022-02-03 VITALS
WEIGHT: 211 LBS | SYSTOLIC BLOOD PRESSURE: 124 MMHG | BODY MASS INDEX: 31.25 KG/M2 | HEIGHT: 69 IN | DIASTOLIC BLOOD PRESSURE: 80 MMHG

## 2022-02-03 DIAGNOSIS — R07.2 PRECORDIAL PAIN: ICD-10-CM

## 2022-02-03 DIAGNOSIS — I25.5 ISCHEMIC CARDIOMYOPATHY: ICD-10-CM

## 2022-02-03 DIAGNOSIS — R06.09 DOE (DYSPNEA ON EXERTION): ICD-10-CM

## 2022-02-03 DIAGNOSIS — I10 ESSENTIAL HYPERTENSION: ICD-10-CM

## 2022-02-03 DIAGNOSIS — Z95.810 ICD (IMPLANTABLE CARDIOVERTER-DEFIBRILLATOR) IN PLACE: ICD-10-CM

## 2022-02-03 DIAGNOSIS — F17.210 SMOKES 1 PACK OF CIGARETTES PER DAY: ICD-10-CM

## 2022-02-03 DIAGNOSIS — E78.2 HYPERLIPIDEMIA, MIXED: ICD-10-CM

## 2022-02-03 DIAGNOSIS — G47.33 OSA (OBSTRUCTIVE SLEEP APNEA): ICD-10-CM

## 2022-02-03 DIAGNOSIS — I25.10 CORONARY ARTERY DISEASE INVOLVING NATIVE CORONARY ARTERY OF NATIVE HEART WITHOUT ANGINA PECTORIS: ICD-10-CM

## 2022-02-03 DIAGNOSIS — I25.10 ARTERIOSCLEROTIC HEART DISEASE (ASHD): ICD-10-CM

## 2022-02-03 LAB
ECHO AO ASC DIAM: 3.6 CM
ECHO AO ASCENDING AORTA INDEX: 1.71 CM/M2
ECHO AO ROOT DIAM: 3.9 CM
ECHO AO ROOT INDEX: 1.85 CM/M2
ECHO AV PEAK GRADIENT: 4 MMHG
ECHO AV PEAK VELOCITY: 1 M/S
ECHO AV VELOCITY RATIO: 0.9
ECHO EST RA PRESSURE: 3 MMHG
ECHO LA DIAMETER INDEX: 1.61 CM/M2
ECHO LA DIAMETER: 3.4 CM
ECHO LA TO AORTIC ROOT RATIO: 0.87
ECHO LA VOL 2C: 37 ML (ref 18–58)
ECHO LA VOL 4C: 31 ML (ref 18–58)
ECHO LA VOL BP: 38 ML (ref 18–58)
ECHO LA VOL/BSA BIPLANE: 18 ML/M2 (ref 16–34)
ECHO LA VOLUME AREA LENGTH: 40 ML
ECHO LA VOLUME INDEX A2C: 18 ML/M2 (ref 16–34)
ECHO LA VOLUME INDEX A4C: 15 ML/M2 (ref 16–34)
ECHO LA VOLUME INDEX AREA LENGTH: 19 ML/M2 (ref 16–34)
ECHO LV E' LATERAL VELOCITY: 6 CM/S
ECHO LV E' SEPTAL VELOCITY: 5 CM/S
ECHO LV EDV A2C: 71 ML
ECHO LV EDV A4C: 96 ML
ECHO LV EDV BP: 84 ML (ref 67–155)
ECHO LV EDV INDEX A4C: 45 ML/M2
ECHO LV EDV INDEX BP: 40 ML/M2
ECHO LV EDV NDEX A2C: 34 ML/M2
ECHO LV EJECTION FRACTION A2C: 34 %
ECHO LV EJECTION FRACTION A4C: 27 %
ECHO LV EJECTION FRACTION BIPLANE: 33 % (ref 55–100)
ECHO LV ESV A2C: 47 ML
ECHO LV ESV A4C: 70 ML
ECHO LV ESV BP: 56 ML (ref 22–58)
ECHO LV ESV INDEX A2C: 22 ML/M2
ECHO LV ESV INDEX A4C: 33 ML/M2
ECHO LV ESV INDEX BP: 27 ML/M2
ECHO LV FRACTIONAL SHORTENING: 19 % (ref 28–44)
ECHO LV INTERNAL DIMENSION DIASTOLE INDEX: 2.51 CM/M2
ECHO LV INTERNAL DIMENSION DIASTOLIC: 5.3 CM (ref 4.2–5.9)
ECHO LV INTERNAL DIMENSION SYSTOLIC INDEX: 2.04 CM/M2
ECHO LV INTERNAL DIMENSION SYSTOLIC: 4.3 CM
ECHO LV ISOVOLUMETRIC RELAXATION TIME (IVRT): 100.9 MS
ECHO LV IVSD: 1 CM (ref 0.6–1)
ECHO LV MASS 2D: 200.4 G (ref 88–224)
ECHO LV MASS INDEX 2D: 95 G/M2 (ref 49–115)
ECHO LV POSTERIOR WALL DIASTOLIC: 1 CM (ref 0.6–1)
ECHO LV RELATIVE WALL THICKNESS RATIO: 0.38
ECHO LVOT PEAK GRADIENT: 3 MMHG
ECHO LVOT PEAK VELOCITY: 0.9 M/S
ECHO MV "A" WAVE DURATION: 138.9 MSEC
ECHO MV A VELOCITY: 0.51 M/S
ECHO MV E DECELERATION TIME (DT): 236.8 MS
ECHO MV E VELOCITY: 0.39 M/S
ECHO MV E/A RATIO: 0.76
ECHO MV E/E' LATERAL: 6.5
ECHO MV E/E' RATIO (AVERAGED): 7.15
ECHO MV E/E' SEPTAL: 7.8
ECHO PVEIN A DURATION: 114.2 MS
ECHO PVEIN A VELOCITY: 0.3 M/S
ECHO RIGHT VENTRICULAR SYSTOLIC PRESSURE (RVSP): 24 MMHG
ECHO RV FREE WALL PEAK S': 14 CM/S
ECHO RV TAPSE: 1.6 CM (ref 1.5–2)
ECHO TV REGURGITANT MAX VELOCITY: 2.27 M/S
ECHO TV REGURGITANT PEAK GRADIENT: 21 MMHG
NUC STRESS EJECTION FRACTION: 40 %
STRESS BASELINE DIAS BP: 80 MMHG
STRESS BASELINE HR: 80 BPM
STRESS BASELINE ST DEPRESSION: 0 MM
STRESS BASELINE SYS BP: 124 MMHG
STRESS PEAK DIAS BP: 80 MMHG
STRESS PEAK SYS BP: 124 MMHG
STRESS PERCENT HR ACHIEVED: 61 %
STRESS POST PEAK HR: 100 BPM
STRESS RATE PRESSURE PRODUCT: NORMAL BPM*MMHG
STRESS TARGET HR: 163 BPM

## 2022-02-03 PROCEDURE — 93015 CV STRESS TEST SUPVJ I&R: CPT | Performed by: INTERNAL MEDICINE

## 2022-02-03 PROCEDURE — 93306 TTE W/DOPPLER COMPLETE: CPT | Performed by: INTERNAL MEDICINE

## 2022-02-03 PROCEDURE — A9502 TC99M TETROFOSMIN: HCPCS | Performed by: INTERNAL MEDICINE

## 2022-02-03 PROCEDURE — 78452 HT MUSCLE IMAGE SPECT MULT: CPT | Performed by: INTERNAL MEDICINE

## 2022-02-03 RX ADMIN — TETROFOSMIN 25.3 MILLICURIE: 1.38 INJECTION, POWDER, LYOPHILIZED, FOR SOLUTION INTRAVENOUS at 10:45

## 2022-02-03 RX ADMIN — REGADENOSON 0.4 MG: 0.08 INJECTION, SOLUTION INTRAVENOUS at 10:45

## 2022-02-03 RX ADMIN — PERFLUTREN 1 ML: 6.52 INJECTION, SUSPENSION INTRAVENOUS at 09:30

## 2022-02-03 NOTE — PROGRESS NOTES
EF sl down compared from previous echo. Await stress test tomorrow. Follow up with Dr Dolly De Oliveira after stress test regardless of result.

## 2022-02-04 ENCOUNTER — TELEPHONE (OUTPATIENT)
Dept: CARDIOLOGY CLINIC | Age: 57
End: 2022-02-04

## 2022-02-04 NOTE — TELEPHONE ENCOUNTER
----- Message from Lester Horne NP sent at 2/3/2022  9:38 PM EST -----  Please advise patient stress test without evidence of flow-limiting blockages in the arteries of the heart-  Old scar on heart is noted. He should still see Dr Chiquita Merino next week or so to further discuss stress test and echo.

## 2022-02-04 NOTE — TELEPHONE ENCOUNTER
----- Message from Palmer Carrasco NP sent at 2/3/2022  1:52 PM EST -----  EF sl down compared from previous echo. Await stress test tomorrow. Follow up with Dr Ilsa Iqbal after stress test regardless of result.

## 2022-02-04 NOTE — PROGRESS NOTES
Please advise patient stress test without evidence of flow-limiting blockages in the arteries of the heart-  Old scar on heart is noted. He should still see Dr Annalise Madrigal next week or so to further discuss stress test and echo.

## 2022-02-04 NOTE — TELEPHONE ENCOUNTER
Patient informed of test results . Mari Gonzales will you be able to schedule this month with Dr Elliot Kaplan thanks.

## 2022-02-11 ENCOUNTER — OFFICE VISIT (OUTPATIENT)
Dept: CARDIOLOGY CLINIC | Age: 57
End: 2022-02-11
Payer: COMMERCIAL

## 2022-02-11 VITALS
RESPIRATION RATE: 20 BRPM | HEIGHT: 69 IN | DIASTOLIC BLOOD PRESSURE: 80 MMHG | HEART RATE: 90 BPM | OXYGEN SATURATION: 97 % | BODY MASS INDEX: 31.27 KG/M2 | WEIGHT: 211.1 LBS | SYSTOLIC BLOOD PRESSURE: 130 MMHG

## 2022-02-11 DIAGNOSIS — Z45.018 PACEMAKER REPROGRAMMING/CHECK: ICD-10-CM

## 2022-02-11 DIAGNOSIS — I10 ESSENTIAL HYPERTENSION: ICD-10-CM

## 2022-02-11 DIAGNOSIS — E66.01 SEVERE OBESITY (BMI 35.0-39.9) WITH COMORBIDITY (HCC): ICD-10-CM

## 2022-02-11 DIAGNOSIS — E78.2 HYPERLIPIDEMIA, MIXED: ICD-10-CM

## 2022-02-11 DIAGNOSIS — Z72.0 TOBACCO ABUSE: ICD-10-CM

## 2022-02-11 DIAGNOSIS — I50.22 CHRONIC SYSTOLIC CONGESTIVE HEART FAILURE (HCC): ICD-10-CM

## 2022-02-11 DIAGNOSIS — G47.33 OSA (OBSTRUCTIVE SLEEP APNEA): ICD-10-CM

## 2022-02-11 DIAGNOSIS — I25.5 ISCHEMIC CARDIOMYOPATHY: ICD-10-CM

## 2022-02-11 DIAGNOSIS — I25.10 CORONARY ARTERY DISEASE INVOLVING NATIVE CORONARY ARTERY OF NATIVE HEART WITHOUT ANGINA PECTORIS: Primary | ICD-10-CM

## 2022-02-11 DIAGNOSIS — Z95.810 ICD (IMPLANTABLE CARDIOVERTER-DEFIBRILLATOR) IN PLACE: ICD-10-CM

## 2022-02-11 LAB
ALBUMIN SERPL-MCNC: 4.1 G/DL (ref 3.8–4.9)
ALBUMIN/GLOB SERPL: 1.6 {RATIO} (ref 1.2–2.2)
ALP SERPL-CCNC: 124 IU/L (ref 44–121)
ALT SERPL-CCNC: 19 IU/L (ref 0–44)
AST SERPL-CCNC: 17 IU/L (ref 0–40)
BASOPHILS # BLD AUTO: 0.1 X10E3/UL (ref 0–0.2)
BASOPHILS NFR BLD AUTO: 1 %
BILIRUB SERPL-MCNC: 0.3 MG/DL (ref 0–1.2)
BUN SERPL-MCNC: 15 MG/DL (ref 6–24)
BUN/CREAT SERPL: 16 (ref 9–20)
CALCIUM SERPL-MCNC: 9.3 MG/DL (ref 8.7–10.2)
CHLORIDE SERPL-SCNC: 102 MMOL/L (ref 96–106)
CHOLEST SERPL-MCNC: 123 MG/DL (ref 100–199)
CO2 SERPL-SCNC: 22 MMOL/L (ref 20–29)
CREAT SERPL-MCNC: 0.91 MG/DL (ref 0.76–1.27)
EOSINOPHIL # BLD AUTO: 0.4 X10E3/UL (ref 0–0.4)
EOSINOPHIL NFR BLD AUTO: 4 %
ERYTHROCYTE [DISTWIDTH] IN BLOOD BY AUTOMATED COUNT: 13.3 % (ref 11.6–15.4)
EST. AVERAGE GLUCOSE BLD GHB EST-MCNC: 131 MG/DL
GLOBULIN SER CALC-MCNC: 2.5 G/DL (ref 1.5–4.5)
GLUCOSE SERPL-MCNC: 102 MG/DL (ref 65–99)
HBA1C MFR BLD: 6.2 % (ref 4.8–5.6)
HCT VFR BLD AUTO: 44.8 % (ref 37.5–51)
HDLC SERPL-MCNC: 33 MG/DL
HGB BLD-MCNC: 14.6 G/DL (ref 13–17.7)
IMM GRANULOCYTES # BLD AUTO: 0 X10E3/UL (ref 0–0.1)
IMM GRANULOCYTES NFR BLD AUTO: 0 %
LDLC SERPL CALC-MCNC: 65 MG/DL (ref 0–99)
LYMPHOCYTES # BLD AUTO: 2.2 X10E3/UL (ref 0.7–3.1)
LYMPHOCYTES NFR BLD AUTO: 24 %
MCH RBC QN AUTO: 28.4 PG (ref 26.6–33)
MCHC RBC AUTO-ENTMCNC: 32.6 G/DL (ref 31.5–35.7)
MCV RBC AUTO: 87 FL (ref 79–97)
MONOCYTES # BLD AUTO: 0.7 X10E3/UL (ref 0.1–0.9)
MONOCYTES NFR BLD AUTO: 8 %
NEUTROPHILS # BLD AUTO: 5.8 X10E3/UL (ref 1.4–7)
NEUTROPHILS NFR BLD AUTO: 63 %
PLATELET # BLD AUTO: 302 X10E3/UL (ref 150–450)
POTASSIUM SERPL-SCNC: 4.7 MMOL/L (ref 3.5–5.2)
PROT SERPL-MCNC: 6.6 G/DL (ref 6–8.5)
PSA SERPL-MCNC: 0.4 NG/ML (ref 0–4)
RBC # BLD AUTO: 5.14 X10E6/UL (ref 4.14–5.8)
SODIUM SERPL-SCNC: 139 MMOL/L (ref 134–144)
TRIGL SERPL-MCNC: 140 MG/DL (ref 0–149)
VLDLC SERPL CALC-MCNC: 25 MG/DL (ref 5–40)
WBC # BLD AUTO: 9.2 X10E3/UL (ref 3.4–10.8)

## 2022-02-11 PROCEDURE — 99214 OFFICE O/P EST MOD 30 MIN: CPT | Performed by: INTERNAL MEDICINE

## 2022-02-11 RX ORDER — SACUBITRIL AND VALSARTAN 24; 26 MG/1; MG/1
1 TABLET, FILM COATED ORAL 2 TIMES DAILY
Qty: 30 TABLET | Refills: 4 | Status: SHIPPED | OUTPATIENT
Start: 2022-02-11 | End: 2022-04-10

## 2022-02-11 NOTE — PROGRESS NOTES
Chief Complaint   Patient presents with    Results     Echocardiogram & Nuclear stress    CHF    Coronary Artery Disease    Hypertension    Cardiomyopathy    Cholesterol Problem     1. Have you been to the ER, urgent care clinic since your last visit? No Hospitalized since your last visit? NO    2. Have you seen or consulted any other health care providers outside of the [address] [place] [address] since your last visit? No  Include any pap smears or colon screening.  No

## 2022-02-11 NOTE — PROGRESS NOTES
[address], [place], [address] [facility]  [phone]     Subjective:      Dawson Osler is a 61 y.o. male is here discuss test results, posted below. Pmhx CAD, ICM s/p ICD, HTN. HLD, FRANTZ and tobacco abuse. Last OV 11/2021: he noticed intermittent nonexertional precordial pain and sob. He also reports fatigue    Today, no further cp or sob. C/o lingering fatigue. Not wearing CPAP, does not want repeat sleep study. He continues to smoke 1 PPD. Echo 2/3/2022  Left Ventricle: Left ventricle size is normal. Normal wall thickness. Severe hypokinesis of the following segments: mid anteroseptal. Severe hypokinesis of the apex. Moderately reduced left ventricular systolic function with a visually estimated EF of 35 - 40%. Grade I diastolic dysfunction with normal LAP.     NST 2/3/2022  Nuclear Findings: LVEF measures 40%. LV perfusion is abnormal. There is no evidence of inducible ischemia.   Nuclear Findings: LVEF measures 40%. There is a moderate severity left ventricular stress perfusion defect that is medium in size present in the anterior segment(s) that is predominantly fixed. The defect is consistent with abnormal perfusion in the LAD territory. There is a moderate severity second left ventricular stress perfusion defect.   Nuclear Findings: Abnormal left ventricular systolic function post-stress. Global function is mildly reduced. LVEF measures 40%. There is a global left ventricular stress perfusion defect present in the inferior and apex segment(s).   Nuclear Findings: The study is most consistent with myocardial infarction.   ECG: Resting ECG demonstrates normal sinus rhythm.   Stress Test: A pharmacological stress test was performed using regadenoson. Hemodynamics are non-diagnostic due to medication. Blood pressure demonstrated a normal response and heart rate demonstrated a normal response to stress.       Patient Active Problem List    Diagnosis Date Noted    Vertigo 07/28/2021    Prediabetes 07/28/2021    Mild intermittent asthma without complication 99/13/3528    Nephrolithiasis 11/29/2020    Chronic systolic congestive heart failure ([address] 07/21/2020    Major depressive disorder with single episode, in partial remission ([address] 09/15/2019    FRANTZ (obstructive sleep apnea) 09/13/2019    Obesity (BMI 30-39.9) 08/11/2019    Ischemic cardiomyopathy 03/22/2019    ICD (implantable cardioverter-defibrillator) in place 03/22/2019    Coronary artery disease of native artery of native heart with stable angina pectoris ([address] 10/05/2018    Essential hypertension 09/25/2018    Hyperlipidemia, mixed 09/25/2018    Smokes 1 pack of cigarettes per day 09/25/2018      Pedro Ray MD  Past Medical History:   Diagnosis Date    ADD (attention deficit disorder) 9/25/2018    Asthma     Balance problem 9/15/2019    Chest pain 11/30/2018    Congestive heart failure ([address]     Diabetes ([address]     Dyslipidemia 9/25/2018    Fatigue 11/30/2018    Glucose intolerance (impaired glucose tolerance) 8/11/2019    HTN (hypertension) 9/25/2018    Hypertension     ICD (implantable cardioverter-defibrillator) in place     Other ill-defined conditions(799.89)     ADHD,BELL'S PALSY WEAKNESS  LT FACE    Psychiatric disorder     DEPRESSION    S/P coronary artery stent placement 9/25/2018 9/25/18 PCI/MAVIS to LAD    STEMI (ST elevation myocardial infarction) ([address] 9/25/2018    Syncope 1/25/2019    Tobacco abuse 9/25/2018      Past Surgical History:   Procedure Laterality Date    HX CORONARY STENT PLACEMENT      HX GI  1984    STOMACH ABDOMEN ACCIDENT    HX HEART CATHETERIZATION      HX HEENT      TEETH EXTRACTION    HX ORTHOPAEDIC  1984    FRACTURE RT FEMUR    HX PACEMAKER  01/25/2019    BSC ICD    HX PTCA      NEUROLOGICAL PROCEDURE UNLISTED      NECK     Allergies   Allergen Reactions    Bee Sting [Sting, Bee] Anaphylaxis    Flomax [Tamsulosin] Other (comments) Passed out      Family History   Problem Relation Age of Onset    Hypertension Mother     Diabetes Mother     No Known Problems Father     Lung Disease Paternal Aunt     Alcohol abuse Paternal Aunt     Cancer Sister         breast    Hypertension Sister     Hypertension Brother     Heart Disease Maternal Uncle     Hypertension Maternal Uncle     Diabetes Maternal Uncle     Alcohol abuse Paternal Uncle     Heart Disease Paternal Grandfather     Hypertension Sister     Macular Degen Sister       Social History     Socioeconomic History    Marital status:      Spouse name: Loyd Keating Number of children: 2    Years of education: 15    Highest education level: 12th grade   Occupational History    Not on file   Tobacco Use    Smoking status: Current Every Day Smoker     Packs/day: 1.00     Years: 40.00     Pack years: 40.00     Types: Cigarettes    Smokeless tobacco: Never Used    Tobacco comment: started again  6/1/2019   Vaping Use    Vaping Use: Never used   Substance and Sexual Activity    Alcohol use: No     Alcohol/week: 0.0 standard drinks    Drug use: No    Sexual activity: Not Currently     Partners: Female   Other Topics Concern     Service No    Blood Transfusions Yes    Caffeine Concern Yes     Comment: coffee 1 pot    Occupational Exposure Yes     Comment: cig smoke    Hobby Hazards No    Sleep Concern Yes     Comment: does not sleep well,wakes up sweating,dog    Stress Concern Yes     Comment: high level daily -depression he states    Weight Concern Yes     Comment: more tired    Special Diet No    Back Care No     Comment: neck and low back surgery    Exercise No     Comment: does not exercise    Bike Helmet No     Comment: does not ride bike   [address] Yes     Comment: 1/2 time wears seatbelt    Self-Exams No   Social History Narrative    Not on file     Social Determinants of Health     Financial Resource Strain:     Difficulty of Paying Living Expenses: Not on file   Food Insecurity:     Worried About Running Out of Food in the Last Year: Not on file    Isela of Food in the Last Year: Not on file   Transportation Needs:     Lack of Transportation (Medical): Not on file    Lack of Transportation (Non-Medical): Not on file   Physical Activity:     Days of Exercise per Week: Not on file    Minutes of Exercise per Session: Not on file   Stress:     Feeling of Stress : Not on file   Social Connections:     Frequency of Communication with Friends and Family: Not on file    Frequency of Social Gatherings with Friends and Family: Not on file    Attends Jew Services: Not on file    Active Member of [address] [place] [address] [employer] or Organizations: Not on file    Attends Club or Organization Meetings: Not on file    Marital Status: Not on file   Intimate Partner Violence:     Fear of Current or Ex-Partner: Not on file    Emotionally Abused: Not on file    Physically Abused: Not on file    Sexually Abused: Not on file   Housing Stability:     Unable to Pay for Housing in the Last Year: Not on file    Number of [place] in the Last Year: Not on file    Unstable Housing in the Last Year: Not on file      Current Outpatient Medications   Medication Sig    sacubitriL-valsartan (Entresto) 24-26 mg tablet Take 1 Tablet by mouth two (2) times a day.  ranolazine ER (Ranexa) 500 mg SR tablet Take 1 Tablet by mouth two (2) times a day.  isosorbide mononitrate ER (IMDUR) 30 mg tablet TAKE 1 TABLET DAILY    aspirin delayed-release 81 mg tablet TAKE 1 TABLET DAILY    ezetimibe (ZETIA) 10 mg tablet Take 1 Tablet by mouth daily. Indications: high cholesterol    carvediloL (COREG) 6.25 mg tablet TAKE 1 TABLET BY MOUTH 2 TIMES A DAY.  DULoxetine (CYMBALTA) 60 mg capsule Take 1 Capsule by mouth daily.  nitroglycerin (NITROSTAT) 0.4 mg SL tablet 1 Tablet by SubLINGual route every five (5) minutes as needed for Chest Pain. Up to 3 doses.     atorvastatin (Lipitor) 80 mg tablet Take 1 Tab by mouth daily.  albuterol (PROAIR HFA) 90 mcg/actuation inhaler Take 2 Puffs by inhalation every four (4) hours as needed for Wheezing.  acetaminophen (TYLENOL) 500 mg tablet Take 2,000 mg by mouth two (2) times daily as needed for Pain. No current facility-administered medications for this visit. Review of Symptoms:  11 systems reviewed, negative other than as stated in the HPI    Physical ExamPhysical Exam:    Vitals:    02/11/22 1314   BP: 130/80   Pulse: 90   Resp: 20   SpO2: 97%   Weight: 211 lb 1.6 oz (95.8 kg)   Height: 5' 9\" (1.753 m)     Body mass index is 31.17 kg/m². General PE  Gen:  NAD  Mental Status - Alert. General Appearance - Not in acute distress. HEENT:  PERRL, no carotid bruits or JVD  Chest and Lung Exam   Inspection: Accessory muscles - No use of accessory muscles in breathing. Auscultation:   Breath sounds: - Normal.   Cardiovascular   Inspection: Jugular vein - Bilateral - Inspection Normal.   Palpation/Percussion:   Apical Impulse: - Normal.   Auscultation: Rhythm - Regular. Heart Sounds - S1 WNL and S2 WNL. No S3 or S4. Murmurs & Other Heart Sounds: Auscultation of the heart reveals - No Murmurs. Peripheral Vascular   Upper Extremity: Inspection - Bilateral - No Cyanotic nailbeds or Digital clubbing. Lower Extremity:   Palpation: Edema - Bilateral - No edema. Abdomen:   Soft, non-tender, bowel sounds are active.   Neuro: A&O times 3, CN and motor grossly WNL    Labs:   Lab Results   Component Value Date/Time    Cholesterol, total 123 02/10/2022 08:41 AM    Cholesterol, total 143 03/02/2021 09:34 AM    Cholesterol, total 129 07/20/2020 08:19 AM    Cholesterol, total 129 07/20/2020 08:17 AM    Cholesterol, total 154 10/15/2019 08:42 AM    HDL Cholesterol 33 (L) 02/10/2022 08:41 AM    HDL Cholesterol 35 (L) 03/02/2021 09:34 AM    HDL Cholesterol 34 (L) 07/20/2020 08:19 AM    HDL Cholesterol 34 (L) 07/20/2020 08:17 AM    HDL Cholesterol 32 (L) 10/15/2019 08:42 AM    LDL, calculated 65 02/10/2022 08:41 AM    LDL, calculated 81 03/02/2021 09:34 AM    LDL, calculated 66 07/20/2020 08:19 AM    LDL, calculated 66 07/20/2020 08:17 AM    LDL, calculated 88 10/15/2019 08:42 AM    LDL, calculated 91 07/30/2019 08:38 AM    LDL, calculated 128.8 (H) 09/26/2018 03:34 AM    Triglyceride 140 02/10/2022 08:41 AM    Triglyceride 152 (H) 03/02/2021 09:34 AM    Triglyceride 144 07/20/2020 08:19 AM    Triglyceride 146 07/20/2020 08:17 AM    Triglyceride 172 (H) 10/15/2019 08:42 AM    CHOL/HDL Ratio 6.1 (H) 09/26/2018 03:34 AM     Lab Results   Component Value Date/Time    CK 78 05/24/2019 04:50 PM     Lab Results   Component Value Date/Time    Sodium 139 02/10/2022 08:41 AM    Potassium 4.7 02/10/2022 08:41 AM    Chloride 102 02/10/2022 08:41 AM    CO2 22 02/10/2022 08:41 AM    Anion gap 4 (L) 05/24/2019 04:50 PM    Glucose 102 (H) 02/10/2022 08:41 AM    BUN 15 02/10/2022 08:41 AM    Creatinine 0.91 02/10/2022 08:41 AM    BUN/Creatinine ratio 16 02/10/2022 08:41 AM    GFR est  02/10/2022 08:41 AM    GFR est non-AA 93 02/10/2022 08:41 AM    Calcium 9.3 02/10/2022 08:41 AM    Bilirubin, total 0.3 02/10/2022 08:41 AM    Alk. phosphatase 124 (H) 02/10/2022 08:41 AM    Protein, total 6.6 02/10/2022 08:41 AM    Albumin 4.1 02/10/2022 08:41 AM    Globulin 3.8 05/24/2019 04:50 PM    A-G Ratio 1.6 02/10/2022 08:41 AM    ALT (SGPT) 19 02/10/2022 08:41 AM       EKG:         Assessment:     Assessment:        ICD-10-CM ICD-9-CM    1. Coronary artery disease involving native coronary artery of native heart without angina pectoris  P20.64 139.70 METABOLIC PANEL, BASIC   2. Essential hypertension  H05 549.5 METABOLIC PANEL, BASIC   3. Ischemic cardiomyopathy  U83.5 274.4 METABOLIC PANEL, BASIC   4. ICD (implantable cardioverter-defibrillator) in place  U06.203 Y71.00 METABOLIC PANEL, BASIC   5. Hyperlipidemia, mixed  M02.7 634.3 METABOLIC PANEL, BASIC   6.  FRANTZ (obstructive sleep apnea)  W49.47 947.02 METABOLIC PANEL, BASIC   7. Chronic systolic congestive heart failure (HCC)  G36.63 332.05 METABOLIC PANEL, BASIC     428.0    8. Severe obesity (BMI 35.0-39. 9) with comorbidity ([address]  L22.09 780.95 METABOLIC PANEL, BASIC   9. Tobacco abuse  V69.7 083.7 METABOLIC PANEL, BASIC   10. Pacemaker reprogramming/check  X81.710 D23.99 METABOLIC PANEL, BASIC       Orders Placed This Encounter    METABOLIC PANEL, BASIC     Standing Status:   Future     Standing Expiration Date:   2/11/2023    sacubitriL-valsartan (Entresto) 24-26 mg tablet     Sig: Take 1 Tablet by mouth two (2) times a day. Dispense:  30 Tablet     Refill:  4        Plan:        CAD  Hx anterior STEMI in 9/2018, with peak troponin 100. Cardiac cath with MAVIS to the prox LAD.  He had 80% stenosis of the prox third of the OM1 and RPL. NST 2/2022:  Fixed defect anterior segment / LAD territory. Fixed defect consistent with prior myocardial infarction per NST 9/8/2020;   Continue with ASA,  Imdur 30mg, Coreg 6.25mg bid, statin therapy. Continue Ranexa  500 mg BID       ICM, s/p ICD  EF 30-35% per echo in 2/3/2022  EF 45-50% in 9/2020;   Echo in 1/2019 with reduced LVEF 40-45%  and 2018  No events noted per device check, device with appropriate function on 1/2022  Continue with Coreg 6.25 mg BID  Stable kidney fxn Serum Cr 0.91 in 1/2022  DC losartan, switch to entresto 24/26 mg BID. Repeat BMP in 3 weeks  Repeat echo in 3 months after optimal medical therapy. Last OV with EP 9/28/2021: annual device follow up s/p ICD 1/25/2019.   He is <1% RVP with 14 years batter remaining. No arrhythmias noted. Echo 1/19 with EF 40 % to 45 %.   EKG normal sinus.        HLD  1/2022 LDL 65  On Lipitor 80mg daily and Zetia 10mg. Labs and lipids per PCP.      Tobacco abuse, back to 1PPD.  Not interested in smoking cessation     FRANTZ  Not wearing CPAP      Orthostatic hypotension/ supine HTN  Previously had resolved upon d/c Straterra and decreased dose of Losartan  He has seen ENT who diagnosed him with vertigo  BP was not significantly orthostatic today nor was he symptomatic   Advised to continue with aggressive hydration, use compression stockings        Continue current care and f/u in 3 mos      Myra Pineda NP

## 2022-02-13 NOTE — PROGRESS NOTES
Letter sent: Your labs showed A1c 6.2%, meaning you have prediabetes that is worse than it was at 6.0% in March 2021. To decrease your risk of getting full-blown diabetes, work on diet, exercise, and weight loss. For diet, increase fruits and vegetables and decrease sweets, soft drinks, juices, and foods high in carbohydrates like white bread, white rice, potatoes, crackers, potato chips, and fries. Your other labs were normal, including kidney and liver tests, blood counts, cholesterol, and PSA (prostate cancer screening blood test).

## 2022-02-22 ENCOUNTER — TELEPHONE (OUTPATIENT)
Dept: INTERNAL MEDICINE CLINIC | Age: 57
End: 2022-02-22

## 2022-02-22 NOTE — TELEPHONE ENCOUNTER
I left message for patient to return my call regarding insurance. I need to verify if patient is on Medicare because of disability.

## 2022-02-23 ENCOUNTER — TELEPHONE (OUTPATIENT)
Dept: CARDIOLOGY CLINIC | Age: 57
End: 2022-02-23

## 2022-02-23 NOTE — TELEPHONE ENCOUNTER
Pt called stating that he would like to be prescribed a new medication because he is having dizzy spells and experiencing low bp today at 88/69 and hr was 92, 94/72 and hr. He states it happens when he stands up.     Callback is [phone]    Thanks  Chuy Gonsales

## 2022-03-18 PROBLEM — I50.22 CHRONIC SYSTOLIC CONGESTIVE HEART FAILURE (HCC): Status: ACTIVE | Noted: 2020-07-21

## 2022-03-19 PROBLEM — E66.9 OBESITY (BMI 30-39.9): Status: ACTIVE | Noted: 2019-08-11

## 2022-03-19 PROBLEM — N20.0 NEPHROLITHIASIS: Status: ACTIVE | Noted: 2020-11-29

## 2022-03-19 PROBLEM — I25.118 CORONARY ARTERY DISEASE OF NATIVE ARTERY OF NATIVE HEART WITH STABLE ANGINA PECTORIS (HCC): Status: ACTIVE | Noted: 2018-10-05

## 2022-03-19 PROBLEM — F32.4 MAJOR DEPRESSIVE DISORDER WITH SINGLE EPISODE, IN PARTIAL REMISSION (HCC): Status: ACTIVE | Noted: 2019-09-15

## 2022-03-19 PROBLEM — E78.2 HYPERLIPIDEMIA, MIXED: Status: ACTIVE | Noted: 2018-09-25

## 2022-03-19 PROBLEM — F17.210 SMOKES 1 PACK OF CIGARETTES PER DAY: Status: ACTIVE | Noted: 2018-09-25

## 2022-03-19 PROBLEM — I25.5 ISCHEMIC CARDIOMYOPATHY: Status: ACTIVE | Noted: 2019-03-22

## 2022-03-19 PROBLEM — I10 ESSENTIAL HYPERTENSION: Status: ACTIVE | Noted: 2018-09-25

## 2022-03-19 PROBLEM — G47.33 OSA (OBSTRUCTIVE SLEEP APNEA): Status: ACTIVE | Noted: 2019-09-13

## 2022-03-19 PROBLEM — Z95.810 ICD (IMPLANTABLE CARDIOVERTER-DEFIBRILLATOR) IN PLACE: Status: ACTIVE | Noted: 2019-03-22

## 2022-03-19 PROBLEM — J45.20 MILD INTERMITTENT ASTHMA WITHOUT COMPLICATION: Status: ACTIVE | Noted: 2021-02-22

## 2022-03-19 PROBLEM — R42 VERTIGO: Status: ACTIVE | Noted: 2021-07-28

## 2022-03-20 PROBLEM — R73.03 PREDIABETES: Status: ACTIVE | Noted: 2021-07-28

## 2022-04-07 ENCOUNTER — OFFICE VISIT (OUTPATIENT)
Dept: CARDIOLOGY CLINIC | Age: 57
End: 2022-04-07
Payer: COMMERCIAL

## 2022-04-07 DIAGNOSIS — I50.22 CHRONIC SYSTOLIC CONGESTIVE HEART FAILURE (HCC): ICD-10-CM

## 2022-04-07 DIAGNOSIS — Z95.810 ICD (IMPLANTABLE CARDIOVERTER-DEFIBRILLATOR) IN PLACE: Primary | ICD-10-CM

## 2022-04-07 DIAGNOSIS — I25.5 ISCHEMIC CARDIOMYOPATHY: ICD-10-CM

## 2022-04-07 PROCEDURE — 93296 REM INTERROG EVL PM/IDS: CPT | Performed by: INTERNAL MEDICINE

## 2022-04-07 PROCEDURE — 93295 DEV INTERROG REMOTE 1/2/MLT: CPT | Performed by: INTERNAL MEDICINE

## 2022-04-26 LAB
BUN SERPL-MCNC: 14 MG/DL (ref 6–24)
BUN/CREAT SERPL: 14 (ref 9–20)
CALCIUM SERPL-MCNC: 9.2 MG/DL (ref 8.7–10.2)
CHLORIDE SERPL-SCNC: 103 MMOL/L (ref 96–106)
CO2 SERPL-SCNC: 22 MMOL/L (ref 20–29)
CREAT SERPL-MCNC: 1.03 MG/DL (ref 0.76–1.27)
EGFR: 85 ML/MIN/1.73
GLUCOSE SERPL-MCNC: 96 MG/DL (ref 65–99)
POTASSIUM SERPL-SCNC: 4.7 MMOL/L (ref 3.5–5.2)
SODIUM SERPL-SCNC: 140 MMOL/L (ref 134–144)

## 2022-04-29 ENCOUNTER — TELEPHONE (OUTPATIENT)
Dept: CARDIOLOGY CLINIC | Age: 57
End: 2022-04-29

## 2022-04-29 NOTE — TELEPHONE ENCOUNTER
----- Message from Jonas Ortega NP sent at 4/26/2022 10:13 AM EDT -----  Kidney fxn/lytes stable on entresto, continue

## 2022-04-29 NOTE — TELEPHONE ENCOUNTER
Attempted to reach patient by telephone. A message was left informing pt that labs look good to continue Entresto. Invited pt to call back with any questions/concerns.

## 2022-05-02 DIAGNOSIS — E78.2 HYPERLIPIDEMIA, MIXED: ICD-10-CM

## 2022-05-02 RX ORDER — ATORVASTATIN CALCIUM 80 MG/1
80 TABLET, FILM COATED ORAL DAILY
Qty: 90 TABLET | Refills: 3 | Status: SHIPPED | OUTPATIENT
Start: 2022-05-02

## 2022-05-02 RX ORDER — RANOLAZINE 500 MG/1
TABLET, EXTENDED RELEASE ORAL
Qty: 180 TABLET | Refills: 3 | Status: SHIPPED | OUTPATIENT
Start: 2022-05-02

## 2022-05-02 NOTE — TELEPHONE ENCOUNTER
PCP: Sherlyn Rai MD     Last appt: 1/28/2022   Future Appointments   Date Time Provider Peewee Varela   5/16/2022  2:00 PM JACOB SIMMS [facility]   5/16/2022  3:00 PM MD SANAM Stephen  AMB   [facility]   [facility]  9[facility]   10/19/2022  8:45 AM PACEMAKER, RCA OSWALDO [facility]   10/19/2022  9:00 AM Wendie Leal, ANP [facility] AMB        Requested Prescriptions     Pending Prescriptions Disp Refills    atorvastatin (Lipitor) 80 mg tablet 90 Tablet 3     Sig: Take 1 Tablet by mouth daily.

## 2022-05-02 NOTE — TELEPHONE ENCOUNTER
Requested Prescriptions     Pending Prescriptions Disp Refills    atorvastatin (Lipitor) 80 mg tablet 90 Tablet 3     Sig: Take 1 Tablet by mouth daily.

## 2022-05-16 ENCOUNTER — OFFICE VISIT (OUTPATIENT)
Dept: CARDIOLOGY CLINIC | Age: 57
End: 2022-05-16
Payer: COMMERCIAL

## 2022-05-16 ENCOUNTER — ANCILLARY PROCEDURE (OUTPATIENT)
Dept: CARDIOLOGY CLINIC | Age: 57
End: 2022-05-16
Payer: COMMERCIAL

## 2022-05-16 VITALS
WEIGHT: 210 LBS | HEIGHT: 69 IN | BODY MASS INDEX: 31.1 KG/M2 | DIASTOLIC BLOOD PRESSURE: 80 MMHG | SYSTOLIC BLOOD PRESSURE: 130 MMHG

## 2022-05-16 VITALS
WEIGHT: 210 LBS | HEART RATE: 94 BPM | HEIGHT: 69 IN | DIASTOLIC BLOOD PRESSURE: 70 MMHG | SYSTOLIC BLOOD PRESSURE: 110 MMHG | BODY MASS INDEX: 31.1 KG/M2 | RESPIRATION RATE: 16 BRPM | OXYGEN SATURATION: 96 %

## 2022-05-16 DIAGNOSIS — E66.01 SEVERE OBESITY (BMI 35.0-39.9) WITH COMORBIDITY (HCC): ICD-10-CM

## 2022-05-16 DIAGNOSIS — I25.10 CORONARY ARTERY DISEASE INVOLVING NATIVE CORONARY ARTERY OF NATIVE HEART WITHOUT ANGINA PECTORIS: ICD-10-CM

## 2022-05-16 DIAGNOSIS — I50.22 CHRONIC SYSTOLIC CONGESTIVE HEART FAILURE (HCC): ICD-10-CM

## 2022-05-16 DIAGNOSIS — Z95.810 ICD (IMPLANTABLE CARDIOVERTER-DEFIBRILLATOR) IN PLACE: ICD-10-CM

## 2022-05-16 DIAGNOSIS — Z72.0 TOBACCO ABUSE: ICD-10-CM

## 2022-05-16 DIAGNOSIS — I25.5 ISCHEMIC CARDIOMYOPATHY: ICD-10-CM

## 2022-05-16 DIAGNOSIS — E78.2 HYPERLIPIDEMIA, MIXED: ICD-10-CM

## 2022-05-16 DIAGNOSIS — I10 ESSENTIAL HYPERTENSION: Primary | ICD-10-CM

## 2022-05-16 DIAGNOSIS — G47.33 OSA (OBSTRUCTIVE SLEEP APNEA): ICD-10-CM

## 2022-05-16 DIAGNOSIS — I10 ESSENTIAL HYPERTENSION: ICD-10-CM

## 2022-05-16 DIAGNOSIS — Z45.018 PACEMAKER REPROGRAMMING/CHECK: ICD-10-CM

## 2022-05-16 PROCEDURE — G8427 DOCREV CUR MEDS BY ELIG CLIN: HCPCS | Performed by: INTERNAL MEDICINE

## 2022-05-16 PROCEDURE — G8752 SYS BP LESS 140: HCPCS | Performed by: INTERNAL MEDICINE

## 2022-05-16 PROCEDURE — 99406 BEHAV CHNG SMOKING 3-10 MIN: CPT | Performed by: INTERNAL MEDICINE

## 2022-05-16 PROCEDURE — 99214 OFFICE O/P EST MOD 30 MIN: CPT | Performed by: INTERNAL MEDICINE

## 2022-05-16 PROCEDURE — G8754 DIAS BP LESS 90: HCPCS | Performed by: INTERNAL MEDICINE

## 2022-05-16 PROCEDURE — G8417 CALC BMI ABV UP PARAM F/U: HCPCS | Performed by: INTERNAL MEDICINE

## 2022-05-16 PROCEDURE — G9717 DOC PT DX DEP/BP F/U NT REQ: HCPCS | Performed by: INTERNAL MEDICINE

## 2022-05-16 PROCEDURE — 93005 ELECTROCARDIOGRAM TRACING: CPT | Performed by: INTERNAL MEDICINE

## 2022-05-16 PROCEDURE — 93306 TTE W/DOPPLER COMPLETE: CPT | Performed by: INTERNAL MEDICINE

## 2022-05-16 PROCEDURE — G0463 HOSPITAL OUTPT CLINIC VISIT: HCPCS | Performed by: INTERNAL MEDICINE

## 2022-05-16 PROCEDURE — 3017F COLORECTAL CA SCREEN DOC REV: CPT | Performed by: INTERNAL MEDICINE

## 2022-05-16 PROCEDURE — 93000 ELECTROCARDIOGRAM COMPLETE: CPT | Performed by: INTERNAL MEDICINE

## 2022-05-16 NOTE — PROGRESS NOTES
[address], [facility], [address]  [phone]     Subjective:      Buzz Casiano is a 61 y.o. male is here for routine f/u. Pmhx CAD, ICM s/p ICD, HTN. HLD, FRANTZ and tobacco abuse. Last seen by us in 2/11//2022. At that time we dc losartan and switched to entresto. Prelim echo reading today    Left Ventricle: Mildly reduced left ventricular systolic function with a visually estimated EF of 40 - 45%. Left ventricle size is normal. Mildly increased wall thickness. Akinesis of the following segments: apical anterior, apical lateral, apical septal and apical inferior. Akinesis of the apex. Grade I diastolic dysfunction with normal LAP.   Aortic Valve: Valve structure is normal. Mildly calcified noncoronary cusp.   Mitral Valve: Mild regurgitation.   Pulmonic Valve: Mild regurgitation.       Today, feeling a lot better but might not able to continue entresto long term due to cost.  He continues to smoke 1ppd. The patient denies chest pain/ shortness of breath, orthopnea, PND, LE edema, palpitations, syncope, or presyncope.        Patient Active Problem List    Diagnosis Date Noted    Vertigo 07/28/2021    Prediabetes 07/28/2021    Mild intermittent asthma without complication 33/10/5965    Nephrolithiasis 11/29/2020    Chronic systolic congestive heart failure ([address] 07/21/2020    Major depressive disorder with single episode, in partial remission ([address] 09/15/2019    FRANTZ (obstructive sleep apnea) 09/13/2019    Obesity (BMI 30-39.9) 08/11/2019    Ischemic cardiomyopathy 03/22/2019    ICD (implantable cardioverter-defibrillator) in place 03/22/2019    Coronary artery disease of native artery of native heart with stable angina pectoris ([address] 10/05/2018    Essential hypertension 09/25/2018    Hyperlipidemia, mixed 09/25/2018    Smokes 1 pack of cigarettes per day 09/25/2018      Brian Joshi MD  Past Medical History:   Diagnosis Date    ADD (attention deficit disorder) 9/25/2018    Asthma     Balance problem 9/15/2019    Chest pain 11/30/2018    Congestive heart failure ([facility]     Diabetes ([facility]     Dyslipidemia 9/25/2018    Fatigue 11/30/2018    Glucose intolerance (impaired glucose tolerance) 8/11/2019    HTN (hypertension) 9/25/2018    Hypertension     ICD (implantable cardioverter-defibrillator) in place     Other ill-defined conditions(799.89)     ADHD,BELL'S PALSY WEAKNESS  LT FACE    Psychiatric disorder     DEPRESSION    S/P coronary artery stent placement 9/25/2018 9/25/18 PCI/MAVIS to LAD    STEMI (ST elevation myocardial infarction) ([facility] 9/25/2018    Syncope 1/25/2019    Tobacco abuse 9/25/2018      Past Surgical History:   Procedure Laterality Date    HX CORONARY STENT PLACEMENT      HX GI  1984    STOMACH ABDOMEN ACCIDENT    HX HEART CATHETERIZATION      HX HEENT      TEETH EXTRACTION    HX ORTHOPAEDIC  1984    FRACTURE RT FEMUR    HX PACEMAKER  01/25/2019    BSC ICD    HX PTCA      NEUROLOGICAL PROCEDURE UNLISTED      NECK     Allergies   Allergen Reactions    Bee Sting [Sting, Bee] Anaphylaxis    Flomax [Tamsulosin] Other (comments)     Passed out      Family History   Problem Relation Age of Onset    Hypertension Mother     Diabetes Mother     No Known Problems Father     Lung Disease Paternal Aunt     Alcohol abuse Paternal Aunt     Cancer Sister         breast    Hypertension Sister     Hypertension Brother     Heart Disease Maternal Uncle     Hypertension Maternal Uncle     Diabetes Maternal Uncle     Alcohol abuse Paternal Uncle     Heart Disease Paternal Grandfather     Hypertension Sister     Macular Degen Sister       Social History     Socioeconomic History    Marital status:      Spouse name: Julio Cesar Grijalva Number of children: 2    Years of education: 15    Highest education level: 12th grade   Occupational History    Not on file   Tobacco Use    Smoking status: Current Every Day Smoker Packs/day: 1.00     Years: 40.00     Pack years: 40.00     Types: Cigarettes    Smokeless tobacco: Never Used    Tobacco comment: started again  6/1/2019   Vaping Use    Vaping Use: Never used   Substance and Sexual Activity    Alcohol use: No     Alcohol/week: 0.0 standard drinks    Drug use: No    Sexual activity: Not Currently     Partners: Female   Other Topics Concern     Service No    Blood Transfusions Yes    Caffeine Concern Yes     Comment: coffee 1 pot    Occupational Exposure Yes     Comment: cig smoke    Hobby Hazards No    Sleep Concern Yes     Comment: does not sleep well,wakes up sweating,dog    Stress Concern Yes     Comment: high level daily -depression he states    Weight Concern Yes     Comment: more tired    Special Diet No    Back Care No     Comment: neck and low back surgery    Exercise No     Comment: does not exercise    Bike Helmet No     Comment: does not ride bike   [address] [facility] Yes     Comment: 1/2 time wears seatbelt    Self-Exams No   Social History Narrative    Not on file     Social Determinants of Health     Financial Resource Strain:     Difficulty of Paying Living Expenses: Not on file   Food Insecurity:     Worried About Running Out of Food in the Last Year: Not on file    Isela of Food in the Last Year: Not on file   Transportation Needs:     Lack of Transportation (Medical): Not on file    Lack of Transportation (Non-Medical):  Not on file   Physical Activity:     Days of Exercise per Week: Not on file    Minutes of Exercise per Session: Not on file   Stress:     Feeling of Stress : Not on file   Social Connections:     Frequency of Communication with Friends and Family: Not on file    Frequency of Social Gatherings with Friends and Family: Not on file    Attends Sikh Services: Not on file    Active Member of Clubs or Organizations: Not on file    Attends Club or Organization Meetings: Not on file    Marital Status: Not on file Intimate Partner Violence:     Fear of Current or Ex-Partner: Not on file    Emotionally Abused: Not on file    Physically Abused: Not on file    Sexually Abused: Not on file   Housing Stability:     Unable to Pay for Housing in the Last Year: Not on file    Number of [place] in the Last Year: Not on file    Unstable Housing in the Last Year: Not on file      Current Outpatient Medications   Medication Sig    ranolazine ER (RANEXA) 500 mg SR tablet TAKE 1 TABLET TWICE A DAY    atorvastatin (Lipitor) 80 mg tablet Take 1 Tablet by mouth daily.  Entresto 24-26 mg tablet Take 1 tablet by mouth twice daily    isosorbide mononitrate ER (IMDUR) 30 mg tablet TAKE 1 TABLET DAILY    aspirin delayed-release 81 mg tablet TAKE 1 TABLET DAILY    ezetimibe (ZETIA) 10 mg tablet Take 1 Tablet by mouth daily. Indications: high cholesterol    carvediloL (COREG) 6.25 mg tablet TAKE 1 TABLET BY MOUTH 2 TIMES A DAY.  DULoxetine (CYMBALTA) 60 mg capsule Take 1 Capsule by mouth daily.  albuterol (PROAIR HFA) 90 mcg/actuation inhaler Take 2 Puffs by inhalation every four (4) hours as needed for Wheezing.  acetaminophen (TYLENOL) 500 mg tablet Take 2,000 mg by mouth two (2) times daily as needed for Pain.  nitroglycerin (NITROSTAT) 0.4 mg SL tablet 1 Tablet by SubLINGual route every five (5) minutes as needed for Chest Pain. Up to 3 doses. No current facility-administered medications for this visit. Review of Symptoms:  11 systems reviewed, negative other than as stated in the HPI    Physical ExamPhysical Exam:    Vitals:    05/16/22 1439   BP: 110/70   Pulse: 94   Resp: 16   SpO2: 96%   Weight: 210 lb (95.3 kg)   Height: 5' 9\" (1.753 m)     Body mass index is 31.01 kg/m². General PE  Gen:  NAD  Mental Status - Alert. General Appearance - Not in acute distress.    HEENT:  PERRL, no carotid bruits or JVD  Chest and Lung Exam   Inspection: Accessory muscles - No use of accessory muscles in breathing. Auscultation:   Breath sounds: - Normal.   Cardiovascular   Inspection: Jugular vein - Bilateral - Inspection Normal.   Palpation/Percussion:   Apical Impulse: - Normal.   Auscultation: Rhythm - Regular. Heart Sounds - S1 WNL and S2 WNL. No S3 or S4. Murmurs & Other Heart Sounds: Auscultation of the heart reveals - No Murmurs. Peripheral Vascular   Upper Extremity: Inspection - Bilateral - No Cyanotic nailbeds or Digital clubbing. Lower Extremity:   Palpation: Edema - Bilateral - No edema. Abdomen:   Soft, non-tender, bowel sounds are active.   Neuro: A&O times 3, CN and motor grossly WNL    Labs:   Lab Results   Component Value Date/Time    Cholesterol, total 123 02/10/2022 08:41 AM    Cholesterol, total 143 03/02/2021 09:34 AM    Cholesterol, total 129 07/20/2020 08:19 AM    Cholesterol, total 129 07/20/2020 08:17 AM    Cholesterol, total 154 10/15/2019 08:42 AM    HDL Cholesterol 33 (L) 02/10/2022 08:41 AM    HDL Cholesterol 35 (L) 03/02/2021 09:34 AM    HDL Cholesterol 34 (L) 07/20/2020 08:19 AM    HDL Cholesterol 34 (L) 07/20/2020 08:17 AM    HDL Cholesterol 32 (L) 10/15/2019 08:42 AM    LDL, calculated 65 02/10/2022 08:41 AM    LDL, calculated 81 03/02/2021 09:34 AM    LDL, calculated 66 07/20/2020 08:19 AM    LDL, calculated 66 07/20/2020 08:17 AM    LDL, calculated 88 10/15/2019 08:42 AM    LDL, calculated 91 07/30/2019 08:38 AM    LDL, calculated 128.8 (H) 09/26/2018 03:34 AM    Triglyceride 140 02/10/2022 08:41 AM    Triglyceride 152 (H) 03/02/2021 09:34 AM    Triglyceride 144 07/20/2020 08:19 AM    Triglyceride 146 07/20/2020 08:17 AM    Triglyceride 172 (H) 10/15/2019 08:42 AM    CHOL/HDL Ratio 6.1 (H) 09/26/2018 03:34 AM     Lab Results   Component Value Date/Time    CK 78 05/24/2019 04:50 PM     Lab Results   Component Value Date/Time    Sodium 140 04/25/2022 07:57 AM    Potassium 4.7 04/25/2022 07:57 AM    Chloride 103 04/25/2022 07:57 AM    CO2 22 04/25/2022 07:57 AM    Anion gap 4 (L) 05/24/2019 04:50 PM    Glucose 96 04/25/2022 07:57 AM    BUN 14 04/25/2022 07:57 AM    Creatinine 1.03 04/25/2022 07:57 AM    BUN/Creatinine ratio 14 04/25/2022 07:57 AM    GFR est  02/10/2022 08:41 AM    GFR est non-AA 93 02/10/2022 08:41 AM    Calcium 9.2 04/25/2022 07:57 AM    Bilirubin, total 0.3 02/10/2022 08:41 AM    Alk. phosphatase 124 (H) 02/10/2022 08:41 AM    Protein, total 6.6 02/10/2022 08:41 AM    Albumin 4.1 02/10/2022 08:41 AM    Globulin 3.8 05/24/2019 04:50 PM    A-G Ratio 1.6 02/10/2022 08:41 AM    ALT (SGPT) 19 02/10/2022 08:41 AM       EKG:  NSR, old ant infarct     Assessment:          ICD-10-CM ICD-9-CM    1. Essential hypertension  I10 401.9 AMB POC EKG ROUTINE W/ 12 LEADS, INTER & REP   2. Ischemic cardiomyopathy  I25.5 414.8    3. ICD (implantable cardioverter-defibrillator) in place  Z95.810 V45.02    4. Coronary artery disease involving native coronary artery of native heart without angina pectoris  I25.10 414.01    5. FRANTZ (obstructive sleep apnea)  G47.33 327.23    6. Hyperlipidemia, mixed  E78.2 272.2    7. Chronic systolic congestive heart failure (HCC)  I50.22 428.22      428.0    8. Tobacco abuse  Z72.0 305.1        Orders Placed This Encounter    AMB POC EKG ROUTINE W/ 12 LEADS, INTER & REP     Order Specific Question:   Reason for Exam:     Answer:   routine     Prelim echo today  Left Ventricle: Mildly reduced left ventricular systolic function with a visually estimated EF of 40 - 45%. Left ventricle size is normal. Mildly increased wall thickness. Akinesis of the following segments: apical anterior, apical lateral, apical septal and apical inferior. Akinesis of the apex. Grade I diastolic dysfunction with normal LAP.   Aortic Valve: Valve structure is normal. Mildly calcified noncoronary cusp.   Mitral Valve: Mild regurgitation.     Pulmonic Valve: Mild regurgitation.          Plan:        ICM, s/p ICD  Prelim reading today showed improved systolic function 90-40%, mild MR/TR  EF 30-35% per echo in 2/3/2022  EF 45-50% in 9/2020;   Echo in 1/2019 with reduced LVEF 40-45%  and 2018  No events noted per device check, device with appropriate function on 4/2022  Wt stable at 210 lbs. Continue with Coreg 6.25 mg BID  Continue Entresto 24/26 mg BID (started 2/11/2022)  Stable kidney fxn Serum Cr 1.02 in 4/2022;  Serum Cr 0.91 in 1/2022   Last OV with EP 9/28/2021: annual device follow up s/p ICD 1/25/2019.   He is <1% RVP with 14 years batter remaining. No arrhythmias noted. Echo 1/19 with EF 40 % to 45 %.   EKG normal sinus.        CAD  Hx anterior STEMI in 9/2018, with peak troponin 100. Cardiac cath with MAVIS to the prox LAD.  He had 80% stenosis of the prox third of the OM1 and RPL. NST 2/2022:  Fixed defect anterior segment / LAD territory. Fixed defect consistent with prior myocardial infarction per NST 9/8/2020;   Continue with ASA,  Imdur 30mg, Coreg 6.25mg bid, statin therapy. Continue Ranexa  500 mg BID          HLD  1/2022 LDL 65  On Lipitor 80mg daily and Zetia 10mg.   Labs and lipids per PCP.      Tobacco abuse, back to 1PPD. Not interested in smoking cessation     FRANTZ  Not wearing CPAP        Orthostatic hypotension/ supine HTN  Previously had resolved upon d/c Straterra and decreased dose of Losartan  He has seen ENT who diagnosed him with vertigo  BP was not significantly orthostatic today nor was he symptomatic   Advised to continue with aggressive hydration, use compression stockings           Continue current care and f/u in 1 year. Yulisa Faustin NP    Patient seen and examined by me with the above nurse practitioner. I personally performed all components of the history, physical, and medical decision making and agree with the assessment and plan with minor modifications as noted. Today the patient presents with stable CAD and ischemic cardiomyopathy, with ejection fraction preliminarily improved by echo today up to 40 to 45%.   Blood pressure and lipids at goal    General PE  Gen:  NAD  Mental Status - Alert. General Appearance - Not in acute distress. HEENT:  PERRL, no carotid bruits or JVD  Chest and Lung Exam   Inspection: Accessory muscles - No use of accessory muscles in breathing. Auscultation:   Breath sounds: - Normal.   Cardiovascular   Inspection: Jugular vein - Bilateral - Inspection Normal.   Palpation/Percussion:   Apical Impulse: - Normal.   Auscultation: Rhythm - Regular. Heart Sounds - S1 WNL and S2 WNL. No S3 or S4. Murmurs & Other Heart Sounds: Auscultation of the heart reveals - No Murmurs. Peripheral Vascular   Upper Extremity: Inspection - Bilateral - No Cyanotic nailbeds or Digital clubbing. Lower Extremity:   Palpation: Edema - Bilateral - No edema. Abdomen:   Soft, non-tender, bowel sounds are active. Neuro: A&O times 3, CN and motor grossly WNL    Doing well. Continue current cardiac care. Encouraged more exercise and try to quit smoking. Counseled on smoking cessation using strict sequential weaning with a quit date using nicotine losenges - 5 minutes spent. Advised smoking is especially and immediately life-threatening in the setting of the COVID-19 pandemic. Follow-up in 1 year, sooner as needed.

## 2022-05-16 NOTE — LETTER
5/16/2022    Patient: Buzz Casiano   YOB: 1965   Date of Visit: 5/16/2022     Bernie Bonilla MD  [facility] [address]  [address] [facility] [address]    Dear Bernie Bonilla MD,      Thank you for referring Mr. Kristine Junior to [address]  for evaluation. My notes for this consultation are attached. If you have questions, please do not hesitate to call me. I look forward to following your patient along with you.       Sincerely,    Brianna Phan MD

## 2022-05-17 ENCOUNTER — TELEPHONE (OUTPATIENT)
Dept: CARDIOLOGY CLINIC | Age: 57
End: 2022-05-17

## 2022-05-17 LAB
ECHO AO ASC DIAM: 3.4 CM
ECHO AO ASCENDING AORTA INDEX: 1.61 CM/M2
ECHO AO ROOT DIAM: 3.7 CM
ECHO AO ROOT INDEX: 1.75 CM/M2
ECHO AV PEAK GRADIENT: 4 MMHG
ECHO AV PEAK VELOCITY: 1 M/S
ECHO AV VELOCITY RATIO: 0.9
ECHO EST RA PRESSURE: 3 MMHG
ECHO LA DIAMETER INDEX: 1.66 CM/M2
ECHO LA DIAMETER: 3.5 CM
ECHO LA TO AORTIC ROOT RATIO: 0.95
ECHO LA VOL 2C: 42 ML (ref 18–58)
ECHO LA VOL 4C: 43 ML (ref 18–58)
ECHO LA VOL BP: 48 ML (ref 18–58)
ECHO LA VOL/BSA BIPLANE: 23 ML/M2 (ref 16–34)
ECHO LA VOLUME AREA LENGTH: 51 ML
ECHO LA VOLUME INDEX A2C: 20 ML/M2 (ref 16–34)
ECHO LA VOLUME INDEX A4C: 20 ML/M2 (ref 16–34)
ECHO LA VOLUME INDEX AREA LENGTH: 24 ML/M2 (ref 16–34)
ECHO LV E' LATERAL VELOCITY: 6 CM/S
ECHO LV E' SEPTAL VELOCITY: 6 CM/S
ECHO LV EDV A2C: 80 ML
ECHO LV EDV A4C: 102 ML
ECHO LV EDV BP: 91 ML (ref 67–155)
ECHO LV EDV INDEX A4C: 48 ML/M2
ECHO LV EDV INDEX BP: 43 ML/M2
ECHO LV EDV NDEX A2C: 38 ML/M2
ECHO LV EJECTION FRACTION A2C: 36 %
ECHO LV EJECTION FRACTION A4C: 47 %
ECHO LV EJECTION FRACTION BIPLANE: 42 % (ref 55–100)
ECHO LV ESV A2C: 51 ML
ECHO LV ESV A4C: 54 ML
ECHO LV ESV BP: 53 ML (ref 22–58)
ECHO LV ESV INDEX A2C: 24 ML/M2
ECHO LV ESV INDEX A4C: 26 ML/M2
ECHO LV ESV INDEX BP: 25 ML/M2
ECHO LV FRACTIONAL SHORTENING: 30 % (ref 28–44)
ECHO LV INTERNAL DIMENSION DIASTOLE INDEX: 2.23 CM/M2
ECHO LV INTERNAL DIMENSION DIASTOLIC: 4.7 CM (ref 4.2–5.9)
ECHO LV INTERNAL DIMENSION SYSTOLIC INDEX: 1.56 CM/M2
ECHO LV INTERNAL DIMENSION SYSTOLIC: 3.3 CM
ECHO LV ISOVOLUMETRIC RELAXATION TIME (IVRT): 110.4 MS
ECHO LV IVSD: 1.2 CM (ref 0.6–1)
ECHO LV MASS 2D: 212 G (ref 88–224)
ECHO LV MASS INDEX 2D: 100.5 G/M2 (ref 49–115)
ECHO LV POSTERIOR WALL DIASTOLIC: 1.2 CM (ref 0.6–1)
ECHO LV RELATIVE WALL THICKNESS RATIO: 0.51
ECHO LVOT PEAK GRADIENT: 4 MMHG
ECHO LVOT PEAK VELOCITY: 0.9 M/S
ECHO MV "A" WAVE DURATION: 121.8 MSEC
ECHO MV A VELOCITY: 0.64 M/S
ECHO MV AREA PHT: 3.6 CM2
ECHO MV E DECELERATION TIME (DT): 210.6 MS
ECHO MV E VELOCITY: 0.47 M/S
ECHO MV E/A RATIO: 0.73
ECHO MV E/E' LATERAL: 7.83
ECHO MV E/E' RATIO (AVERAGED): 7.83
ECHO MV E/E' SEPTAL: 7.83
ECHO MV PRESSURE HALF TIME (PHT): 61.1 MS
ECHO RIGHT VENTRICULAR SYSTOLIC PRESSURE (RVSP): 27 MMHG
ECHO RV FREE WALL PEAK S': 13 CM/S
ECHO RV TAPSE: 1.7 CM (ref 1.7–?)
ECHO TV REGURGITANT MAX VELOCITY: 2.45 M/S
ECHO TV REGURGITANT PEAK GRADIENT: 24 MMHG

## 2022-07-23 LAB
ALBUMIN SERPL-MCNC: 4.2 G/DL (ref 3.8–4.9)
ALBUMIN/GLOB SERPL: 1.8 {RATIO} (ref 1.2–2.2)
ALP SERPL-CCNC: 112 IU/L (ref 44–121)
ALT SERPL-CCNC: 14 IU/L (ref 0–44)
AST SERPL-CCNC: 14 IU/L (ref 0–40)
BASOPHILS # BLD AUTO: 0.1 X10E3/UL (ref 0–0.2)
BASOPHILS NFR BLD AUTO: 1 %
BILIRUB SERPL-MCNC: 0.4 MG/DL (ref 0–1.2)
BUN SERPL-MCNC: 16 MG/DL (ref 6–24)
BUN/CREAT SERPL: 16 (ref 9–20)
CALCIUM SERPL-MCNC: 9.6 MG/DL (ref 8.7–10.2)
CHLORIDE SERPL-SCNC: 103 MMOL/L (ref 96–106)
CHOLEST SERPL-MCNC: 121 MG/DL (ref 100–199)
CO2 SERPL-SCNC: 25 MMOL/L (ref 20–29)
CREAT SERPL-MCNC: 0.99 MG/DL (ref 0.76–1.27)
EGFR: 89 ML/MIN/1.73
EOSINOPHIL # BLD AUTO: 0.2 X10E3/UL (ref 0–0.4)
EOSINOPHIL NFR BLD AUTO: 3 %
ERYTHROCYTE [DISTWIDTH] IN BLOOD BY AUTOMATED COUNT: 12.9 % (ref 11.6–15.4)
EST. AVERAGE GLUCOSE BLD GHB EST-MCNC: 128 MG/DL
GLOBULIN SER CALC-MCNC: 2.4 G/DL (ref 1.5–4.5)
GLUCOSE SERPL-MCNC: 106 MG/DL (ref 65–99)
HBA1C MFR BLD: 6.1 % (ref 4.8–5.6)
HCT VFR BLD AUTO: 45.1 % (ref 37.5–51)
HDLC SERPL-MCNC: 35 MG/DL
HGB BLD-MCNC: 15 G/DL (ref 13–17.7)
IMM GRANULOCYTES # BLD AUTO: 0 X10E3/UL (ref 0–0.1)
IMM GRANULOCYTES NFR BLD AUTO: 0 %
LDLC SERPL CALC-MCNC: 66 MG/DL (ref 0–99)
LYMPHOCYTES # BLD AUTO: 2.5 X10E3/UL (ref 0.7–3.1)
LYMPHOCYTES NFR BLD AUTO: 28 %
MCH RBC QN AUTO: 30 PG (ref 26.6–33)
MCHC RBC AUTO-ENTMCNC: 33.3 G/DL (ref 31.5–35.7)
MCV RBC AUTO: 90 FL (ref 79–97)
MONOCYTES # BLD AUTO: 0.6 X10E3/UL (ref 0.1–0.9)
MONOCYTES NFR BLD AUTO: 7 %
NEUTROPHILS # BLD AUTO: 5.4 X10E3/UL (ref 1.4–7)
NEUTROPHILS NFR BLD AUTO: 61 %
PLATELET # BLD AUTO: 275 X10E3/UL (ref 150–450)
POTASSIUM SERPL-SCNC: 4.9 MMOL/L (ref 3.5–5.2)
PROT SERPL-MCNC: 6.6 G/DL (ref 6–8.5)
PSA SERPL-MCNC: 0.7 NG/ML (ref 0–4)
RBC # BLD AUTO: 5 X10E6/UL (ref 4.14–5.8)
SODIUM SERPL-SCNC: 139 MMOL/L (ref 134–144)
TRIGL SERPL-MCNC: 107 MG/DL (ref 0–149)
VLDLC SERPL CALC-MCNC: 20 MG/DL (ref 5–40)
WBC # BLD AUTO: 8.9 X10E3/UL (ref 3.4–10.8)

## 2022-07-24 NOTE — PROGRESS NOTES
Will discuss at 7/28/22 appt. Prediabetes a little better than in Feb (A1c 6.1% now, was 6.2% in 2/22). Other labs normal, including kidney and liver tests, blood counts, cholesterol (tot chol 121, LDL 66), and PSA. Stay on the same medications, and keep up the good work!

## 2022-07-28 ENCOUNTER — OFFICE VISIT (OUTPATIENT)
Dept: INTERNAL MEDICINE CLINIC | Age: 57
End: 2022-07-28
Payer: COMMERCIAL

## 2022-07-28 VITALS
HEART RATE: 80 BPM | BODY MASS INDEX: 30.96 KG/M2 | RESPIRATION RATE: 12 BRPM | HEIGHT: 69 IN | DIASTOLIC BLOOD PRESSURE: 83 MMHG | OXYGEN SATURATION: 95 % | SYSTOLIC BLOOD PRESSURE: 122 MMHG | TEMPERATURE: 97.7 F | WEIGHT: 209 LBS

## 2022-07-28 DIAGNOSIS — Z00.00 MEDICARE ANNUAL WELLNESS VISIT, SUBSEQUENT: Primary | ICD-10-CM

## 2022-07-28 DIAGNOSIS — I25.118 CORONARY ARTERY DISEASE OF NATIVE ARTERY OF NATIVE HEART WITH STABLE ANGINA PECTORIS (HCC): ICD-10-CM

## 2022-07-28 DIAGNOSIS — R42 VERTIGO: ICD-10-CM

## 2022-07-28 DIAGNOSIS — E78.2 HYPERLIPIDEMIA, MIXED: ICD-10-CM

## 2022-07-28 DIAGNOSIS — Z71.89 ADVANCE CARE PLANNING: ICD-10-CM

## 2022-07-28 DIAGNOSIS — I50.22 CHRONIC SYSTOLIC CONGESTIVE HEART FAILURE (HCC): ICD-10-CM

## 2022-07-28 DIAGNOSIS — I10 ESSENTIAL HYPERTENSION: ICD-10-CM

## 2022-07-28 PROCEDURE — G8754 DIAS BP LESS 90: HCPCS | Performed by: INTERNAL MEDICINE

## 2022-07-28 PROCEDURE — G8417 CALC BMI ABV UP PARAM F/U: HCPCS | Performed by: INTERNAL MEDICINE

## 2022-07-28 PROCEDURE — G8427 DOCREV CUR MEDS BY ELIG CLIN: HCPCS | Performed by: INTERNAL MEDICINE

## 2022-07-28 PROCEDURE — G9717 DOC PT DX DEP/BP F/U NT REQ: HCPCS | Performed by: INTERNAL MEDICINE

## 2022-07-28 PROCEDURE — 99214 OFFICE O/P EST MOD 30 MIN: CPT | Performed by: INTERNAL MEDICINE

## 2022-07-28 PROCEDURE — 3017F COLORECTAL CA SCREEN DOC REV: CPT | Performed by: INTERNAL MEDICINE

## 2022-07-28 PROCEDURE — G0439 PPPS, SUBSEQ VISIT: HCPCS | Performed by: INTERNAL MEDICINE

## 2022-07-28 PROCEDURE — G8752 SYS BP LESS 140: HCPCS | Performed by: INTERNAL MEDICINE

## 2022-07-28 RX ORDER — NITROGLYCERIN 0.4 MG/1
0.4 TABLET SUBLINGUAL
Qty: 1 EACH | Refills: 1 | Status: SHIPPED | OUTPATIENT
Start: 2022-07-28

## 2022-07-28 NOTE — PROGRESS NOTES
This is the Subsequent Medicare Annual Wellness Exam, performed 12 months or more after the Initial AWV or the last Subsequent AWV    I have reviewed the patient's medical history in detail and updated the computerized patient record. Assessment/Plan   Education and counseling provided:  Are appropriate based on today's review and evaluation  End-of-Life planning (with patient's consent)    1. Medicare annual wellness visit, subsequent  2. Essential hypertension  3. Hyperlipidemia, mixed  4. Coronary artery disease of native artery of native heart with stable angina pectoris (HCC)  -     nitroglycerin (NITROSTAT) 0.4 mg SL tablet; 1 Tablet by SubLINGual route every five (5) minutes as needed for Chest Pain. Up to 3 doses. , Normal, Disp-1 Each, R-1Disp: 1 bottle  5. Chronic systolic congestive heart failure (HCC)  6. Vertigo  7.  Advance care planning       Depression Risk Factor Screening     3 most recent PHQ Screens 7/28/2022   PHQ Not Done -   Little interest or pleasure in doing things Not at all   Feeling down, depressed, irritable, or hopeless Not at all   Total Score PHQ 2 0   Trouble falling or staying asleep, or sleeping too much -   Feeling tired or having little energy -   Poor appetite, weight loss, or overeating -   Feeling bad about yourself - or that you are a failure or have let yourself or your family down -   Trouble concentrating on things such as school, work, reading, or watching TV -   Moving or speaking so slowly that other people could have noticed; or the opposite being so fidgety that others notice -   Thoughts of being better off dead, or hurting yourself in some way -   PHQ 9 Score -   How difficult have these problems made it for you to do your work, take care of your home and get along with others -       Alcohol & Drug Abuse Risk Screen    Do you average more than 2 drinks per night or 14 drinks a week: No    On any one occasion in the past three months have you have had more than 4 drinks containing alcohol:  No          Functional Ability and Level of Safety    Hearing: Hearing is good. Activities of Daily Living: The home contains: no safety equipment. Patient does total self care      Ambulation: with no difficulty     Fall Risk:  Fall Risk Assessment, last 12 mths 2/22/2021   Able to walk? Yes   Fall in past 12 months? 0   Do you feel unsteady? 0   Are you worried about falling 0      Abuse Screen:  Patient is not abused       Cognitive Screening    Has your family/caregiver stated any concerns about your memory: no     Cognitive Screening: Normal recall of 3/3 objects after 5 mins    Health Maintenance Due     Health Maintenance Due   Topic Date Due    Colorectal Cancer Screening Combo  Never done    COVID-19 Vaccine (3 - Booster for Plummer Peter series) 10/05/2021       Patient Care Team   Patient Care Team:  Raiza Camp MD as PCP - General (Internal Medicine Physician)  Raiza Camp MD as PCP - [facility]  Adeline Parkinson MD as Physician (Cardiovascular Disease Physician)  Coco Babin MD as Physician (Cardiovascular Disease Physician)  Devonte Lopez NP (Nurse Practitioner)  Gavin Renteria NP as Nurse Practitioner (Cardiovascular Disease Physician)    History     Patient Active Problem List   Diagnosis Code    Essential hypertension I10    Hyperlipidemia, mixed E78.2    Smokes 1 pack of cigarettes per day F17.210    Coronary artery disease of native artery of native heart with stable angina pectoris ([facility] I25.118    Ischemic cardiomyopathy I25.5    ICD (implantable cardioverter-defibrillator) in place Z95.810    Obesity (BMI 30-39. 9) E66.9    FRANTZ (obstructive sleep apnea) G47.33    Major depressive disorder with single episode, in partial remission (HCC) F32.4    Chronic systolic congestive heart failure (HCC) I50.22    Nephrolithiasis N20.0    Mild intermittent asthma without complication E07.69    Vertigo R42    Prediabetes R73.03     Past Medical History:   Diagnosis Date    ADD (attention deficit disorder) 9/25/2018    Asthma     Balance problem 9/15/2019    Chest pain 11/30/2018    Congestive heart failure [address]     Diabetes ([facility]     Dyslipidemia 9/25/2018    Fatigue 11/30/2018    Glucose intolerance (impaired glucose tolerance) 8/11/2019    HTN (hypertension) 9/25/2018    Hypertension     ICD (implantable cardioverter-defibrillator) in place     Other ill-defined conditions(799.89)     ADHD,BELL'S PALSY WEAKNESS  LT FACE    Psychiatric disorder     DEPRESSION    S/P coronary artery stent placement 9/25/2018 9/25/18 PCI/MAVIS to LAD    STEMI (ST elevation myocardial infarction) ([facility] 9/25/2018    Syncope 1/25/2019    Tobacco abuse 9/25/2018      Past Surgical History:   Procedure Laterality Date    HX CORONARY STENT PLACEMENT      HX GI  1984    STOMACH ABDOMEN ACCIDENT    HX HEART CATHETERIZATION      HX HEENT      TEETH EXTRACTION    HX ORTHOPAEDIC  1984    FRACTURE RT FEMUR    HX PACEMAKER  01/25/2019    [facility] ICD    HX PTCA      NEUROLOGICAL PROCEDURE UNLISTED      NECK     Current Outpatient Medications   Medication Sig Dispense Refill    ranolazine ER (RANEXA) 500 mg SR tablet TAKE 1 TABLET TWICE A  Tablet 3    atorvastatin (Lipitor) 80 mg tablet Take 1 Tablet by mouth daily. 90 Tablet 3    Entresto 24-26 mg tablet Take 1 tablet by mouth twice daily 180 Tablet 1    isosorbide mononitrate ER (IMDUR) 30 mg tablet TAKE 1 TABLET DAILY 90 Tablet 3    aspirin delayed-release 81 mg tablet TAKE 1 TABLET DAILY 90 Tablet 3    ezetimibe (ZETIA) 10 mg tablet Take 1 Tablet by mouth daily. Indications: high cholesterol 90 Tablet 3    carvediloL (COREG) 6.25 mg tablet TAKE 1 TABLET BY MOUTH 2 TIMES A DAY. 180 Tablet 3    DULoxetine (CYMBALTA) 60 mg capsule Take 1 Capsule by mouth daily. 90 Capsule 3    nitroglycerin (NITROSTAT) 0.4 mg SL tablet 1 Tablet by SubLINGual route every five (5) minutes as needed for Chest Pain. Up to 3 doses.  1 Bottle 1 albuterol (PROAIR HFA) 90 mcg/actuation inhaler Take 2 Puffs by inhalation every four (4) hours as needed for Wheezing. 3 Inhaler 3    acetaminophen (TYLENOL) 500 mg tablet Take 2,000 mg by mouth two (2) times daily as needed for Pain.        Allergies   Allergen Reactions    Bee Sting [Sting, Bee] Anaphylaxis    Flomax [Tamsulosin] Other (comments)     Passed out       Family History   Problem Relation Age of Onset    Hypertension Mother     Diabetes Mother     No Known Problems Father     Lung Disease Paternal Aunt     Alcohol abuse Paternal Aunt     Cancer Sister         breast    Hypertension Sister     Hypertension Brother     Heart Disease Maternal Uncle     Hypertension Maternal Uncle     Diabetes Maternal Uncle     Alcohol abuse Paternal Uncle     Heart Disease Paternal Grandfather     Hypertension Sister     Macular Degen Sister      Social History     Tobacco Use    Smoking status: Every Day     Packs/day: 1.00     Years: 40.00     Pack years: 40.00     Types: Cigarettes    Smokeless tobacco: Never    Tobacco comments:     started again  6/1/2019   Substance Use Topics    Alcohol use: No     Alcohol/week: 0.0 standard drinks         Eve Soriano MD

## 2022-07-28 NOTE — PROGRESS NOTES
CC:   Chief Complaint   Patient presents with    Annual Wellness Visit    Hypertension    Cholesterol Problem    Blood sugar problem       HISTORY OF PRESENT ILLNESS  Thelma Brown is a 61 y.o. male. Presents for Medicare AWV and 6 month follow up evaluation. He has hypertension, hyperlipidemia, coronary artery disease, history of MI with cardiac stent (MAVIS to proximal LAD) in 9/18, ischemic cardiomyopathy, CHF, ICD in place, major depression, prediabetes, tobacco use, FRANTZ not on CPAP, vertigo, kidney stones, and obesity. Has chronic dizzy spells. Occur about every other day. Usually lasts a few minutes. One time lasted about 45 mins when he lay down, eventually fell asleep. Saw Dr. Javy Villagomez (Neurology) years ago and diagnosed with vestibular vertigo. PT vestibular exercises helped but dizziness never went away. Dr. Jayashree Valdez told him dizziness was something he would have to live with. Complains of intermittent left shoulder pain over past week. Denies HA's, CP, SOB, or leg swelling. Home BP monitoring: has but has not checked BP recently. Soc Hx  . Has 2 stepsons, no grandchildren. Spends the day riding in [place] with his brother-in-law who builds pools. Smokes 1 ppd since age 8 (48 pack yr hx). Stays active but no formal exercise. ROS  A complete review of systems was performed and is negative except for those mentioned in the HPI. Patient Active Problem List   Diagnosis Code    Essential hypertension I10    Hyperlipidemia, mixed E78.2    Smokes 1 pack of cigarettes per day F17.210    Coronary artery disease of native artery of native heart with stable angina pectoris ([address] I25.118    Ischemic cardiomyopathy I25.5    ICD (implantable cardioverter-defibrillator) in place Z95.810    Obesity (BMI 30-39. 9) E66.9    FRANTZ (obstructive sleep apnea) G47.33    Major depressive disorder with single episode, in partial remission (HCC) F32.4    Chronic systolic congestive heart failure ([address] I50.22    Nephrolithiasis N20.0    Mild intermittent asthma without complication N40.54    Vertigo R42    Prediabetes R73.03     Past Medical History:   Diagnosis Date    ADD (attention deficit disorder) 9/25/2018    Asthma     Balance problem 9/15/2019    Chest pain 11/30/2018    Congestive heart failure ([facility]     Diabetes ([facility]     Dyslipidemia 9/25/2018    Fatigue 11/30/2018    Glucose intolerance (impaired glucose tolerance) 8/11/2019    HTN (hypertension) 9/25/2018    Hypertension     ICD (implantable cardioverter-defibrillator) in place     Other ill-defined conditions(799.89)     ADHD,BELL'S PALSY WEAKNESS  LT FACE    Psychiatric disorder     DEPRESSION    S/P coronary artery stent placement 9/25/2018 9/25/18 PCI/MAVIS to LAD    STEMI (ST elevation myocardial infarction) ([facility] 9/25/2018    Syncope 1/25/2019    Tobacco abuse 9/25/2018     Allergies   Allergen Reactions    Bee Sting [Sting, Bee] Anaphylaxis    Flomax [Tamsulosin] Other (comments)     Passed out       Current Outpatient Medications   Medication Sig Dispense Refill    ranolazine ER (RANEXA) 500 mg SR tablet TAKE 1 TABLET TWICE A  Tablet 3    atorvastatin (Lipitor) 80 mg tablet Take 1 Tablet by mouth daily. 90 Tablet 3    Entresto 24-26 mg tablet Take 1 tablet by mouth twice daily 180 Tablet 1    isosorbide mononitrate ER (IMDUR) 30 mg tablet TAKE 1 TABLET DAILY 90 Tablet 3    aspirin delayed-release 81 mg tablet TAKE 1 TABLET DAILY 90 Tablet 3    ezetimibe (ZETIA) 10 mg tablet Take 1 Tablet by mouth daily. Indications: high cholesterol 90 Tablet 3    carvediloL (COREG) 6.25 mg tablet TAKE 1 TABLET BY MOUTH 2 TIMES A DAY. 180 Tablet 3    DULoxetine (CYMBALTA) 60 mg capsule Take 1 Capsule by mouth daily. 90 Capsule 3    nitroglycerin (NITROSTAT) 0.4 mg SL tablet 1 Tablet by SubLINGual route every five (5) minutes as needed for Chest Pain. Up to 3 doses.  1 Bottle 1    albuterol (PROAIR HFA) 90 mcg/actuation inhaler Take 2 Puffs by inhalation every four (4) hours as needed for Wheezing. 3 Inhaler 3    acetaminophen (TYLENOL) 500 mg tablet Take 2,000 mg by mouth two (2) times daily as needed for Pain. PHYSICAL EXAM  Visit Vitals  /83 (BP 1 Location: Left upper arm, BP Patient Position: Sitting)   Pulse 80   Temp 97.7 °F (36.5 °C) (Oral)   Resp 12   Ht 5' 9\" (1.753 m)   Wt 209 lb (94.8 kg)   SpO2 95%   BMI 30.86 kg/m²       General: Obese, no distress. Skin tanned. HEENT:  Head normocephalic/atraumatic, no scleral icterus  Lungs:  Clear to ausculation bilaterally. Good air movement. Heart:  Regular rate and rhythm, normal S1 and S2, no murmur, gallop, or rub  Abdomen: Soft, non-distended, normal bowel sounds, no tenderness, no guarding, masses, rebound tenderness, or HSM. Extremities: No clubbing, cyanosis, or edema. Mildly decreased forward flexion and abduction with crepitus at left shoulder. Neurological: Alert and oriented. Psychiatric: Normal mood and affect. Behavior is normal.     Results for orders placed or performed in visit on 22   CBC WITH AUTOMATED DIFF   Result Value Ref Range    WBC 8.9 3.4 - 10.8 x10E3/uL    RBC 5.00 4.14 - 5.80 x10E6/uL    HGB 15.0 13.0 - 17.7 g/dL    HCT 45.1 37.5 - 51.0 %    MCV 90 79 - 97 fL    MCH 30.0 26.6 - 33.0 pg    MCHC 33.3 31.5 - 35.7 g/dL    RDW 12.9 11.6 - 15.4 %    PLATELET 607 746 - 257 x10E3/uL    NEUTROPHILS 61 Not Estab. %    Lymphocytes 28 Not Estab. %    MONOCYTES 7 Not Estab. %    EOSINOPHILS 3 Not Estab. %    BASOPHILS 1 Not Estab. %    ABS. NEUTROPHILS 5.4 1.4 - 7.0 x10E3/uL    Abs Lymphocytes 2.5 0.7 - 3.1 x10E3/uL    ABS. MONOCYTES 0.6 0.1 - 0.9 x10E3/uL    ABS. EOSINOPHILS 0.2 0.0 - 0.4 x10E3/uL    ABS. BASOPHILS 0.1 0.0 - 0.2 x10E3/uL    IMMATURE GRANULOCYTES 0 Not Estab. %    ABS. IMM.  GRANS. 0.0 0.0 - 0.1 /KQ   METABOLIC PANEL, COMPREHENSIVE   Result Value Ref Range    Glucose 106 (H) 65 - 99 mg/dL    BUN 16 6 - 24 mg/dL    Creatinine 0.99 0.76 - 1.27 mg/dL eGFR 89 >59 mL/min/1.73    BUN/Creatinine ratio 16 9 - 20    Sodium 139 134 - 144 mmol/L    Potassium 4.9 3.5 - 5.2 mmol/L    Chloride 103 96 - 106 mmol/L    CO2 25 20 - 29 mmol/L    Calcium 9.6 8.7 - 10.2 mg/dL    Protein, total 6.6 6.0 - 8.5 g/dL    Albumin 4.2 3.8 - 4.9 g/dL    GLOBULIN, TOTAL 2.4 1.5 - 4.5 g/dL    A-G Ratio 1.8 1.2 - 2.2    Bilirubin, total 0.4 0.0 - 1.2 mg/dL    Alk. phosphatase 112 44 - 121 IU/L    AST (SGOT) 14 0 - 40 IU/L    ALT (SGPT) 14 0 - 44 IU/L   LIPID PANEL   Result Value Ref Range    Cholesterol, total 121 100 - 199 mg/dL    Triglyceride 107 0 - 149 mg/dL    HDL Cholesterol 35 (L) >39 mg/dL    VLDL, calculated 20 5 - 40 mg/dL    LDL, calculated 66 0 - 99 mg/dL   HEMOGLOBIN A1C WITH EAG   Result Value Ref Range    Hemoglobin A1c 6.1 (H) 4.8 - 5.6 %    Estimated average glucose 128 mg/dL   PSA SCREENING (SCREENING)   Result Value Ref Range    Prostate Specific Ag 0.7 0.0 - 4.0 ng/mL         ASSESSMENT AND PLAN    ICD-10-CM ICD-9-CM    1. Medicare annual wellness visit, subsequent  Z00.00 V70.0       2. Essential hypertension  I10 401.9       3. Hyperlipidemia, mixed  E78.2 272.2       4. Coronary artery disease of native artery of native heart with stable angina pectoris (HCC)  I25.118 414.01 nitroglycerin (NITROSTAT) 0.4 mg SL tablet     413.9       5. Chronic systolic congestive heart failure (HCC)  I50.22 428.22      428.0       6. Vertigo  R42 780.4       7. Advance care planning  Z71.89 V65.49         Discussed lab results from 7/22/22. Prediabetes a little better than in Feb (A1c 6.1% now, was 6.2% in 2/22). Other labs normal, including kidney and liver tests, blood counts, cholesterol (tot chol 121, LDL 66), and PSA. Stay on the same medications, and keep up the good work! Diagnoses and all orders for this visit:    1. Medicare annual wellness visit, subsequent    2. Essential hypertension  Controlled on carvedilol and Entresto.     3. Hyperlipidemia, mixed  7/22/22: tot chol 121, LDL 66. Controlled, LDL <70. Continue atorvastatin 80 mg nightly and Zetia 10 mg daily. 4. Coronary artery disease of native artery of native heart with stable angina pectoris ([facility]  Reports not using SL NTG in over a year but requests refill in case he gets CP.  -     Refill nitroglycerin (NITROSTAT) 0.4 mg SL tablet; 1 Tablet by SubLINGual route every five (5) minutes as needed for Chest Pain. Up to 3 doses. 5. Chronic systolic congestive heart failure ([facility]  Echo 5/16/22: EF 40-45%, apical akinesis. ICD in place. Continue present management and follow up with Dr. Paul Madera. 6. Vertigo  Unchanged. Does not bother him enough to take daily meclizine or have vestibular PT again. 7. Advance care planning      Follow-up and Dispositions    Return in about 6 months (around 1/28/2023), or if symptoms worsen or fail to improve, for HTN, chol, prediabetes. I have discussed the diagnosis with the patient and the intended plan as seen in the above orders. Patient is in agreement. The patient has received an after-visit summary and questions were answered concerning future plans. I have discussed medication side effects and warnings with the patient as well.

## 2022-07-28 NOTE — PROGRESS NOTES
Nickie Almanza  Identified pt with two pt identifiers(name and ). Chief Complaint   Patient presents with    Annual Wellness Visit    Hypertension    Cholesterol Problem    Blood sugar problem       Reviewed record In preparation for visit and have obtained necessary documentation. 1. Have you been to the ER, urgent care clinic or hospitalized since your last visit? No     2. Have you seen or consulted any other health care providers outside of the [address] [place] [address] since your last visit? Include any pap smears or colon screening. No    Vitals reviewed with provider.     Health Maintenance reviewed:     Health Maintenance Due   Topic    Colorectal Cancer Screening Combo     COVID-19 Vaccine (3 - Booster for Pfizer series)    Medicare Yearly Exam           Wt Readings from Last 3 Encounters:   22 209 lb (94.8 kg)   22 210 lb (95.3 kg)   22 210 lb (95.3 kg)        Temp Readings from Last 3 Encounters:   22 97.7 °F (36.5 °C) (Oral)   21 97.7 °F (36.5 °C) (Oral)   21 97.8 °F (36.6 °C) (Oral)        BP Readings from Last 3 Encounters:   22 122/83   22 110/70   22 130/80        Pulse Readings from Last 3 Encounters:   22 80   22 94   22 90        Vitals:    22 0956   BP: 122/83   Pulse: 80   Resp: 12   Temp: 97.7 °F (36.5 °C)   TempSrc: Oral   SpO2: 95%   Weight: 209 lb (94.8 kg)   Height: 5' 9\" (1.753 m)   PainSc:   0 - No pain          Learning Assessment:   :       Learning Assessment 2019 10/5/2018   PRIMARY LEARNER Patient Patient   PRIMARY LANGUAGE ENGLISH ENGLISH   LEARNER PREFERENCE PRIMARY READING DEMONSTRATION   ANSWERED BY patient patient   RELATIONSHIP SELF SELF        Depression Screening:   :       3 most recent PHQ Screens 2022   PHQ Not Done -   Little interest or pleasure in doing things Not at all   Feeling down, depressed, irritable, or hopeless Not at all   Total Score PHQ 2 0   Trouble falling or staying asleep, or sleeping too much -   Feeling tired or having little energy -   Poor appetite, weight loss, or overeating -   Feeling bad about yourself - or that you are a failure or have let yourself or your family down -   Trouble concentrating on things such as school, work, reading, or watching TV -   Moving or speaking so slowly that other people could have noticed; or the opposite being so fidgety that others notice -   Thoughts of being better off dead, or hurting yourself in some way -   PHQ 9 Score -   How difficult have these problems made it for you to do your work, take care of your home and get along with others -        Fall Risk Assessment:   :       Fall Risk Assessment, last 12 mths 2/22/2021   Able to walk? Yes   Fall in past 12 months? 0   Do you feel unsteady? 0   Are you worried about falling 0        Abuse Screening:   :       Abuse Screening Questionnaire 3/22/2019 1/9/2019   Do you ever feel afraid of your partner? N N   Are you in a relationship with someone who physically or mentally threatens you? N N   Is it safe for you to go home?  Y Y        ADL Screening:   :       ADL Assessment 7/28/2022   Feeding yourself No Help Needed   Getting from bed to chair No Help Needed   Getting dressed No Help Needed   Bathing or showering No Help Needed   Walk across the room (includes cane/walker) No Help Needed   Using the telphone No Help Needed   Taking your medications No Help Needed   Preparing meals No Help Needed   Managing money (expenses/bills) No Help Needed   Moderately strenuous housework (laundry) No Help Needed   Shopping for personal items (toiletries/medicines) No Help Needed   Shopping for groceries No Help Needed   Driving No Help Needed   Climbing a flight of stairs No Help Needed   Getting to places beyond walking distances No Help Needed

## 2022-07-28 NOTE — ACP (ADVANCE CARE PLANNING)
Advance Care Planning     Advance Care Planning (ACP) Physician/NP/PA Conversation      Date of Conversation: 7/28/2022  Conducted with: Patient with Decision Making Capacity    Healthcare Decision Maker:     Primary Decision Maker: Karen Arriola - [phone]  Click here to complete [address] including selection of the Healthcare Decision Maker Relationship (ie \"Primary\")      Care Preferences:    Hospitalization: \"If your health worsens and it becomes clear that your chance of recovery is unlikely, what would be your preference regarding hospitalization? \"  The patient would prefer hospitalization. Ventilation: \"If you were unable to breathe on your own and your chance of recovery was unlikely, what would be your preference about the use of a ventilator (breathing machine) if it was available to you? \"   The patient is unsure. Resuscitation: \"In the event your heart stopped as a result of an underlying serious health condition, would you want attempts to be made to restart your heart, or would you prefer a natural death? \"   Yes, attempt to resuscitate.     Additional topics discussed: treatment goals    Conversation Outcomes / Follow-Up Plan:   ACP in process - information provided, considering goals and options  Reviewed DNR/DNI and patient elects Full Code (Attempt Resuscitation)     Length of Voluntary ACP Conversation in minutes:  <16 minutes (Non-Billable)    Eulogio Benitez MD

## 2022-07-28 NOTE — PATIENT INSTRUCTIONS
Medicare Wellness Visit, Male    The best way to live healthy is to have a lifestyle where you eat a well-balanced diet, exercise regularly, limit alcohol use, and quit all forms of tobacco/nicotine, if applicable. Regular preventive services are another way to keep healthy. Preventive services (vaccines, screening tests, monitoring & exams) can help personalize your care plan, which helps you manage your own care. Screening tests can find health problems at the earliest stages, when they are easiest to treat. [facility] follows the current, evidence-based guidelines published by the [facility] ([facility]) when recommending preventive services for our patients. Because we follow these guidelines, sometimes recommendations change over time as research supports it. (For example, a prostate screening blood test is no longer routinely recommended for men with no symptoms). Of course, you and your doctor may decide to screen more often for some diseases, based on your risk and co-morbidities (chronic disease you are already diagnosed with). Preventive services for you include:  - Medicare offers their members a free annual wellness visit, which is time for you and your primary care provider to discuss and plan for your preventive service needs. Take advantage of this benefit every year!  -All adults over age 72 should receive the recommended pneumonia vaccines. Current USPSTF guidelines recommend a series of two vaccines for the best pneumonia protection.   -All adults should have a flu vaccine yearly and tetanus vaccine every 10 years.  -All adults age 48 and older should receive the shingles vaccines (series of two vaccines).        -All adults age 38-68 who are overweight should have a diabetes screening test once every three years.   -Other screening tests & preventive services for persons with diabetes include: an eye exam to screen for diabetic retinopathy, a kidney function test, a foot exam, and stricter control over your cholesterol.   -Cardiovascular screening for adults with routine risk involves an electrocardiogram (ECG) at intervals determined by the provider.   -Colorectal cancer screening should be done for adults age 54-65 with no increased risk factors for colorectal cancer. There are a number of acceptable methods of screening for this type of cancer. Each test has its own benefits and drawbacks. Discuss with your provider what is most appropriate for you during your annual wellness visit. The different tests include: colonoscopy (considered the best screening method), a fecal occult blood test, a fecal DNA test, and sigmoidoscopy.  -All adults born between [facility] should be screened once for Hepatitis C.  -An Abdominal Aortic Aneurysm (AAA) Screening is recommended for men age 73-68 who has ever smoked in their lifetime.      Here is a list of your current Health Maintenance items (your personalized list of preventive services) with a due date:  Health Maintenance Due   Topic Date Due    Colorectal Screening  Never done    COVID-19 Vaccine (3 - Booster for [place] series) 10/05/2021

## 2022-08-05 NOTE — PROGRESS NOTES
Bedside shift change report given to Shantal Haines (oncoming nurse) by [facility] RN (offgoing nurse). Report included the following information SBAR, Kardex, Intake/Output, MAR and Recent Results Pt slept most of the night. Uneventful shift. Marla Walters Hospitalist Discharge Summary    Patient: Mikaela Stone MRN: [id number]  [facility]: [id number]    YOB: 1959  Age: 60 y.o. Sex: female    DOA: 8/2/2022 LOS:  LOS: 2 days   Discharge Date:     Admission Diagnoses: Esophageal and gastric varices ([facility] [I85.00, I86.4]  Hematemesis [K92.0]  Cirrhosis ([facility] [address] [K74.60]    Discharge Diagnoses:    Upper GI bleed  Anemia secondary to acute blood loss  History of cirrhosis secondary to alcohol abuse  Simple of liver disease secondary to cirrhosis, thrombocytopenia    Discharge Condition: [address]    Discharge To: Home    CODE STATUS: Full Code      PHYSICAL EXAM  Visit Vitals  BP (!) 159/81 (BP 1 Location: Left upper arm, BP Patient Position: At rest)   Pulse 69   Temp 98 °F (36.7 °C)   Resp 16   Ht 5' 10\" (1.778 m)   Wt 76.5 kg (168 lb 9.6 oz)   SpO2 100%   Breastfeeding No   BMI 24.19 kg/m²       General: Alert, cooperative, no acute distress    Lungs:  CTA Bilaterally. No Wheezing/Rhonchi/Rales. Heart:  Regular rate and Rhythm. Abdomen: Soft, Non distended, Non tender. + Bowel sounds. Extremities: No edema/ cyanosis/ clubbing  Neurologic:  AA oriented X 3. Moves all extremities. Mikaela Stone is a 60 y.o. female who presents to [facility] ER with complaint of Hematemesis. Patient reports that she had 6 episodes of hematemesis that were \"just blood\" and states that she had recent abdominal pain. Patient states that she has had previous Banding of her Esophageal Varices and has received Blood Transfusions due to previous GI Bleeds. Patient reports that she continue to Smoke 0.30 PPD and has done so for approximately 46 years. Patient reports that she had 2-3 days of diarrhea approximately 10 days ago that has since resolved. Patient denies fevers, chills, dysuria, cough, chest pain, and pain with inspiration.       Patient is admitted to [facility] Telemetry Unit for management of Hematemesis with concern for Esophageal Variceal [facility] Course:     76-year-old female presents to the emergency room secondary to hematemesis. Patient reports 6 episodes of hematemesis that were randy blood. Patient has a known history of alcoholic cirrhosis with esophageal varices and has received blood transfusions in the past secondary to previous GI bleed. Patient is a chronic smoker. Patient seen by GI, underwent EGD which showed moderate to severe PHG. 2 columns of large varices, one with red ana sign, 2 bands were placed. Continue liquid diet. Carafate slurry 4 times daily, can wean octreotide in 72 hours. Repeat EGD in 4 to 6 weeks. Patient's diet was advanced which she has tolerated well. H&H remained stable at this time. Patient was a resident of [facility] prior to coming to the emergency room however at this time it appears that [facility] will unfortunately not be able to take her back. Case management on board for discharge planning. Follow-up with PCP and GI in 1 to 2 weeks    Follow up Care: With PCP and GI in 1 to 2 weeks    Consults: Gastroenterology    Significant Diagnostic Studies:     Imaging:  XR Results (most recent):  No results found for this or any previous visit. CT Results (most recent):  Results from Hospital Encounter encounter on 08/02/22    CT ABD PELV WO CONT    Narrative  CT ABDOMEN AND PELVIS WITHOUT ENHANCEMENT    INDICATION: Abdominal pain. Vomiting red blood. TECHNIQUE: Axial images obtained of the abdomen and pelvis without intravenous  contrast. Coronal and sagittal reformatted images were obtained. All CT scans  are performed using dose optimization techniques as appropriate to the performed  exam including the following: Automated exposure control, adjustment of mA  and/or kV according to patient size, and use of iterative reconstructive  technique. COMPARISON: None. FINDINGS:  Lung Base: Minimal atelectasis.     Liver: Nodular hepatic contour with enlarged caudate lobe and lateral segment  left lobe consistent with cirrhosis. Biliary: Cholelithiasis. Spleen: Splenomegaly. Pancreas: Unremarkable. Adrenal Glands: Unremarkable. Kidneys: Unremarkable. Gastrointestinal tract: Thickened appearance of the distal esophagus with  slightly nodular external contour. This contour could be due to varices in the  setting of cirrhosis, versus abutting lymph nodes or other cause. Diverticulosis  coli. No dilated bowel. Normal appendix    Bladder/ Pelvic Organs: Left adnexal 4 cm isoechoic nodule is likely an  exophytic fibroid. Additional small partially calcified uterine fibroids. Peritoneum/ Retroperitoneum: Unremarkable. Lymph Nodes: Lymphadenopathy versus clusters of varices in the gastrohepatic  ligament region, limited without IV contrast.  Vessels: No aortic aneurysm. Limited without contrast.    Bones/Soft tissues: No acute findings. Lumbar spine degenerative changes. Impression  1. No definite acute findings by noncontrast limited CT imaging. 2. Cirrhosis with features of portal hypertension including splenomegaly. Suspected varices along the esophagus but cannot differentiate from abutting  lymph nodes or soft tissue wall thickening in the setting of a noncontrast exam.  Esophagitis is not excluded. 3. Lymphadenopathy versus varices in the gastrohepatic ligament. 4. Left adnexal mass is most likely a fibroid. This can be confirmed with pelvic  ultrasound on a nonemergent basis. 08/09/20    ECHO ADULT COMPLETE 08/10/2020 8/10/2020    Interpretation Summary  · LV: Normal cavity size and systolic function (ejection fraction normal). Increased wall thickness. Estimated left ventricular ejection fraction is 60 - 65%. Mild (grade 1) left ventricular diastolic dysfunction E'E= 13.18.  · AV: Aortic valve sclerosis. Mild aortic valve regurgitation is present. · MV: Mitral valve non-specific thickening. Trace mitral valve regurgitation is present.   · TV: Mild tricuspid valve regurgitation is present. · PA: Pulmonary arterial systolic pressure is 28 mmHg. Signed by: Abi Bro MD on 8/10/2020  1:28 PM       MRI Results (most recent):  No results found for this or any previous visit. Procedures:     EGD    Endoscopic Gastroduodenoscopy Procedure Note     Eddi Puckett  1959  [id number]     Indication:  Hematemesis       : Elta Sandhoff, MD     Referring Provider:  Roseann Diane MD     Anesthesia/Sedation:  MAC anesthesia Propofol      Procedure Details   Full disclosure of risks were reviewed with patient as detailed on the consent form. The patient was placed in the left lateral decubitus position and monitored with continuous interval blood pressure monitoring and direct observations. After adequate sedation, the endoscope was carefully introduced into the oropharynx and passed in to the esophagus under direct visualization. The esophagus and GE junction were carefully examined, including a measurement of the Z-line at 41 cm, GEJ at 41 cm and hiatus at 41 cm from the incisors. After advancement of the endoscope into the stomach, a careful examination was performed, including views of the antrum, incisura angularis, corpus and retroflexed views of the cardia and fundus. The pylorus was then intubated without any difficulty and the endoscope was advanced to the second portion of the duodenum. Careful examination of the second portion of the duodenum and the bulb was then performed. The stomach was then decompressed and the endoscope withdrawn. Findings and intervention(s) are detailed below. Findings:  Esophagus:  Two columns of large varices, one with red ana sign, where she probably bled from. Two rubber bands were placed, with appropriate flattening of both varices. Stomach:   Insufflated appropriately  Moderate to severe PHG  Gastric antrum appeared normal mucosa without evidence of erythema, erosion or ulceration  Retroflexed view of the incisura and cardia revealed normal mucosa. No gastric varices     Duodenum/small bowel:  Examination into second portion of duodenum  Mucosa appeared normal without evidence of erythema, erosion or ulceration     Therapies:    variceal banding     Specimens: * No specimens *     Complications:  none; patient tolerated the procedure well. EBL: 0 ml     Impression:  Moderate to severe PHG  Two columns of large varices, one with red ana sign, where she probably bled from. Two bands were placed, with appropriate flattening of both varices. Recommendations:  Ok to start liquid diet. Can advance in 24 hours if H/H remains stable. Avoid extreme of temperatures. Carafate slurry QID for the next 2 weeks. Complete 3-5 days of antibiotics for SBP prophylaxis. Can wean octreotide when 72 hours have been completed. Repeat EGD in 4-6 weeks to confirm eradication of varices. This can be with us or her Hepatologists at [address] [place] [address] (Dr Santana Gray and Dr Osmany Sylvester)  Needs close outpatient follow up with her outpatient Hepatologists after discharge, to consider liver transplantation    Current Discharge Medication List        CONTINUE these medications which have CHANGED    Details   furosemide (Lasix) 20 mg tablet 1 tab po q daily  Qty: 30 Tablet, Refills: 0  Start date: 8/5/2022      spironolactone (Aldactone) 100 mg tablet Take 1 Tablet by mouth in the morning. Qty: 30 Tablet, Refills: 0  Start date: 8/5/2022           STOP taking these medications       naloxone (Narcan) 4 mg/actuation nasal spray Comments:   Reason for Stopping:                 Activity: Activity as tolerated    Diet: Cardiac Diet    Wound Care: None needed      Arslan Vu MD  8/5/2022, 10:52 AM    Total time spent 36 mins  Disclaimer: Sections of this note are dictated using utilizing voice recognition software. Minor typographical errors may be present.  If questions arise, please do not hesitate to contact me or call our department.

## 2022-08-16 RX ORDER — DULOXETIN HYDROCHLORIDE 60 MG/1
CAPSULE, DELAYED RELEASE ORAL
Qty: 90 CAPSULE | Refills: 3 | Status: SHIPPED | OUTPATIENT
Start: 2022-08-16

## 2022-10-03 ENCOUNTER — TELEPHONE (OUTPATIENT)
Dept: CARDIOLOGY CLINIC | Age: 57
End: 2022-10-03

## 2022-10-03 RX ORDER — CARVEDILOL 6.25 MG/1
6.25 TABLET ORAL 2 TIMES DAILY WITH MEALS
Qty: 30 TABLET | Refills: 0 | Status: SHIPPED | OUTPATIENT
Start: 2022-10-03 | End: 2022-10-07 | Stop reason: SDUPTHER

## 2022-10-03 NOTE — TELEPHONE ENCOUNTER
Pt requested a two wk supply of the carvedilol 6.25mg as he is waiting on the refill from [facility]      [facility] [phone]

## 2022-10-07 ENCOUNTER — TELEPHONE (OUTPATIENT)
Dept: CARDIOLOGY CLINIC | Age: 57
End: 2022-10-07

## 2022-10-07 RX ORDER — CARVEDILOL 6.25 MG/1
6.25 TABLET ORAL 2 TIMES DAILY WITH MEALS
Qty: 180 TABLET | Refills: 3 | Status: SHIPPED | OUTPATIENT
Start: 2022-10-07

## 2022-10-07 NOTE — TELEPHONE ENCOUNTER
[facility]ax received from [facility] requesting a new prescription for Carvedilol. RX for carvedilol entered for 180 caps with 3 refills.

## 2022-10-27 NOTE — PROGRESS NOTES
Called pt. Verified patient's identity with two identifiers. Notified her of Dr. Renate Dunn message and advice. Patient verbalized understanding and denied further questions or concerns. Pt. Ambulate to bathroom and in the boyle without difficulty. Pt. Report no SOB, chest pain or dizziness.

## 2022-10-31 RX ORDER — EZETIMIBE 10 MG/1
TABLET ORAL
Qty: 90 TABLET | Refills: 3 | Status: SHIPPED | OUTPATIENT
Start: 2022-10-31

## 2023-01-06 RX ORDER — SACUBITRIL AND VALSARTAN 24; 26 MG/1; MG/1
TABLET, FILM COATED ORAL
Qty: 180 TABLET | Refills: 0 | Status: SHIPPED | OUTPATIENT
Start: 2023-01-06

## 2023-01-06 NOTE — TELEPHONE ENCOUNTER
Per VO of Dr. Nicolas Duran: 5/16/2022    Future Appointments   Date Time Provider Peewee Varela   1/23/2023  9:10 AM MD VITA Ibarra BS AMB   5/16/2023  9:20 AM Charlene Dickson MD CAVREY BS AMB       Requested Prescriptions     Signed Prescriptions Disp Refills    Entresto 24-26 mg tablet 180 Tablet 0     Sig: Take 1 tablet by mouth twice daily     Authorizing Provider: Ashley Huang     Ordering User: Jp Moses

## 2023-01-23 ENCOUNTER — OFFICE VISIT (OUTPATIENT)
Dept: INTERNAL MEDICINE CLINIC | Age: 58
End: 2023-01-23
Payer: COMMERCIAL

## 2023-01-23 VITALS
HEART RATE: 89 BPM | SYSTOLIC BLOOD PRESSURE: 102 MMHG | TEMPERATURE: 97.6 F | DIASTOLIC BLOOD PRESSURE: 72 MMHG | HEIGHT: 69 IN | BODY MASS INDEX: 30.51 KG/M2 | WEIGHT: 206 LBS | OXYGEN SATURATION: 96 % | RESPIRATION RATE: 12 BRPM

## 2023-01-23 DIAGNOSIS — R05.3 CHRONIC COUGH: ICD-10-CM

## 2023-01-23 DIAGNOSIS — I50.22 CHRONIC SYSTOLIC CONGESTIVE HEART FAILURE (HCC): ICD-10-CM

## 2023-01-23 DIAGNOSIS — Z12.11 SCREENING FOR COLON CANCER: ICD-10-CM

## 2023-01-23 DIAGNOSIS — I25.118 CORONARY ARTERY DISEASE OF NATIVE ARTERY OF NATIVE HEART WITH STABLE ANGINA PECTORIS (HCC): ICD-10-CM

## 2023-01-23 DIAGNOSIS — R00.2 HEART PALPITATIONS: ICD-10-CM

## 2023-01-23 DIAGNOSIS — R42 VERTIGO: ICD-10-CM

## 2023-01-23 DIAGNOSIS — I10 ESSENTIAL HYPERTENSION: Primary | ICD-10-CM

## 2023-01-23 DIAGNOSIS — F32.4 MAJOR DEPRESSIVE DISORDER WITH SINGLE EPISODE, IN PARTIAL REMISSION (HCC): ICD-10-CM

## 2023-01-23 DIAGNOSIS — E78.2 HYPERLIPIDEMIA, MIXED: ICD-10-CM

## 2023-01-23 DIAGNOSIS — R73.03 PREDIABETES: ICD-10-CM

## 2023-01-23 DIAGNOSIS — Z12.5 SCREENING FOR PROSTATE CANCER: ICD-10-CM

## 2023-01-23 PROCEDURE — 3017F COLORECTAL CA SCREEN DOC REV: CPT | Performed by: INTERNAL MEDICINE

## 2023-01-23 PROCEDURE — G9717 DOC PT DX DEP/BP F/U NT REQ: HCPCS | Performed by: INTERNAL MEDICINE

## 2023-01-23 PROCEDURE — 3074F SYST BP LT 130 MM HG: CPT | Performed by: INTERNAL MEDICINE

## 2023-01-23 PROCEDURE — 3078F DIAST BP <80 MM HG: CPT | Performed by: INTERNAL MEDICINE

## 2023-01-23 PROCEDURE — G8417 CALC BMI ABV UP PARAM F/U: HCPCS | Performed by: INTERNAL MEDICINE

## 2023-01-23 PROCEDURE — 99214 OFFICE O/P EST MOD 30 MIN: CPT | Performed by: INTERNAL MEDICINE

## 2023-01-23 PROCEDURE — G8427 DOCREV CUR MEDS BY ELIG CLIN: HCPCS | Performed by: INTERNAL MEDICINE

## 2023-01-23 NOTE — PROGRESS NOTES
CC:   Chief Complaint   Patient presents with    Blood sugar problem    Hypertension    Cholesterol Problem       HISTORY OF PRESENT ILLNESS  Yang Presvane is a 61 y.o. male. Presents for 6 month follow up evaluation. He has HTN, hyperlipidemia, CAD, history of MI with cardiac stent (MAVIS to proximal LAD) in 9/18, ischemic cardiomyopathy, CHF, ICD in place, major depression, prediabetes, tobacco use, and vestibular vertigo. Complains of:  1) unchanged chronic dizziness. 2) dry cough that started about 8 months ago. 3) occasional heart palpitations. Drinks 3 cups of coffee daily. Reports his BP low when he saw Dr. Ana Paula Pastor. Denies HA's, CP, SOB, or leg swelling. Home BP monitoring: has but has not checked BP recently. Smokes 1 ppd since age 8 (48 pack yr hx). Stays active but no formal exercise. Flu vaccine: had in 11/22  COVID vaccine: declines booster vaccine     Patient Active Problem List   Diagnosis Code    Essential hypertension I10    Hyperlipidemia, mixed E78.2    Smokes 1 pack of cigarettes per day F17.210    Coronary artery disease of native artery of native heart with stable angina pectoris ([address] I25.118    Ischemic cardiomyopathy I25.5    ICD (implantable cardioverter-defibrillator) in place Z95.810    Obesity (BMI 30-39. 9) E66.9    FRANTZ (obstructive sleep apnea) G47.33    Major depressive disorder with single episode, in partial remission ([address] F32.4    Chronic systolic congestive heart failure ([address] I50.22    Nephrolithiasis N20.0    Mild intermittent asthma without complication Z17.25    Vertigo R42    Prediabetes R73.03     Past Medical History:   Diagnosis Date    ADD (attention deficit disorder) 9/25/2018    Asthma     Balance problem 9/15/2019    Chest pain 11/30/2018    Congestive heart failure ([address]     Diabetes ([address]     Dyslipidemia 9/25/2018    Fatigue 11/30/2018    Glucose intolerance (impaired glucose tolerance) 8/11/2019    HTN (hypertension) 9/25/2018    Hypertension     ICD (implantable cardioverter-defibrillator) in place     Other ill-defined conditions(799.89)     ADHD,BELL'S PALSY WEAKNESS  LT FACE    Psychiatric disorder     DEPRESSION    S/P coronary artery stent placement 9/25/2018 9/25/18 PCI/MAVIS to LAD    STEMI (ST elevation myocardial infarction) ([facility] [address] 9/25/2018    Syncope 1/25/2019    Tobacco abuse 9/25/2018     Allergies   Allergen Reactions    Bee Sting [Sting, Bee] Anaphylaxis    Flomax [Tamsulosin] Other (comments)     Passed out       Current Outpatient Medications   Medication Sig Dispense Refill    isosorbide mononitrate ER (IMDUR) 30 mg tablet TAKE 1 TABLET DAILY 90 Tablet 0    aspirin delayed-release 81 mg tablet TAKE 1 TABLET DAILY 90 Tablet 0    Entresto 24-26 mg tablet Take 1 tablet by mouth twice daily 180 Tablet 0    ezetimibe (ZETIA) 10 mg tablet TAKE 1 TABLET DAILY FOR HIGH CHOLESTEROL 90 Tablet 3    carvediloL (COREG) 6.25 mg tablet Take 1 Tablet by mouth two (2) times daily (with meals). TAKE 1 TABLET BY MOUTH 2 TIMES A DAY. 180 Tablet 3    DULoxetine (CYMBALTA) 60 mg capsule TAKE 1 CAPSULE DAILY 90 Capsule 3    nitroglycerin (NITROSTAT) 0.4 mg SL tablet 1 Tablet by SubLINGual route every five (5) minutes as needed for Chest Pain. Up to 3 doses. 1 Each 1    ranolazine ER (RANEXA) 500 mg SR tablet TAKE 1 TABLET TWICE A  Tablet 3    atorvastatin (Lipitor) 80 mg tablet Take 1 Tablet by mouth daily. 90 Tablet 3    albuterol (PROAIR HFA) 90 mcg/actuation inhaler Take 2 Puffs by inhalation every four (4) hours as needed for Wheezing. 3 Inhaler 3    acetaminophen (TYLENOL) 500 mg tablet Take 2,000 mg by mouth two (2) times daily as needed for Pain. PHYSICAL EXAM  Visit Vitals  /72 (BP 1 Location: Left upper arm, BP Patient Position: Sitting)   Pulse 89   Temp 97.6 °F (36.4 °C) (Oral)   Resp 12   Ht 5' 9\" (1.753 m)   Wt 206 lb (93.4 kg)   SpO2 96%   BMI 30.42 kg/m²       General: Well-developed and well-nourished, no distress.   HEENT: Head normocephalic/atraumatic, no scleral icterus  Lungs:  Clear to ausculation bilaterally. Good air movement. Heart:  Regular rate and rhythm, normal S1 and S2, no murmur, gallop, or rub  Extremities: No clubbing, cyanosis, or edema. Neurological: Alert and oriented. Non-focal exam.  Psychiatric: Normal mood and affect. Behavior is normal.         ASSESSMENT AND PLAN    ICD-10-CM ICD-9-CM    1. Essential hypertension  R17 306.1 METABOLIC PANEL, COMPREHENSIVE      CBC WITH AUTOMATED DIFF      METABOLIC PANEL, COMPREHENSIVE      CBC WITH AUTOMATED DIFF      2. Hyperlipidemia, mixed  E78.2 272.2 LIPID PANEL      LIPID PANEL      3. Prediabetes  R73.03 790.29 HEMOGLOBIN A1C WITH EAG      HEMOGLOBIN A1C WITH EAG      4. Chronic cough  R05.3 786.2       5. Heart palpitations  R00.2 785.1       6. Vertigo  R42 780.4       7. Chronic systolic congestive heart failure (HCC)  I50.22 428.22      428.0       8. Major depressive disorder with single episode, in partial remission ([facility] [address]  F32.4 296.25       9. Coronary artery disease of native artery of native heart with stable angina pectoris ([facility] [address]  I25.118 414.01      413.9       10. Screening for colon cancer  Z12.11 V76.51 COLOGUARD TEST (FECAL DNA COLORECTAL CANCER SCREENING)      11. Screening for prostate cancer  Z12.5 V76.44 PSA SCREENING (SCREENING)      PSA SCREENING (SCREENING)        Diagnoses and all orders for this visit:    1. Essential hypertension  -     METABOLIC PANEL, COMPREHENSIVE; Future  -     CBC WITH AUTOMATED DIFF; Future  2. Hyperlipidemia, mixed  -     LIPID PANEL; Future  3. Prediabetes  -     HEMOGLOBIN A1C WITH EAG; Future  4. Chronic cough  5. Heart palpitations  6. Vertigo  7. Chronic systolic congestive heart failure ([address]  8. Major depressive disorder with single episode, in partial remission ([address]  9. Coronary artery disease of native artery of native heart with stable angina pectoris ([address]  10.  Screening for colon cancer  -     COLOGUARD TEST (FECAL DNA COLORECTAL CANCER SCREENING)  11. Screening for prostate cancer  -     PSA SCREENING (SCREENING); Future    HTN controlled but BP lower than usual today at 102/72. Chronic cough may be side effect of Entresto. Recommended he try to get earlier appointment with Dr. Paul Madera (presently scheduled in May) to discuss cough and heart palpitations. Instructed him to decrease to no more than 1 cup of caffeinated coffee daily. He declined referral for vestibular PT at this time. Follow-up and Dispositions    Return in about 6 months (around 7/23/2023), or if symptoms worsen or fail to improve, for Medicare AW; have fasting lab 1 week before appointment. I have discussed the diagnosis with the patient and the intended plan as seen in the above orders. Patient is in agreement. The patient has received an after-visit summary and questions were answered concerning future plans. I have discussed medication side effects and warnings with the patient as well.

## 2023-01-23 NOTE — PROGRESS NOTES
Katia Ortega  Identified pt with two pt identifiers(name and ). Chief Complaint   Patient presents with    Blood sugar problem    Hypertension    Cholesterol Problem       Reviewed record In preparation for visit and have obtained necessary documentation. 1. Have you been to the ER, urgent care clinic or hospitalized since your last visit? No     2. Have you seen or consulted any other health care providers outside of the [address] [place] [address] since your last visit? Include any pap smears or colon screening. No    Vitals reviewed with provider.     Health Maintenance reviewed:     Health Maintenance Due   Topic    Colorectal Cancer Screening Combo     COVID-19 Vaccine (3 - Booster for Pfizer series)    Flu Vaccine (1)          Wt Readings from Last 3 Encounters:   23 206 lb (93.4 kg)   22 209 lb (94.8 kg)   22 210 lb (95.3 kg)        Temp Readings from Last 3 Encounters:   23 97.6 °F (36.4 °C) (Oral)   22 97.7 °F (36.5 °C) (Oral)   21 97.7 °F (36.5 °C) (Oral)        BP Readings from Last 3 Encounters:   23 102/72   22 122/83   22 110/70        Pulse Readings from Last 3 Encounters:   23 89   22 80   22 94        Vitals:    23 0922   BP: 102/72   Pulse: 89   Resp: 12   Temp: 97.6 °F (36.4 °C)   TempSrc: Oral   SpO2: 96%   Weight: 206 lb (93.4 kg)   Height: 5' 9\" (1.753 m)   PainSc:   0 - No pain          Learning Assessment:   :       Learning Assessment 2019 10/5/2018   PRIMARY LEARNER Patient Patient   PRIMARY LANGUAGE ENGLISH ENGLISH   LEARNER PREFERENCE PRIMARY READING DEMONSTRATION   ANSWERED BY patient patient   RELATIONSHIP SELF SELF        Depression Screening:   :       3 most recent PHQ Screens 2023   PHQ Not Done -   Little interest or pleasure in doing things Not at all   Feeling down, depressed, irritable, or hopeless Not at all   Total Score PHQ 2 0   Trouble falling or staying asleep, or sleeping too much -   Feeling tired or having little energy -   Poor appetite, weight loss, or overeating -   Feeling bad about yourself - or that you are a failure or have let yourself or your family down -   Trouble concentrating on things such as school, work, reading, or watching TV -   Moving or speaking so slowly that other people could have noticed; or the opposite being so fidgety that others notice -   Thoughts of being better off dead, or hurting yourself in some way -   PHQ 9 Score -   How difficult have these problems made it for you to do your work, take care of your home and get along with others -        Fall Risk Assessment:   :       Fall Risk Assessment, last 12 mths 7/28/2022   Able to walk? Yes   Fall in past 12 months? 0   Do you feel unsteady? 0   Are you worried about falling 0        Abuse Screening:   :       Abuse Screening Questionnaire 3/22/2019 1/9/2019   Do you ever feel afraid of your partner? N N   Are you in a relationship with someone who physically or mentally threatens you? N N   Is it safe for you to go home?  Y Y        ADL Screening:   :       ADL Assessment 1/23/2023   Feeding yourself No Help Needed   Getting from bed to chair No Help Needed   Getting dressed No Help Needed   Bathing or showering No Help Needed   Walk across the room (includes cane/walker) No Help Needed   Using the telphone No Help Needed   Taking your medications No Help Needed   Preparing meals No Help Needed   Managing money (expenses/bills) No Help Needed   Moderately strenuous housework (laundry) No Help Needed   Shopping for personal items (toiletries/medicines) No Help Needed   Shopping for groceries No Help Needed   Driving No Help Needed   Climbing a flight of stairs No Help Needed   Getting to places beyond walking distances No Help Needed

## 2023-04-04 RX ORDER — SACUBITRIL AND VALSARTAN 24; 26 MG/1; MG/1
TABLET, FILM COATED ORAL
Qty: 180 TABLET | Refills: 0 | Status: SHIPPED
Start: 2023-04-04

## 2023-04-04 NOTE — TELEPHONE ENCOUNTER
PCP: Ady Wang MD    Last appt: 5/16/2022  Future Appointments   Date Time Provider Peewee Varela   5/16/2023  9:20 AM MD SANAM Potts BS AMB   7/24/2023  8:30 AM Ady Wang MD [facility] AMB       Requested Prescriptions     Signed Prescriptions Disp Refills    Entresto 24-26 mg tablet 180 Tablet 0     Sig: Take 1 tablet by mouth twice daily     Authorizing Provider: Veronika Camacho     Ordering User: Jimbo DE LUNA         Other Comments:  Verbal order per provider. Order (medication, dose, route, frequency, amount, refills) repeated and verified twice.

## 2023-04-14 ENCOUNTER — APPOINTMENT (OUTPATIENT)
Dept: CT IMAGING | Age: 58
DRG: 329 | End: 2023-04-14
Attending: PHYSICIAN ASSISTANT
Payer: COMMERCIAL

## 2023-04-14 ENCOUNTER — HOSPITAL ENCOUNTER (INPATIENT)
Age: 58
LOS: 20 days | Discharge: HOME HEALTH | DRG: 329 | End: 2023-05-04
Attending: EMERGENCY MEDICINE | Admitting: SURGERY
Payer: COMMERCIAL

## 2023-04-14 DIAGNOSIS — K57.92 ACUTE DIVERTICULITIS: Primary | ICD-10-CM

## 2023-04-14 DIAGNOSIS — E78.2 HYPERLIPIDEMIA, MIXED: ICD-10-CM

## 2023-04-14 DIAGNOSIS — K57.20 DIVERTICULITIS OF COLON WITH PERFORATION: ICD-10-CM

## 2023-04-14 DIAGNOSIS — K66.8 PNEUMOPERITONEUM: ICD-10-CM

## 2023-04-14 LAB
ALBUMIN SERPL-MCNC: 3.5 G/DL (ref 3.5–5)
ALBUMIN/GLOB SERPL: 1 (ref 1.1–2.2)
ALP SERPL-CCNC: 111 U/L (ref 45–117)
ALT SERPL-CCNC: 16 U/L (ref 12–78)
ANION GAP SERPL CALC-SCNC: 1 MMOL/L (ref 5–15)
APPEARANCE UR: CLEAR
AST SERPL-CCNC: 12 U/L (ref 15–37)
BACTERIA URNS QL MICRO: NEGATIVE /HPF
BASOPHILS # BLD: 0 K/UL (ref 0–0.1)
BASOPHILS NFR BLD: 0 % (ref 0–1)
BILIRUB SERPL-MCNC: 0.8 MG/DL (ref 0.2–1)
BILIRUB UR QL: NEGATIVE
BUN SERPL-MCNC: 12 MG/DL (ref 6–20)
BUN/CREAT SERPL: 12 (ref 12–20)
CALCIUM SERPL-MCNC: 9.1 MG/DL (ref 8.5–10.1)
CHLORIDE SERPL-SCNC: 103 MMOL/L (ref 97–108)
CO2 SERPL-SCNC: 29 MMOL/L (ref 21–32)
COLOR UR: ABNORMAL
CREAT SERPL-MCNC: 1 MG/DL (ref 0.7–1.3)
DIFFERENTIAL METHOD BLD: ABNORMAL
EOSINOPHIL # BLD: 0.1 K/UL (ref 0–0.4)
EOSINOPHIL NFR BLD: 1 % (ref 0–7)
EPITH CASTS URNS QL MICRO: ABNORMAL /LPF
ERYTHROCYTE [DISTWIDTH] IN BLOOD BY AUTOMATED COUNT: 14.7 % (ref 11.5–14.5)
GLOBULIN SER CALC-MCNC: 3.5 G/DL (ref 2–4)
GLUCOSE SERPL-MCNC: 98 MG/DL (ref 65–100)
GLUCOSE UR STRIP.AUTO-MCNC: NEGATIVE MG/DL
HCT VFR BLD AUTO: 43.6 % (ref 36.6–50.3)
HGB BLD-MCNC: 14 G/DL (ref 12.1–17)
HGB UR QL STRIP: NEGATIVE
IMM GRANULOCYTES # BLD AUTO: 0.1 K/UL (ref 0–0.04)
IMM GRANULOCYTES NFR BLD AUTO: 0 % (ref 0–0.5)
KETONES UR QL STRIP.AUTO: ABNORMAL MG/DL
LEUKOCYTE ESTERASE UR QL STRIP.AUTO: ABNORMAL
LIPASE SERPL-CCNC: 97 U/L (ref 73–393)
LYMPHOCYTES # BLD: 1 K/UL (ref 0.8–3.5)
LYMPHOCYTES NFR BLD: 7 % (ref 12–49)
MCH RBC QN AUTO: 29 PG (ref 26–34)
MCHC RBC AUTO-ENTMCNC: 32.1 G/DL (ref 30–36.5)
MCV RBC AUTO: 90.5 FL (ref 80–99)
MONOCYTES # BLD: 0.9 K/UL (ref 0–1)
MONOCYTES NFR BLD: 6 % (ref 5–13)
NEUTS SEG # BLD: 12.4 K/UL (ref 1.8–8)
NEUTS SEG NFR BLD: 86 % (ref 32–75)
NITRITE UR QL STRIP.AUTO: NEGATIVE
NRBC # BLD: 0 K/UL (ref 0–0.01)
NRBC BLD-RTO: 0 PER 100 WBC
PH UR STRIP: 6 (ref 5–8)
PLATELET # BLD AUTO: 225 K/UL (ref 150–400)
PMV BLD AUTO: 10.2 FL (ref 8.9–12.9)
POTASSIUM SERPL-SCNC: 4.3 MMOL/L (ref 3.5–5.1)
PROT SERPL-MCNC: 7 G/DL (ref 6.4–8.2)
PROT UR STRIP-MCNC: NEGATIVE MG/DL
RBC # BLD AUTO: 4.82 M/UL (ref 4.1–5.7)
RBC #/AREA URNS HPF: ABNORMAL /HPF (ref 0–5)
SODIUM SERPL-SCNC: 133 MMOL/L (ref 136–145)
SP GR UR REFRACTOMETRY: 1.01 (ref 1–1.03)
TROPONIN I SERPL HS-MCNC: 7 NG/L (ref 0–76)
UR CULT HOLD, URHOLD: NORMAL
UROBILINOGEN UR QL STRIP.AUTO: 1 EU/DL (ref 0.2–1)
WBC # BLD AUTO: 14.4 K/UL (ref 4.1–11.1)
WBC URNS QL MICRO: ABNORMAL /HPF (ref 0–4)

## 2023-04-14 PROCEDURE — 36415 COLL VENOUS BLD VENIPUNCTURE: CPT

## 2023-04-14 PROCEDURE — 74011000636 HC RX REV CODE- 636: Performed by: EMERGENCY MEDICINE

## 2023-04-14 PROCEDURE — 96365 THER/PROPH/DIAG IV INF INIT: CPT

## 2023-04-14 PROCEDURE — 85025 COMPLETE CBC W/AUTO DIFF WBC: CPT

## 2023-04-14 PROCEDURE — 74177 CT ABD & PELVIS W/CONTRAST: CPT

## 2023-04-14 PROCEDURE — 80053 COMPREHEN METABOLIC PANEL: CPT

## 2023-04-14 PROCEDURE — 81001 URINALYSIS AUTO W/SCOPE: CPT

## 2023-04-14 PROCEDURE — 96375 TX/PRO/DX INJ NEW DRUG ADDON: CPT

## 2023-04-14 PROCEDURE — 84484 ASSAY OF TROPONIN QUANT: CPT

## 2023-04-14 PROCEDURE — 74011000258 HC RX REV CODE- 258: Performed by: EMERGENCY MEDICINE

## 2023-04-14 PROCEDURE — 3E0336Z INTRODUCTION OF NUTRITIONAL SUBSTANCE INTO PERIPHERAL VEIN, PERCUTANEOUS APPROACH: ICD-10-PCS | Performed by: SURGERY

## 2023-04-14 PROCEDURE — 83690 ASSAY OF LIPASE: CPT

## 2023-04-14 PROCEDURE — 96376 TX/PRO/DX INJ SAME DRUG ADON: CPT

## 2023-04-14 PROCEDURE — 65270000046 HC RM TELEMETRY

## 2023-04-14 PROCEDURE — 74011000250 HC RX REV CODE- 250: Performed by: SURGERY

## 2023-04-14 PROCEDURE — 93005 ELECTROCARDIOGRAM TRACING: CPT

## 2023-04-14 PROCEDURE — 74011250636 HC RX REV CODE- 250/636: Performed by: EMERGENCY MEDICINE

## 2023-04-14 PROCEDURE — 99285 EMERGENCY DEPT VISIT HI MDM: CPT

## 2023-04-14 PROCEDURE — 74011250636 HC RX REV CODE- 250/636: Performed by: SURGERY

## 2023-04-14 PROCEDURE — 74011250637 HC RX REV CODE- 250/637: Performed by: SURGERY

## 2023-04-14 PROCEDURE — 74011250636 HC RX REV CODE- 250/636: Performed by: PHYSICIAN ASSISTANT

## 2023-04-14 RX ORDER — EZETIMIBE 10 MG/1
10 TABLET ORAL
Status: DISCONTINUED | OUTPATIENT
Start: 2023-04-15 | End: 2023-05-04 | Stop reason: HOSPADM

## 2023-04-14 RX ORDER — ACETAMINOPHEN 325 MG/1
650 TABLET ORAL
Status: DISCONTINUED | OUTPATIENT
Start: 2023-04-14 | End: 2023-05-04 | Stop reason: HOSPADM

## 2023-04-14 RX ORDER — ALBUTEROL SULFATE 90 UG/1
2 AEROSOL, METERED RESPIRATORY (INHALATION)
Status: DISCONTINUED | OUTPATIENT
Start: 2023-04-14 | End: 2023-05-04 | Stop reason: HOSPADM

## 2023-04-14 RX ORDER — ISOSORBIDE MONONITRATE 30 MG/1
30 TABLET, EXTENDED RELEASE ORAL DAILY
Status: DISCONTINUED | OUTPATIENT
Start: 2023-04-15 | End: 2023-05-04 | Stop reason: HOSPADM

## 2023-04-14 RX ORDER — RANOLAZINE 500 MG/1
500 TABLET, EXTENDED RELEASE ORAL EVERY 12 HOURS
Status: COMPLETED | OUTPATIENT
Start: 2023-04-14 | End: 2023-04-16

## 2023-04-14 RX ORDER — MORPHINE SULFATE 2 MG/ML
4 INJECTION, SOLUTION INTRAMUSCULAR; INTRAVENOUS
Status: DISCONTINUED | OUTPATIENT
Start: 2023-04-14 | End: 2023-04-26

## 2023-04-14 RX ORDER — MORPHINE SULFATE 2 MG/ML
4 INJECTION, SOLUTION INTRAMUSCULAR; INTRAVENOUS
Status: COMPLETED | OUTPATIENT
Start: 2023-04-14 | End: 2023-04-14

## 2023-04-14 RX ORDER — HYDROCODONE BITARTRATE AND ACETAMINOPHEN 5; 325 MG/1; MG/1
1 TABLET ORAL
Status: DISCONTINUED | OUTPATIENT
Start: 2023-04-14 | End: 2023-05-01

## 2023-04-14 RX ORDER — ONDANSETRON 2 MG/ML
4 INJECTION INTRAMUSCULAR; INTRAVENOUS
Status: COMPLETED | OUTPATIENT
Start: 2023-04-14 | End: 2023-04-14

## 2023-04-14 RX ORDER — ATORVASTATIN CALCIUM 40 MG/1
80 TABLET, FILM COATED ORAL DAILY
Status: DISCONTINUED | OUTPATIENT
Start: 2023-04-15 | End: 2023-05-02

## 2023-04-14 RX ORDER — ONDANSETRON 2 MG/ML
4 INJECTION INTRAMUSCULAR; INTRAVENOUS
Status: DISCONTINUED | OUTPATIENT
Start: 2023-04-14 | End: 2023-05-04 | Stop reason: HOSPADM

## 2023-04-14 RX ORDER — SODIUM CHLORIDE 9 MG/ML
75 INJECTION, SOLUTION INTRAVENOUS CONTINUOUS
Status: DISCONTINUED | OUTPATIENT
Start: 2023-04-14 | End: 2023-05-01

## 2023-04-14 RX ORDER — ADHESIVE BANDAGE
30 BANDAGE TOPICAL DAILY PRN
Status: DISCONTINUED | OUTPATIENT
Start: 2023-04-14 | End: 2023-05-04 | Stop reason: HOSPADM

## 2023-04-14 RX ORDER — DULOXETIN HYDROCHLORIDE 30 MG/1
60 CAPSULE, DELAYED RELEASE ORAL DAILY
Status: DISCONTINUED | OUTPATIENT
Start: 2023-04-15 | End: 2023-05-04 | Stop reason: HOSPADM

## 2023-04-14 RX ORDER — ENOXAPARIN SODIUM 100 MG/ML
40 INJECTION SUBCUTANEOUS EVERY 24 HOURS
Status: DISCONTINUED | OUTPATIENT
Start: 2023-04-14 | End: 2023-05-04 | Stop reason: HOSPADM

## 2023-04-14 RX ORDER — SODIUM CHLORIDE 0.9 % (FLUSH) 0.9 %
5-40 SYRINGE (ML) INJECTION EVERY 8 HOURS
Status: DISCONTINUED | OUTPATIENT
Start: 2023-04-14 | End: 2023-04-26 | Stop reason: SDUPTHER

## 2023-04-14 RX ORDER — SODIUM CHLORIDE 0.9 % (FLUSH) 0.9 %
5-40 SYRINGE (ML) INJECTION AS NEEDED
Status: DISCONTINUED | OUTPATIENT
Start: 2023-04-14 | End: 2023-04-26 | Stop reason: SDUPTHER

## 2023-04-14 RX ORDER — DIPHENHYDRAMINE HYDROCHLORIDE 50 MG/ML
12.5 INJECTION, SOLUTION INTRAMUSCULAR; INTRAVENOUS
Status: DISCONTINUED | OUTPATIENT
Start: 2023-04-14 | End: 2023-05-04 | Stop reason: HOSPADM

## 2023-04-14 RX ORDER — LANOLIN ALCOHOL/MO/W.PET/CERES
3 CREAM (GRAM) TOPICAL
Status: DISCONTINUED | OUTPATIENT
Start: 2023-04-14 | End: 2023-05-04 | Stop reason: HOSPADM

## 2023-04-14 RX ORDER — CARVEDILOL 3.12 MG/1
6.25 TABLET ORAL 2 TIMES DAILY WITH MEALS
Status: DISCONTINUED | OUTPATIENT
Start: 2023-04-15 | End: 2023-05-04 | Stop reason: HOSPADM

## 2023-04-14 RX ADMIN — IOPAMIDOL 100 ML: 755 INJECTION, SOLUTION INTRAVENOUS at 16:57

## 2023-04-14 RX ADMIN — ENOXAPARIN SODIUM 40 MG: 100 INJECTION SUBCUTANEOUS at 20:39

## 2023-04-14 RX ADMIN — PIPERACILLIN AND TAZOBACTAM 3.38 G: 3; .375 INJECTION, POWDER, LYOPHILIZED, FOR SOLUTION INTRAVENOUS at 18:36

## 2023-04-14 RX ADMIN — SODIUM CHLORIDE 500 ML: 9 INJECTION, SOLUTION INTRAVENOUS at 16:12

## 2023-04-14 RX ADMIN — RANOLAZINE 500 MG: 500 TABLET, FILM COATED, EXTENDED RELEASE ORAL at 20:43

## 2023-04-14 RX ADMIN — MORPHINE SULFATE 4 MG: 2 INJECTION, SOLUTION INTRAMUSCULAR; INTRAVENOUS at 16:06

## 2023-04-14 RX ADMIN — SODIUM CHLORIDE, PRESERVATIVE FREE 5 ML: 5 INJECTION INTRAVENOUS at 23:06

## 2023-04-14 RX ADMIN — ONDANSETRON 4 MG: 2 INJECTION INTRAMUSCULAR; INTRAVENOUS at 16:07

## 2023-04-14 RX ADMIN — SODIUM CHLORIDE 125 ML/HR: 9 INJECTION, SOLUTION INTRAVENOUS at 20:38

## 2023-04-14 RX ADMIN — MORPHINE SULFATE 4 MG: 2 INJECTION, SOLUTION INTRAMUSCULAR; INTRAVENOUS at 18:03

## 2023-04-14 RX ADMIN — SODIUM CHLORIDE 1000 ML: 9 INJECTION, SOLUTION INTRAVENOUS at 19:46

## 2023-04-15 ENCOUNTER — APPOINTMENT (OUTPATIENT)
Dept: GENERAL RADIOLOGY | Age: 58
DRG: 329 | End: 2023-04-15
Attending: SURGERY
Payer: COMMERCIAL

## 2023-04-15 LAB
ANION GAP SERPL CALC-SCNC: 5 MMOL/L (ref 5–15)
ATRIAL RATE: 83 BPM
ATRIAL RATE: 86 BPM
BASOPHILS # BLD: 0.1 K/UL (ref 0–0.1)
BASOPHILS NFR BLD: 0 % (ref 0–1)
BUN SERPL-MCNC: 10 MG/DL (ref 6–20)
BUN/CREAT SERPL: 11 (ref 12–20)
CALCIUM SERPL-MCNC: 8.6 MG/DL (ref 8.5–10.1)
CALCULATED P AXIS, ECG09: 54 DEGREES
CALCULATED P AXIS, ECG09: 59 DEGREES
CALCULATED R AXIS, ECG10: 16 DEGREES
CALCULATED R AXIS, ECG10: 27 DEGREES
CALCULATED T AXIS, ECG11: 88 DEGREES
CALCULATED T AXIS, ECG11: 89 DEGREES
CHLORIDE SERPL-SCNC: 106 MMOL/L (ref 97–108)
CO2 SERPL-SCNC: 23 MMOL/L (ref 21–32)
CREAT SERPL-MCNC: 0.88 MG/DL (ref 0.7–1.3)
DIAGNOSIS, 93000: NORMAL
DIAGNOSIS, 93000: NORMAL
DIFFERENTIAL METHOD BLD: ABNORMAL
EOSINOPHIL # BLD: 0 K/UL (ref 0–0.4)
EOSINOPHIL NFR BLD: 0 % (ref 0–7)
ERYTHROCYTE [DISTWIDTH] IN BLOOD BY AUTOMATED COUNT: 14.6 % (ref 11.5–14.5)
ERYTHROCYTE [DISTWIDTH] IN BLOOD BY AUTOMATED COUNT: 14.9 % (ref 11.5–14.5)
GLUCOSE SERPL-MCNC: 94 MG/DL (ref 65–100)
HCT VFR BLD AUTO: 39.3 % (ref 36.6–50.3)
HCT VFR BLD AUTO: 41.3 % (ref 36.6–50.3)
HGB BLD-MCNC: 12.6 G/DL (ref 12.1–17)
HGB BLD-MCNC: 13.3 G/DL (ref 12.1–17)
IMM GRANULOCYTES # BLD AUTO: 0.1 K/UL (ref 0–0.04)
IMM GRANULOCYTES NFR BLD AUTO: 1 % (ref 0–0.5)
LYMPHOCYTES # BLD: 1.3 K/UL (ref 0.8–3.5)
LYMPHOCYTES NFR BLD: 11 % (ref 12–49)
MCH RBC QN AUTO: 28.9 PG (ref 26–34)
MCH RBC QN AUTO: 29.2 PG (ref 26–34)
MCHC RBC AUTO-ENTMCNC: 32.1 G/DL (ref 30–36.5)
MCHC RBC AUTO-ENTMCNC: 32.2 G/DL (ref 30–36.5)
MCV RBC AUTO: 89.8 FL (ref 80–99)
MCV RBC AUTO: 91.2 FL (ref 80–99)
MONOCYTES # BLD: 0.6 K/UL (ref 0–1)
MONOCYTES NFR BLD: 5 % (ref 5–13)
NEUTS SEG # BLD: 10.4 K/UL (ref 1.8–8)
NEUTS SEG NFR BLD: 83 % (ref 32–75)
NRBC # BLD: 0 K/UL (ref 0–0.01)
NRBC # BLD: 0 K/UL (ref 0–0.01)
NRBC BLD-RTO: 0 PER 100 WBC
NRBC BLD-RTO: 0 PER 100 WBC
P-R INTERVAL, ECG05: 138 MS
P-R INTERVAL, ECG05: 148 MS
PLATELET # BLD AUTO: 200 K/UL (ref 150–400)
PLATELET # BLD AUTO: 211 K/UL (ref 150–400)
PMV BLD AUTO: 10.1 FL (ref 8.9–12.9)
PMV BLD AUTO: 10.1 FL (ref 8.9–12.9)
POTASSIUM SERPL-SCNC: 3.9 MMOL/L (ref 3.5–5.1)
Q-T INTERVAL, ECG07: 346 MS
Q-T INTERVAL, ECG07: 350 MS
QRS DURATION, ECG06: 72 MS
QRS DURATION, ECG06: 80 MS
QTC CALCULATION (BEZET), ECG08: 411 MS
QTC CALCULATION (BEZET), ECG08: 414 MS
RBC # BLD AUTO: 4.31 M/UL (ref 4.1–5.7)
RBC # BLD AUTO: 4.6 M/UL (ref 4.1–5.7)
SODIUM SERPL-SCNC: 134 MMOL/L (ref 136–145)
VENTRICULAR RATE, ECG03: 83 BPM
VENTRICULAR RATE, ECG03: 86 BPM
WBC # BLD AUTO: 12.4 K/UL (ref 4.1–11.1)
WBC # BLD AUTO: 16.5 K/UL (ref 4.1–11.1)

## 2023-04-15 PROCEDURE — 77010033678 HC OXYGEN DAILY

## 2023-04-15 PROCEDURE — 71045 X-RAY EXAM CHEST 1 VIEW: CPT

## 2023-04-15 PROCEDURE — 74011250637 HC RX REV CODE- 250/637: Performed by: SURGERY

## 2023-04-15 PROCEDURE — 74011250636 HC RX REV CODE- 250/636: Performed by: SURGERY

## 2023-04-15 PROCEDURE — 94760 N-INVAS EAR/PLS OXIMETRY 1: CPT

## 2023-04-15 PROCEDURE — 74011000258 HC RX REV CODE- 258: Performed by: SURGERY

## 2023-04-15 PROCEDURE — 65270000046 HC RM TELEMETRY

## 2023-04-15 PROCEDURE — 74011000250 HC RX REV CODE- 250: Performed by: SURGERY

## 2023-04-15 PROCEDURE — 85027 COMPLETE CBC AUTOMATED: CPT

## 2023-04-15 PROCEDURE — 74011250637 HC RX REV CODE- 250/637: Performed by: STUDENT IN AN ORGANIZED HEALTH CARE EDUCATION/TRAINING PROGRAM

## 2023-04-15 PROCEDURE — 80048 BASIC METABOLIC PNL TOTAL CA: CPT

## 2023-04-15 PROCEDURE — 36415 COLL VENOUS BLD VENIPUNCTURE: CPT

## 2023-04-15 PROCEDURE — 85025 COMPLETE CBC W/AUTO DIFF WBC: CPT

## 2023-04-15 PROCEDURE — 99232 SBSQ HOSP IP/OBS MODERATE 35: CPT | Performed by: SURGERY

## 2023-04-15 RX ADMIN — MORPHINE SULFATE 4 MG: 2 INJECTION, SOLUTION INTRAMUSCULAR; INTRAVENOUS at 07:47

## 2023-04-15 RX ADMIN — EZETIMIBE 10 MG: 10 TABLET ORAL at 00:50

## 2023-04-15 RX ADMIN — EZETIMIBE 10 MG: 10 TABLET ORAL at 22:37

## 2023-04-15 RX ADMIN — SODIUM CHLORIDE, PRESERVATIVE FREE 10 ML: 5 INJECTION INTRAVENOUS at 08:07

## 2023-04-15 RX ADMIN — HYDROCODONE BITARTRATE AND ACETAMINOPHEN 1 TABLET: 5; 325 TABLET ORAL at 13:17

## 2023-04-15 RX ADMIN — PIPERACILLIN AND TAZOBACTAM 3.38 G: 3; .375 INJECTION, POWDER, LYOPHILIZED, FOR SOLUTION INTRAVENOUS at 16:31

## 2023-04-15 RX ADMIN — ISOSORBIDE MONONITRATE 30 MG: 30 TABLET, EXTENDED RELEASE ORAL at 08:04

## 2023-04-15 RX ADMIN — SODIUM CHLORIDE, PRESERVATIVE FREE 10 ML: 5 INJECTION INTRAVENOUS at 16:31

## 2023-04-15 RX ADMIN — DULOXETINE 60 MG: 30 CAPSULE, DELAYED RELEASE ORAL at 08:04

## 2023-04-15 RX ADMIN — PIPERACILLIN AND TAZOBACTAM 3.38 G: 3; .375 INJECTION, POWDER, LYOPHILIZED, FOR SOLUTION INTRAVENOUS at 08:07

## 2023-04-15 RX ADMIN — HYDROCODONE BITARTRATE AND ACETAMINOPHEN 1 TABLET: 5; 325 TABLET ORAL at 00:50

## 2023-04-15 RX ADMIN — CARVEDILOL 6.25 MG: 3.12 TABLET, FILM COATED ORAL at 16:31

## 2023-04-15 RX ADMIN — ONDANSETRON 4 MG: 2 INJECTION INTRAMUSCULAR; INTRAVENOUS at 20:07

## 2023-04-15 RX ADMIN — ONDANSETRON 4 MG: 2 INJECTION INTRAMUSCULAR; INTRAVENOUS at 13:23

## 2023-04-15 RX ADMIN — PIPERACILLIN AND TAZOBACTAM 3.38 G: 3; .375 INJECTION, POWDER, LYOPHILIZED, FOR SOLUTION INTRAVENOUS at 00:44

## 2023-04-15 RX ADMIN — RANOLAZINE 500 MG: 500 TABLET, FILM COATED, EXTENDED RELEASE ORAL at 20:11

## 2023-04-15 RX ADMIN — CARVEDILOL 6.25 MG: 3.12 TABLET, FILM COATED ORAL at 08:05

## 2023-04-15 RX ADMIN — ATORVASTATIN CALCIUM 80 MG: 40 TABLET, FILM COATED ORAL at 08:04

## 2023-04-15 RX ADMIN — SODIUM CHLORIDE, PRESERVATIVE FREE 10 ML: 5 INJECTION INTRAVENOUS at 20:14

## 2023-04-15 RX ADMIN — ENOXAPARIN SODIUM 40 MG: 100 INJECTION SUBCUTANEOUS at 20:11

## 2023-04-15 RX ADMIN — RANOLAZINE 500 MG: 500 TABLET, FILM COATED, EXTENDED RELEASE ORAL at 08:05

## 2023-04-16 LAB
ANION GAP SERPL CALC-SCNC: 6 MMOL/L (ref 5–15)
BASOPHILS # BLD: 0 K/UL (ref 0–0.1)
BASOPHILS NFR BLD: 0 % (ref 0–1)
BUN SERPL-MCNC: 18 MG/DL (ref 6–20)
BUN/CREAT SERPL: 21 (ref 12–20)
CALCIUM SERPL-MCNC: 8.8 MG/DL (ref 8.5–10.1)
CHLORIDE SERPL-SCNC: 105 MMOL/L (ref 97–108)
CO2 SERPL-SCNC: 25 MMOL/L (ref 21–32)
CREAT SERPL-MCNC: 0.86 MG/DL (ref 0.7–1.3)
DIFFERENTIAL METHOD BLD: ABNORMAL
EOSINOPHIL # BLD: 0 K/UL (ref 0–0.4)
EOSINOPHIL NFR BLD: 0 % (ref 0–7)
ERYTHROCYTE [DISTWIDTH] IN BLOOD BY AUTOMATED COUNT: 14.8 % (ref 11.5–14.5)
GLUCOSE SERPL-MCNC: 109 MG/DL (ref 65–100)
HCT VFR BLD AUTO: 41.7 % (ref 36.6–50.3)
HGB BLD-MCNC: 13.5 G/DL (ref 12.1–17)
IMM GRANULOCYTES # BLD AUTO: 0.1 K/UL (ref 0–0.04)
IMM GRANULOCYTES NFR BLD AUTO: 1 % (ref 0–0.5)
LYMPHOCYTES # BLD: 0.9 K/UL (ref 0.8–3.5)
LYMPHOCYTES NFR BLD: 5 % (ref 12–49)
MCH RBC QN AUTO: 28.8 PG (ref 26–34)
MCHC RBC AUTO-ENTMCNC: 32.4 G/DL (ref 30–36.5)
MCV RBC AUTO: 89.1 FL (ref 80–99)
MONOCYTES # BLD: 0.8 K/UL (ref 0–1)
MONOCYTES NFR BLD: 4 % (ref 5–13)
NEUTS SEG # BLD: 15.6 K/UL (ref 1.8–8)
NEUTS SEG NFR BLD: 90 % (ref 32–75)
NRBC # BLD: 0 K/UL (ref 0–0.01)
NRBC BLD-RTO: 0 PER 100 WBC
PLATELET # BLD AUTO: 211 K/UL (ref 150–400)
PMV BLD AUTO: 9.8 FL (ref 8.9–12.9)
POTASSIUM SERPL-SCNC: 4.1 MMOL/L (ref 3.5–5.1)
RBC # BLD AUTO: 4.68 M/UL (ref 4.1–5.7)
SODIUM SERPL-SCNC: 136 MMOL/L (ref 136–145)
WBC # BLD AUTO: 17.4 K/UL (ref 4.1–11.1)

## 2023-04-16 PROCEDURE — 80048 BASIC METABOLIC PNL TOTAL CA: CPT

## 2023-04-16 PROCEDURE — 65270000046 HC RM TELEMETRY

## 2023-04-16 PROCEDURE — 74011000250 HC RX REV CODE- 250: Performed by: SURGERY

## 2023-04-16 PROCEDURE — 74011250637 HC RX REV CODE- 250/637: Performed by: SURGERY

## 2023-04-16 PROCEDURE — 74011250636 HC RX REV CODE- 250/636: Performed by: SURGERY

## 2023-04-16 PROCEDURE — 74011250637 HC RX REV CODE- 250/637: Performed by: STUDENT IN AN ORGANIZED HEALTH CARE EDUCATION/TRAINING PROGRAM

## 2023-04-16 PROCEDURE — 74011000258 HC RX REV CODE- 258: Performed by: SURGERY

## 2023-04-16 PROCEDURE — 77010033678 HC OXYGEN DAILY

## 2023-04-16 PROCEDURE — 99232 SBSQ HOSP IP/OBS MODERATE 35: CPT | Performed by: SURGERY

## 2023-04-16 PROCEDURE — 36415 COLL VENOUS BLD VENIPUNCTURE: CPT

## 2023-04-16 PROCEDURE — 85025 COMPLETE CBC W/AUTO DIFF WBC: CPT

## 2023-04-16 PROCEDURE — 94760 N-INVAS EAR/PLS OXIMETRY 1: CPT

## 2023-04-16 PROCEDURE — 87040 BLOOD CULTURE FOR BACTERIA: CPT

## 2023-04-16 RX ADMIN — ISOSORBIDE MONONITRATE 30 MG: 30 TABLET, EXTENDED RELEASE ORAL at 09:32

## 2023-04-16 RX ADMIN — ONDANSETRON 4 MG: 2 INJECTION INTRAMUSCULAR; INTRAVENOUS at 03:23

## 2023-04-16 RX ADMIN — PIPERACILLIN AND TAZOBACTAM 3.38 G: 3; .375 INJECTION, POWDER, LYOPHILIZED, FOR SOLUTION INTRAVENOUS at 09:32

## 2023-04-16 RX ADMIN — HYDROCODONE BITARTRATE AND ACETAMINOPHEN 1 TABLET: 5; 325 TABLET ORAL at 12:23

## 2023-04-16 RX ADMIN — DULOXETINE 60 MG: 30 CAPSULE, DELAYED RELEASE ORAL at 09:32

## 2023-04-16 RX ADMIN — SODIUM CHLORIDE, PRESERVATIVE FREE 10 ML: 5 INJECTION INTRAVENOUS at 05:24

## 2023-04-16 RX ADMIN — MAGNESIUM HYDROXIDE 30 ML: 400 SUSPENSION ORAL at 20:27

## 2023-04-16 RX ADMIN — PIPERACILLIN AND TAZOBACTAM 3.38 G: 3; .375 INJECTION, POWDER, LYOPHILIZED, FOR SOLUTION INTRAVENOUS at 16:50

## 2023-04-16 RX ADMIN — ONDANSETRON 4 MG: 2 INJECTION INTRAMUSCULAR; INTRAVENOUS at 21:22

## 2023-04-16 RX ADMIN — SODIUM CHLORIDE, PRESERVATIVE FREE 10 ML: 5 INJECTION INTRAVENOUS at 16:57

## 2023-04-16 RX ADMIN — HYDROCODONE BITARTRATE AND ACETAMINOPHEN 1 TABLET: 5; 325 TABLET ORAL at 23:36

## 2023-04-16 RX ADMIN — CARVEDILOL 6.25 MG: 3.12 TABLET, FILM COATED ORAL at 16:47

## 2023-04-16 RX ADMIN — PIPERACILLIN AND TAZOBACTAM 3.38 G: 3; .375 INJECTION, POWDER, LYOPHILIZED, FOR SOLUTION INTRAVENOUS at 01:09

## 2023-04-16 RX ADMIN — ENOXAPARIN SODIUM 40 MG: 100 INJECTION SUBCUTANEOUS at 20:20

## 2023-04-16 RX ADMIN — EZETIMIBE 10 MG: 10 TABLET ORAL at 20:20

## 2023-04-16 RX ADMIN — SODIUM CHLORIDE, PRESERVATIVE FREE 10 ML: 5 INJECTION INTRAVENOUS at 20:21

## 2023-04-16 RX ADMIN — RANOLAZINE 500 MG: 500 TABLET, FILM COATED, EXTENDED RELEASE ORAL at 09:32

## 2023-04-16 RX ADMIN — HYDROCODONE BITARTRATE AND ACETAMINOPHEN 1 TABLET: 5; 325 TABLET ORAL at 05:20

## 2023-04-16 RX ADMIN — CARVEDILOL 6.25 MG: 3.12 TABLET, FILM COATED ORAL at 09:32

## 2023-04-16 RX ADMIN — ATORVASTATIN CALCIUM 80 MG: 40 TABLET, FILM COATED ORAL at 09:32

## 2023-04-17 ENCOUNTER — APPOINTMENT (OUTPATIENT)
Dept: CT IMAGING | Age: 58
DRG: 329 | End: 2023-04-17
Attending: SURGERY
Payer: COMMERCIAL

## 2023-04-17 ENCOUNTER — APPOINTMENT (OUTPATIENT)
Dept: GENERAL RADIOLOGY | Age: 58
DRG: 329 | End: 2023-04-17
Attending: NURSE PRACTITIONER
Payer: COMMERCIAL

## 2023-04-17 ENCOUNTER — APPOINTMENT (OUTPATIENT)
Dept: GENERAL RADIOLOGY | Age: 58
DRG: 329 | End: 2023-04-17
Attending: SURGERY
Payer: COMMERCIAL

## 2023-04-17 LAB
ANION GAP SERPL CALC-SCNC: 5 MMOL/L (ref 5–15)
BASOPHILS # BLD: 0 K/UL (ref 0–0.1)
BASOPHILS NFR BLD: 0 % (ref 0–1)
BUN SERPL-MCNC: 25 MG/DL (ref 6–20)
BUN/CREAT SERPL: 32 (ref 12–20)
CALCIUM SERPL-MCNC: 8.9 MG/DL (ref 8.5–10.1)
CHLORIDE SERPL-SCNC: 104 MMOL/L (ref 97–108)
CO2 SERPL-SCNC: 26 MMOL/L (ref 21–32)
CREAT SERPL-MCNC: 0.78 MG/DL (ref 0.7–1.3)
DIFFERENTIAL METHOD BLD: ABNORMAL
EOSINOPHIL # BLD: 0 K/UL (ref 0–0.4)
EOSINOPHIL NFR BLD: 0 % (ref 0–7)
ERYTHROCYTE [DISTWIDTH] IN BLOOD BY AUTOMATED COUNT: 14.6 % (ref 11.5–14.5)
GLUCOSE SERPL-MCNC: 114 MG/DL (ref 65–100)
HCT VFR BLD AUTO: 41.3 % (ref 36.6–50.3)
HGB BLD-MCNC: 13.6 G/DL (ref 12.1–17)
IMM GRANULOCYTES # BLD AUTO: 0.1 K/UL (ref 0–0.04)
IMM GRANULOCYTES NFR BLD AUTO: 1 % (ref 0–0.5)
LYMPHOCYTES # BLD: 0.9 K/UL (ref 0.8–3.5)
LYMPHOCYTES NFR BLD: 6 % (ref 12–49)
MAGNESIUM SERPL-MCNC: 2.1 MG/DL (ref 1.6–2.4)
MCH RBC QN AUTO: 28.8 PG (ref 26–34)
MCHC RBC AUTO-ENTMCNC: 32.9 G/DL (ref 30–36.5)
MCV RBC AUTO: 87.3 FL (ref 80–99)
MONOCYTES # BLD: 0.9 K/UL (ref 0–1)
MONOCYTES NFR BLD: 6 % (ref 5–13)
NEUTS SEG # BLD: 13 K/UL (ref 1.8–8)
NEUTS SEG NFR BLD: 87 % (ref 32–75)
NRBC # BLD: 0 K/UL (ref 0–0.01)
NRBC BLD-RTO: 0 PER 100 WBC
PHOSPHATE SERPL-MCNC: 1.9 MG/DL (ref 2.6–4.7)
PLATELET # BLD AUTO: 232 K/UL (ref 150–400)
PMV BLD AUTO: 10.3 FL (ref 8.9–12.9)
POTASSIUM SERPL-SCNC: 4.1 MMOL/L (ref 3.5–5.1)
RBC # BLD AUTO: 4.73 M/UL (ref 4.1–5.7)
SODIUM SERPL-SCNC: 135 MMOL/L (ref 136–145)
WBC # BLD AUTO: 15 K/UL (ref 4.1–11.1)

## 2023-04-17 PROCEDURE — 74011000250 HC RX REV CODE- 250: Performed by: SURGERY

## 2023-04-17 PROCEDURE — 85025 COMPLETE CBC W/AUTO DIFF WBC: CPT

## 2023-04-17 PROCEDURE — 74177 CT ABD & PELVIS W/CONTRAST: CPT

## 2023-04-17 PROCEDURE — 99232 SBSQ HOSP IP/OBS MODERATE 35: CPT | Performed by: SURGERY

## 2023-04-17 PROCEDURE — 74011250636 HC RX REV CODE- 250/636: Performed by: INTERNAL MEDICINE

## 2023-04-17 PROCEDURE — 36573 INSJ PICC RS&I 5 YR+: CPT | Performed by: NURSE PRACTITIONER

## 2023-04-17 PROCEDURE — 36415 COLL VENOUS BLD VENIPUNCTURE: CPT

## 2023-04-17 PROCEDURE — 65270000046 HC RM TELEMETRY

## 2023-04-17 PROCEDURE — 74011000258 HC RX REV CODE- 258: Performed by: SURGERY

## 2023-04-17 PROCEDURE — 74011250636 HC RX REV CODE- 250/636: Performed by: SURGERY

## 2023-04-17 PROCEDURE — 74011250636 HC RX REV CODE- 250/636: Performed by: NURSE PRACTITIONER

## 2023-04-17 PROCEDURE — 74011000250 HC RX REV CODE- 250: Performed by: NURSE PRACTITIONER

## 2023-04-17 PROCEDURE — 76937 US GUIDE VASCULAR ACCESS: CPT

## 2023-04-17 PROCEDURE — 74018 RADEX ABDOMEN 1 VIEW: CPT

## 2023-04-17 PROCEDURE — C1751 CATH, INF, PER/CENT/MIDLINE: HCPCS

## 2023-04-17 PROCEDURE — 83735 ASSAY OF MAGNESIUM: CPT

## 2023-04-17 PROCEDURE — 74011250637 HC RX REV CODE- 250/637: Performed by: SURGERY

## 2023-04-17 PROCEDURE — 74011000636 HC RX REV CODE- 636: Performed by: SURGERY

## 2023-04-17 PROCEDURE — 84100 ASSAY OF PHOSPHORUS: CPT

## 2023-04-17 PROCEDURE — 74011000250 HC RX REV CODE- 250: Performed by: INTERNAL MEDICINE

## 2023-04-17 PROCEDURE — 74011250637 HC RX REV CODE- 250/637: Performed by: STUDENT IN AN ORGANIZED HEALTH CARE EDUCATION/TRAINING PROGRAM

## 2023-04-17 PROCEDURE — 80048 BASIC METABOLIC PNL TOTAL CA: CPT

## 2023-04-17 RX ORDER — ASPIRIN 325 MG
325 TABLET ORAL ONCE
Status: COMPLETED | OUTPATIENT
Start: 2023-04-17 | End: 2023-04-17

## 2023-04-17 RX ORDER — OXYCODONE HYDROCHLORIDE 5 MG/1
5 TABLET ORAL
Status: DISPENSED | OUTPATIENT
Start: 2023-04-17 | End: 2023-04-17

## 2023-04-17 RX ORDER — LORAZEPAM 2 MG/ML
0.5 INJECTION INTRAMUSCULAR ONCE
Status: COMPLETED | OUTPATIENT
Start: 2023-04-17 | End: 2023-04-17

## 2023-04-17 RX ORDER — HEPARIN 100 UNIT/ML
300 SYRINGE INTRAVENOUS AS NEEDED
Status: DISCONTINUED | OUTPATIENT
Start: 2023-04-17 | End: 2023-05-04 | Stop reason: HOSPADM

## 2023-04-17 RX ORDER — HYDROMORPHONE HYDROCHLORIDE 1 MG/ML
1 INJECTION, SOLUTION INTRAMUSCULAR; INTRAVENOUS; SUBCUTANEOUS ONCE
Status: COMPLETED | OUTPATIENT
Start: 2023-04-17 | End: 2023-04-17

## 2023-04-17 RX ORDER — HYDROMORPHONE HYDROCHLORIDE 2 MG/ML
1 INJECTION, SOLUTION INTRAMUSCULAR; INTRAVENOUS; SUBCUTANEOUS ONCE
Status: DISCONTINUED | OUTPATIENT
Start: 2023-04-17 | End: 2023-04-17

## 2023-04-17 RX ORDER — LORAZEPAM 2 MG/ML
1 INJECTION INTRAMUSCULAR
Status: COMPLETED | OUTPATIENT
Start: 2023-04-18 | End: 2023-04-17

## 2023-04-17 RX ORDER — ASPIRIN 81 MG/1
81 TABLET ORAL DAILY
Status: DISCONTINUED | OUTPATIENT
Start: 2023-04-17 | End: 2023-04-17

## 2023-04-17 RX ORDER — ASPIRIN 81 MG/1
81 TABLET ORAL DAILY
Status: DISCONTINUED | OUTPATIENT
Start: 2023-04-18 | End: 2023-05-04 | Stop reason: HOSPADM

## 2023-04-17 RX ORDER — LORAZEPAM 1 MG/1
1 TABLET ORAL ONCE
Status: DISPENSED | OUTPATIENT
Start: 2023-04-17 | End: 2023-04-17

## 2023-04-17 RX ADMIN — ASCORBIC ACID, VITAMIN A PALMITATE, CHOLECALCIFEROL, THIAMINE HYDROCHLORIDE, RIBOFLAVIN-5 PHOSPHATE SODIUM, PYRIDOXINE HYDROCHLORIDE, NIACINAMIDE, DEXPANTHENOL, ALPHA-TOCOPHEROL ACETATE, VITAMIN K1, FOLIC ACID, BIOTIN, CYANOCOBALAMIN: 200; 3300; 200; 6; 3.6; 6; 40; 15; 10; 150; 600; 60; 5 INJECTION, SOLUTION INTRAVENOUS at 18:42

## 2023-04-17 RX ADMIN — CARVEDILOL 6.25 MG: 3.12 TABLET, FILM COATED ORAL at 09:56

## 2023-04-17 RX ADMIN — ENOXAPARIN SODIUM 40 MG: 100 INJECTION SUBCUTANEOUS at 22:38

## 2023-04-17 RX ADMIN — SODIUM CHLORIDE, PRESERVATIVE FREE 10 ML: 5 INJECTION INTRAVENOUS at 05:26

## 2023-04-17 RX ADMIN — LORAZEPAM 1 MG: 2 INJECTION INTRAMUSCULAR; INTRAVENOUS at 23:22

## 2023-04-17 RX ADMIN — ASPIRIN 325 MG: 325 TABLET ORAL at 09:59

## 2023-04-17 RX ADMIN — ISOSORBIDE MONONITRATE 30 MG: 30 TABLET, EXTENDED RELEASE ORAL at 09:57

## 2023-04-17 RX ADMIN — LORAZEPAM 0.5 MG: 2 INJECTION INTRAMUSCULAR; INTRAVENOUS at 13:18

## 2023-04-17 RX ADMIN — DIATRIZOATE MEGLUMINE AND DIATRIZOATE SODIUM 30 ML: 660; 100 LIQUID ORAL; RECTAL at 06:37

## 2023-04-17 RX ADMIN — PIPERACILLIN AND TAZOBACTAM 3.38 G: 3; .375 INJECTION, POWDER, LYOPHILIZED, FOR SOLUTION INTRAVENOUS at 09:57

## 2023-04-17 RX ADMIN — SODIUM CHLORIDE 50 ML/HR: 9 INJECTION, SOLUTION INTRAVENOUS at 00:18

## 2023-04-17 RX ADMIN — SODIUM CHLORIDE, PRESERVATIVE FREE 10 ML: 5 INJECTION INTRAVENOUS at 15:49

## 2023-04-17 RX ADMIN — PIPERACILLIN AND TAZOBACTAM 3.38 G: 3; .375 INJECTION, POWDER, LYOPHILIZED, FOR SOLUTION INTRAVENOUS at 17:18

## 2023-04-17 RX ADMIN — ATORVASTATIN CALCIUM 80 MG: 40 TABLET, FILM COATED ORAL at 09:56

## 2023-04-17 RX ADMIN — ONDANSETRON 4 MG: 2 INJECTION INTRAMUSCULAR; INTRAVENOUS at 06:27

## 2023-04-17 RX ADMIN — BENZOCAINE 1 SPRAY: 200 SPRAY DENTAL; ORAL; PERIODONTAL at 14:10

## 2023-04-17 RX ADMIN — SODIUM CHLORIDE, PRESERVATIVE FREE 10 ML: 5 INJECTION INTRAVENOUS at 22:38

## 2023-04-17 RX ADMIN — PIPERACILLIN AND TAZOBACTAM 3.38 G: 3; .375 INJECTION, POWDER, LYOPHILIZED, FOR SOLUTION INTRAVENOUS at 00:20

## 2023-04-17 RX ADMIN — DULOXETINE 60 MG: 30 CAPSULE, DELAYED RELEASE ORAL at 09:56

## 2023-04-17 RX ADMIN — CARVEDILOL 6.25 MG: 3.12 TABLET, FILM COATED ORAL at 17:41

## 2023-04-17 RX ADMIN — HYDROMORPHONE HYDROCHLORIDE 1 MG: 1 INJECTION, SOLUTION INTRAMUSCULAR; INTRAVENOUS; SUBCUTANEOUS at 13:18

## 2023-04-17 RX ADMIN — IOPAMIDOL 100 ML: 755 INJECTION, SOLUTION INTRAVENOUS at 08:00

## 2023-04-17 RX ADMIN — ONDANSETRON 4 MG: 2 INJECTION INTRAMUSCULAR; INTRAVENOUS at 12:56

## 2023-04-17 RX ADMIN — SODIUM PHOSPHATE, MONOBASIC, MONOHYDRATE AND SODIUM PHOSPHATE, DIBASIC, ANHYDROUS: 142; 276 INJECTION, SOLUTION INTRAVENOUS at 15:49

## 2023-04-17 RX ADMIN — EZETIMIBE 10 MG: 10 TABLET ORAL at 22:38

## 2023-04-18 ENCOUNTER — APPOINTMENT (OUTPATIENT)
Dept: GENERAL RADIOLOGY | Age: 58
DRG: 329 | End: 2023-04-18
Attending: SURGERY
Payer: COMMERCIAL

## 2023-04-18 LAB
ANION GAP SERPL CALC-SCNC: 3 MMOL/L (ref 5–15)
BASOPHILS # BLD: 0.1 K/UL (ref 0–0.1)
BASOPHILS NFR BLD: 1 % (ref 0–1)
BUN SERPL-MCNC: 22 MG/DL (ref 6–20)
BUN/CREAT SERPL: 30 (ref 12–20)
CALCIUM SERPL-MCNC: 8.4 MG/DL (ref 8.5–10.1)
CHLORIDE SERPL-SCNC: 100 MMOL/L (ref 97–108)
CO2 SERPL-SCNC: 34 MMOL/L (ref 21–32)
CREAT SERPL-MCNC: 0.73 MG/DL (ref 0.7–1.3)
DIFFERENTIAL METHOD BLD: ABNORMAL
EOSINOPHIL # BLD: 0.2 K/UL (ref 0–0.4)
EOSINOPHIL NFR BLD: 2 % (ref 0–7)
ERYTHROCYTE [DISTWIDTH] IN BLOOD BY AUTOMATED COUNT: 14.6 % (ref 11.5–14.5)
GLUCOSE SERPL-MCNC: 100 MG/DL (ref 65–100)
HCT VFR BLD AUTO: 31.2 % (ref 36.6–50.3)
HGB BLD-MCNC: 9.8 G/DL (ref 12.1–17)
IMM GRANULOCYTES # BLD AUTO: 0.1 K/UL (ref 0–0.04)
IMM GRANULOCYTES NFR BLD AUTO: 1 % (ref 0–0.5)
LYMPHOCYTES # BLD: 1.1 K/UL (ref 0.8–3.5)
LYMPHOCYTES NFR BLD: 13 % (ref 12–49)
MCH RBC QN AUTO: 28.4 PG (ref 26–34)
MCHC RBC AUTO-ENTMCNC: 31.4 G/DL (ref 30–36.5)
MCV RBC AUTO: 90.4 FL (ref 80–99)
MONOCYTES # BLD: 0.8 K/UL (ref 0–1)
MONOCYTES NFR BLD: 9 % (ref 5–13)
NEUTS SEG # BLD: 6.4 K/UL (ref 1.8–8)
NEUTS SEG NFR BLD: 75 % (ref 32–75)
NRBC # BLD: 0 K/UL (ref 0–0.01)
NRBC BLD-RTO: 0 PER 100 WBC
PLATELET # BLD AUTO: 204 K/UL (ref 150–400)
PMV BLD AUTO: 10.2 FL (ref 8.9–12.9)
POTASSIUM SERPL-SCNC: 3.1 MMOL/L (ref 3.5–5.1)
RBC # BLD AUTO: 3.45 M/UL (ref 4.1–5.7)
SODIUM SERPL-SCNC: 137 MMOL/L (ref 136–145)
WBC # BLD AUTO: 8.6 K/UL (ref 4.1–11.1)

## 2023-04-18 PROCEDURE — 80048 BASIC METABOLIC PNL TOTAL CA: CPT

## 2023-04-18 PROCEDURE — 74011250636 HC RX REV CODE- 250/636: Performed by: SURGERY

## 2023-04-18 PROCEDURE — 99232 SBSQ HOSP IP/OBS MODERATE 35: CPT | Performed by: SURGERY

## 2023-04-18 PROCEDURE — 36415 COLL VENOUS BLD VENIPUNCTURE: CPT

## 2023-04-18 PROCEDURE — 74018 RADEX ABDOMEN 1 VIEW: CPT

## 2023-04-18 PROCEDURE — 74011250636 HC RX REV CODE- 250/636: Performed by: INTERNAL MEDICINE

## 2023-04-18 PROCEDURE — 74011250637 HC RX REV CODE- 250/637: Performed by: STUDENT IN AN ORGANIZED HEALTH CARE EDUCATION/TRAINING PROGRAM

## 2023-04-18 PROCEDURE — 77010033678 HC OXYGEN DAILY

## 2023-04-18 PROCEDURE — 74011250637 HC RX REV CODE- 250/637: Performed by: SURGERY

## 2023-04-18 PROCEDURE — 74011000250 HC RX REV CODE- 250: Performed by: SURGERY

## 2023-04-18 PROCEDURE — 74011000258 HC RX REV CODE- 258: Performed by: SURGERY

## 2023-04-18 PROCEDURE — 94760 N-INVAS EAR/PLS OXIMETRY 1: CPT

## 2023-04-18 PROCEDURE — 85025 COMPLETE CBC W/AUTO DIFF WBC: CPT

## 2023-04-18 PROCEDURE — 65270000046 HC RM TELEMETRY

## 2023-04-18 RX ORDER — POTASSIUM CHLORIDE 7.45 MG/ML
10 INJECTION INTRAVENOUS
Status: COMPLETED | OUTPATIENT
Start: 2023-04-18 | End: 2023-04-18

## 2023-04-18 RX ADMIN — ENOXAPARIN SODIUM 40 MG: 100 INJECTION SUBCUTANEOUS at 22:12

## 2023-04-18 RX ADMIN — POTASSIUM CHLORIDE 10 MEQ: 7.46 INJECTION, SOLUTION INTRAVENOUS at 18:30

## 2023-04-18 RX ADMIN — PIPERACILLIN AND TAZOBACTAM 3.38 G: 3; .375 INJECTION, POWDER, LYOPHILIZED, FOR SOLUTION INTRAVENOUS at 09:21

## 2023-04-18 RX ADMIN — SODIUM CHLORIDE, PRESERVATIVE FREE 10 ML: 5 INJECTION INTRAVENOUS at 22:12

## 2023-04-18 RX ADMIN — POTASSIUM CHLORIDE 10 MEQ: 7.46 INJECTION, SOLUTION INTRAVENOUS at 12:37

## 2023-04-18 RX ADMIN — HYDROCODONE BITARTRATE AND ACETAMINOPHEN 1 TABLET: 5; 325 TABLET ORAL at 18:29

## 2023-04-18 RX ADMIN — CARVEDILOL 6.25 MG: 3.12 TABLET, FILM COATED ORAL at 18:29

## 2023-04-18 RX ADMIN — TRACE ELEMENTS INJECTION 4: 7.4; .75; 98; 151 INJECTION, SOLUTION INTRAVENOUS at 18:32

## 2023-04-18 RX ADMIN — PIPERACILLIN AND TAZOBACTAM 3.38 G: 3; .375 INJECTION, POWDER, LYOPHILIZED, FOR SOLUTION INTRAVENOUS at 00:56

## 2023-04-18 RX ADMIN — SODIUM CHLORIDE 50 ML/HR: 9 INJECTION, SOLUTION INTRAVENOUS at 18:43

## 2023-04-18 RX ADMIN — CARVEDILOL 6.25 MG: 3.12 TABLET, FILM COATED ORAL at 09:16

## 2023-04-18 RX ADMIN — ASPIRIN 81 MG: 81 TABLET, COATED ORAL at 09:16

## 2023-04-18 RX ADMIN — PIPERACILLIN AND TAZOBACTAM 3.38 G: 3; .375 INJECTION, POWDER, LYOPHILIZED, FOR SOLUTION INTRAVENOUS at 18:30

## 2023-04-18 RX ADMIN — HYDROCODONE BITARTRATE AND ACETAMINOPHEN 1 TABLET: 5; 325 TABLET ORAL at 12:37

## 2023-04-18 RX ADMIN — EZETIMIBE 10 MG: 10 TABLET ORAL at 22:12

## 2023-04-18 RX ADMIN — POTASSIUM CHLORIDE 10 MEQ: 7.46 INJECTION, SOLUTION INTRAVENOUS at 14:12

## 2023-04-18 RX ADMIN — POTASSIUM CHLORIDE 10 MEQ: 7.46 INJECTION, SOLUTION INTRAVENOUS at 16:14

## 2023-04-18 RX ADMIN — ISOSORBIDE MONONITRATE 30 MG: 30 TABLET, EXTENDED RELEASE ORAL at 09:16

## 2023-04-18 RX ADMIN — SODIUM CHLORIDE, PRESERVATIVE FREE 10 ML: 5 INJECTION INTRAVENOUS at 05:58

## 2023-04-18 RX ADMIN — MORPHINE SULFATE 4 MG: 2 INJECTION, SOLUTION INTRAMUSCULAR; INTRAVENOUS at 22:17

## 2023-04-18 RX ADMIN — DULOXETINE 60 MG: 30 CAPSULE, DELAYED RELEASE ORAL at 09:16

## 2023-04-18 RX ADMIN — ATORVASTATIN CALCIUM 80 MG: 40 TABLET, FILM COATED ORAL at 09:16

## 2023-04-19 LAB
ANION GAP SERPL CALC-SCNC: 1 MMOL/L (ref 5–15)
BASOPHILS # BLD: 0.1 K/UL (ref 0–0.1)
BASOPHILS NFR BLD: 1 % (ref 0–1)
BUN SERPL-MCNC: 18 MG/DL (ref 6–20)
BUN/CREAT SERPL: 25 (ref 12–20)
CALCIUM SERPL-MCNC: 8.2 MG/DL (ref 8.5–10.1)
CHLORIDE SERPL-SCNC: 103 MMOL/L (ref 97–108)
CO2 SERPL-SCNC: 32 MMOL/L (ref 21–32)
CREAT SERPL-MCNC: 0.71 MG/DL (ref 0.7–1.3)
DIFFERENTIAL METHOD BLD: NORMAL
EOSINOPHIL # BLD: 0.3 K/UL (ref 0–0.4)
EOSINOPHIL NFR BLD: 3 % (ref 0–7)
ERYTHROCYTE [DISTWIDTH] IN BLOOD BY AUTOMATED COUNT: 14.1 % (ref 11.5–14.5)
GLUCOSE SERPL-MCNC: 134 MG/DL (ref 65–100)
HCT VFR BLD AUTO: 38.2 % (ref 36.6–50.3)
HGB BLD-MCNC: 12.4 G/DL (ref 12.1–17)
IMM GRANULOCYTES # BLD AUTO: 0 K/UL (ref 0–0.04)
IMM GRANULOCYTES NFR BLD AUTO: 0 % (ref 0–0.5)
LYMPHOCYTES # BLD: 1.8 K/UL (ref 0.8–3.5)
LYMPHOCYTES NFR BLD: 20 % (ref 12–49)
MAGNESIUM SERPL-MCNC: 2 MG/DL (ref 1.6–2.4)
MCH RBC QN AUTO: 28.4 PG (ref 26–34)
MCHC RBC AUTO-ENTMCNC: 32.5 G/DL (ref 30–36.5)
MCV RBC AUTO: 87.4 FL (ref 80–99)
MONOCYTES # BLD: 0.9 K/UL (ref 0–1)
MONOCYTES NFR BLD: 10 % (ref 5–13)
NEUTS SEG # BLD: 6 K/UL (ref 1.8–8)
NEUTS SEG NFR BLD: 66 % (ref 32–75)
NRBC # BLD: 0 K/UL (ref 0–0.01)
NRBC BLD-RTO: 0 PER 100 WBC
PHOSPHATE SERPL-MCNC: 2.3 MG/DL (ref 2.6–4.7)
PLATELET # BLD AUTO: 246 K/UL (ref 150–400)
PMV BLD AUTO: 9.9 FL (ref 8.9–12.9)
POTASSIUM SERPL-SCNC: 3 MMOL/L (ref 3.5–5.1)
RBC # BLD AUTO: 4.37 M/UL (ref 4.1–5.7)
SODIUM SERPL-SCNC: 136 MMOL/L (ref 136–145)
WBC # BLD AUTO: 9.1 K/UL (ref 4.1–11.1)

## 2023-04-19 PROCEDURE — 65270000046 HC RM TELEMETRY

## 2023-04-19 PROCEDURE — 74011000250 HC RX REV CODE- 250: Performed by: NURSE PRACTITIONER

## 2023-04-19 PROCEDURE — 85025 COMPLETE CBC W/AUTO DIFF WBC: CPT

## 2023-04-19 PROCEDURE — 94760 N-INVAS EAR/PLS OXIMETRY 1: CPT

## 2023-04-19 PROCEDURE — 74011250636 HC RX REV CODE- 250/636: Performed by: SURGERY

## 2023-04-19 PROCEDURE — 74011250637 HC RX REV CODE- 250/637: Performed by: STUDENT IN AN ORGANIZED HEALTH CARE EDUCATION/TRAINING PROGRAM

## 2023-04-19 PROCEDURE — 74011000258 HC RX REV CODE- 258: Performed by: SURGERY

## 2023-04-19 PROCEDURE — 80048 BASIC METABOLIC PNL TOTAL CA: CPT

## 2023-04-19 PROCEDURE — 99232 SBSQ HOSP IP/OBS MODERATE 35: CPT | Performed by: SURGERY

## 2023-04-19 PROCEDURE — 74011000250 HC RX REV CODE- 250: Performed by: SURGERY

## 2023-04-19 PROCEDURE — 36415 COLL VENOUS BLD VENIPUNCTURE: CPT

## 2023-04-19 PROCEDURE — C9113 INJ PANTOPRAZOLE SODIUM, VIA: HCPCS | Performed by: NURSE PRACTITIONER

## 2023-04-19 PROCEDURE — 74011250636 HC RX REV CODE- 250/636: Performed by: NURSE PRACTITIONER

## 2023-04-19 PROCEDURE — 84100 ASSAY OF PHOSPHORUS: CPT

## 2023-04-19 PROCEDURE — 74011250637 HC RX REV CODE- 250/637: Performed by: SURGERY

## 2023-04-19 PROCEDURE — 83735 ASSAY OF MAGNESIUM: CPT

## 2023-04-19 RX ORDER — POTASSIUM CHLORIDE 7.45 MG/ML
10 INJECTION INTRAVENOUS
Status: COMPLETED | OUTPATIENT
Start: 2023-04-19 | End: 2023-04-19

## 2023-04-19 RX ADMIN — POTASSIUM CHLORIDE 10 MEQ: 7.46 INJECTION, SOLUTION INTRAVENOUS at 08:23

## 2023-04-19 RX ADMIN — DULOXETINE 60 MG: 30 CAPSULE, DELAYED RELEASE ORAL at 08:23

## 2023-04-19 RX ADMIN — SODIUM CHLORIDE, PRESERVATIVE FREE 10 ML: 5 INJECTION INTRAVENOUS at 21:18

## 2023-04-19 RX ADMIN — PIPERACILLIN AND TAZOBACTAM 3.38 G: 3; .375 INJECTION, POWDER, LYOPHILIZED, FOR SOLUTION INTRAVENOUS at 08:38

## 2023-04-19 RX ADMIN — ATORVASTATIN CALCIUM 80 MG: 40 TABLET, FILM COATED ORAL at 08:23

## 2023-04-19 RX ADMIN — POTASSIUM CHLORIDE 10 MEQ: 7.46 INJECTION, SOLUTION INTRAVENOUS at 09:37

## 2023-04-19 RX ADMIN — PIPERACILLIN AND TAZOBACTAM 3.38 G: 3; .375 INJECTION, POWDER, LYOPHILIZED, FOR SOLUTION INTRAVENOUS at 16:51

## 2023-04-19 RX ADMIN — PIPERACILLIN AND TAZOBACTAM 3.38 G: 3; .375 INJECTION, POWDER, LYOPHILIZED, FOR SOLUTION INTRAVENOUS at 02:04

## 2023-04-19 RX ADMIN — ASPIRIN 81 MG: 81 TABLET, COATED ORAL at 08:23

## 2023-04-19 RX ADMIN — MORPHINE SULFATE 4 MG: 2 INJECTION, SOLUTION INTRAMUSCULAR; INTRAVENOUS at 08:23

## 2023-04-19 RX ADMIN — ISOSORBIDE MONONITRATE 30 MG: 30 TABLET, EXTENDED RELEASE ORAL at 08:23

## 2023-04-19 RX ADMIN — HYDROCODONE BITARTRATE AND ACETAMINOPHEN 1 TABLET: 5; 325 TABLET ORAL at 03:27

## 2023-04-19 RX ADMIN — ENOXAPARIN SODIUM 40 MG: 100 INJECTION SUBCUTANEOUS at 21:13

## 2023-04-19 RX ADMIN — POTASSIUM CHLORIDE 10 MEQ: 7.46 INJECTION, SOLUTION INTRAVENOUS at 11:44

## 2023-04-19 RX ADMIN — SODIUM CHLORIDE, PRESERVATIVE FREE 10 ML: 5 INJECTION INTRAVENOUS at 06:09

## 2023-04-19 RX ADMIN — POTASSIUM CHLORIDE 10 MEQ: 7.46 INJECTION, SOLUTION INTRAVENOUS at 10:43

## 2023-04-19 RX ADMIN — TRACE ELEMENTS INJECTION 4: 7.4; .75; 98; 151 INJECTION, SOLUTION INTRAVENOUS at 17:50

## 2023-04-19 RX ADMIN — CARVEDILOL 6.25 MG: 3.12 TABLET, FILM COATED ORAL at 16:48

## 2023-04-19 RX ADMIN — CARVEDILOL 6.25 MG: 3.12 TABLET, FILM COATED ORAL at 08:23

## 2023-04-19 RX ADMIN — HYDROCODONE BITARTRATE AND ACETAMINOPHEN 1 TABLET: 5; 325 TABLET ORAL at 16:48

## 2023-04-19 RX ADMIN — SODIUM CHLORIDE, PRESERVATIVE FREE 10 ML: 5 INJECTION INTRAVENOUS at 16:53

## 2023-04-19 RX ADMIN — SODIUM CHLORIDE, PRESERVATIVE FREE 40 MG: 5 INJECTION INTRAVENOUS at 08:24

## 2023-04-19 RX ADMIN — EZETIMIBE 10 MG: 10 TABLET ORAL at 21:13

## 2023-04-19 RX ADMIN — MORPHINE SULFATE 4 MG: 2 INJECTION, SOLUTION INTRAMUSCULAR; INTRAVENOUS at 21:13

## 2023-04-20 ENCOUNTER — APPOINTMENT (OUTPATIENT)
Dept: CT IMAGING | Age: 58
DRG: 329 | End: 2023-04-20
Attending: NURSE PRACTITIONER
Payer: COMMERCIAL

## 2023-04-20 LAB
ANION GAP SERPL CALC-SCNC: 3 MMOL/L (ref 5–15)
BUN SERPL-MCNC: 16 MG/DL (ref 6–20)
BUN/CREAT SERPL: 25 (ref 12–20)
CALCIUM SERPL-MCNC: 8.3 MG/DL (ref 8.5–10.1)
CHLORIDE SERPL-SCNC: 103 MMOL/L (ref 97–108)
CO2 SERPL-SCNC: 29 MMOL/L (ref 21–32)
CREAT SERPL-MCNC: 0.64 MG/DL (ref 0.7–1.3)
GLUCOSE SERPL-MCNC: 124 MG/DL (ref 65–100)
MAGNESIUM SERPL-MCNC: 1.9 MG/DL (ref 1.6–2.4)
PHOSPHATE SERPL-MCNC: 3.5 MG/DL (ref 2.6–4.7)
POTASSIUM SERPL-SCNC: 3.5 MMOL/L (ref 3.5–5.1)
SODIUM SERPL-SCNC: 135 MMOL/L (ref 136–145)

## 2023-04-20 PROCEDURE — 36415 COLL VENOUS BLD VENIPUNCTURE: CPT

## 2023-04-20 PROCEDURE — 74011250637 HC RX REV CODE- 250/637: Performed by: SURGERY

## 2023-04-20 PROCEDURE — 84100 ASSAY OF PHOSPHORUS: CPT

## 2023-04-20 PROCEDURE — 74011250637 HC RX REV CODE- 250/637: Performed by: STUDENT IN AN ORGANIZED HEALTH CARE EDUCATION/TRAINING PROGRAM

## 2023-04-20 PROCEDURE — 80048 BASIC METABOLIC PNL TOTAL CA: CPT

## 2023-04-20 PROCEDURE — 74011250636 HC RX REV CODE- 250/636: Performed by: SURGERY

## 2023-04-20 PROCEDURE — 74011000636 HC RX REV CODE- 636: Performed by: SURGERY

## 2023-04-20 PROCEDURE — 99231 SBSQ HOSP IP/OBS SF/LOW 25: CPT | Performed by: NURSE PRACTITIONER

## 2023-04-20 PROCEDURE — 74011250636 HC RX REV CODE- 250/636: Performed by: NURSE PRACTITIONER

## 2023-04-20 PROCEDURE — 94760 N-INVAS EAR/PLS OXIMETRY 1: CPT

## 2023-04-20 PROCEDURE — 74011000250 HC RX REV CODE- 250: Performed by: NURSE PRACTITIONER

## 2023-04-20 PROCEDURE — 74011000250 HC RX REV CODE- 250: Performed by: SURGERY

## 2023-04-20 PROCEDURE — 74011000258 HC RX REV CODE- 258: Performed by: SURGERY

## 2023-04-20 PROCEDURE — 65270000046 HC RM TELEMETRY

## 2023-04-20 PROCEDURE — C9113 INJ PANTOPRAZOLE SODIUM, VIA: HCPCS | Performed by: NURSE PRACTITIONER

## 2023-04-20 PROCEDURE — 83735 ASSAY OF MAGNESIUM: CPT

## 2023-04-20 PROCEDURE — 74177 CT ABD & PELVIS W/CONTRAST: CPT

## 2023-04-20 PROCEDURE — 74011250636 HC RX REV CODE- 250/636: Performed by: INTERNAL MEDICINE

## 2023-04-20 RX ADMIN — ENOXAPARIN SODIUM 40 MG: 100 INJECTION SUBCUTANEOUS at 21:13

## 2023-04-20 RX ADMIN — PIPERACILLIN AND TAZOBACTAM 3.38 G: 3; .375 INJECTION, POWDER, LYOPHILIZED, FOR SOLUTION INTRAVENOUS at 18:02

## 2023-04-20 RX ADMIN — EZETIMIBE 10 MG: 10 TABLET ORAL at 21:14

## 2023-04-20 RX ADMIN — ATORVASTATIN CALCIUM 80 MG: 40 TABLET, FILM COATED ORAL at 08:28

## 2023-04-20 RX ADMIN — SODIUM CHLORIDE, PRESERVATIVE FREE 10 ML: 5 INJECTION INTRAVENOUS at 21:14

## 2023-04-20 RX ADMIN — CARVEDILOL 6.25 MG: 3.12 TABLET, FILM COATED ORAL at 17:56

## 2023-04-20 RX ADMIN — HYDROCODONE BITARTRATE AND ACETAMINOPHEN 1 TABLET: 5; 325 TABLET ORAL at 08:35

## 2023-04-20 RX ADMIN — ASPIRIN 81 MG: 81 TABLET, COATED ORAL at 08:27

## 2023-04-20 RX ADMIN — ISOSORBIDE MONONITRATE 30 MG: 30 TABLET, EXTENDED RELEASE ORAL at 08:27

## 2023-04-20 RX ADMIN — PIPERACILLIN AND TAZOBACTAM 3.38 G: 3; .375 INJECTION, POWDER, LYOPHILIZED, FOR SOLUTION INTRAVENOUS at 00:53

## 2023-04-20 RX ADMIN — SODIUM CHLORIDE, PRESERVATIVE FREE 40 MG: 5 INJECTION INTRAVENOUS at 08:28

## 2023-04-20 RX ADMIN — TRACE ELEMENTS INJECTION 4: 7.4; .75; 98; 151 INJECTION, SOLUTION INTRAVENOUS at 18:54

## 2023-04-20 RX ADMIN — DIATRIZOATE MEGLUMINE AND DIATRIZOATE SODIUM 30 ML: 660; 100 LIQUID ORAL; RECTAL at 06:42

## 2023-04-20 RX ADMIN — MORPHINE SULFATE 4 MG: 2 INJECTION, SOLUTION INTRAMUSCULAR; INTRAVENOUS at 22:04

## 2023-04-20 RX ADMIN — MORPHINE SULFATE 4 MG: 2 INJECTION, SOLUTION INTRAMUSCULAR; INTRAVENOUS at 17:56

## 2023-04-20 RX ADMIN — ONDANSETRON 4 MG: 2 INJECTION INTRAMUSCULAR; INTRAVENOUS at 17:56

## 2023-04-20 RX ADMIN — PIPERACILLIN AND TAZOBACTAM 3.38 G: 3; .375 INJECTION, POWDER, LYOPHILIZED, FOR SOLUTION INTRAVENOUS at 08:37

## 2023-04-20 RX ADMIN — CARVEDILOL 6.25 MG: 3.12 TABLET, FILM COATED ORAL at 08:28

## 2023-04-20 RX ADMIN — DULOXETINE 60 MG: 30 CAPSULE, DELAYED RELEASE ORAL at 08:27

## 2023-04-20 RX ADMIN — SODIUM CHLORIDE 50 ML/HR: 9 INJECTION, SOLUTION INTRAVENOUS at 01:04

## 2023-04-20 RX ADMIN — IOPAMIDOL 100 ML: 755 INJECTION, SOLUTION INTRAVENOUS at 09:56

## 2023-04-21 LAB
ANION GAP SERPL CALC-SCNC: 2 MMOL/L (ref 5–15)
BUN SERPL-MCNC: 21 MG/DL (ref 6–20)
BUN/CREAT SERPL: 29 (ref 12–20)
CALCIUM SERPL-MCNC: 8.8 MG/DL (ref 8.5–10.1)
CHLORIDE SERPL-SCNC: 102 MMOL/L (ref 97–108)
CO2 SERPL-SCNC: 30 MMOL/L (ref 21–32)
CREAT SERPL-MCNC: 0.72 MG/DL (ref 0.7–1.3)
GLUCOSE SERPL-MCNC: 87 MG/DL (ref 65–100)
MAGNESIUM SERPL-MCNC: 2 MG/DL (ref 1.6–2.4)
PHOSPHATE SERPL-MCNC: 5 MG/DL (ref 2.6–4.7)
POTASSIUM SERPL-SCNC: 4.1 MMOL/L (ref 3.5–5.1)
SODIUM SERPL-SCNC: 134 MMOL/L (ref 136–145)

## 2023-04-21 PROCEDURE — 74011250637 HC RX REV CODE- 250/637: Performed by: STUDENT IN AN ORGANIZED HEALTH CARE EDUCATION/TRAINING PROGRAM

## 2023-04-21 PROCEDURE — 74011000250 HC RX REV CODE- 250: Performed by: SURGERY

## 2023-04-21 PROCEDURE — 94760 N-INVAS EAR/PLS OXIMETRY 1: CPT

## 2023-04-21 PROCEDURE — C9113 INJ PANTOPRAZOLE SODIUM, VIA: HCPCS | Performed by: NURSE PRACTITIONER

## 2023-04-21 PROCEDURE — 80048 BASIC METABOLIC PNL TOTAL CA: CPT

## 2023-04-21 PROCEDURE — 74011250636 HC RX REV CODE- 250/636: Performed by: INTERNAL MEDICINE

## 2023-04-21 PROCEDURE — 74011250636 HC RX REV CODE- 250/636: Performed by: NURSE PRACTITIONER

## 2023-04-21 PROCEDURE — 83735 ASSAY OF MAGNESIUM: CPT

## 2023-04-21 PROCEDURE — 74011250636 HC RX REV CODE- 250/636: Performed by: SURGERY

## 2023-04-21 PROCEDURE — 74011000250 HC RX REV CODE- 250: Performed by: NURSE PRACTITIONER

## 2023-04-21 PROCEDURE — 36415 COLL VENOUS BLD VENIPUNCTURE: CPT

## 2023-04-21 PROCEDURE — 99231 SBSQ HOSP IP/OBS SF/LOW 25: CPT | Performed by: NURSE PRACTITIONER

## 2023-04-21 PROCEDURE — 74011000258 HC RX REV CODE- 258: Performed by: SURGERY

## 2023-04-21 PROCEDURE — 65270000046 HC RM TELEMETRY

## 2023-04-21 PROCEDURE — 74011250637 HC RX REV CODE- 250/637: Performed by: SURGERY

## 2023-04-21 PROCEDURE — 84100 ASSAY OF PHOSPHORUS: CPT

## 2023-04-21 RX ADMIN — SODIUM CHLORIDE 50 ML/HR: 9 INJECTION, SOLUTION INTRAVENOUS at 07:34

## 2023-04-21 RX ADMIN — DULOXETINE 60 MG: 30 CAPSULE, DELAYED RELEASE ORAL at 10:11

## 2023-04-21 RX ADMIN — SODIUM CHLORIDE, PRESERVATIVE FREE 40 MG: 5 INJECTION INTRAVENOUS at 10:10

## 2023-04-21 RX ADMIN — SODIUM CHLORIDE, PRESERVATIVE FREE 10 ML: 5 INJECTION INTRAVENOUS at 07:15

## 2023-04-21 RX ADMIN — HYDROCODONE BITARTRATE AND ACETAMINOPHEN 1 TABLET: 5; 325 TABLET ORAL at 21:47

## 2023-04-21 RX ADMIN — ISOSORBIDE MONONITRATE 30 MG: 30 TABLET, EXTENDED RELEASE ORAL at 10:12

## 2023-04-21 RX ADMIN — CARVEDILOL 6.25 MG: 3.12 TABLET, FILM COATED ORAL at 18:08

## 2023-04-21 RX ADMIN — SODIUM CHLORIDE, PRESERVATIVE FREE 10 ML: 5 INJECTION INTRAVENOUS at 18:13

## 2023-04-21 RX ADMIN — CARVEDILOL 6.25 MG: 3.12 TABLET, FILM COATED ORAL at 10:12

## 2023-04-21 RX ADMIN — ENOXAPARIN SODIUM 40 MG: 100 INJECTION SUBCUTANEOUS at 21:27

## 2023-04-21 RX ADMIN — I.V. FAT EMULSION 500 ML: 20 EMULSION INTRAVENOUS at 21:27

## 2023-04-21 RX ADMIN — PIPERACILLIN AND TAZOBACTAM 3.38 G: 3; .375 INJECTION, POWDER, LYOPHILIZED, FOR SOLUTION INTRAVENOUS at 00:01

## 2023-04-21 RX ADMIN — MELATONIN 3 MG: at 00:01

## 2023-04-21 RX ADMIN — MELATONIN 3 MG: at 21:27

## 2023-04-21 RX ADMIN — HYDROCODONE BITARTRATE AND ACETAMINOPHEN 1 TABLET: 5; 325 TABLET ORAL at 00:01

## 2023-04-21 RX ADMIN — ASPIRIN 81 MG: 81 TABLET, COATED ORAL at 10:12

## 2023-04-21 RX ADMIN — ATORVASTATIN CALCIUM 80 MG: 40 TABLET, FILM COATED ORAL at 10:12

## 2023-04-21 RX ADMIN — EZETIMIBE 10 MG: 10 TABLET ORAL at 21:27

## 2023-04-21 RX ADMIN — PIPERACILLIN AND TAZOBACTAM 3.38 G: 3; .375 INJECTION, POWDER, LYOPHILIZED, FOR SOLUTION INTRAVENOUS at 10:11

## 2023-04-21 RX ADMIN — SODIUM CHLORIDE, PRESERVATIVE FREE 10 ML: 5 INJECTION INTRAVENOUS at 21:49

## 2023-04-21 RX ADMIN — ASCORBIC ACID, VITAMIN A PALMITATE, CHOLECALCIFEROL, THIAMINE HYDROCHLORIDE, RIBOFLAVIN-5 PHOSPHATE SODIUM, PYRIDOXINE HYDROCHLORIDE, NIACINAMIDE, DEXPANTHENOL, ALPHA-TOCOPHEROL ACETATE, VITAMIN K1, FOLIC ACID, BIOTIN, CYANOCOBALAMIN: 200; 3300; 200; 6; 3.6; 6; 40; 15; 10; 150; 600; 60; 5 INJECTION, SOLUTION INTRAVENOUS at 18:13

## 2023-04-21 RX ADMIN — PIPERACILLIN AND TAZOBACTAM 3.38 G: 3; .375 INJECTION, POWDER, LYOPHILIZED, FOR SOLUTION INTRAVENOUS at 18:08

## 2023-04-22 LAB
ANION GAP SERPL CALC-SCNC: 3 MMOL/L (ref 5–15)
BACTERIA SPEC CULT: NORMAL
BUN SERPL-MCNC: 17 MG/DL (ref 6–20)
BUN/CREAT SERPL: 22 (ref 12–20)
CALCIUM SERPL-MCNC: 9 MG/DL (ref 8.5–10.1)
CHLORIDE SERPL-SCNC: 103 MMOL/L (ref 97–108)
CO2 SERPL-SCNC: 27 MMOL/L (ref 21–32)
CREAT SERPL-MCNC: 0.78 MG/DL (ref 0.7–1.3)
GLUCOSE SERPL-MCNC: 102 MG/DL (ref 65–100)
MAGNESIUM SERPL-MCNC: 2.1 MG/DL (ref 1.6–2.4)
PHOSPHATE SERPL-MCNC: 3.3 MG/DL (ref 2.6–4.7)
POTASSIUM SERPL-SCNC: 4.5 MMOL/L (ref 3.5–5.1)
SERVICE CMNT-IMP: NORMAL
SODIUM SERPL-SCNC: 133 MMOL/L (ref 136–145)

## 2023-04-22 PROCEDURE — 94760 N-INVAS EAR/PLS OXIMETRY 1: CPT

## 2023-04-22 PROCEDURE — 74011250636 HC RX REV CODE- 250/636: Performed by: SURGERY

## 2023-04-22 PROCEDURE — 74011000250 HC RX REV CODE- 250: Performed by: SURGERY

## 2023-04-22 PROCEDURE — C9113 INJ PANTOPRAZOLE SODIUM, VIA: HCPCS | Performed by: NURSE PRACTITIONER

## 2023-04-22 PROCEDURE — 74011250636 HC RX REV CODE- 250/636: Performed by: NURSE PRACTITIONER

## 2023-04-22 PROCEDURE — 74011250637 HC RX REV CODE- 250/637: Performed by: STUDENT IN AN ORGANIZED HEALTH CARE EDUCATION/TRAINING PROGRAM

## 2023-04-22 PROCEDURE — 65270000046 HC RM TELEMETRY

## 2023-04-22 PROCEDURE — 84100 ASSAY OF PHOSPHORUS: CPT

## 2023-04-22 PROCEDURE — 36415 COLL VENOUS BLD VENIPUNCTURE: CPT

## 2023-04-22 PROCEDURE — 74011250637 HC RX REV CODE- 250/637: Performed by: SURGERY

## 2023-04-22 PROCEDURE — 74011000250 HC RX REV CODE- 250: Performed by: NURSE PRACTITIONER

## 2023-04-22 PROCEDURE — 74011250636 HC RX REV CODE- 250/636: Performed by: INTERNAL MEDICINE

## 2023-04-22 PROCEDURE — 74011000258 HC RX REV CODE- 258: Performed by: SURGERY

## 2023-04-22 PROCEDURE — 83735 ASSAY OF MAGNESIUM: CPT

## 2023-04-22 PROCEDURE — 80048 BASIC METABOLIC PNL TOTAL CA: CPT

## 2023-04-22 PROCEDURE — 99231 SBSQ HOSP IP/OBS SF/LOW 25: CPT | Performed by: SURGERY

## 2023-04-22 RX ADMIN — ASPIRIN 81 MG: 81 TABLET, COATED ORAL at 08:05

## 2023-04-22 RX ADMIN — SODIUM CHLORIDE 50 ML/HR: 9 INJECTION, SOLUTION INTRAVENOUS at 05:53

## 2023-04-22 RX ADMIN — PIPERACILLIN AND TAZOBACTAM 3.38 G: 3; .375 INJECTION, POWDER, LYOPHILIZED, FOR SOLUTION INTRAVENOUS at 16:33

## 2023-04-22 RX ADMIN — EZETIMIBE 10 MG: 10 TABLET ORAL at 22:50

## 2023-04-22 RX ADMIN — DULOXETINE 60 MG: 30 CAPSULE, DELAYED RELEASE ORAL at 08:05

## 2023-04-22 RX ADMIN — MELATONIN 3 MG: at 20:34

## 2023-04-22 RX ADMIN — HYDROCODONE BITARTRATE AND ACETAMINOPHEN 1 TABLET: 5; 325 TABLET ORAL at 20:34

## 2023-04-22 RX ADMIN — PIPERACILLIN AND TAZOBACTAM 3.38 G: 3; .375 INJECTION, POWDER, LYOPHILIZED, FOR SOLUTION INTRAVENOUS at 08:05

## 2023-04-22 RX ADMIN — ISOSORBIDE MONONITRATE 30 MG: 30 TABLET, EXTENDED RELEASE ORAL at 08:05

## 2023-04-22 RX ADMIN — SODIUM CHLORIDE, PRESERVATIVE FREE 10 ML: 5 INJECTION INTRAVENOUS at 16:34

## 2023-04-22 RX ADMIN — ATORVASTATIN CALCIUM 80 MG: 40 TABLET, FILM COATED ORAL at 08:05

## 2023-04-22 RX ADMIN — SODIUM CHLORIDE, PRESERVATIVE FREE 10 ML: 5 INJECTION INTRAVENOUS at 05:53

## 2023-04-22 RX ADMIN — SODIUM CHLORIDE, PRESERVATIVE FREE 10 ML: 5 INJECTION INTRAVENOUS at 22:50

## 2023-04-22 RX ADMIN — PIPERACILLIN AND TAZOBACTAM 3.38 G: 3; .375 INJECTION, POWDER, LYOPHILIZED, FOR SOLUTION INTRAVENOUS at 01:16

## 2023-04-22 RX ADMIN — HYDROCODONE BITARTRATE AND ACETAMINOPHEN 1 TABLET: 5; 325 TABLET ORAL at 16:33

## 2023-04-22 RX ADMIN — SODIUM CHLORIDE, PRESERVATIVE FREE 40 MG: 5 INJECTION INTRAVENOUS at 08:05

## 2023-04-22 RX ADMIN — CARVEDILOL 6.25 MG: 3.12 TABLET, FILM COATED ORAL at 16:33

## 2023-04-22 RX ADMIN — CARVEDILOL 6.25 MG: 3.12 TABLET, FILM COATED ORAL at 08:05

## 2023-04-22 RX ADMIN — ENOXAPARIN SODIUM 40 MG: 100 INJECTION SUBCUTANEOUS at 20:34

## 2023-04-23 ENCOUNTER — APPOINTMENT (OUTPATIENT)
Dept: GENERAL RADIOLOGY | Age: 58
DRG: 329 | End: 2023-04-23
Attending: SURGERY
Payer: COMMERCIAL

## 2023-04-23 ENCOUNTER — APPOINTMENT (OUTPATIENT)
Dept: CT IMAGING | Age: 58
DRG: 329 | End: 2023-04-23
Attending: SURGERY
Payer: COMMERCIAL

## 2023-04-23 LAB
ANION GAP SERPL CALC-SCNC: 2 MMOL/L (ref 5–15)
BUN SERPL-MCNC: 18 MG/DL (ref 6–20)
BUN/CREAT SERPL: 23 (ref 12–20)
CALCIUM SERPL-MCNC: 9.2 MG/DL (ref 8.5–10.1)
CHLORIDE SERPL-SCNC: 102 MMOL/L (ref 97–108)
CO2 SERPL-SCNC: 28 MMOL/L (ref 21–32)
CREAT SERPL-MCNC: 0.79 MG/DL (ref 0.7–1.3)
GLUCOSE SERPL-MCNC: 95 MG/DL (ref 65–100)
POTASSIUM SERPL-SCNC: 4.4 MMOL/L (ref 3.5–5.1)
SODIUM SERPL-SCNC: 132 MMOL/L (ref 136–145)

## 2023-04-23 PROCEDURE — 99231 SBSQ HOSP IP/OBS SF/LOW 25: CPT | Performed by: SURGERY

## 2023-04-23 PROCEDURE — 74177 CT ABD & PELVIS W/CONTRAST: CPT

## 2023-04-23 PROCEDURE — 74011000258 HC RX REV CODE- 258: Performed by: SURGERY

## 2023-04-23 PROCEDURE — 74011250637 HC RX REV CODE- 250/637: Performed by: STUDENT IN AN ORGANIZED HEALTH CARE EDUCATION/TRAINING PROGRAM

## 2023-04-23 PROCEDURE — C9113 INJ PANTOPRAZOLE SODIUM, VIA: HCPCS | Performed by: NURSE PRACTITIONER

## 2023-04-23 PROCEDURE — 36415 COLL VENOUS BLD VENIPUNCTURE: CPT

## 2023-04-23 PROCEDURE — 74011250637 HC RX REV CODE- 250/637: Performed by: SURGERY

## 2023-04-23 PROCEDURE — 74011000636 HC RX REV CODE- 636: Performed by: SURGERY

## 2023-04-23 PROCEDURE — 80048 BASIC METABOLIC PNL TOTAL CA: CPT

## 2023-04-23 PROCEDURE — 74011250636 HC RX REV CODE- 250/636: Performed by: NURSE PRACTITIONER

## 2023-04-23 PROCEDURE — 74011000250 HC RX REV CODE- 250: Performed by: SURGERY

## 2023-04-23 PROCEDURE — 94760 N-INVAS EAR/PLS OXIMETRY 1: CPT

## 2023-04-23 PROCEDURE — 74011250636 HC RX REV CODE- 250/636: Performed by: SURGERY

## 2023-04-23 PROCEDURE — 74011000250 HC RX REV CODE- 250: Performed by: NURSE PRACTITIONER

## 2023-04-23 PROCEDURE — 65270000046 HC RM TELEMETRY

## 2023-04-23 PROCEDURE — 74018 RADEX ABDOMEN 1 VIEW: CPT

## 2023-04-23 RX ADMIN — IOPAMIDOL 100 ML: 755 INJECTION, SOLUTION INTRAVENOUS at 13:58

## 2023-04-23 RX ADMIN — PIPERACILLIN AND TAZOBACTAM 3.38 G: 3; .375 INJECTION, POWDER, LYOPHILIZED, FOR SOLUTION INTRAVENOUS at 09:37

## 2023-04-23 RX ADMIN — CARVEDILOL 6.25 MG: 3.12 TABLET, FILM COATED ORAL at 16:41

## 2023-04-23 RX ADMIN — SODIUM CHLORIDE, PRESERVATIVE FREE 40 MG: 5 INJECTION INTRAVENOUS at 09:37

## 2023-04-23 RX ADMIN — MORPHINE SULFATE 4 MG: 2 INJECTION, SOLUTION INTRAMUSCULAR; INTRAVENOUS at 13:08

## 2023-04-23 RX ADMIN — SODIUM CHLORIDE, PRESERVATIVE FREE 10 ML: 5 INJECTION INTRAVENOUS at 22:00

## 2023-04-23 RX ADMIN — ONDANSETRON 4 MG: 2 INJECTION INTRAMUSCULAR; INTRAVENOUS at 09:38

## 2023-04-23 RX ADMIN — PIPERACILLIN AND TAZOBACTAM 3.38 G: 3; .375 INJECTION, POWDER, LYOPHILIZED, FOR SOLUTION INTRAVENOUS at 00:10

## 2023-04-23 RX ADMIN — ENOXAPARIN SODIUM 40 MG: 100 INJECTION SUBCUTANEOUS at 20:34

## 2023-04-23 RX ADMIN — DIATRIZOATE MEGLUMINE AND DIATRIZOATE SODIUM 30 ML: 660; 100 LIQUID ORAL; RECTAL at 11:50

## 2023-04-23 RX ADMIN — PIPERACILLIN AND TAZOBACTAM 3.38 G: 3; .375 INJECTION, POWDER, LYOPHILIZED, FOR SOLUTION INTRAVENOUS at 16:40

## 2023-04-23 RX ADMIN — ATORVASTATIN CALCIUM 80 MG: 40 TABLET, FILM COATED ORAL at 09:38

## 2023-04-23 RX ADMIN — HYDROCODONE BITARTRATE AND ACETAMINOPHEN 1 TABLET: 5; 325 TABLET ORAL at 00:13

## 2023-04-23 RX ADMIN — DULOXETINE 60 MG: 30 CAPSULE, DELAYED RELEASE ORAL at 09:38

## 2023-04-23 RX ADMIN — SODIUM CHLORIDE, PRESERVATIVE FREE 10 ML: 5 INJECTION INTRAVENOUS at 05:10

## 2023-04-23 RX ADMIN — EZETIMIBE 10 MG: 10 TABLET ORAL at 22:00

## 2023-04-23 RX ADMIN — ISOSORBIDE MONONITRATE 30 MG: 30 TABLET, EXTENDED RELEASE ORAL at 09:38

## 2023-04-23 RX ADMIN — MORPHINE SULFATE 4 MG: 2 INJECTION, SOLUTION INTRAMUSCULAR; INTRAVENOUS at 20:32

## 2023-04-23 RX ADMIN — ASPIRIN 81 MG: 81 TABLET, COATED ORAL at 09:38

## 2023-04-23 RX ADMIN — CARVEDILOL 6.25 MG: 3.12 TABLET, FILM COATED ORAL at 09:38

## 2023-04-23 RX ADMIN — MELATONIN 3 MG: at 20:35

## 2023-04-23 RX ADMIN — HYDROCODONE BITARTRATE AND ACETAMINOPHEN 1 TABLET: 5; 325 TABLET ORAL at 05:10

## 2023-04-23 RX ADMIN — SODIUM CHLORIDE, PRESERVATIVE FREE 10 ML: 5 INJECTION INTRAVENOUS at 11:51

## 2023-04-24 ENCOUNTER — ANESTHESIA (OUTPATIENT)
Dept: SURGERY | Age: 58
End: 2023-04-24
Payer: COMMERCIAL

## 2023-04-24 ENCOUNTER — ANESTHESIA EVENT (OUTPATIENT)
Dept: SURGERY | Age: 58
End: 2023-04-24
Payer: COMMERCIAL

## 2023-04-24 DIAGNOSIS — Z12.5 SCREENING FOR PROSTATE CANCER: Primary | ICD-10-CM

## 2023-04-24 LAB
ABO + RH BLD: NORMAL
ANION GAP SERPL CALC-SCNC: 5 MMOL/L (ref 5–15)
BLOOD GROUP ANTIBODIES SERPL: NORMAL
BUN SERPL-MCNC: 21 MG/DL (ref 6–20)
BUN/CREAT SERPL: 23 (ref 12–20)
CALCIUM SERPL-MCNC: 8.4 MG/DL (ref 8.5–10.1)
CHLORIDE SERPL-SCNC: 103 MMOL/L (ref 97–108)
CO2 SERPL-SCNC: 25 MMOL/L (ref 21–32)
COMMENT, HOLDF: NORMAL
CREAT SERPL-MCNC: 0.92 MG/DL (ref 0.7–1.3)
ERYTHROCYTE [DISTWIDTH] IN BLOOD BY AUTOMATED COUNT: 14 % (ref 11.5–14.5)
GLUCOSE BLD STRIP.AUTO-MCNC: 116 MG/DL (ref 65–117)
GLUCOSE SERPL-MCNC: 89 MG/DL (ref 65–100)
HCT VFR BLD AUTO: 38.1 % (ref 36.6–50.3)
HGB BLD-MCNC: 12.2 G/DL (ref 12.1–17)
MCH RBC QN AUTO: 28.8 PG (ref 26–34)
MCHC RBC AUTO-ENTMCNC: 32 G/DL (ref 30–36.5)
MCV RBC AUTO: 90.1 FL (ref 80–99)
NRBC # BLD: 0 K/UL (ref 0–0.01)
NRBC BLD-RTO: 0 PER 100 WBC
PLATELET # BLD AUTO: 287 K/UL (ref 150–400)
PMV BLD AUTO: 10.5 FL (ref 8.9–12.9)
POTASSIUM SERPL-SCNC: 4.2 MMOL/L (ref 3.5–5.1)
RBC # BLD AUTO: 4.23 M/UL (ref 4.1–5.7)
SAMPLES BEING HELD,HOLD: NORMAL
SERVICE CMNT-IMP: NORMAL
SODIUM SERPL-SCNC: 133 MMOL/L (ref 136–145)
SPECIMEN EXP DATE BLD: NORMAL
WBC # BLD AUTO: 11 K/UL (ref 4.1–11.1)

## 2023-04-24 PROCEDURE — 74011000250 HC RX REV CODE- 250: Performed by: NURSE PRACTITIONER

## 2023-04-24 PROCEDURE — 74011250636 HC RX REV CODE- 250/636: Performed by: SURGERY

## 2023-04-24 PROCEDURE — 77030002916 HC SUT ETHLN J&J -A: Performed by: SURGERY

## 2023-04-24 PROCEDURE — 74011250636 HC RX REV CODE- 250/636: Performed by: ANESTHESIOLOGY

## 2023-04-24 PROCEDURE — 74011000258 HC RX REV CODE- 258: Performed by: SURGERY

## 2023-04-24 PROCEDURE — 77030018809 HC RETRCTR ALXSO DISP AMR -B: Performed by: SURGERY

## 2023-04-24 PROCEDURE — 0DN80ZZ RELEASE SMALL INTESTINE, OPEN APPROACH: ICD-10-PCS | Performed by: SURGERY

## 2023-04-24 PROCEDURE — 88305 TISSUE EXAM BY PATHOLOGIST: CPT

## 2023-04-24 PROCEDURE — 74011000250 HC RX REV CODE- 250: Performed by: NURSE ANESTHETIST, CERTIFIED REGISTERED

## 2023-04-24 PROCEDURE — 77030038692 HC WND DEB SYS IRMX -B: Performed by: SURGERY

## 2023-04-24 PROCEDURE — 44120 REMOVAL OF SMALL INTESTINE: CPT | Performed by: SURGERY

## 2023-04-24 PROCEDURE — 65270000046 HC RM TELEMETRY

## 2023-04-24 PROCEDURE — 0DT80ZZ RESECTION OF SMALL INTESTINE, OPEN APPROACH: ICD-10-PCS | Performed by: SURGERY

## 2023-04-24 PROCEDURE — 74011250637 HC RX REV CODE- 250/637: Performed by: SURGERY

## 2023-04-24 PROCEDURE — 74011250636 HC RX REV CODE- 250/636: Performed by: INTERNAL MEDICINE

## 2023-04-24 PROCEDURE — 74011000250 HC RX REV CODE- 250: Performed by: SURGERY

## 2023-04-24 PROCEDURE — 77030008462 HC STPLR SKN PROX J&J -A: Performed by: SURGERY

## 2023-04-24 PROCEDURE — 77030012407 HC DRN WND BARD -B: Performed by: SURGERY

## 2023-04-24 PROCEDURE — 0DJD8ZZ INSPECTION OF LOWER INTESTINAL TRACT, VIA NATURAL OR ARTIFICIAL OPENING ENDOSCOPIC: ICD-10-PCS | Performed by: SURGERY

## 2023-04-24 PROCEDURE — 74011250637 HC RX REV CODE- 250/637: Performed by: STUDENT IN AN ORGANIZED HEALTH CARE EDUCATION/TRAINING PROGRAM

## 2023-04-24 PROCEDURE — 77030011264 HC ELECTRD BLD EXT COVD -A: Performed by: SURGERY

## 2023-04-24 PROCEDURE — 36415 COLL VENOUS BLD VENIPUNCTURE: CPT

## 2023-04-24 PROCEDURE — 2709999900 HC NON-CHARGEABLE SUPPLY: Performed by: SURGERY

## 2023-04-24 PROCEDURE — 77030002996 HC SUT SLK J&J -A: Performed by: SURGERY

## 2023-04-24 PROCEDURE — 77030011278 HC ELECTRD LIG IMPT COVD -F: Performed by: SURGERY

## 2023-04-24 PROCEDURE — 77030026438 HC STYL ET INTUB CARD -A: Performed by: NURSE ANESTHETIST, CERTIFIED REGISTERED

## 2023-04-24 PROCEDURE — 76060000037 HC ANESTHESIA 3 TO 3.5 HR: Performed by: SURGERY

## 2023-04-24 PROCEDURE — C9113 INJ PANTOPRAZOLE SODIUM, VIA: HCPCS | Performed by: NURSE PRACTITIONER

## 2023-04-24 PROCEDURE — 77030031139 HC SUT VCRL2 J&J -A: Performed by: SURGERY

## 2023-04-24 PROCEDURE — 76010000133 HC OR TIME 3 TO 3.5 HR: Performed by: SURGERY

## 2023-04-24 PROCEDURE — 82962 GLUCOSE BLOOD TEST: CPT

## 2023-04-24 PROCEDURE — 88307 TISSUE EXAM BY PATHOLOGIST: CPT

## 2023-04-24 PROCEDURE — 77030040923 HC STPLR ENDOSC ECHELON J&J -E: Performed by: SURGERY

## 2023-04-24 PROCEDURE — 86900 BLOOD TYPING SEROLOGIC ABO: CPT

## 2023-04-24 PROCEDURE — 94760 N-INVAS EAR/PLS OXIMETRY 1: CPT

## 2023-04-24 PROCEDURE — 77030013079 HC BLNKT BAIR HGGR 3M -A: Performed by: NURSE ANESTHETIST, CERTIFIED REGISTERED

## 2023-04-24 PROCEDURE — 99231 SBSQ HOSP IP/OBS SF/LOW 25: CPT | Performed by: SURGERY

## 2023-04-24 PROCEDURE — 87205 SMEAR GRAM STAIN: CPT

## 2023-04-24 PROCEDURE — 74011250636 HC RX REV CODE- 250/636: Performed by: NURSE ANESTHETIST, CERTIFIED REGISTERED

## 2023-04-24 PROCEDURE — 74011250636 HC RX REV CODE- 250/636: Performed by: NURSE PRACTITIONER

## 2023-04-24 PROCEDURE — 87075 CULTR BACTERIA EXCEPT BLOOD: CPT

## 2023-04-24 PROCEDURE — 80048 BASIC METABOLIC PNL TOTAL CA: CPT

## 2023-04-24 PROCEDURE — 77030009968 HC RELD STPLR ENDOSC J&J -D: Performed by: SURGERY

## 2023-04-24 PROCEDURE — 77030035236 HC SUT PDS STRATFX BARB J&J -B: Performed by: SURGERY

## 2023-04-24 PROCEDURE — 77030008684 HC TU ET CUF COVD -B: Performed by: NURSE ANESTHETIST, CERTIFIED REGISTERED

## 2023-04-24 PROCEDURE — 76210000016 HC OR PH I REC 1 TO 1.5 HR: Performed by: SURGERY

## 2023-04-24 PROCEDURE — 87077 CULTURE AEROBIC IDENTIFY: CPT

## 2023-04-24 PROCEDURE — 77030005513 HC CATH URETH FOL11 MDII -B: Performed by: SURGERY

## 2023-04-24 PROCEDURE — 77030013567 HC DRN WND RESERV BARD -A: Performed by: SURGERY

## 2023-04-24 PROCEDURE — 44140 PARTIAL REMOVAL OF COLON: CPT | Performed by: SURGERY

## 2023-04-24 PROCEDURE — 0DTN0ZZ RESECTION OF SIGMOID COLON, OPEN APPROACH: ICD-10-PCS | Performed by: SURGERY

## 2023-04-24 PROCEDURE — 85027 COMPLETE CBC AUTOMATED: CPT

## 2023-04-24 PROCEDURE — 87186 SC STD MICRODIL/AGAR DIL: CPT

## 2023-04-24 PROCEDURE — P9045 ALBUMIN (HUMAN), 5%, 250 ML: HCPCS | Performed by: NURSE ANESTHETIST, CERTIFIED REGISTERED

## 2023-04-24 PROCEDURE — 77030021678 HC GLIDESCP STAT DISP VERT -B: Performed by: NURSE ANESTHETIST, CERTIFIED REGISTERED

## 2023-04-24 RX ORDER — SODIUM CHLORIDE, SODIUM LACTATE, POTASSIUM CHLORIDE, CALCIUM CHLORIDE 600; 310; 30; 20 MG/100ML; MG/100ML; MG/100ML; MG/100ML
INJECTION, SOLUTION INTRAVENOUS
Status: DISCONTINUED | OUTPATIENT
Start: 2023-04-24 | End: 2023-04-24 | Stop reason: HOSPADM

## 2023-04-24 RX ORDER — EPHEDRINE SULFATE/0.9% NACL/PF 50 MG/5 ML
SYRINGE (ML) INTRAVENOUS AS NEEDED
Status: DISCONTINUED | OUTPATIENT
Start: 2023-04-24 | End: 2023-04-24 | Stop reason: HOSPADM

## 2023-04-24 RX ORDER — PROPOFOL 10 MG/ML
INJECTION, EMULSION INTRAVENOUS AS NEEDED
Status: DISCONTINUED | OUTPATIENT
Start: 2023-04-24 | End: 2023-04-24 | Stop reason: HOSPADM

## 2023-04-24 RX ORDER — PHENYLEPHRINE HCL IN 0.9% NACL 0.4MG/10ML
SYRINGE (ML) INTRAVENOUS AS NEEDED
Status: DISCONTINUED | OUTPATIENT
Start: 2023-04-24 | End: 2023-04-24 | Stop reason: HOSPADM

## 2023-04-24 RX ORDER — NEOSTIGMINE METHYLSULFATE 1 MG/ML
INJECTION, SOLUTION INTRAVENOUS AS NEEDED
Status: DISCONTINUED | OUTPATIENT
Start: 2023-04-24 | End: 2023-04-24 | Stop reason: HOSPADM

## 2023-04-24 RX ORDER — HYDROMORPHONE HYDROCHLORIDE 1 MG/ML
0.2 INJECTION, SOLUTION INTRAMUSCULAR; INTRAVENOUS; SUBCUTANEOUS
Status: DISCONTINUED | OUTPATIENT
Start: 2023-04-24 | End: 2023-04-24 | Stop reason: HOSPADM

## 2023-04-24 RX ORDER — ROCURONIUM BROMIDE 10 MG/ML
INJECTION, SOLUTION INTRAVENOUS AS NEEDED
Status: DISCONTINUED | OUTPATIENT
Start: 2023-04-24 | End: 2023-04-24 | Stop reason: HOSPADM

## 2023-04-24 RX ORDER — GLYCOPYRROLATE 0.2 MG/ML
INJECTION INTRAMUSCULAR; INTRAVENOUS AS NEEDED
Status: DISCONTINUED | OUTPATIENT
Start: 2023-04-24 | End: 2023-04-24 | Stop reason: HOSPADM

## 2023-04-24 RX ORDER — HYDROMORPHONE HYDROCHLORIDE 2 MG/ML
INJECTION, SOLUTION INTRAMUSCULAR; INTRAVENOUS; SUBCUTANEOUS AS NEEDED
Status: DISCONTINUED | OUTPATIENT
Start: 2023-04-24 | End: 2023-04-24 | Stop reason: HOSPADM

## 2023-04-24 RX ORDER — ONDANSETRON 2 MG/ML
4 INJECTION INTRAMUSCULAR; INTRAVENOUS AS NEEDED
Status: DISCONTINUED | OUTPATIENT
Start: 2023-04-24 | End: 2023-04-24 | Stop reason: HOSPADM

## 2023-04-24 RX ORDER — SODIUM CHLORIDE 0.9 % (FLUSH) 0.9 %
5-40 SYRINGE (ML) INJECTION EVERY 8 HOURS
Status: DISCONTINUED | OUTPATIENT
Start: 2023-04-24 | End: 2023-05-04 | Stop reason: HOSPADM

## 2023-04-24 RX ORDER — ONDANSETRON 2 MG/ML
INJECTION INTRAMUSCULAR; INTRAVENOUS AS NEEDED
Status: DISCONTINUED | OUTPATIENT
Start: 2023-04-24 | End: 2023-04-24 | Stop reason: HOSPADM

## 2023-04-24 RX ORDER — MIDAZOLAM HYDROCHLORIDE 1 MG/ML
INJECTION, SOLUTION INTRAMUSCULAR; INTRAVENOUS AS NEEDED
Status: DISCONTINUED | OUTPATIENT
Start: 2023-04-24 | End: 2023-04-24 | Stop reason: HOSPADM

## 2023-04-24 RX ORDER — SODIUM CHLORIDE 0.9 % (FLUSH) 0.9 %
5-40 SYRINGE (ML) INJECTION AS NEEDED
Status: DISCONTINUED | OUTPATIENT
Start: 2023-04-24 | End: 2023-05-04 | Stop reason: HOSPADM

## 2023-04-24 RX ORDER — SODIUM CHLORIDE 9 MG/ML
INJECTION, SOLUTION INTRAVENOUS
Status: DISCONTINUED | OUTPATIENT
Start: 2023-04-24 | End: 2023-04-24 | Stop reason: HOSPADM

## 2023-04-24 RX ORDER — FENTANYL CITRATE 50 UG/ML
25 INJECTION, SOLUTION INTRAMUSCULAR; INTRAVENOUS
Status: DISCONTINUED | OUTPATIENT
Start: 2023-04-24 | End: 2023-04-24 | Stop reason: HOSPADM

## 2023-04-24 RX ORDER — SUCCINYLCHOLINE CHLORIDE 20 MG/ML
INJECTION INTRAMUSCULAR; INTRAVENOUS AS NEEDED
Status: DISCONTINUED | OUTPATIENT
Start: 2023-04-24 | End: 2023-04-24 | Stop reason: HOSPADM

## 2023-04-24 RX ORDER — SODIUM CHLORIDE 0.9 % (FLUSH) 0.9 %
5-40 SYRINGE (ML) INJECTION EVERY 8 HOURS
Status: DISCONTINUED | OUTPATIENT
Start: 2023-04-24 | End: 2023-04-24 | Stop reason: HOSPADM

## 2023-04-24 RX ORDER — SODIUM CHLORIDE 0.9 % (FLUSH) 0.9 %
5-40 SYRINGE (ML) INJECTION AS NEEDED
Status: DISCONTINUED | OUTPATIENT
Start: 2023-04-24 | End: 2023-04-24 | Stop reason: HOSPADM

## 2023-04-24 RX ORDER — ALBUMIN HUMAN 50 G/1000ML
SOLUTION INTRAVENOUS AS NEEDED
Status: DISCONTINUED | OUTPATIENT
Start: 2023-04-24 | End: 2023-04-24 | Stop reason: HOSPADM

## 2023-04-24 RX ORDER — DEXAMETHASONE SODIUM PHOSPHATE 4 MG/ML
INJECTION, SOLUTION INTRA-ARTICULAR; INTRALESIONAL; INTRAMUSCULAR; INTRAVENOUS; SOFT TISSUE AS NEEDED
Status: DISCONTINUED | OUTPATIENT
Start: 2023-04-24 | End: 2023-04-24 | Stop reason: HOSPADM

## 2023-04-24 RX ADMIN — GLYCOPYRROLATE 0.6 MG: 0.2 INJECTION, SOLUTION INTRAMUSCULAR; INTRAVENOUS at 17:57

## 2023-04-24 RX ADMIN — MORPHINE SULFATE 4 MG: 2 INJECTION, SOLUTION INTRAMUSCULAR; INTRAVENOUS at 06:15

## 2023-04-24 RX ADMIN — SUCCINYLCHOLINE CHLORIDE 180 MG: 20 INJECTION, SOLUTION INTRAMUSCULAR; INTRAVENOUS at 15:04

## 2023-04-24 RX ADMIN — HYDROMORPHONE HYDROCHLORIDE 0.8 MG: 2 INJECTION, SOLUTION INTRAMUSCULAR; INTRAVENOUS; SUBCUTANEOUS at 15:16

## 2023-04-24 RX ADMIN — SODIUM CHLORIDE 50 ML/HR: 9 INJECTION, SOLUTION INTRAVENOUS at 18:44

## 2023-04-24 RX ADMIN — ENOXAPARIN SODIUM 40 MG: 100 INJECTION SUBCUTANEOUS at 20:48

## 2023-04-24 RX ADMIN — CARVEDILOL 6.25 MG: 3.12 TABLET, FILM COATED ORAL at 22:36

## 2023-04-24 RX ADMIN — Medication: at 19:11

## 2023-04-24 RX ADMIN — ROCURONIUM BROMIDE 30 MG: 10 INJECTION INTRAVENOUS at 17:00

## 2023-04-24 RX ADMIN — FENTANYL CITRATE 25 MCG: 50 INJECTION, SOLUTION INTRAMUSCULAR; INTRAVENOUS at 18:50

## 2023-04-24 RX ADMIN — PIPERACILLIN AND TAZOBACTAM 3.38 G: 3; .375 INJECTION, POWDER, LYOPHILIZED, FOR SOLUTION INTRAVENOUS at 00:58

## 2023-04-24 RX ADMIN — ALBUMIN (HUMAN) 12.5 G: 2.5 SOLUTION INTRAVENOUS at 16:00

## 2023-04-24 RX ADMIN — PHENYLEPHRINE HYDROCHLORIDE 20 MCG/MIN: 10 INJECTION INTRAVENOUS at 16:45

## 2023-04-24 RX ADMIN — PIPERACILLIN AND TAZOBACTAM 3.38 G: 3; .375 INJECTION, POWDER, LYOPHILIZED, FOR SOLUTION INTRAVENOUS at 09:03

## 2023-04-24 RX ADMIN — EZETIMIBE 10 MG: 10 TABLET ORAL at 22:36

## 2023-04-24 RX ADMIN — SODIUM CHLORIDE, POTASSIUM CHLORIDE, SODIUM LACTATE AND CALCIUM CHLORIDE: 600; 310; 30; 20 INJECTION, SOLUTION INTRAVENOUS at 14:56

## 2023-04-24 RX ADMIN — Medication 5 MG: at 17:57

## 2023-04-24 RX ADMIN — CARVEDILOL 6.25 MG: 3.12 TABLET, FILM COATED ORAL at 09:02

## 2023-04-24 RX ADMIN — PIPERACILLIN AND TAZOBACTAM 3.38 G: 3; .375 INJECTION, POWDER, LYOPHILIZED, FOR SOLUTION INTRAVENOUS at 22:37

## 2023-04-24 RX ADMIN — SODIUM CHLORIDE: 9 INJECTION, SOLUTION INTRAVENOUS at 15:10

## 2023-04-24 RX ADMIN — SODIUM CHLORIDE, PRESERVATIVE FREE 10 ML: 5 INJECTION INTRAVENOUS at 22:51

## 2023-04-24 RX ADMIN — Medication 10 MG: at 15:23

## 2023-04-24 RX ADMIN — HYDROMORPHONE HYDROCHLORIDE 0.4 MG: 2 INJECTION, SOLUTION INTRAMUSCULAR; INTRAVENOUS; SUBCUTANEOUS at 18:01

## 2023-04-24 RX ADMIN — MIDAZOLAM HYDROCHLORIDE 2 MG: 1 INJECTION, SOLUTION INTRAMUSCULAR; INTRAVENOUS at 14:53

## 2023-04-24 RX ADMIN — MORPHINE SULFATE 4 MG: 2 INJECTION, SOLUTION INTRAMUSCULAR; INTRAVENOUS at 01:05

## 2023-04-24 RX ADMIN — Medication 40 MCG: at 16:37

## 2023-04-24 RX ADMIN — SODIUM CHLORIDE 50 ML/HR: 9 INJECTION, SOLUTION INTRAVENOUS at 01:04

## 2023-04-24 RX ADMIN — FENTANYL CITRATE 25 MCG: 50 INJECTION, SOLUTION INTRAMUSCULAR; INTRAVENOUS at 18:55

## 2023-04-24 RX ADMIN — ISOSORBIDE MONONITRATE 30 MG: 30 TABLET, EXTENDED RELEASE ORAL at 09:03

## 2023-04-24 RX ADMIN — Medication 10 MG: at 15:41

## 2023-04-24 RX ADMIN — SODIUM CHLORIDE, PRESERVATIVE FREE 40 MG: 5 INJECTION INTRAVENOUS at 09:03

## 2023-04-24 RX ADMIN — FENTANYL CITRATE 50 MCG: 50 INJECTION, SOLUTION INTRAMUSCULAR; INTRAVENOUS at 18:26

## 2023-04-24 RX ADMIN — DEXAMETHASONE SODIUM PHOSPHATE 4 MG: 4 INJECTION, SOLUTION INTRAMUSCULAR; INTRAVENOUS at 15:18

## 2023-04-24 RX ADMIN — Medication 10 MG: at 16:37

## 2023-04-24 RX ADMIN — PIPERACILLIN AND TAZOBACTAM 3.38 G: 3; .375 INJECTION, POWDER, LYOPHILIZED, FOR SOLUTION INTRAVENOUS at 15:19

## 2023-04-24 RX ADMIN — SODIUM CHLORIDE, PRESERVATIVE FREE 10 ML: 5 INJECTION INTRAVENOUS at 06:15

## 2023-04-24 RX ADMIN — PROPOFOL 120 MG: 10 INJECTION, EMULSION INTRAVENOUS at 15:04

## 2023-04-24 RX ADMIN — ROCURONIUM BROMIDE 20 MG: 10 INJECTION INTRAVENOUS at 16:09

## 2023-04-24 RX ADMIN — DULOXETINE 60 MG: 30 CAPSULE, DELAYED RELEASE ORAL at 09:02

## 2023-04-24 RX ADMIN — SODIUM CHLORIDE, PRESERVATIVE FREE 10 ML: 5 INJECTION INTRAVENOUS at 22:50

## 2023-04-24 RX ADMIN — ONDANSETRON HYDROCHLORIDE 4 MG: 2 INJECTION, SOLUTION INTRAMUSCULAR; INTRAVENOUS at 17:51

## 2023-04-24 RX ADMIN — ATORVASTATIN CALCIUM 80 MG: 40 TABLET, FILM COATED ORAL at 09:03

## 2023-04-24 RX ADMIN — SODIUM CHLORIDE, POTASSIUM CHLORIDE, SODIUM LACTATE AND CALCIUM CHLORIDE: 600; 310; 30; 20 INJECTION, SOLUTION INTRAVENOUS at 16:48

## 2023-04-24 RX ADMIN — Medication 3 AMPULE: at 14:49

## 2023-04-24 RX ADMIN — ROCURONIUM BROMIDE 50 MG: 10 INJECTION INTRAVENOUS at 15:13

## 2023-04-24 NOTE — ANESTHESIA POSTPROCEDURE EVALUATION
Procedure(s):  Small bowel resection Sigmoid colon resection  Lysis of adhesions Rigid sigmoidoscopy. general    Anesthesia Post Evaluation      Multimodal analgesia: multimodal analgesia used between 6 hours prior to anesthesia start to PACU discharge  Patient location during evaluation: bedside  Patient participation: complete - patient participated  Level of consciousness: awake  Pain management: adequate  Airway patency: patent  Anesthetic complications: no  Cardiovascular status: acceptable  Respiratory status: acceptable  Hydration status: acceptable  Post anesthesia nausea and vomiting:  controlled  Final Post Anesthesia Temperature Assessment:  Normothermia (36.0-37.5 degrees C)      INITIAL Post-op Vital signs:   Vitals Value Taken Time   /87 04/24/23 1915   Temp 37 °C (98.6 °F) 04/24/23 1826   Pulse 76 04/24/23 1927   Resp 9 04/24/23 1927   SpO2 95 % 04/24/23 1927   Vitals shown include unvalidated device data.

## 2023-04-24 NOTE — ANESTHESIA PREPROCEDURE EVALUATION
Anesthetic History   No history of anesthetic complications            Review of Systems / Medical History  Patient summary reviewed, nursing notes reviewed and pertinent labs reviewed    Pulmonary        Sleep apnea  Smoker  Asthma : well controlled       Neuro/Psych         Psychiatric history     Cardiovascular    Hypertension: well controlled          Past MI (09/2018), CAD, cardiac stents and hyperlipidemia    Exercise tolerance: >4 METS  Comments: TTE 2022    Left Ventricle: Mildly reduced left ventricular systolic function with a visually estimated EF of 40 - 45%. Left ventricle size is normal. Mildly increased wall thickness. Akinesis of the following segments: apical anterior, apical lateral, apical septal and apical inferior. Akinesis of the apex. Normal diastolic function for age.   Aortic Valve: Tricuspid valve. Mildly calcified noncoronary cusp.   Pulmonic Valve: Trace regurgitation. GI/Hepatic/Renal  Within defined limits              Endo/Other    Diabetes    Obesity     Other Findings   Comments: CT A/P    Unchanged small bowel obstruction due to inflamed small bowel loop  with associated extraluminal gas.          Physical Exam    Airway  Mallampati: II  TM Distance: 4 - 6 cm  Neck ROM: normal range of motion   Mouth opening: Normal     Cardiovascular  Regular rate and rhythm,  S1 and S2 normal,  no murmur, click, rub, or gallop             Dental    Dentition: Full upper dentures and Full lower dentures     Pulmonary  Breath sounds clear to auscultation               Abdominal  GI exam deferred       Other Findings            Anesthetic Plan    ASA: 3  Anesthesia type: general    Monitoring Plan: BIS      Induction: Intravenous and RSI  Anesthetic plan and risks discussed with: Patient      Possible arterial line

## 2023-04-25 LAB
ANION GAP SERPL CALC-SCNC: 8 MMOL/L (ref 5–15)
BUN SERPL-MCNC: 25 MG/DL (ref 6–20)
BUN/CREAT SERPL: 32 (ref 12–20)
CALCIUM SERPL-MCNC: 7.9 MG/DL (ref 8.5–10.1)
CHLORIDE SERPL-SCNC: 106 MMOL/L (ref 97–108)
CO2 SERPL-SCNC: 24 MMOL/L (ref 21–32)
CREAT SERPL-MCNC: 0.78 MG/DL (ref 0.7–1.3)
ERYTHROCYTE [DISTWIDTH] IN BLOOD BY AUTOMATED COUNT: 14.2 % (ref 11.5–14.5)
GLUCOSE SERPL-MCNC: 119 MG/DL (ref 65–100)
HCT VFR BLD AUTO: 40.7 % (ref 36.6–50.3)
HGB BLD-MCNC: 12.7 G/DL (ref 12.1–17)
MCH RBC QN AUTO: 28.2 PG (ref 26–34)
MCHC RBC AUTO-ENTMCNC: 31.2 G/DL (ref 30–36.5)
MCV RBC AUTO: 90.2 FL (ref 80–99)
NRBC # BLD: 0 K/UL (ref 0–0.01)
NRBC BLD-RTO: 0 PER 100 WBC
PLATELET # BLD AUTO: 320 K/UL (ref 150–400)
PMV BLD AUTO: 10.2 FL (ref 8.9–12.9)
POTASSIUM SERPL-SCNC: 4.5 MMOL/L (ref 3.5–5.1)
RBC # BLD AUTO: 4.51 M/UL (ref 4.1–5.7)
SODIUM SERPL-SCNC: 138 MMOL/L (ref 136–145)
WBC # BLD AUTO: 15.1 K/UL (ref 4.1–11.1)

## 2023-04-25 PROCEDURE — 36415 COLL VENOUS BLD VENIPUNCTURE: CPT

## 2023-04-25 PROCEDURE — 74011250637 HC RX REV CODE- 250/637: Performed by: SURGERY

## 2023-04-25 PROCEDURE — 74011250636 HC RX REV CODE- 250/636: Performed by: SURGERY

## 2023-04-25 PROCEDURE — C9113 INJ PANTOPRAZOLE SODIUM, VIA: HCPCS | Performed by: SURGERY

## 2023-04-25 PROCEDURE — 74011000250 HC RX REV CODE- 250: Performed by: SURGERY

## 2023-04-25 PROCEDURE — 80048 BASIC METABOLIC PNL TOTAL CA: CPT

## 2023-04-25 PROCEDURE — 65270000046 HC RM TELEMETRY

## 2023-04-25 PROCEDURE — 94760 N-INVAS EAR/PLS OXIMETRY 1: CPT

## 2023-04-25 PROCEDURE — 74011000258 HC RX REV CODE- 258: Performed by: SURGERY

## 2023-04-25 PROCEDURE — 77010033678 HC OXYGEN DAILY

## 2023-04-25 PROCEDURE — 85027 COMPLETE CBC AUTOMATED: CPT

## 2023-04-25 RX ADMIN — SODIUM CHLORIDE, PRESERVATIVE FREE 10 ML: 5 INJECTION INTRAVENOUS at 21:02

## 2023-04-25 RX ADMIN — ASPIRIN 81 MG: 81 TABLET, COATED ORAL at 09:55

## 2023-04-25 RX ADMIN — MAGNESIUM SULFATE HEPTAHYDRATE: 500 INJECTION, SOLUTION INTRAMUSCULAR; INTRAVENOUS at 18:46

## 2023-04-25 RX ADMIN — CARVEDILOL 6.25 MG: 3.12 TABLET, FILM COATED ORAL at 09:55

## 2023-04-25 RX ADMIN — SODIUM CHLORIDE, PRESERVATIVE FREE 10 ML: 5 INJECTION INTRAVENOUS at 06:16

## 2023-04-25 RX ADMIN — MORPHINE SULFATE 4 MG: 2 INJECTION, SOLUTION INTRAMUSCULAR; INTRAVENOUS at 20:21

## 2023-04-25 RX ADMIN — SODIUM CHLORIDE, PRESERVATIVE FREE 10 ML: 5 INJECTION INTRAVENOUS at 17:17

## 2023-04-25 RX ADMIN — SODIUM CHLORIDE, PRESERVATIVE FREE 10 ML: 5 INJECTION INTRAVENOUS at 17:16

## 2023-04-25 RX ADMIN — MORPHINE SULFATE 4 MG: 2 INJECTION, SOLUTION INTRAMUSCULAR; INTRAVENOUS at 01:47

## 2023-04-25 RX ADMIN — ENOXAPARIN SODIUM 40 MG: 100 INJECTION SUBCUTANEOUS at 20:23

## 2023-04-25 RX ADMIN — PIPERACILLIN AND TAZOBACTAM 3.38 G: 3; .375 INJECTION, POWDER, LYOPHILIZED, FOR SOLUTION INTRAVENOUS at 06:16

## 2023-04-25 RX ADMIN — SODIUM CHLORIDE, PRESERVATIVE FREE 10 ML: 5 INJECTION INTRAVENOUS at 14:00

## 2023-04-25 RX ADMIN — PIPERACILLIN AND TAZOBACTAM 3.38 G: 3; .375 INJECTION, POWDER, LYOPHILIZED, FOR SOLUTION INTRAVENOUS at 16:14

## 2023-04-25 RX ADMIN — SODIUM CHLORIDE, PRESERVATIVE FREE 40 MG: 5 INJECTION INTRAVENOUS at 09:55

## 2023-04-25 RX ADMIN — ISOSORBIDE MONONITRATE 30 MG: 30 TABLET, EXTENDED RELEASE ORAL at 09:55

## 2023-04-25 RX ADMIN — MORPHINE SULFATE 4 MG: 2 INJECTION, SOLUTION INTRAMUSCULAR; INTRAVENOUS at 10:22

## 2023-04-25 RX ADMIN — SODIUM CHLORIDE, PRESERVATIVE FREE 10 ML: 5 INJECTION INTRAVENOUS at 21:01

## 2023-04-25 RX ADMIN — MORPHINE SULFATE 4 MG: 2 INJECTION, SOLUTION INTRAMUSCULAR; INTRAVENOUS at 16:20

## 2023-04-25 RX ADMIN — CARVEDILOL 6.25 MG: 3.12 TABLET, FILM COATED ORAL at 17:16

## 2023-04-25 RX ADMIN — MORPHINE SULFATE 4 MG: 2 INJECTION, SOLUTION INTRAMUSCULAR; INTRAVENOUS at 06:15

## 2023-04-25 RX ADMIN — PIPERACILLIN AND TAZOBACTAM 3.38 G: 3; .375 INJECTION, POWDER, LYOPHILIZED, FOR SOLUTION INTRAVENOUS at 22:33

## 2023-04-25 RX ADMIN — ATORVASTATIN CALCIUM 80 MG: 40 TABLET, FILM COATED ORAL at 09:55

## 2023-04-25 RX ADMIN — EZETIMIBE 10 MG: 10 TABLET ORAL at 21:01

## 2023-04-25 RX ADMIN — DULOXETINE 60 MG: 30 CAPSULE, DELAYED RELEASE ORAL at 09:55

## 2023-04-26 LAB
ALBUMIN SERPL-MCNC: 2.1 G/DL (ref 3.5–5)
ALBUMIN/GLOB SERPL: 0.6 (ref 1.1–2.2)
ALP SERPL-CCNC: 58 U/L (ref 45–117)
ALT SERPL-CCNC: 19 U/L (ref 12–78)
ANION GAP SERPL CALC-SCNC: 2 MMOL/L (ref 5–15)
AST SERPL-CCNC: 19 U/L (ref 15–37)
BACTERIA SPEC CULT: ABNORMAL
BACTERIA SPEC CULT: ABNORMAL
BACTERIA SPEC CULT: NORMAL
BILIRUB SERPL-MCNC: 0.4 MG/DL (ref 0.2–1)
BUN SERPL-MCNC: 22 MG/DL (ref 6–20)
BUN/CREAT SERPL: 27 (ref 12–20)
CALCIUM SERPL-MCNC: 8.1 MG/DL (ref 8.5–10.1)
CHLORIDE SERPL-SCNC: 104 MMOL/L (ref 97–108)
CO2 SERPL-SCNC: 30 MMOL/L (ref 21–32)
CREAT SERPL-MCNC: 0.83 MG/DL (ref 0.7–1.3)
ERYTHROCYTE [DISTWIDTH] IN BLOOD BY AUTOMATED COUNT: 14 % (ref 11.5–14.5)
GLOBULIN SER CALC-MCNC: 3.8 G/DL (ref 2–4)
GLUCOSE SERPL-MCNC: 157 MG/DL (ref 65–100)
GRAM STN SPEC: ABNORMAL
GRAM STN SPEC: ABNORMAL
HCT VFR BLD AUTO: 36.2 % (ref 36.6–50.3)
HGB BLD-MCNC: 11.2 G/DL (ref 12.1–17)
MAGNESIUM SERPL-MCNC: 2.2 MG/DL (ref 1.6–2.4)
MCH RBC QN AUTO: 27.9 PG (ref 26–34)
MCHC RBC AUTO-ENTMCNC: 30.9 G/DL (ref 30–36.5)
MCV RBC AUTO: 90.3 FL (ref 80–99)
NRBC # BLD: 0 K/UL (ref 0–0.01)
NRBC BLD-RTO: 0 PER 100 WBC
PHOSPHATE SERPL-MCNC: 1.8 MG/DL (ref 2.6–4.7)
PLATELET # BLD AUTO: 310 K/UL (ref 150–400)
PMV BLD AUTO: 9.8 FL (ref 8.9–12.9)
POTASSIUM SERPL-SCNC: 4.3 MMOL/L (ref 3.5–5.1)
PROT SERPL-MCNC: 5.9 G/DL (ref 6.4–8.2)
RBC # BLD AUTO: 4.01 M/UL (ref 4.1–5.7)
SERVICE CMNT-IMP: ABNORMAL
SERVICE CMNT-IMP: NORMAL
SODIUM SERPL-SCNC: 136 MMOL/L (ref 136–145)
WBC # BLD AUTO: 15 K/UL (ref 4.1–11.1)

## 2023-04-26 PROCEDURE — 74011250637 HC RX REV CODE- 250/637: Performed by: SURGERY

## 2023-04-26 PROCEDURE — 80053 COMPREHEN METABOLIC PANEL: CPT

## 2023-04-26 PROCEDURE — 74011000258 HC RX REV CODE- 258: Performed by: SURGERY

## 2023-04-26 PROCEDURE — 77010033678 HC OXYGEN DAILY

## 2023-04-26 PROCEDURE — 74011000250 HC RX REV CODE- 250: Performed by: SURGERY

## 2023-04-26 PROCEDURE — 84100 ASSAY OF PHOSPHORUS: CPT

## 2023-04-26 PROCEDURE — 74011250636 HC RX REV CODE- 250/636: Performed by: SURGERY

## 2023-04-26 PROCEDURE — 36415 COLL VENOUS BLD VENIPUNCTURE: CPT

## 2023-04-26 PROCEDURE — 94760 N-INVAS EAR/PLS OXIMETRY 1: CPT

## 2023-04-26 PROCEDURE — 65270000046 HC RM TELEMETRY

## 2023-04-26 PROCEDURE — 83735 ASSAY OF MAGNESIUM: CPT

## 2023-04-26 PROCEDURE — C9113 INJ PANTOPRAZOLE SODIUM, VIA: HCPCS | Performed by: SURGERY

## 2023-04-26 PROCEDURE — 74011250637 HC RX REV CODE- 250/637: Performed by: NURSE PRACTITIONER

## 2023-04-26 PROCEDURE — 85027 COMPLETE CBC AUTOMATED: CPT

## 2023-04-26 RX ORDER — FLUCONAZOLE 2 MG/ML
400 INJECTION, SOLUTION INTRAVENOUS EVERY 24 HOURS
Status: DISCONTINUED | OUTPATIENT
Start: 2023-04-26 | End: 2023-05-04 | Stop reason: HOSPADM

## 2023-04-26 RX ORDER — NYSTATIN 100000 [USP'U]/ML
500000 SUSPENSION ORAL 4 TIMES DAILY
Status: DISCONTINUED | OUTPATIENT
Start: 2023-04-26 | End: 2023-05-04 | Stop reason: HOSPADM

## 2023-04-26 RX ADMIN — CARVEDILOL 6.25 MG: 3.12 TABLET, FILM COATED ORAL at 16:32

## 2023-04-26 RX ADMIN — SODIUM CHLORIDE, PRESERVATIVE FREE 10 ML: 5 INJECTION INTRAVENOUS at 05:09

## 2023-04-26 RX ADMIN — HYDROCODONE BITARTRATE AND ACETAMINOPHEN 1 TABLET: 5; 325 TABLET ORAL at 16:53

## 2023-04-26 RX ADMIN — FLUCONAZOLE 400 MG: 400 INJECTION, SOLUTION INTRAVENOUS at 11:18

## 2023-04-26 RX ADMIN — PIPERACILLIN AND TAZOBACTAM 3.38 G: 3; .375 INJECTION, POWDER, LYOPHILIZED, FOR SOLUTION INTRAVENOUS at 21:09

## 2023-04-26 RX ADMIN — EZETIMIBE 10 MG: 10 TABLET ORAL at 21:11

## 2023-04-26 RX ADMIN — ASPIRIN 81 MG: 81 TABLET, COATED ORAL at 09:40

## 2023-04-26 RX ADMIN — SODIUM CHLORIDE, PRESERVATIVE FREE 40 MG: 5 INJECTION INTRAVENOUS at 09:43

## 2023-04-26 RX ADMIN — PIPERACILLIN AND TAZOBACTAM 3.38 G: 3; .375 INJECTION, POWDER, LYOPHILIZED, FOR SOLUTION INTRAVENOUS at 06:17

## 2023-04-26 RX ADMIN — ENOXAPARIN SODIUM 40 MG: 100 INJECTION SUBCUTANEOUS at 21:10

## 2023-04-26 RX ADMIN — ISOSORBIDE MONONITRATE 30 MG: 30 TABLET, EXTENDED RELEASE ORAL at 09:50

## 2023-04-26 RX ADMIN — MORPHINE SULFATE 4 MG: 2 INJECTION, SOLUTION INTRAMUSCULAR; INTRAVENOUS at 01:24

## 2023-04-26 RX ADMIN — CARVEDILOL 6.25 MG: 3.12 TABLET, FILM COATED ORAL at 09:40

## 2023-04-26 RX ADMIN — ATORVASTATIN CALCIUM 80 MG: 40 TABLET, FILM COATED ORAL at 09:40

## 2023-04-26 RX ADMIN — MORPHINE SULFATE 4 MG: 2 INJECTION, SOLUTION INTRAMUSCULAR; INTRAVENOUS at 05:55

## 2023-04-26 RX ADMIN — SODIUM CHLORIDE, PRESERVATIVE FREE 10 ML: 5 INJECTION INTRAVENOUS at 21:11

## 2023-04-26 RX ADMIN — SODIUM CHLORIDE, PRESERVATIVE FREE 10 ML: 5 INJECTION INTRAVENOUS at 05:08

## 2023-04-26 RX ADMIN — NYSTATIN 500000 UNITS: 100000 SUSPENSION ORAL at 18:33

## 2023-04-26 RX ADMIN — SODIUM CHLORIDE 50 ML/HR: 9 INJECTION, SOLUTION INTRAVENOUS at 05:55

## 2023-04-26 RX ADMIN — SODIUM CHLORIDE, PRESERVATIVE FREE 10 ML: 5 INJECTION INTRAVENOUS at 17:46

## 2023-04-26 RX ADMIN — POTASSIUM PHOSPHATE, MONOBASIC AND POTASSIUM PHOSPHATE, DIBASIC: 224; 236 INJECTION, SOLUTION, CONCENTRATE INTRAVENOUS at 14:16

## 2023-04-26 RX ADMIN — DULOXETINE 60 MG: 30 CAPSULE, DELAYED RELEASE ORAL at 09:40

## 2023-04-26 RX ADMIN — MAGNESIUM SULFATE HEPTAHYDRATE: 500 INJECTION, SOLUTION INTRAMUSCULAR; INTRAVENOUS at 18:53

## 2023-04-27 LAB
ANION GAP SERPL CALC-SCNC: 5 MMOL/L (ref 5–15)
BUN SERPL-MCNC: 16 MG/DL (ref 6–20)
BUN/CREAT SERPL: 26 (ref 12–20)
CALCIUM SERPL-MCNC: 8.2 MG/DL (ref 8.5–10.1)
CHLORIDE SERPL-SCNC: 101 MMOL/L (ref 97–108)
CO2 SERPL-SCNC: 30 MMOL/L (ref 21–32)
CREAT SERPL-MCNC: 0.61 MG/DL (ref 0.7–1.3)
ERYTHROCYTE [DISTWIDTH] IN BLOOD BY AUTOMATED COUNT: 13.8 % (ref 11.5–14.5)
GLUCOSE SERPL-MCNC: 115 MG/DL (ref 65–100)
HCT VFR BLD AUTO: 32 % (ref 36.6–50.3)
HGB BLD-MCNC: 10.4 G/DL (ref 12.1–17)
MAGNESIUM SERPL-MCNC: 1.9 MG/DL (ref 1.6–2.4)
MCH RBC QN AUTO: 28.9 PG (ref 26–34)
MCHC RBC AUTO-ENTMCNC: 32.5 G/DL (ref 30–36.5)
MCV RBC AUTO: 88.9 FL (ref 80–99)
NRBC # BLD: 0 K/UL (ref 0–0.01)
NRBC BLD-RTO: 0 PER 100 WBC
PHOSPHATE SERPL-MCNC: 1.7 MG/DL (ref 2.6–4.7)
PLATELET # BLD AUTO: 311 K/UL (ref 150–400)
PMV BLD AUTO: 10.3 FL (ref 8.9–12.9)
POTASSIUM SERPL-SCNC: 3.6 MMOL/L (ref 3.5–5.1)
RBC # BLD AUTO: 3.6 M/UL (ref 4.1–5.7)
SODIUM SERPL-SCNC: 136 MMOL/L (ref 136–145)
WBC # BLD AUTO: 15.5 K/UL (ref 4.1–11.1)

## 2023-04-27 PROCEDURE — 83735 ASSAY OF MAGNESIUM: CPT

## 2023-04-27 PROCEDURE — 74011250637 HC RX REV CODE- 250/637: Performed by: SURGERY

## 2023-04-27 PROCEDURE — 74011000258 HC RX REV CODE- 258: Performed by: SURGERY

## 2023-04-27 PROCEDURE — 74011000258 HC RX REV CODE- 258: Performed by: STUDENT IN AN ORGANIZED HEALTH CARE EDUCATION/TRAINING PROGRAM

## 2023-04-27 PROCEDURE — 65270000046 HC RM TELEMETRY

## 2023-04-27 PROCEDURE — 74011000250 HC RX REV CODE- 250: Performed by: SURGERY

## 2023-04-27 PROCEDURE — 74011250636 HC RX REV CODE- 250/636: Performed by: SURGERY

## 2023-04-27 PROCEDURE — 36415 COLL VENOUS BLD VENIPUNCTURE: CPT

## 2023-04-27 PROCEDURE — 85027 COMPLETE CBC AUTOMATED: CPT

## 2023-04-27 PROCEDURE — 80048 BASIC METABOLIC PNL TOTAL CA: CPT

## 2023-04-27 PROCEDURE — 74011000250 HC RX REV CODE- 250: Performed by: STUDENT IN AN ORGANIZED HEALTH CARE EDUCATION/TRAINING PROGRAM

## 2023-04-27 PROCEDURE — 84100 ASSAY OF PHOSPHORUS: CPT

## 2023-04-27 PROCEDURE — C9113 INJ PANTOPRAZOLE SODIUM, VIA: HCPCS | Performed by: SURGERY

## 2023-04-27 PROCEDURE — 94760 N-INVAS EAR/PLS OXIMETRY 1: CPT

## 2023-04-27 RX ADMIN — CARVEDILOL 6.25 MG: 3.12 TABLET, FILM COATED ORAL at 08:35

## 2023-04-27 RX ADMIN — HYDROCODONE BITARTRATE AND ACETAMINOPHEN 1 TABLET: 5; 325 TABLET ORAL at 03:10

## 2023-04-27 RX ADMIN — ISOSORBIDE MONONITRATE 30 MG: 30 TABLET, EXTENDED RELEASE ORAL at 08:35

## 2023-04-27 RX ADMIN — DULOXETINE 60 MG: 30 CAPSULE, DELAYED RELEASE ORAL at 08:34

## 2023-04-27 RX ADMIN — MAGNESIUM SULFATE HEPTAHYDRATE: 500 INJECTION, SOLUTION INTRAMUSCULAR; INTRAVENOUS at 18:07

## 2023-04-27 RX ADMIN — PIPERACILLIN AND TAZOBACTAM 3.38 G: 3; .375 INJECTION, POWDER, LYOPHILIZED, FOR SOLUTION INTRAVENOUS at 04:32

## 2023-04-27 RX ADMIN — FLUCONAZOLE 400 MG: 400 INJECTION, SOLUTION INTRAVENOUS at 09:29

## 2023-04-27 RX ADMIN — PIPERACILLIN AND TAZOBACTAM 3.38 G: 3; .375 INJECTION, POWDER, LYOPHILIZED, FOR SOLUTION INTRAVENOUS at 21:31

## 2023-04-27 RX ADMIN — POTASSIUM PHOSPHATE, MONOBASIC AND POTASSIUM PHOSPHATE, DIBASIC: 224; 236 INJECTION, SOLUTION, CONCENTRATE INTRAVENOUS at 12:49

## 2023-04-27 RX ADMIN — SODIUM CHLORIDE 50 ML/HR: 9 INJECTION, SOLUTION INTRAVENOUS at 06:22

## 2023-04-27 RX ADMIN — CARVEDILOL 6.25 MG: 3.12 TABLET, FILM COATED ORAL at 18:12

## 2023-04-27 RX ADMIN — SODIUM CHLORIDE, PRESERVATIVE FREE 10 ML: 5 INJECTION INTRAVENOUS at 21:32

## 2023-04-27 RX ADMIN — ATORVASTATIN CALCIUM 80 MG: 40 TABLET, FILM COATED ORAL at 08:34

## 2023-04-27 RX ADMIN — SODIUM CHLORIDE, PRESERVATIVE FREE 40 MG: 5 INJECTION INTRAVENOUS at 08:35

## 2023-04-27 RX ADMIN — SODIUM CHLORIDE, PRESERVATIVE FREE 10 ML: 5 INJECTION INTRAVENOUS at 07:00

## 2023-04-27 RX ADMIN — ASPIRIN 81 MG: 81 TABLET, COATED ORAL at 08:35

## 2023-04-27 RX ADMIN — EZETIMIBE 10 MG: 10 TABLET ORAL at 21:31

## 2023-04-27 RX ADMIN — PIPERACILLIN AND TAZOBACTAM 3.38 G: 3; .375 INJECTION, POWDER, LYOPHILIZED, FOR SOLUTION INTRAVENOUS at 14:03

## 2023-04-27 RX ADMIN — HYDROCODONE BITARTRATE AND ACETAMINOPHEN 1 TABLET: 5; 325 TABLET ORAL at 14:14

## 2023-04-27 RX ADMIN — ENOXAPARIN SODIUM 40 MG: 100 INJECTION SUBCUTANEOUS at 21:30

## 2023-04-27 RX ADMIN — ACETAMINOPHEN 650 MG: 325 TABLET ORAL at 18:12

## 2023-04-28 ENCOUNTER — APPOINTMENT (OUTPATIENT)
Dept: GENERAL RADIOLOGY | Age: 58
DRG: 329 | End: 2023-04-28
Attending: NURSE PRACTITIONER
Payer: COMMERCIAL

## 2023-04-28 DIAGNOSIS — R73.03 PREDIABETES: Primary | ICD-10-CM

## 2023-04-28 LAB
ANION GAP SERPL CALC-SCNC: 4 MMOL/L (ref 5–15)
BASOPHILS # BLD: 0.1 K/UL (ref 0–0.1)
BASOPHILS NFR BLD: 1 % (ref 0–1)
BUN SERPL-MCNC: 8 MG/DL (ref 6–20)
BUN/CREAT SERPL: 13 (ref 12–20)
CALCIUM SERPL-MCNC: 8.1 MG/DL (ref 8.5–10.1)
CHLORIDE SERPL-SCNC: 99 MMOL/L (ref 97–108)
CO2 SERPL-SCNC: 29 MMOL/L (ref 21–32)
CREAT SERPL-MCNC: 0.6 MG/DL (ref 0.7–1.3)
DIFFERENTIAL METHOD BLD: ABNORMAL
EOSINOPHIL # BLD: 0.3 K/UL (ref 0–0.4)
EOSINOPHIL NFR BLD: 2 % (ref 0–7)
ERYTHROCYTE [DISTWIDTH] IN BLOOD BY AUTOMATED COUNT: 13.7 % (ref 11.5–14.5)
GLUCOSE BLD STRIP.AUTO-MCNC: 141 MG/DL (ref 65–117)
GLUCOSE SERPL-MCNC: 101 MG/DL (ref 65–100)
HCT VFR BLD AUTO: 29.3 % (ref 36.6–50.3)
HGB BLD-MCNC: 9.7 G/DL (ref 12.1–17)
IMM GRANULOCYTES # BLD AUTO: 0.2 K/UL (ref 0–0.04)
IMM GRANULOCYTES NFR BLD AUTO: 1 % (ref 0–0.5)
LYMPHOCYTES # BLD: 1.4 K/UL (ref 0.8–3.5)
LYMPHOCYTES NFR BLD: 10 % (ref 12–49)
MAGNESIUM SERPL-MCNC: 1.8 MG/DL (ref 1.6–2.4)
MCH RBC QN AUTO: 28.6 PG (ref 26–34)
MCHC RBC AUTO-ENTMCNC: 33.1 G/DL (ref 30–36.5)
MCV RBC AUTO: 86.4 FL (ref 80–99)
MONOCYTES # BLD: 1.1 K/UL (ref 0–1)
MONOCYTES NFR BLD: 7 % (ref 5–13)
NEUTS SEG # BLD: 11.8 K/UL (ref 1.8–8)
NEUTS SEG NFR BLD: 79 % (ref 32–75)
NRBC # BLD: 0 K/UL (ref 0–0.01)
NRBC BLD-RTO: 0 PER 100 WBC
PHOSPHATE SERPL-MCNC: 3 MG/DL (ref 2.6–4.7)
PLATELET # BLD AUTO: 321 K/UL (ref 150–400)
PMV BLD AUTO: 9.9 FL (ref 8.9–12.9)
POTASSIUM SERPL-SCNC: 3.7 MMOL/L (ref 3.5–5.1)
RBC # BLD AUTO: 3.39 M/UL (ref 4.1–5.7)
SERVICE CMNT-IMP: ABNORMAL
SODIUM SERPL-SCNC: 132 MMOL/L (ref 136–145)
WBC # BLD AUTO: 14.8 K/UL (ref 4.1–11.1)

## 2023-04-28 PROCEDURE — 84100 ASSAY OF PHOSPHORUS: CPT

## 2023-04-28 PROCEDURE — 82962 GLUCOSE BLOOD TEST: CPT

## 2023-04-28 PROCEDURE — 80048 BASIC METABOLIC PNL TOTAL CA: CPT

## 2023-04-28 PROCEDURE — 36415 COLL VENOUS BLD VENIPUNCTURE: CPT

## 2023-04-28 PROCEDURE — 65270000046 HC RM TELEMETRY

## 2023-04-28 PROCEDURE — 74011250636 HC RX REV CODE- 250/636: Performed by: NURSE PRACTITIONER

## 2023-04-28 PROCEDURE — 74011250637 HC RX REV CODE- 250/637: Performed by: SURGERY

## 2023-04-28 PROCEDURE — 74011000250 HC RX REV CODE- 250: Performed by: SURGERY

## 2023-04-28 PROCEDURE — 83735 ASSAY OF MAGNESIUM: CPT

## 2023-04-28 PROCEDURE — 74011250636 HC RX REV CODE- 250/636: Performed by: SURGERY

## 2023-04-28 PROCEDURE — 94760 N-INVAS EAR/PLS OXIMETRY 1: CPT

## 2023-04-28 PROCEDURE — 85025 COMPLETE CBC W/AUTO DIFF WBC: CPT

## 2023-04-28 PROCEDURE — 74011000258 HC RX REV CODE- 258: Performed by: NURSE PRACTITIONER

## 2023-04-28 PROCEDURE — 99024 POSTOP FOLLOW-UP VISIT: CPT | Performed by: NURSE PRACTITIONER

## 2023-04-28 PROCEDURE — 74011000250 HC RX REV CODE- 250: Performed by: NURSE PRACTITIONER

## 2023-04-28 PROCEDURE — 74011000258 HC RX REV CODE- 258: Performed by: SURGERY

## 2023-04-28 PROCEDURE — 74018 RADEX ABDOMEN 1 VIEW: CPT

## 2023-04-28 PROCEDURE — 74011250637 HC RX REV CODE- 250/637: Performed by: NURSE PRACTITIONER

## 2023-04-28 PROCEDURE — C9113 INJ PANTOPRAZOLE SODIUM, VIA: HCPCS | Performed by: SURGERY

## 2023-04-28 RX ORDER — FUROSEMIDE 10 MG/ML
20 INJECTION INTRAMUSCULAR; INTRAVENOUS ONCE
Status: COMPLETED | OUTPATIENT
Start: 2023-04-28 | End: 2023-04-28

## 2023-04-28 RX ORDER — VANCOMYCIN HYDROCHLORIDE
1250 EVERY 12 HOURS
Status: DISCONTINUED | OUTPATIENT
Start: 2023-04-29 | End: 2023-04-30

## 2023-04-28 RX ADMIN — FLUCONAZOLE 400 MG: 400 INJECTION, SOLUTION INTRAVENOUS at 10:57

## 2023-04-28 RX ADMIN — MAGNESIUM SULFATE HEPTAHYDRATE: 500 INJECTION, SOLUTION INTRAMUSCULAR; INTRAVENOUS at 18:00

## 2023-04-28 RX ADMIN — ASPIRIN 81 MG: 81 TABLET, COATED ORAL at 08:31

## 2023-04-28 RX ADMIN — ENOXAPARIN SODIUM 40 MG: 100 INJECTION SUBCUTANEOUS at 20:30

## 2023-04-28 RX ADMIN — Medication: at 19:32

## 2023-04-28 RX ADMIN — EZETIMIBE 10 MG: 10 TABLET ORAL at 22:25

## 2023-04-28 RX ADMIN — CARVEDILOL 6.25 MG: 3.12 TABLET, FILM COATED ORAL at 18:07

## 2023-04-28 RX ADMIN — SODIUM CHLORIDE, PRESERVATIVE FREE 10 ML: 5 INJECTION INTRAVENOUS at 05:48

## 2023-04-28 RX ADMIN — FUROSEMIDE 20 MG: 10 INJECTION, SOLUTION INTRAMUSCULAR; INTRAVENOUS at 10:57

## 2023-04-28 RX ADMIN — HYDROCODONE BITARTRATE AND ACETAMINOPHEN 1 TABLET: 5; 325 TABLET ORAL at 15:13

## 2023-04-28 RX ADMIN — ATORVASTATIN CALCIUM 80 MG: 40 TABLET, FILM COATED ORAL at 08:32

## 2023-04-28 RX ADMIN — PIPERACILLIN AND TAZOBACTAM 3.38 G: 3; .375 INJECTION, POWDER, LYOPHILIZED, FOR SOLUTION INTRAVENOUS at 14:04

## 2023-04-28 RX ADMIN — PIPERACILLIN AND TAZOBACTAM 3.38 G: 3; .375 INJECTION, POWDER, LYOPHILIZED, FOR SOLUTION INTRAVENOUS at 05:48

## 2023-04-28 RX ADMIN — ISOSORBIDE MONONITRATE 30 MG: 30 TABLET, EXTENDED RELEASE ORAL at 08:32

## 2023-04-28 RX ADMIN — SODIUM CHLORIDE, PRESERVATIVE FREE 10 ML: 5 INJECTION INTRAVENOUS at 14:04

## 2023-04-28 RX ADMIN — CARVEDILOL 6.25 MG: 3.12 TABLET, FILM COATED ORAL at 08:32

## 2023-04-28 RX ADMIN — VANCOMYCIN HYDROCHLORIDE 2500 MG: 10 INJECTION, POWDER, LYOPHILIZED, FOR SOLUTION INTRAVENOUS at 18:05

## 2023-04-28 RX ADMIN — DULOXETINE 60 MG: 30 CAPSULE, DELAYED RELEASE ORAL at 08:32

## 2023-04-28 RX ADMIN — SODIUM CHLORIDE, PRESERVATIVE FREE 10 ML: 5 INJECTION INTRAVENOUS at 22:30

## 2023-04-28 RX ADMIN — PIPERACILLIN AND TAZOBACTAM 3.38 G: 3; .375 INJECTION, POWDER, LYOPHILIZED, FOR SOLUTION INTRAVENOUS at 20:30

## 2023-04-28 RX ADMIN — SODIUM CHLORIDE, PRESERVATIVE FREE 40 MG: 5 INJECTION INTRAVENOUS at 08:32

## 2023-04-29 LAB
ANION GAP SERPL CALC-SCNC: 4 MMOL/L (ref 5–15)
BUN SERPL-MCNC: 13 MG/DL (ref 6–20)
BUN/CREAT SERPL: 12 (ref 12–20)
CALCIUM SERPL-MCNC: 8.7 MG/DL (ref 8.5–10.1)
CHLORIDE SERPL-SCNC: 101 MMOL/L (ref 97–108)
CO2 SERPL-SCNC: 29 MMOL/L (ref 21–32)
CREAT SERPL-MCNC: 1.09 MG/DL (ref 0.7–1.3)
DATE LAST DOSE: ABNORMAL
GLUCOSE BLD STRIP.AUTO-MCNC: 105 MG/DL (ref 65–117)
GLUCOSE BLD STRIP.AUTO-MCNC: 140 MG/DL (ref 65–117)
GLUCOSE SERPL-MCNC: 124 MG/DL (ref 65–100)
MAGNESIUM SERPL-MCNC: 1.8 MG/DL (ref 1.6–2.4)
PHOSPHATE SERPL-MCNC: 3.9 MG/DL (ref 2.6–4.7)
POTASSIUM SERPL-SCNC: 3.4 MMOL/L (ref 3.5–5.1)
REPORTED DOSE,DOSE: ABNORMAL UNITS
REPORTED DOSE/TIME,TMG: 500
SERVICE CMNT-IMP: ABNORMAL
SERVICE CMNT-IMP: NORMAL
SODIUM SERPL-SCNC: 134 MMOL/L (ref 136–145)
VANCOMYCIN TROUGH SERPL-MCNC: 22.1 UG/ML (ref 5–10)

## 2023-04-29 PROCEDURE — 74011250636 HC RX REV CODE- 250/636: Performed by: STUDENT IN AN ORGANIZED HEALTH CARE EDUCATION/TRAINING PROGRAM

## 2023-04-29 PROCEDURE — 74011250637 HC RX REV CODE- 250/637: Performed by: SURGERY

## 2023-04-29 PROCEDURE — 74011250636 HC RX REV CODE- 250/636: Performed by: SURGERY

## 2023-04-29 PROCEDURE — 74011000250 HC RX REV CODE- 250: Performed by: SURGERY

## 2023-04-29 PROCEDURE — 36415 COLL VENOUS BLD VENIPUNCTURE: CPT

## 2023-04-29 PROCEDURE — 80202 ASSAY OF VANCOMYCIN: CPT

## 2023-04-29 PROCEDURE — 84100 ASSAY OF PHOSPHORUS: CPT

## 2023-04-29 PROCEDURE — 65270000046 HC RM TELEMETRY

## 2023-04-29 PROCEDURE — 94760 N-INVAS EAR/PLS OXIMETRY 1: CPT

## 2023-04-29 PROCEDURE — 80048 BASIC METABOLIC PNL TOTAL CA: CPT

## 2023-04-29 PROCEDURE — 74011250637 HC RX REV CODE- 250/637: Performed by: NURSE PRACTITIONER

## 2023-04-29 PROCEDURE — C9113 INJ PANTOPRAZOLE SODIUM, VIA: HCPCS | Performed by: SURGERY

## 2023-04-29 PROCEDURE — 74011000258 HC RX REV CODE- 258: Performed by: SURGERY

## 2023-04-29 PROCEDURE — 83735 ASSAY OF MAGNESIUM: CPT

## 2023-04-29 PROCEDURE — 82962 GLUCOSE BLOOD TEST: CPT

## 2023-04-29 RX ORDER — POTASSIUM CHLORIDE 7.45 MG/ML
10 INJECTION INTRAVENOUS ONCE
Status: COMPLETED | OUTPATIENT
Start: 2023-04-29 | End: 2023-04-29

## 2023-04-29 RX ORDER — POTASSIUM CHLORIDE 7.45 MG/ML
10 INJECTION INTRAVENOUS
Status: DISCONTINUED | OUTPATIENT
Start: 2023-04-29 | End: 2023-04-29 | Stop reason: SDUPTHER

## 2023-04-29 RX ADMIN — HYDROCODONE BITARTRATE AND ACETAMINOPHEN 1 TABLET: 5; 325 TABLET ORAL at 15:33

## 2023-04-29 RX ADMIN — I.V. FAT EMULSION 250 ML: 20 EMULSION INTRAVENOUS at 19:16

## 2023-04-29 RX ADMIN — SODIUM CHLORIDE, PRESERVATIVE FREE 10 ML: 5 INJECTION INTRAVENOUS at 05:10

## 2023-04-29 RX ADMIN — ISOSORBIDE MONONITRATE 30 MG: 30 TABLET, EXTENDED RELEASE ORAL at 09:16

## 2023-04-29 RX ADMIN — PIPERACILLIN AND TAZOBACTAM 3.38 G: 3; .375 INJECTION, POWDER, LYOPHILIZED, FOR SOLUTION INTRAVENOUS at 05:06

## 2023-04-29 RX ADMIN — DULOXETINE 60 MG: 30 CAPSULE, DELAYED RELEASE ORAL at 09:16

## 2023-04-29 RX ADMIN — ENOXAPARIN SODIUM 40 MG: 100 INJECTION SUBCUTANEOUS at 21:30

## 2023-04-29 RX ADMIN — SODIUM CHLORIDE, PRESERVATIVE FREE 10 ML: 5 INJECTION INTRAVENOUS at 13:13

## 2023-04-29 RX ADMIN — PIPERACILLIN AND TAZOBACTAM 3.38 G: 3; .375 INJECTION, POWDER, LYOPHILIZED, FOR SOLUTION INTRAVENOUS at 21:30

## 2023-04-29 RX ADMIN — POTASSIUM CHLORIDE 10 MEQ: 7.46 INJECTION, SOLUTION INTRAVENOUS at 12:00

## 2023-04-29 RX ADMIN — FLUCONAZOLE 400 MG: 400 INJECTION, SOLUTION INTRAVENOUS at 11:05

## 2023-04-29 RX ADMIN — EZETIMIBE 10 MG: 10 TABLET ORAL at 21:30

## 2023-04-29 RX ADMIN — MELATONIN 3 MG: at 21:30

## 2023-04-29 RX ADMIN — POTASSIUM CHLORIDE 10 MEQ: 7.46 INJECTION, SOLUTION INTRAVENOUS at 15:39

## 2023-04-29 RX ADMIN — ATORVASTATIN CALCIUM 80 MG: 40 TABLET, FILM COATED ORAL at 09:16

## 2023-04-29 RX ADMIN — POTASSIUM CHLORIDE: 2 INJECTION, SOLUTION, CONCENTRATE INTRAVENOUS at 19:16

## 2023-04-29 RX ADMIN — SODIUM CHLORIDE, PRESERVATIVE FREE 10 ML: 5 INJECTION INTRAVENOUS at 21:53

## 2023-04-29 RX ADMIN — ASPIRIN 81 MG: 81 TABLET, COATED ORAL at 09:16

## 2023-04-29 RX ADMIN — CARVEDILOL 6.25 MG: 3.12 TABLET, FILM COATED ORAL at 16:58

## 2023-04-29 RX ADMIN — CARVEDILOL 6.25 MG: 3.12 TABLET, FILM COATED ORAL at 09:16

## 2023-04-29 RX ADMIN — VANCOMYCIN HYDROCHLORIDE 1250 MG: 10 INJECTION, POWDER, LYOPHILIZED, FOR SOLUTION INTRAVENOUS at 05:06

## 2023-04-29 RX ADMIN — VANCOMYCIN HYDROCHLORIDE 1250 MG: 10 INJECTION, POWDER, LYOPHILIZED, FOR SOLUTION INTRAVENOUS at 16:58

## 2023-04-29 RX ADMIN — SODIUM CHLORIDE, PRESERVATIVE FREE 40 MG: 5 INJECTION INTRAVENOUS at 09:16

## 2023-04-29 RX ADMIN — PIPERACILLIN AND TAZOBACTAM 3.38 G: 3; .375 INJECTION, POWDER, LYOPHILIZED, FOR SOLUTION INTRAVENOUS at 13:00

## 2023-04-30 ENCOUNTER — APPOINTMENT (OUTPATIENT)
Dept: CT IMAGING | Age: 58
DRG: 329 | End: 2023-04-30
Attending: SURGERY
Payer: COMMERCIAL

## 2023-04-30 LAB
ANION GAP SERPL CALC-SCNC: 7 MMOL/L (ref 5–15)
BASOPHILS # BLD: 0.1 K/UL (ref 0–0.1)
BASOPHILS NFR BLD: 1 % (ref 0–1)
BUN SERPL-MCNC: 17 MG/DL (ref 6–20)
BUN/CREAT SERPL: 9 (ref 12–20)
CALCIUM SERPL-MCNC: 8.4 MG/DL (ref 8.5–10.1)
CHLORIDE SERPL-SCNC: 109 MMOL/L (ref 97–108)
CO2 SERPL-SCNC: 26 MMOL/L (ref 21–32)
CREAT SERPL-MCNC: 1.9 MG/DL (ref 0.7–1.3)
DIFFERENTIAL METHOD BLD: ABNORMAL
EOSINOPHIL # BLD: 0.4 K/UL (ref 0–0.4)
EOSINOPHIL NFR BLD: 3 % (ref 0–7)
ERYTHROCYTE [DISTWIDTH] IN BLOOD BY AUTOMATED COUNT: 14.5 % (ref 11.5–14.5)
GLUCOSE BLD STRIP.AUTO-MCNC: 134 MG/DL (ref 65–117)
GLUCOSE BLD STRIP.AUTO-MCNC: 138 MG/DL (ref 65–117)
GLUCOSE SERPL-MCNC: 125 MG/DL (ref 65–100)
HCT VFR BLD AUTO: 27.5 % (ref 36.6–50.3)
HGB BLD-MCNC: 8.9 G/DL (ref 12.1–17)
IMM GRANULOCYTES # BLD AUTO: 0.2 K/UL (ref 0–0.04)
IMM GRANULOCYTES NFR BLD AUTO: 2 % (ref 0–0.5)
LYMPHOCYTES # BLD: 1.2 K/UL (ref 0.8–3.5)
LYMPHOCYTES NFR BLD: 10 % (ref 12–49)
MAGNESIUM SERPL-MCNC: 2 MG/DL (ref 1.6–2.4)
MCH RBC QN AUTO: 29 PG (ref 26–34)
MCHC RBC AUTO-ENTMCNC: 32.4 G/DL (ref 30–36.5)
MCV RBC AUTO: 89.6 FL (ref 80–99)
MONOCYTES # BLD: 1.2 K/UL (ref 0–1)
MONOCYTES NFR BLD: 10 % (ref 5–13)
NEUTS SEG # BLD: 9.1 K/UL (ref 1.8–8)
NEUTS SEG NFR BLD: 74 % (ref 32–75)
NRBC # BLD: 0 K/UL (ref 0–0.01)
NRBC BLD-RTO: 0 PER 100 WBC
PHOSPHATE SERPL-MCNC: 4.2 MG/DL (ref 2.6–4.7)
PLATELET # BLD AUTO: 346 K/UL (ref 150–400)
PMV BLD AUTO: 9.6 FL (ref 8.9–12.9)
POTASSIUM SERPL-SCNC: 3.7 MMOL/L (ref 3.5–5.1)
RBC # BLD AUTO: 3.07 M/UL (ref 4.1–5.7)
SERVICE CMNT-IMP: ABNORMAL
SERVICE CMNT-IMP: ABNORMAL
SODIUM SERPL-SCNC: 142 MMOL/L (ref 136–145)
VANCOMYCIN SERPL-MCNC: 16.9 UG/ML
WBC # BLD AUTO: 12.1 K/UL (ref 4.1–11.1)

## 2023-04-30 PROCEDURE — 74011250637 HC RX REV CODE- 250/637: Performed by: SURGERY

## 2023-04-30 PROCEDURE — 74011250636 HC RX REV CODE- 250/636: Performed by: SURGERY

## 2023-04-30 PROCEDURE — C9113 INJ PANTOPRAZOLE SODIUM, VIA: HCPCS | Performed by: SURGERY

## 2023-04-30 PROCEDURE — 74011000250 HC RX REV CODE- 250: Performed by: SURGERY

## 2023-04-30 PROCEDURE — 74011000258 HC RX REV CODE- 258: Performed by: SURGERY

## 2023-04-30 PROCEDURE — 36415 COLL VENOUS BLD VENIPUNCTURE: CPT

## 2023-04-30 PROCEDURE — 85025 COMPLETE CBC W/AUTO DIFF WBC: CPT

## 2023-04-30 PROCEDURE — 83735 ASSAY OF MAGNESIUM: CPT

## 2023-04-30 PROCEDURE — 74177 CT ABD & PELVIS W/CONTRAST: CPT

## 2023-04-30 PROCEDURE — 65270000046 HC RM TELEMETRY

## 2023-04-30 PROCEDURE — 80048 BASIC METABOLIC PNL TOTAL CA: CPT

## 2023-04-30 PROCEDURE — 80202 ASSAY OF VANCOMYCIN: CPT

## 2023-04-30 PROCEDURE — 74011000636 HC RX REV CODE- 636: Performed by: SURGERY

## 2023-04-30 PROCEDURE — 82962 GLUCOSE BLOOD TEST: CPT

## 2023-04-30 PROCEDURE — 84100 ASSAY OF PHOSPHORUS: CPT

## 2023-04-30 RX ADMIN — PIPERACILLIN AND TAZOBACTAM 3.38 G: 3; .375 INJECTION, POWDER, LYOPHILIZED, FOR SOLUTION INTRAVENOUS at 22:35

## 2023-04-30 RX ADMIN — SODIUM CHLORIDE, PRESERVATIVE FREE 40 MG: 5 INJECTION INTRAVENOUS at 09:11

## 2023-04-30 RX ADMIN — SODIUM CHLORIDE, PRESERVATIVE FREE 10 ML: 5 INJECTION INTRAVENOUS at 22:40

## 2023-04-30 RX ADMIN — MAGNESIUM SULFATE HEPTAHYDRATE: 500 INJECTION, SOLUTION INTRAMUSCULAR; INTRAVENOUS at 18:07

## 2023-04-30 RX ADMIN — CARVEDILOL 6.25 MG: 3.12 TABLET, FILM COATED ORAL at 09:11

## 2023-04-30 RX ADMIN — PIPERACILLIN AND TAZOBACTAM 3.38 G: 3; .375 INJECTION, POWDER, LYOPHILIZED, FOR SOLUTION INTRAVENOUS at 15:01

## 2023-04-30 RX ADMIN — ATORVASTATIN CALCIUM 80 MG: 40 TABLET, FILM COATED ORAL at 09:11

## 2023-04-30 RX ADMIN — DIATRIZOATE MEGLUMINE AND DIATRIZOATE SODIUM 30 ML: 660; 100 LIQUID ORAL; RECTAL at 09:57

## 2023-04-30 RX ADMIN — I.V. FAT EMULSION 250 ML: 20 EMULSION INTRAVENOUS at 18:08

## 2023-04-30 RX ADMIN — SODIUM CHLORIDE, PRESERVATIVE FREE 10 ML: 5 INJECTION INTRAVENOUS at 15:01

## 2023-04-30 RX ADMIN — ONDANSETRON 4 MG: 2 INJECTION INTRAMUSCULAR; INTRAVENOUS at 17:18

## 2023-04-30 RX ADMIN — ENOXAPARIN SODIUM 40 MG: 100 INJECTION SUBCUTANEOUS at 22:35

## 2023-04-30 RX ADMIN — ASPIRIN 81 MG: 81 TABLET, COATED ORAL at 09:11

## 2023-04-30 RX ADMIN — FLUCONAZOLE 400 MG: 400 INJECTION, SOLUTION INTRAVENOUS at 09:57

## 2023-04-30 RX ADMIN — SODIUM CHLORIDE, PRESERVATIVE FREE 10 ML: 5 INJECTION INTRAVENOUS at 05:29

## 2023-04-30 RX ADMIN — EZETIMIBE 10 MG: 10 TABLET ORAL at 22:35

## 2023-04-30 RX ADMIN — ISOSORBIDE MONONITRATE 30 MG: 30 TABLET, EXTENDED RELEASE ORAL at 09:11

## 2023-04-30 RX ADMIN — IOPAMIDOL 100 ML: 755 INJECTION, SOLUTION INTRAVENOUS at 13:26

## 2023-04-30 RX ADMIN — PIPERACILLIN AND TAZOBACTAM 3.38 G: 3; .375 INJECTION, POWDER, LYOPHILIZED, FOR SOLUTION INTRAVENOUS at 05:29

## 2023-04-30 RX ADMIN — CARVEDILOL 6.25 MG: 3.12 TABLET, FILM COATED ORAL at 18:08

## 2023-04-30 RX ADMIN — DULOXETINE 60 MG: 30 CAPSULE, DELAYED RELEASE ORAL at 09:11

## 2023-05-01 LAB
ANION GAP SERPL CALC-SCNC: 2 MMOL/L (ref 5–15)
BASOPHILS # BLD: 0.1 K/UL (ref 0–0.1)
BASOPHILS NFR BLD: 1 % (ref 0–1)
BUN SERPL-MCNC: 21 MG/DL (ref 6–20)
BUN/CREAT SERPL: 11 (ref 12–20)
CALCIUM SERPL-MCNC: 8.8 MG/DL (ref 8.5–10.1)
CHLORIDE SERPL-SCNC: 108 MMOL/L (ref 97–108)
CO2 SERPL-SCNC: 26 MMOL/L (ref 21–32)
CREAT SERPL-MCNC: 1.98 MG/DL (ref 0.7–1.3)
CREAT UR-MCNC: 30.5 MG/DL
DIFFERENTIAL METHOD BLD: ABNORMAL
EOSINOPHIL # BLD: 0.4 K/UL (ref 0–0.4)
EOSINOPHIL NFR BLD: 4 % (ref 0–7)
ERYTHROCYTE [DISTWIDTH] IN BLOOD BY AUTOMATED COUNT: 14.6 % (ref 11.5–14.5)
GLUCOSE SERPL-MCNC: 117 MG/DL (ref 65–100)
HCT VFR BLD AUTO: 28.7 % (ref 36.6–50.3)
HGB BLD-MCNC: 9 G/DL (ref 12.1–17)
IMM GRANULOCYTES # BLD AUTO: 0.2 K/UL (ref 0–0.04)
IMM GRANULOCYTES NFR BLD AUTO: 1 % (ref 0–0.5)
LYMPHOCYTES # BLD: 1.3 K/UL (ref 0.8–3.5)
LYMPHOCYTES NFR BLD: 12 % (ref 12–49)
MAGNESIUM SERPL-MCNC: 2.1 MG/DL (ref 1.6–2.4)
MCH RBC QN AUTO: 28.6 PG (ref 26–34)
MCHC RBC AUTO-ENTMCNC: 31.4 G/DL (ref 30–36.5)
MCV RBC AUTO: 91.1 FL (ref 80–99)
MONOCYTES # BLD: 1 K/UL (ref 0–1)
MONOCYTES NFR BLD: 9 % (ref 5–13)
NEUTS SEG # BLD: 8.4 K/UL (ref 1.8–8)
NEUTS SEG NFR BLD: 73 % (ref 32–75)
NRBC # BLD: 0 K/UL (ref 0–0.01)
NRBC BLD-RTO: 0 PER 100 WBC
PHOSPHATE SERPL-MCNC: 5.2 MG/DL (ref 2.6–4.7)
PLATELET # BLD AUTO: 402 K/UL (ref 150–400)
PMV BLD AUTO: 9.7 FL (ref 8.9–12.9)
POTASSIUM SERPL-SCNC: 3.9 MMOL/L (ref 3.5–5.1)
RBC # BLD AUTO: 3.15 M/UL (ref 4.1–5.7)
SODIUM SERPL-SCNC: 136 MMOL/L (ref 136–145)
SODIUM UR-SCNC: 42 MMOL/L
WBC # BLD AUTO: 11.3 K/UL (ref 4.1–11.1)

## 2023-05-01 PROCEDURE — 80048 BASIC METABOLIC PNL TOTAL CA: CPT

## 2023-05-01 PROCEDURE — 74011000258 HC RX REV CODE- 258: Performed by: SURGERY

## 2023-05-01 PROCEDURE — 74011250637 HC RX REV CODE- 250/637: Performed by: NURSE PRACTITIONER

## 2023-05-01 PROCEDURE — 74011250636 HC RX REV CODE- 250/636: Performed by: INTERNAL MEDICINE

## 2023-05-01 PROCEDURE — 74011250637 HC RX REV CODE- 250/637: Performed by: SURGERY

## 2023-05-01 PROCEDURE — 65270000046 HC RM TELEMETRY

## 2023-05-01 PROCEDURE — 83735 ASSAY OF MAGNESIUM: CPT

## 2023-05-01 PROCEDURE — 74011250636 HC RX REV CODE- 250/636: Performed by: NURSE PRACTITIONER

## 2023-05-01 PROCEDURE — 84300 ASSAY OF URINE SODIUM: CPT

## 2023-05-01 PROCEDURE — 74011000250 HC RX REV CODE- 250: Performed by: SURGERY

## 2023-05-01 PROCEDURE — 74011250636 HC RX REV CODE- 250/636: Performed by: SURGERY

## 2023-05-01 PROCEDURE — 36415 COLL VENOUS BLD VENIPUNCTURE: CPT

## 2023-05-01 PROCEDURE — 85025 COMPLETE CBC W/AUTO DIFF WBC: CPT

## 2023-05-01 PROCEDURE — 82570 ASSAY OF URINE CREATININE: CPT

## 2023-05-01 PROCEDURE — 94760 N-INVAS EAR/PLS OXIMETRY 1: CPT

## 2023-05-01 PROCEDURE — C9113 INJ PANTOPRAZOLE SODIUM, VIA: HCPCS | Performed by: SURGERY

## 2023-05-01 PROCEDURE — 84100 ASSAY OF PHOSPHORUS: CPT

## 2023-05-01 RX ORDER — ACETAMINOPHEN 325 MG/1
650 TABLET ORAL EVERY 6 HOURS
Status: DISCONTINUED | OUTPATIENT
Start: 2023-05-01 | End: 2023-05-04 | Stop reason: HOSPADM

## 2023-05-01 RX ORDER — SODIUM CHLORIDE 9 MG/ML
75 INJECTION, SOLUTION INTRAVENOUS CONTINUOUS
Status: DISPENSED | OUTPATIENT
Start: 2023-05-01 | End: 2023-05-02

## 2023-05-01 RX ORDER — OXYCODONE HYDROCHLORIDE 5 MG/1
5 TABLET ORAL
Status: DISCONTINUED | OUTPATIENT
Start: 2023-05-01 | End: 2023-05-04 | Stop reason: HOSPADM

## 2023-05-01 RX ORDER — HYDROMORPHONE HYDROCHLORIDE 1 MG/ML
0.5 INJECTION, SOLUTION INTRAMUSCULAR; INTRAVENOUS; SUBCUTANEOUS
Status: DISCONTINUED | OUTPATIENT
Start: 2023-05-01 | End: 2023-05-04 | Stop reason: HOSPADM

## 2023-05-01 RX ADMIN — SODIUM CHLORIDE 100 ML/HR: 9 INJECTION, SOLUTION INTRAVENOUS at 09:50

## 2023-05-01 RX ADMIN — PIPERACILLIN AND TAZOBACTAM 3.38 G: 3; .375 INJECTION, POWDER, LYOPHILIZED, FOR SOLUTION INTRAVENOUS at 05:43

## 2023-05-01 RX ADMIN — CARVEDILOL 6.25 MG: 3.12 TABLET, FILM COATED ORAL at 17:39

## 2023-05-01 RX ADMIN — SODIUM CHLORIDE, PRESERVATIVE FREE 10 ML: 5 INJECTION INTRAVENOUS at 05:18

## 2023-05-01 RX ADMIN — ISOSORBIDE MONONITRATE 30 MG: 30 TABLET, EXTENDED RELEASE ORAL at 09:41

## 2023-05-01 RX ADMIN — ACETAMINOPHEN 650 MG: 325 TABLET ORAL at 12:57

## 2023-05-01 RX ADMIN — ACETAMINOPHEN 650 MG: 325 TABLET ORAL at 17:39

## 2023-05-01 RX ADMIN — ACETAMINOPHEN 650 MG: 325 TABLET ORAL at 23:57

## 2023-05-01 RX ADMIN — SODIUM CHLORIDE, PRESERVATIVE FREE 10 ML: 5 INJECTION INTRAVENOUS at 17:39

## 2023-05-01 RX ADMIN — HYDROMORPHONE HYDROCHLORIDE 0.5 MG: 1 INJECTION, SOLUTION INTRAMUSCULAR; INTRAVENOUS; SUBCUTANEOUS at 17:38

## 2023-05-01 RX ADMIN — ATORVASTATIN CALCIUM 80 MG: 40 TABLET, FILM COATED ORAL at 09:41

## 2023-05-01 RX ADMIN — EZETIMIBE 10 MG: 10 TABLET ORAL at 21:43

## 2023-05-01 RX ADMIN — SODIUM CHLORIDE, PRESERVATIVE FREE 10 ML: 5 INJECTION INTRAVENOUS at 21:43

## 2023-05-01 RX ADMIN — FLUCONAZOLE 400 MG: 400 INJECTION, SOLUTION INTRAVENOUS at 12:58

## 2023-05-01 RX ADMIN — CARVEDILOL 6.25 MG: 3.12 TABLET, FILM COATED ORAL at 09:41

## 2023-05-01 RX ADMIN — ASPIRIN 81 MG: 81 TABLET, COATED ORAL at 09:41

## 2023-05-01 RX ADMIN — ENOXAPARIN SODIUM 40 MG: 100 INJECTION SUBCUTANEOUS at 21:43

## 2023-05-01 RX ADMIN — PIPERACILLIN AND TAZOBACTAM 3.38 G: 3; .375 INJECTION, POWDER, LYOPHILIZED, FOR SOLUTION INTRAVENOUS at 21:43

## 2023-05-01 RX ADMIN — DULOXETINE 60 MG: 30 CAPSULE, DELAYED RELEASE ORAL at 09:41

## 2023-05-01 RX ADMIN — SODIUM CHLORIDE, PRESERVATIVE FREE 40 MG: 5 INJECTION INTRAVENOUS at 09:41

## 2023-05-01 RX ADMIN — PIPERACILLIN AND TAZOBACTAM 3.38 G: 3; .375 INJECTION, POWDER, LYOPHILIZED, FOR SOLUTION INTRAVENOUS at 12:56

## 2023-05-01 NOTE — PROGRESS NOTES
End of Shift Note    Bedside shift change report given to TONJA Casillas  (oncoming nurse) by Filipe Armas RN (offgoing nurse). Report included the following information SBAR, Kardex, Intake/Output, and MAR    Shift worked:  7am-7pm     Shift summary and any significant changes:     Pt ambulated to the chair and bed multiple times during the shift and tolerated the activity well. Pt ambulated in the hallway 3 times during the shift and tolerated the activity. Pt did not complain of Pt did not complain of nausea during the shift. Pt abdomen is  red on the lower abdomen. Pt is passing gas. Pt had a BM during the shift. Pt complained of nausea once during the shift. Pt is voiding appropriately. Concerns for physician to address:       Zone phone for oncoming shift:   [phone]       Activity:  Activity Level: Up ad adelso, Bath Room Privileges  Number times ambulated in hallways past shift: 0  Number of times OOB to chair past shift: 0    Cardiac:   Cardiac Monitoring: No      Cardiac Rhythm: Sinus Rhythm    Access:  Current line(s): PICC     Genitourinary:   Urinary status: voiding    Respiratory:   O2 Device: None (Room air)  Chronic home O2 use?: NO  Incentive spirometer at bedside: YES       GI:  Last Bowel Movement Date: 04/23/23  Current diet:  DIET NPO Ice Chips, Sips of Water with Meds, Sips of Clear Liquids, Popsicles  DIET ONE TIME MESSAGE  TPN ADULT - CENTRAL  Passing flatus: YES  Tolerating current diet: YES       Pain Management:   Patient states pain is manageable on current regimen: YES    Skin:  Beau Score: 18  Interventions: float heels and increase time out of bed    Patient Safety:  Fall Score:  Total Score: 1  Interventions: gripper socks       Length of Stay:  Expected LOS: 9d 19h  Actual LOS: Mark Rodriguez RN

## 2023-05-01 NOTE — PROGRESS NOTES
Admit Date: 2023    POD 7 Days Post-Op    Procedure:  Procedure(s):  Small bowel resection Sigmoid colon resection  Lysis of adhesions Rigid sigmoidoscopy    Subjective:     Patient with bowel movement. Mauricio liquids well. In good spirits, alert and oriented    Objective:     Blood pressure 126/76, pulse 85, temperature 97.7 °F (36.5 °C), resp. rate 18, height 5' 9\" (1.753 m), weight 210 lb (95.3 kg), SpO2 95 %.     Temp (24hrs), Av.6 °F (36.4 °C), Min:97.5 °F (36.4 °C), Max:97.7 °F (36.5 °C)      Physical Exam:  GENERAL: alert, cooperative, no distress, appears stated age, LUNG: clear to auscultation bilaterally, HEART: regular rate and rhythm, ABDOMEN: soft, ND, wound with some surrounding erythema, inferior aspect open with packing, scant drainage, EXTREMITIES:  extremities normal, atraumatic, no cyanosis or edema    Labs:   Recent Results (from the past 24 hour(s))   CREATININE, UR, RANDOM    Collection Time: 23 12:58 AM   Result Value Ref Range    Creatinine, urine random 55.76 mg/dL   METABOLIC PANEL, BASIC    Collection Time: 23 12:58 AM   Result Value Ref Range    Sodium 136 136 - 145 mmol/L    Potassium 3.9 3.5 - 5.1 mmol/L    Chloride 108 97 - 108 mmol/L    CO2 26 21 - 32 mmol/L    Anion gap 2 (L) 5 - 15 mmol/L    Glucose 117 (H) 65 - 100 mg/dL    BUN 21 (H) 6 - 20 MG/DL    Creatinine 1.98 (H) 0.70 - 1.30 MG/DL    BUN/Creatinine ratio 11 (L) 12 - 20      eGFR 38 (L) >60 ml/min/1.73m2    Calcium 8.8 8.5 - 10.1 MG/DL   CBC WITH AUTOMATED DIFF    Collection Time: 23 12:58 AM   Result Value Ref Range    WBC 11.3 (H) 4.1 - 11.1 K/uL    RBC 3.15 (L) 4.10 - 5.70 M/uL    HGB 9.0 (L) 12.1 - 17.0 g/dL    HCT 28.7 (L) 36.6 - 50.3 %    MCV 91.1 80.0 - 99.0 FL    MCH 28.6 26.0 - 34.0 PG    MCHC 31.4 30.0 - 36.5 g/dL    RDW 14.6 (H) 11.5 - 14.5 %    PLATELET 563 (H) 768 - 400 K/uL    MPV 9.7 8.9 - 12.9 FL    NRBC 0.0 0  WBC    ABSOLUTE NRBC 0.00 0.00 - 0.01 K/uL    NEUTROPHILS 73 32 - 75 %    LYMPHOCYTES 12 12 - 49 %    MONOCYTES 9 5 - 13 %    EOSINOPHILS 4 0 - 7 %    BASOPHILS 1 0 - 1 %    IMMATURE GRANULOCYTES 1 (H) 0.0 - 0.5 %    ABS. NEUTROPHILS 8.4 (H) 1.8 - 8.0 K/UL    ABS. LYMPHOCYTES 1.3 0.8 - 3.5 K/UL    ABS. MONOCYTES 1.0 0.0 - 1.0 K/UL    ABS. EOSINOPHILS 0.4 0.0 - 0.4 K/UL    ABS. BASOPHILS 0.1 0.0 - 0.1 K/UL    ABS. IMM. GRANS. 0.2 (H) 0.00 - 0.04 K/UL    DF AUTOMATED     MAGNESIUM    Collection Time: 05/01/23 12:58 AM   Result Value Ref Range    Magnesium 2.1 1.6 - 2.4 mg/dL   PHOSPHORUS    Collection Time: 05/01/23 12:58 AM   Result Value Ref Range    Phosphorus 5.2 (H) 2.6 - 4.7 MG/DL       Data Review images and reports reviewed    Assessment:     Active Problems:    Diverticulitis of colon with perforation (4/14/2023)        Plan/Recommendations/Medical Decision Making:     Cr 2 today  D/c'd vanc due to elevated Cr. Continue zosyn  Advance diet  Wean and d/c tpn  CT 4/30 no evidence of intraabdominal abscess. WBC down further today.     Carrillo Llamas MD  [facility]

## 2023-05-01 NOTE — PROGRESS NOTES
Hospitalist Progress Note    NAME: Kirsty Ackerman   :  1965   MRN:  [id number]     Consult Note   Assessment / Plan:  Mid Sigmoid Diverticulitis of colon with perforation POA    Use fluids cautiously  Cont IV Abx empiric- zosyn for now  Currently on TPN  WBC improving  blood cultures NGTD   repeat CT scan 23 showing  1. Decrease in the amount of free fluid. 2. Improvement in the colonic changes. 3. Persistent small bowel distention suggestive of proximal partial obstruction. Question of an extraluminal fluid collection adjacent to the distal small bowel.   Pain meds and diet per primary surgical team  NT tube removed on   Remains NPO for now, diet advancing as per surgery  S/p Small-bowel resection, Sigmoid colon resection and lysis of adhesion on 23    Hypertension  Ischemic cardiomyopathy s/p ICD  HFrEF without exacerbation  2022 ECHO EF 40-45% with akinesis of apical anterior, apical lateral, apical septal, apical inferior, and apex with mild MVR and PVR  Continue on ranexa, carvedilol, atorvastatin, and zetia  Cw aspirin     Hypokalemia:  Hypophosphatemia  Repleted K today, repeat in AM     FRANTZ  Not wearing CPAP  Oxygen when sleeping if Sats <89%     Acute Tobacco Abuse POA  Incidental Infra Renal AAA 3.4cm x 3.3 cm POA  1.5 ppd, denies need for nicotine patch at this time  Reinforced Tobacco cessation minna in light of AAA on CT imaging  Outpatient follow up      Medical Decision Making:     Labs reviewed by myself   CBC, BMP     Diagnostic data reviewed by myself   Flowsheets and progress notes     Toxic drug monitoring   Monitor creatining while on zosyn     Code status: Full  Prophylaxis: SCD's  Recommended Disposition: Home w/Family       Subjective:     Chief Complaint / Reason for Physician Visit  Seen and evaluated at bedside  No bm yet  Less flatus today    Review of Systems:  Symptom Y/N Comments  Symptom Y/N Comments   Fever/Chills n   Chest Pain n    Poor Appetite n Edema n    Cough n   Abdominal Pain y    Sputum n   Joint Pain n    SOB/OLEARY n   Pruritis/Rash n    Nausea/vomit    Tolerating PT/OT     Diarrhea    Tolerating Diet     Constipation    Other       Could NOT obtain due to:      Objective:     VITALS:   Last 24hrs VS reviewed since prior progress note. Most recent are:  Patient Vitals for the past 24 hrs:   Temp Pulse Resp BP SpO2   04/30/23 2024 97.5 °F (36.4 °C) 82 19 124/77 96 %   04/30/23 1514 -- 77 -- (!) 146/86 97 %   04/30/23 1512 97.5 °F (36.4 °C) 80 -- (!) 150/94 97 %   04/30/23 0723 97.5 °F (36.4 °C) 80 18 (!) 148/90 98 %   04/30/23 0305 97.9 °F (36.6 °C) 83 18 131/79 96 %         Intake/Output Summary (Last 24 hours) at 4/30/2023 2253  Last data filed at 4/30/2023 2241  Gross per 24 hour   Intake 4732.76 ml   Output 3505 ml   Net 1227.76 ml          I had a face to face encounter and independently examined this patient on 4/30/2023, as outlined below:  PHYSICAL EXAM:  General: Awake, No acute distress  EENT:  EOMI. Anicteric sclerae. MMM  Resp:  CTA bilaterally, no wheezing or rales. No accessory muscle use  CV:  Regular  rhythm,  No edema  GI:  Midline incision looks clean  Neurologic:  Alert and oriented X 3, normal speech,   Psych:   Not anxious nor agitated  Skin:  No rashes. No jaundice    Reviewed most current lab test results and cultures  YES  Reviewed most current radiology test results   YES  Review and summation of old records today    NO  Reviewed patient's current orders and MAR    YES  PMH/SH reviewed - no change compared to H&P  ________________________________________________________________________  Care Plan discussed with:    Comments   Patient y    Family      RN y    Care Manager     Consultant                       y Multidiciplinary team rounds were held today with , nursing, pharmacist and clinical coordinator. Patient's plan of care was discussed; medications were reviewed and discharge planning was addressed. ________________________________________________________________________  Total NON critical care TIME:  y   Minutes    Total CRITICAL CARE TIME Spent:   Minutes non procedure based      Comments   >50% of visit spent in counseling and coordination of care x    ________________________________________________________________________  Jenelle Ly MD     Procedures: see electronic medical records for all procedures/Xrays and details which were not copied into this note but were reviewed prior to creation of Plan. LABS:  I reviewed today's most current labs and imaging studies.   Pertinent labs include:  Recent Labs     04/30/23 0310 04/28/23  0927   WBC 12.1* 14.8*   HGB 8.9* 9.7*   HCT 27.5* 29.3*    321       Recent Labs     04/30/23 0310 04/29/23  0345 04/28/23  0259 04/28/23  0258    134* 132*  --    K 3.7 3.4* 3.7  --    * 101 99  --    CO2 26 29 29  --    * 124* 101*  --    BUN 17 13 8  --    CREA 1.90* 1.09 0.60*  --    CA 8.4* 8.7 8.1*  --    MG 2.0 1.8  --  1.8   PHOS 4.2 3.9  --  3.0         Signed: Jenelle Ly MD

## 2023-05-01 NOTE — PROGRESS NOTES
End of Shift Note    Bedside shift change report given to Roopa Steven RN (oncoming nurse) by Concepcion Mitchell RN (offgoing nurse). Report included the following information SBAR, Kardex, Intake/Output, MAR, and Recent Results    Shift worked:  [id number]     Shift summary and any significant changes:     BM thrice. Greenish, watery stool.      Concerns for physician to address:       Zone phone for oncoming shift:               GI:  Last Bowel Movement Date: 04/30/23  Current diet:  DIET NPO Ice Chips, Sips of Water with Meds, Sips of Clear Liquids, Popsicles  DIET ONE TIME MESSAGE  TPN ADULT - CENTRAL  Passing flatus: YES  Tolerating current diet: YES       Length of Stay:  Expected LOS: 9d 19h  Actual LOS: 17      Concepcion Mitchell RN

## 2023-05-01 NOTE — PROGRESS NOTES
Problem: Falls - Risk of  Goal: *Absence of Falls  Description: Document Monserrat Ash Fall Risk and appropriate interventions in the flowsheet. Outcome: Progressing Towards Goal  Note: Fall Risk Interventions:            Medication Interventions: Patient to call before getting OOB, Teach patient to arise slowly                   Problem: Patient Education: Go to Patient Education Activity  Goal: Patient/Family Education  Outcome: Progressing Towards Goal     Problem: Pressure Injury - Risk of  Goal: *Prevention of pressure injury  Description: Document Beau Scale and appropriate interventions in the flowsheet. Outcome: Progressing Towards Goal  Note: Pressure Injury Interventions:             Activity Interventions: Increase time out of bed    Mobility Interventions: HOB 30 degrees or less    Nutrition Interventions: Document food/fluid/supplement intake    Friction and Shear Interventions: Feet elevated on foot rest                Problem: Patient Education: Go to Patient Education Activity  Goal: Patient/Family Education  Outcome: Progressing Towards Goal

## 2023-05-01 NOTE — PROGRESS NOTES
Hospitalist Progress Note    NAME: Alden Alberto   :  1965   MRN:  [id number]     Consult Note   Assessment / Plan:  Mid Sigmoid Diverticulitis of colon with perforation POA  Cont IV Abx empiric- zosyn for now  Currently on TPN  WBC improving  blood cultures NGTD   repeat CT scan 23 showing  1. Decrease in the amount of free fluid. 2. Improvement in the colonic changes. 3. Persistent small bowel distention suggestive of proximal partial obstruction. Question of an extraluminal fluid collection adjacent to the distal small bowel. Pain meds and diet per primary surgical team  NT tube removed on   Remains NPO for now, diet advancing as per surgery  S/p Small-bowel resection, Sigmoid colon resection and lysis of adhesion on 23    Hypertension  Ischemic cardiomyopathy s/p ICD  HFrEF without exacerbation  2022 ECHO EF 40-45% with akinesis of apical anterior, apical lateral, apical septal, apical inferior, and apex with mild MVR and PVR  Continue on ranexa, carvedilol, atorvastatin, and zetia  Cw aspirin  Monitor volume status while on IVF. He is currently euvolemic     LYN, baseline cr 0.6-0.8, now with cr 1.98.  will incr IVF. Avoid nephrotoxic agents.   Repeat bmp tomorrow    Hypokalemia  Hypophosphatemia  Repleted K today, repeat in AM     FRANTZ  Not wearing CPAP  Oxygen when sleeping if Sats <89%     Acute Tobacco Abuse POA  Incidental Infra Renal AAA 3.4cm x 3.3 cm POA  1.5 ppd, denies need for nicotine patch at this time  Reinforced Tobacco cessation minna in light of AAA on CT imaging  Outpatient follow up      Medical Decision Making:   Labs reviewed by myself   CBC, BMP     Diagnostic data reviewed by myself   Flowsheets and progress notes     Toxic drug monitoring   Monitor creatining while on zosyn     Code status: Full  Prophylaxis: SCD's  Recommended Disposition: Home w/Family       Subjective:     Chief Complaint / Reason for Physician Visit  Seen and evaluated at bedside  No bm yet      Review of Systems:  Symptom Y/N Comments  Symptom Y/N Comments   Fever/Chills n   Chest Pain n    Poor Appetite n   Edema n    Cough n   Abdominal Pain y    Sputum n   Joint Pain n    SOB/OLEARY n   Pruritis/Rash n    Nausea/vomit    Tolerating PT/OT     Diarrhea    Tolerating Diet     Constipation    Other       Could NOT obtain due to:      Objective:     VITALS:   Last 24hrs VS reviewed since prior progress note. Most recent are:  Patient Vitals for the past 24 hrs:   Temp Pulse Resp BP SpO2   05/01/23 0951 97.9 °F (36.6 °C) 81 18 113/67 96 %   05/01/23 0344 97.7 °F (36.5 °C) 85 18 126/76 95 %   04/30/23 2024 97.5 °F (36.4 °C) 82 19 124/77 96 %   04/30/23 1514 -- 77 -- (!) 146/86 97 %   04/30/23 1512 97.5 °F (36.4 °C) 80 -- (!) 150/94 97 %         Intake/Output Summary (Last 24 hours) at 5/1/2023 1403  Last data filed at 5/1/2023 0346  Gross per 24 hour   Intake 5417.64 ml   Output 1730 ml   Net 3687.64 ml          I had a face to face encounter and independently examined this patient on 5/1/2023, as outlined below:  PHYSICAL EXAM:  General: Awake, No acute distress  EENT:  EOMI. Anicteric sclerae. MMM  Resp:  CTA bilaterally, no wheezing or rales. No accessory muscle use  CV:  Regular  rhythm,  No edema  GI:  Midline incision looks clean  Neurologic:  Alert and oriented X 3, normal speech,   Psych:   Not anxious nor agitated  Skin:  No rashes.   No jaundice    Reviewed most current lab test results and cultures  YES  Reviewed most current radiology test results   YES  Review and summation of old records today    NO  Reviewed patient's current orders and MAR    YES  PMH/SH reviewed - no change compared to H&P  ________________________________________________________________________  Care Plan discussed with:    Comments   Patient y    Family      RN y    Care Manager     Consultant                       y Multidiciplinary team rounds were held today with , nursing, pharmacist and clinical coordinator. Patient's plan of care was discussed; medications were reviewed and discharge planning was addressed. ________________________________________________________________________  Total NON critical care TIME:  45   Minutes    Total CRITICAL CARE TIME Spent:   Minutes non procedure based      Comments   >50% of visit spent in counseling and coordination of care x    ________________________________________________________________________  Juan Amor MD     Procedures: see electronic medical records for all procedures/Xrays and details which were not copied into this note but were reviewed prior to creation of Plan. LABS:  I reviewed today's most current labs and imaging studies.   Pertinent labs include:  Recent Labs     05/01/23 0058 04/30/23 0310   WBC 11.3* 12.1*   HGB 9.0* 8.9*   HCT 28.7* 27.5*   * 346       Recent Labs     05/01/23 0058 04/30/23 0310 04/29/23  0345    142 134*   K 3.9 3.7 3.4*    109* 101   CO2 26 26 29   * 125* 124*   BUN 21* 17 13   CREA 1.98* 1.90* 1.09   CA 8.8 8.4* 8.7   MG 2.1 2.0 1.8   PHOS 5.2* 4.2 3.9         Signed: Juan Amor MD No

## 2023-05-01 NOTE — PROGRESS NOTES
Comprehensive Nutrition Assessment    Type and Reason for Visit: Reassess    Nutrition Recommendations/Plan:   Continue diet as tolerated  Agree with letting TPN running out  Please document % meals and supplements consumed in flowsheet I/O's under intake   Low residue diet education done early on in the admission, please reconsult it pt/family needs a refresher      Malnutrition Assessment:  Malnutrition Status: At risk for malnutrition (specify) (04/18/23 1112)    Context:  Acute illness     Findings of the 6 clinical characteristics of malnutrition:   Energy Intake:  75% or less of est energy req for 7 or more days  Weight Loss:  No significant weight loss     Body Fat Loss:  No significant body fat loss,     Muscle Mass Loss:  No significant muscle mass loss,    Fluid Accumulation:  Mild, Extremities   Strength:  Not performed     Nutrition Assessment:  Chart reviewed, pt sitting up in the chair awake and alert. He reports he is tolerating solid food fine although he does not have much of an appetite. Diet advanced to solids this morning. Plan is to let current TPN bag run out and not renew. BG have been in the 130's. Pt reports his wife may have some questions about his home diet, education was done early on in the admission but I would be happy to answer any questions they may have prior to discharge. He could not think of anything specific at the moment. BM noted today. Nutrition Related Findings:    Meds: diflucan, protonix, zosyn, [email or web address]. Edema: trace-all extremities. BM 5/1 Wound Type: Surgical incision    Current Nutrition Intake & Therapies:  Average Meal Intake: 51-75%  Average Supplement Intake: None ordered  TPN ADULT - CENTRAL  ADULT DIET Regular; Low Fiber    Anthropometric Measures:  Height: 5' 9\" (175.3 cm)  Ideal Body Weight (IBW): 160 lbs (73 kg)     Current Body Wt:  95.3 kg (210 lb 1.6 oz), 134.3 % IBW.  Bed scale  Current BMI (kg/m2): 31  Usual Body Weight: 97.1 kg (214 lb)  % Weight Change (Calculated): 0.4                    BMI Category: Obese class 1 (BMI 30.0-34. 9)    Estimated Daily Nutrient Needs:  Energy Requirements Based On: Formula  Weight Used for Energy Requirements: Current  Energy (kcal/day): MSJ 1965 (1786 x 1.1)  Weight Used for Protein Requirements: Current  Protein (g/day): 78-98g (0.8-1gPro/kg)  Method Used for Fluid Requirements: 1 ml/kcal  Fluid (ml/day): 2000mL    Nutrition Diagnosis:   Inadequate protein-energy intake related to altered GI function, altered GI structure as evidenced by NPO or clear liquid status due to medical condition  Previous dx resolving.      Nutrition Interventions:   Food and/or Nutrient Delivery: Continue current diet, Discontinue parenteral nutrition  Nutrition Education/Counseling: Education completed  Coordination of Nutrition Care: Continue to monitor while inpatient       Goals:  Previous Goal Met: Goal(s) achieved  Goals: PO intake 75% or greater, by next RD assessment       Nutrition Monitoring and Evaluation:   Behavioral-Environmental Outcomes: None identified  Food/Nutrient Intake Outcomes: Food and nutrient intake  Physical Signs/Symptoms Outcomes: Biochemical data, Nutrition focused physical findings, Skin, Weight, GI status    Discharge Planning:    Continue current diet    Lo Bishop RD, CNSC  Contact: ext [phone]

## 2023-05-02 ENCOUNTER — HOSPITAL ENCOUNTER (INPATIENT)
Dept: ULTRASOUND IMAGING | Age: 58
Discharge: HOME | DRG: 329 | End: 2023-05-02
Attending: STUDENT IN AN ORGANIZED HEALTH CARE EDUCATION/TRAINING PROGRAM
Payer: COMMERCIAL

## 2023-05-02 LAB
ANION GAP SERPL CALC-SCNC: 5 MMOL/L (ref 5–15)
BUN SERPL-MCNC: 23 MG/DL (ref 6–20)
BUN/CREAT SERPL: 11 (ref 12–20)
CALCIUM SERPL-MCNC: 8.5 MG/DL (ref 8.5–10.1)
CHLORIDE SERPL-SCNC: 109 MMOL/L (ref 97–108)
CO2 SERPL-SCNC: 25 MMOL/L (ref 21–32)
CREAT SERPL-MCNC: 2.18 MG/DL (ref 0.7–1.3)
GLUCOSE SERPL-MCNC: 88 MG/DL (ref 65–100)
POTASSIUM SERPL-SCNC: 4.1 MMOL/L (ref 3.5–5.1)
SODIUM SERPL-SCNC: 139 MMOL/L (ref 136–145)

## 2023-05-02 PROCEDURE — C9113 INJ PANTOPRAZOLE SODIUM, VIA: HCPCS | Performed by: SURGERY

## 2023-05-02 PROCEDURE — 74011250636 HC RX REV CODE- 250/636: Performed by: SURGERY

## 2023-05-02 PROCEDURE — 74011250637 HC RX REV CODE- 250/637: Performed by: SURGERY

## 2023-05-02 PROCEDURE — 80048 BASIC METABOLIC PNL TOTAL CA: CPT

## 2023-05-02 PROCEDURE — 74011000250 HC RX REV CODE- 250: Performed by: SURGERY

## 2023-05-02 PROCEDURE — 74011250636 HC RX REV CODE- 250/636: Performed by: INTERNAL MEDICINE

## 2023-05-02 PROCEDURE — 74011250637 HC RX REV CODE- 250/637: Performed by: NURSE PRACTITIONER

## 2023-05-02 PROCEDURE — 36415 COLL VENOUS BLD VENIPUNCTURE: CPT

## 2023-05-02 PROCEDURE — 65270000046 HC RM TELEMETRY

## 2023-05-02 PROCEDURE — 76770 US EXAM ABDO BACK WALL COMP: CPT

## 2023-05-02 PROCEDURE — 74011000258 HC RX REV CODE- 258: Performed by: SURGERY

## 2023-05-02 PROCEDURE — 74011250637 HC RX REV CODE- 250/637: Performed by: INTERNAL MEDICINE

## 2023-05-02 PROCEDURE — 94760 N-INVAS EAR/PLS OXIMETRY 1: CPT

## 2023-05-02 RX ORDER — ATORVASTATIN CALCIUM 40 MG/1
80 TABLET, FILM COATED ORAL
Status: DISCONTINUED | OUTPATIENT
Start: 2023-05-02 | End: 2023-05-04 | Stop reason: HOSPADM

## 2023-05-02 RX ADMIN — SODIUM CHLORIDE, PRESERVATIVE FREE 10 ML: 5 INJECTION INTRAVENOUS at 06:10

## 2023-05-02 RX ADMIN — ENOXAPARIN SODIUM 40 MG: 100 INJECTION SUBCUTANEOUS at 22:15

## 2023-05-02 RX ADMIN — ACETAMINOPHEN 650 MG: 325 TABLET ORAL at 17:36

## 2023-05-02 RX ADMIN — ASPIRIN 81 MG: 81 TABLET, COATED ORAL at 09:39

## 2023-05-02 RX ADMIN — OXYCODONE 5 MG: 5 TABLET ORAL at 04:29

## 2023-05-02 RX ADMIN — ATORVASTATIN CALCIUM 80 MG: 40 TABLET, FILM COATED ORAL at 22:15

## 2023-05-02 RX ADMIN — ATORVASTATIN CALCIUM 80 MG: 40 TABLET, FILM COATED ORAL at 09:39

## 2023-05-02 RX ADMIN — ISOSORBIDE MONONITRATE 30 MG: 30 TABLET, EXTENDED RELEASE ORAL at 09:40

## 2023-05-02 RX ADMIN — SODIUM CHLORIDE, PRESERVATIVE FREE 10 ML: 5 INJECTION INTRAVENOUS at 12:28

## 2023-05-02 RX ADMIN — SODIUM CHLORIDE, PRESERVATIVE FREE 10 ML: 5 INJECTION INTRAVENOUS at 22:16

## 2023-05-02 RX ADMIN — FLUCONAZOLE 400 MG: 400 INJECTION, SOLUTION INTRAVENOUS at 10:37

## 2023-05-02 RX ADMIN — PIPERACILLIN AND TAZOBACTAM 3.38 G: 3; .375 INJECTION, POWDER, LYOPHILIZED, FOR SOLUTION INTRAVENOUS at 22:15

## 2023-05-02 RX ADMIN — EZETIMIBE 10 MG: 10 TABLET ORAL at 22:15

## 2023-05-02 RX ADMIN — CARVEDILOL 6.25 MG: 3.12 TABLET, FILM COATED ORAL at 17:35

## 2023-05-02 RX ADMIN — PIPERACILLIN AND TAZOBACTAM 3.38 G: 3; .375 INJECTION, POWDER, LYOPHILIZED, FOR SOLUTION INTRAVENOUS at 05:20

## 2023-05-02 RX ADMIN — DULOXETINE 60 MG: 30 CAPSULE, DELAYED RELEASE ORAL at 09:39

## 2023-05-02 RX ADMIN — SODIUM CHLORIDE, PRESERVATIVE FREE 40 MG: 5 INJECTION INTRAVENOUS at 09:41

## 2023-05-02 RX ADMIN — CARVEDILOL 6.25 MG: 3.12 TABLET, FILM COATED ORAL at 09:39

## 2023-05-02 RX ADMIN — PIPERACILLIN AND TAZOBACTAM 3.38 G: 3; .375 INJECTION, POWDER, LYOPHILIZED, FOR SOLUTION INTRAVENOUS at 12:50

## 2023-05-02 RX ADMIN — ACETAMINOPHEN 650 MG: 325 TABLET ORAL at 05:20

## 2023-05-02 RX ADMIN — SODIUM CHLORIDE 75 ML/HR: 9 INJECTION, SOLUTION INTRAVENOUS at 06:22

## 2023-05-03 ENCOUNTER — TELEPHONE (OUTPATIENT)
Dept: INTERNAL MEDICINE CLINIC | Age: 58
End: 2023-05-03

## 2023-05-03 LAB
ANION GAP SERPL CALC-SCNC: 7 MMOL/L (ref 5–15)
BASOPHILS # BLD: 0.1 K/UL (ref 0–0.1)
BASOPHILS NFR BLD: 1 % (ref 0–1)
BUN SERPL-MCNC: 21 MG/DL (ref 6–20)
BUN/CREAT SERPL: 10 (ref 12–20)
CALCIUM SERPL-MCNC: 7.9 MG/DL (ref 8.5–10.1)
CHLORIDE SERPL-SCNC: 109 MMOL/L (ref 97–108)
CO2 SERPL-SCNC: 24 MMOL/L (ref 21–32)
CREAT SERPL-MCNC: 2.14 MG/DL (ref 0.7–1.3)
DIFFERENTIAL METHOD BLD: ABNORMAL
EOSINOPHIL # BLD: 0.3 K/UL (ref 0–0.4)
EOSINOPHIL NFR BLD: 3 % (ref 0–7)
ERYTHROCYTE [DISTWIDTH] IN BLOOD BY AUTOMATED COUNT: 14.2 % (ref 11.5–14.5)
GLUCOSE SERPL-MCNC: 83 MG/DL (ref 65–100)
HCT VFR BLD AUTO: 29.8 % (ref 36.6–50.3)
HGB BLD-MCNC: 9.6 G/DL (ref 12.1–17)
IMM GRANULOCYTES # BLD AUTO: 0.1 K/UL (ref 0–0.04)
IMM GRANULOCYTES NFR BLD AUTO: 1 % (ref 0–0.5)
LYMPHOCYTES # BLD: 1.3 K/UL (ref 0.8–3.5)
LYMPHOCYTES NFR BLD: 12 % (ref 12–49)
MCH RBC QN AUTO: 28 PG (ref 26–34)
MCHC RBC AUTO-ENTMCNC: 32.2 G/DL (ref 30–36.5)
MCV RBC AUTO: 86.9 FL (ref 80–99)
MONOCYTES # BLD: 0.8 K/UL (ref 0–1)
MONOCYTES NFR BLD: 7 % (ref 5–13)
NEUTS SEG # BLD: 8.5 K/UL (ref 1.8–8)
NEUTS SEG NFR BLD: 76 % (ref 32–75)
NRBC # BLD: 0 K/UL (ref 0–0.01)
NRBC BLD-RTO: 0 PER 100 WBC
PLATELET # BLD AUTO: 412 K/UL (ref 150–400)
PMV BLD AUTO: 10.1 FL (ref 8.9–12.9)
POTASSIUM SERPL-SCNC: 3.2 MMOL/L (ref 3.5–5.1)
RBC # BLD AUTO: 3.43 M/UL (ref 4.1–5.7)
SODIUM SERPL-SCNC: 140 MMOL/L (ref 136–145)
WBC # BLD AUTO: 11.2 K/UL (ref 4.1–11.1)

## 2023-05-03 PROCEDURE — 74011250637 HC RX REV CODE- 250/637

## 2023-05-03 PROCEDURE — 74011250637 HC RX REV CODE- 250/637: Performed by: SURGERY

## 2023-05-03 PROCEDURE — 74011250636 HC RX REV CODE- 250/636: Performed by: SURGERY

## 2023-05-03 PROCEDURE — 74011000250 HC RX REV CODE- 250: Performed by: SURGERY

## 2023-05-03 PROCEDURE — C9113 INJ PANTOPRAZOLE SODIUM, VIA: HCPCS | Performed by: SURGERY

## 2023-05-03 PROCEDURE — 74011000258 HC RX REV CODE- 258: Performed by: SURGERY

## 2023-05-03 PROCEDURE — 36415 COLL VENOUS BLD VENIPUNCTURE: CPT

## 2023-05-03 PROCEDURE — 74011250636 HC RX REV CODE- 250/636

## 2023-05-03 PROCEDURE — 85025 COMPLETE CBC W/AUTO DIFF WBC: CPT

## 2023-05-03 PROCEDURE — 74011250637 HC RX REV CODE- 250/637: Performed by: NURSE PRACTITIONER

## 2023-05-03 PROCEDURE — 80048 BASIC METABOLIC PNL TOTAL CA: CPT

## 2023-05-03 PROCEDURE — 65270000046 HC RM TELEMETRY

## 2023-05-03 PROCEDURE — 74011250637 HC RX REV CODE- 250/637: Performed by: INTERNAL MEDICINE

## 2023-05-03 PROCEDURE — 94760 N-INVAS EAR/PLS OXIMETRY 1: CPT

## 2023-05-03 RX ORDER — CALCIUM GLUCONATE 20 MG/ML
1 INJECTION, SOLUTION INTRAVENOUS ONCE
Status: COMPLETED | OUTPATIENT
Start: 2023-05-03 | End: 2023-05-03

## 2023-05-03 RX ORDER — METRONIDAZOLE 250 MG/1
500 TABLET ORAL EVERY 8 HOURS
Status: DISCONTINUED | OUTPATIENT
Start: 2023-05-03 | End: 2023-05-04 | Stop reason: HOSPADM

## 2023-05-03 RX ORDER — POTASSIUM CHLORIDE 20 MEQ/1
40 TABLET, EXTENDED RELEASE ORAL
Status: COMPLETED | OUTPATIENT
Start: 2023-05-03 | End: 2023-05-03

## 2023-05-03 RX ORDER — CIPROFLOXACIN 500 MG/1
500 TABLET ORAL EVERY 12 HOURS
Status: DISCONTINUED | OUTPATIENT
Start: 2023-05-03 | End: 2023-05-04 | Stop reason: HOSPADM

## 2023-05-03 RX ADMIN — ACETAMINOPHEN 650 MG: 325 TABLET ORAL at 06:21

## 2023-05-03 RX ADMIN — ASPIRIN 81 MG: 81 TABLET, COATED ORAL at 08:52

## 2023-05-03 RX ADMIN — CARVEDILOL 6.25 MG: 3.12 TABLET, FILM COATED ORAL at 16:59

## 2023-05-03 RX ADMIN — POTASSIUM CHLORIDE 40 MEQ: 20 TABLET, EXTENDED RELEASE ORAL at 08:52

## 2023-05-03 RX ADMIN — EZETIMIBE 10 MG: 10 TABLET ORAL at 21:19

## 2023-05-03 RX ADMIN — FLUCONAZOLE 400 MG: 400 INJECTION, SOLUTION INTRAVENOUS at 10:03

## 2023-05-03 RX ADMIN — METRONIDAZOLE 500 MG: 250 TABLET ORAL at 10:02

## 2023-05-03 RX ADMIN — CIPROFLOXACIN 500 MG: 500 TABLET, FILM COATED ORAL at 21:18

## 2023-05-03 RX ADMIN — SODIUM CHLORIDE, PRESERVATIVE FREE 40 MG: 5 INJECTION INTRAVENOUS at 08:53

## 2023-05-03 RX ADMIN — DULOXETINE 60 MG: 30 CAPSULE, DELAYED RELEASE ORAL at 08:52

## 2023-05-03 RX ADMIN — ACETAMINOPHEN 650 MG: 325 TABLET ORAL at 12:09

## 2023-05-03 RX ADMIN — ENOXAPARIN SODIUM 40 MG: 100 INJECTION SUBCUTANEOUS at 21:19

## 2023-05-03 RX ADMIN — SODIUM CHLORIDE, PRESERVATIVE FREE 10 ML: 5 INJECTION INTRAVENOUS at 06:22

## 2023-05-03 RX ADMIN — CIPROFLOXACIN 500 MG: 500 TABLET, FILM COATED ORAL at 10:02

## 2023-05-03 RX ADMIN — CARVEDILOL 6.25 MG: 3.12 TABLET, FILM COATED ORAL at 08:52

## 2023-05-03 RX ADMIN — ISOSORBIDE MONONITRATE 30 MG: 30 TABLET, EXTENDED RELEASE ORAL at 08:52

## 2023-05-03 RX ADMIN — ATORVASTATIN CALCIUM 80 MG: 40 TABLET, FILM COATED ORAL at 21:19

## 2023-05-03 RX ADMIN — ACETAMINOPHEN 650 MG: 325 TABLET ORAL at 00:10

## 2023-05-03 RX ADMIN — SODIUM CHLORIDE, PRESERVATIVE FREE 10 ML: 5 INJECTION INTRAVENOUS at 14:00

## 2023-05-03 RX ADMIN — POTASSIUM CHLORIDE 40 MEQ: 20 TABLET, EXTENDED RELEASE ORAL at 06:21

## 2023-05-03 RX ADMIN — METRONIDAZOLE 500 MG: 250 TABLET ORAL at 17:00

## 2023-05-03 RX ADMIN — PIPERACILLIN AND TAZOBACTAM 3.38 G: 3; .375 INJECTION, POWDER, LYOPHILIZED, FOR SOLUTION INTRAVENOUS at 06:21

## 2023-05-03 RX ADMIN — OXYCODONE 5 MG: 5 TABLET ORAL at 23:37

## 2023-05-03 RX ADMIN — SODIUM CHLORIDE, PRESERVATIVE FREE 10 ML: 5 INJECTION INTRAVENOUS at 21:19

## 2023-05-03 RX ADMIN — OXYCODONE 5 MG: 5 TABLET ORAL at 18:39

## 2023-05-03 RX ADMIN — CALCIUM GLUCONATE 1000 MG: 20 INJECTION, SOLUTION INTRAVENOUS at 07:36

## 2023-05-03 RX ADMIN — ACETAMINOPHEN 650 MG: 325 TABLET ORAL at 17:00

## 2023-05-04 VITALS
RESPIRATION RATE: 16 BRPM | HEIGHT: 69 IN | WEIGHT: 210 LBS | DIASTOLIC BLOOD PRESSURE: 109 MMHG | OXYGEN SATURATION: 98 % | BODY MASS INDEX: 31.1 KG/M2 | HEART RATE: 86 BPM | TEMPERATURE: 97.2 F | SYSTOLIC BLOOD PRESSURE: 172 MMHG

## 2023-05-04 LAB
ANION GAP SERPL CALC-SCNC: 4 MMOL/L (ref 5–15)
BUN SERPL-MCNC: 17 MG/DL (ref 6–20)
BUN/CREAT SERPL: 8 (ref 12–20)
CALCIUM SERPL-MCNC: 8.4 MG/DL (ref 8.5–10.1)
CHLORIDE SERPL-SCNC: 108 MMOL/L (ref 97–108)
CO2 SERPL-SCNC: 27 MMOL/L (ref 21–32)
CREAT SERPL-MCNC: 2.02 MG/DL (ref 0.7–1.3)
GLUCOSE SERPL-MCNC: 90 MG/DL (ref 65–100)
POTASSIUM SERPL-SCNC: 3.2 MMOL/L (ref 3.5–5.1)
SODIUM SERPL-SCNC: 139 MMOL/L (ref 136–145)

## 2023-05-04 PROCEDURE — 74011000250 HC RX REV CODE- 250: Performed by: SURGERY

## 2023-05-04 PROCEDURE — 36415 COLL VENOUS BLD VENIPUNCTURE: CPT

## 2023-05-04 PROCEDURE — C9113 INJ PANTOPRAZOLE SODIUM, VIA: HCPCS | Performed by: SURGERY

## 2023-05-04 PROCEDURE — 74011250637 HC RX REV CODE- 250/637

## 2023-05-04 PROCEDURE — 74011250637 HC RX REV CODE- 250/637: Performed by: SURGERY

## 2023-05-04 PROCEDURE — 74011250636 HC RX REV CODE- 250/636

## 2023-05-04 PROCEDURE — 74011250636 HC RX REV CODE- 250/636: Performed by: SURGERY

## 2023-05-04 PROCEDURE — 99024 POSTOP FOLLOW-UP VISIT: CPT | Performed by: NURSE PRACTITIONER

## 2023-05-04 PROCEDURE — 74011250637 HC RX REV CODE- 250/637: Performed by: NURSE PRACTITIONER

## 2023-05-04 PROCEDURE — 80048 BASIC METABOLIC PNL TOTAL CA: CPT

## 2023-05-04 RX ORDER — CALCIUM GLUCONATE 20 MG/ML
1 INJECTION, SOLUTION INTRAVENOUS ONCE
Status: COMPLETED | OUTPATIENT
Start: 2023-05-04 | End: 2023-05-04

## 2023-05-04 RX ORDER — FLUCONAZOLE 200 MG/1
200 TABLET ORAL DAILY
Qty: 7 TABLET | Refills: 0 | Status: SHIPPED | OUTPATIENT
Start: 2023-05-04 | End: 2023-05-11

## 2023-05-04 RX ORDER — ATORVASTATIN CALCIUM 80 MG/1
80 TABLET, FILM COATED ORAL DAILY
Qty: 90 TABLET | Refills: 3 | Status: SHIPPED | OUTPATIENT
Start: 2023-05-04

## 2023-05-04 RX ORDER — POTASSIUM CHLORIDE 20 MEQ/1
40 TABLET, EXTENDED RELEASE ORAL
Status: DISCONTINUED | OUTPATIENT
Start: 2023-05-04 | End: 2023-05-04

## 2023-05-04 RX ORDER — METRONIDAZOLE 500 MG/1
500 TABLET ORAL EVERY 8 HOURS
Qty: 21 TABLET | Refills: 0 | Status: SHIPPED | OUTPATIENT
Start: 2023-05-04 | End: 2023-05-11

## 2023-05-04 RX ORDER — CIPROFLOXACIN 500 MG/1
500 TABLET ORAL EVERY 12 HOURS
Qty: 14 TABLET | Refills: 0 | Status: SHIPPED | OUTPATIENT
Start: 2023-05-04 | End: 2023-05-11

## 2023-05-04 RX ORDER — OXYCODONE HYDROCHLORIDE 5 MG/1
5 TABLET ORAL
Qty: 10 TABLET | Refills: 0 | Status: SHIPPED | OUTPATIENT
Start: 2023-05-04 | End: 2023-05-07

## 2023-05-04 RX ORDER — POTASSIUM CHLORIDE 20 MEQ/1
40 TABLET, EXTENDED RELEASE ORAL
Status: COMPLETED | OUTPATIENT
Start: 2023-05-04 | End: 2023-05-04

## 2023-05-04 RX ADMIN — METRONIDAZOLE 500 MG: 250 TABLET ORAL at 09:09

## 2023-05-04 RX ADMIN — POTASSIUM CHLORIDE 40 MEQ: 1500 TABLET, EXTENDED RELEASE ORAL at 04:45

## 2023-05-04 RX ADMIN — CARVEDILOL 6.25 MG: 3.12 TABLET, FILM COATED ORAL at 09:09

## 2023-05-04 RX ADMIN — ASPIRIN 81 MG: 81 TABLET, COATED ORAL at 09:09

## 2023-05-04 RX ADMIN — OXYCODONE 5 MG: 5 TABLET ORAL at 10:04

## 2023-05-04 RX ADMIN — ISOSORBIDE MONONITRATE 30 MG: 30 TABLET, EXTENDED RELEASE ORAL at 09:10

## 2023-05-04 RX ADMIN — SODIUM CHLORIDE, PRESERVATIVE FREE 40 MG: 5 INJECTION INTRAVENOUS at 09:10

## 2023-05-04 RX ADMIN — SODIUM CHLORIDE, PRESERVATIVE FREE 10 ML: 5 INJECTION INTRAVENOUS at 05:52

## 2023-05-04 RX ADMIN — ACETAMINOPHEN 650 MG: 325 TABLET ORAL at 02:38

## 2023-05-04 RX ADMIN — POTASSIUM CHLORIDE 40 MEQ: 1500 TABLET, EXTENDED RELEASE ORAL at 09:09

## 2023-05-04 RX ADMIN — DULOXETINE 60 MG: 30 CAPSULE, DELAYED RELEASE ORAL at 09:08

## 2023-05-04 RX ADMIN — OXYCODONE 5 MG: 5 TABLET ORAL at 04:45

## 2023-05-04 RX ADMIN — CALCIUM GLUCONATE 1000 MG: 20 INJECTION, SOLUTION INTRAVENOUS at 04:47

## 2023-05-04 RX ADMIN — CIPROFLOXACIN 500 MG: 500 TABLET, FILM COATED ORAL at 09:14

## 2023-05-04 RX ADMIN — METRONIDAZOLE 500 MG: 250 TABLET ORAL at 02:35

## 2023-05-05 ENCOUNTER — PATIENT OUTREACH (OUTPATIENT)
Dept: CASE MANAGEMENT | Age: 58
End: 2023-05-05

## 2023-05-15 ENCOUNTER — OFFICE VISIT (OUTPATIENT)
Facility: CLINIC | Age: 58
End: 2023-05-15

## 2023-05-15 VITALS
SYSTOLIC BLOOD PRESSURE: 114 MMHG | BODY MASS INDEX: 28.23 KG/M2 | HEIGHT: 69 IN | HEART RATE: 88 BPM | OXYGEN SATURATION: 97 % | WEIGHT: 190.6 LBS | DIASTOLIC BLOOD PRESSURE: 80 MMHG | TEMPERATURE: 98 F

## 2023-05-15 DIAGNOSIS — Z90.49 STATUS POST SMALL BOWEL RESECTION: ICD-10-CM

## 2023-05-15 DIAGNOSIS — N17.9 AKI (ACUTE KIDNEY INJURY) (HCC): ICD-10-CM

## 2023-05-15 DIAGNOSIS — K57.20 DIVERTICULITIS OF COLON WITH PERFORATION: ICD-10-CM

## 2023-05-15 DIAGNOSIS — I10 ESSENTIAL HYPERTENSION: ICD-10-CM

## 2023-05-15 DIAGNOSIS — Z09 HOSPITAL DISCHARGE FOLLOW-UP: Primary | ICD-10-CM

## 2023-05-15 SDOH — ECONOMIC STABILITY: FOOD INSECURITY: WITHIN THE PAST 12 MONTHS, YOU WORRIED THAT YOUR FOOD WOULD RUN OUT BEFORE YOU GOT MONEY TO BUY MORE.: NEVER TRUE

## 2023-05-15 SDOH — ECONOMIC STABILITY: HOUSING INSECURITY
IN THE LAST 12 MONTHS, WAS THERE A TIME WHEN YOU DID NOT HAVE A STEADY PLACE TO SLEEP OR SLEPT IN A SHELTER (INCLUDING NOW)?: NO

## 2023-05-15 SDOH — ECONOMIC STABILITY: INCOME INSECURITY: HOW HARD IS IT FOR YOU TO PAY FOR THE VERY BASICS LIKE FOOD, HOUSING, MEDICAL CARE, AND HEATING?: NOT VERY HARD

## 2023-05-15 SDOH — ECONOMIC STABILITY: FOOD INSECURITY: WITHIN THE PAST 12 MONTHS, THE FOOD YOU BOUGHT JUST DIDN'T LAST AND YOU DIDN'T HAVE MONEY TO GET MORE.: NEVER TRUE

## 2023-05-15 ASSESSMENT — PATIENT HEALTH QUESTIONNAIRE - PHQ9
SUM OF ALL RESPONSES TO PHQ9 QUESTIONS 1 & 2: 0
SUM OF ALL RESPONSES TO PHQ QUESTIONS 1-9: 0
SUM OF ALL RESPONSES TO PHQ QUESTIONS 1-9: 0
1. LITTLE INTEREST OR PLEASURE IN DOING THINGS: 0
2. FEELING DOWN, DEPRESSED OR HOPELESS: 0
SUM OF ALL RESPONSES TO PHQ QUESTIONS 1-9: 0
SUM OF ALL RESPONSES TO PHQ QUESTIONS 1-9: 0

## 2023-05-15 NOTE — PROGRESS NOTES
Post-Discharge Transitional Care  Follow Up      Wander Dickson   YOB: 1965    Date of Office Visit:  5/15/2023  Date of Hospital Admission: 5/2/23  Date of Hospital Discharge: 5/2/23  Risk of hospital readmission (high >=14%. Medium >=10%) :No data recorded    Care management risk score Rising risk (score 2-5) and Complex Care (Scores >=6): No Risk Score On File     Non face to face  following discharge, date last encounter closed (first attempt may have been earlier): 5/5/23    Call initiated 2 business days of discharge: Yes, 5/5/23    ASSESSMENT/PLAN:   Hospital discharge follow-up  -     [place] DISCHARGE MEDS RECONCILED W/ CURRENT OUTPATIENT MED LIST  Status post small bowel resection  Diverticulitis of colon with perforation  Essential hypertension  TIFFANIE (acute kidney injury) ([address]    - No complications since being home. Continue daily dressing changes and keep scheduled appointment with Dr. Nithya Horton on 5/17/23.  - Needs to follow up with Dr. Isaiah Edge for TIFFANIE. I provided him with 2 phone numbers to contact her: [phone] [facility] location) or [phone] [facility] location)  -If unable to reach Dr. Isaiah Edge, let me know and I will order BMP to assess renal status. Medical Decision Making: moderate complexity  Return if symptoms worsen or fail to improve, for Scheduled appointment on 7/24/23 for Medicare AWV. Subjective:   HPI:  Follow up of Hospital problems/diagnosis(es): Acute diverticulitis. Inpatient course: Discharge summary reviewed- see chart. Hospitalized from 4/14-5/4/23 for diverticulitis of colon with perforation. Had small bowel resection and lysis of adhesions on 4/24/23. Interval history/Current status:   No pain in daytime. Takes Tylenol for abdominal pain at night. Tolerating regular foods with no problem. Passing much gas.  Nurse from [facility] changes wound dressings every other day on weekends; his wife changes the dressings on other days

## 2023-05-15 NOTE — PROGRESS NOTES
Sharan Anaya  Identified pt with two pt identifiers(name and ). Chief Complaint   Patient presents with    Follow-Up from [address] [place] [address] In preparation for visit and have obtained necessary documentation. 1. Have you been to the ER, urgent care clinic or hospitalized since your last visit? No     2. Have you seen or consulted any other health care providers outside of the [address] [place] [address] since your last visit? Include any pap smears or colon screening. No    Vitals reviewed with provider. Health Maintenance reviewed:     Health Maintenance Due   Topic    HIV screen     Colorectal Cancer Screen     Shingles vaccine (1 of 2)    COVID-19 Vaccine (3 - Booster for Pfizer series)          Wt Readings from Last 3 Encounters:   05/15/23 190 lb 9.6 oz (86.5 kg)   23 210 lb (95.3 kg)   23 206 lb (93.4 kg)        Temp Readings from Last 3 Encounters:   05/15/23 98 °F (36.7 °C) (Oral)        BP Readings from Last 3 Encounters:   05/15/23 114/80   23 (!) 172/109   23 102/72        Pulse Readings from Last 3 Encounters:   05/15/23 88   23 86   23 89      No flowsheet data found. Learning Assessment:   :   No flowsheet data found. Abuse Assessment:    No flowsheet data found. Fall Risk Assessment:   :     ,  Amb Fall Risk Assessment and TUG Test 2018   Fall in past 12 months? - 0 0 -   Able to walk? - Yes Yes -   Total Score 1 - - 1          ADL Assessment:    No flowsheet data found.

## 2023-05-17 ENCOUNTER — OFFICE VISIT (OUTPATIENT)
Age: 58
End: 2023-05-17

## 2023-05-17 VITALS
OXYGEN SATURATION: 95 % | DIASTOLIC BLOOD PRESSURE: 71 MMHG | BODY MASS INDEX: 28.14 KG/M2 | WEIGHT: 190 LBS | HEART RATE: 97 BPM | HEIGHT: 69 IN | RESPIRATION RATE: 18 BRPM | TEMPERATURE: 97 F | SYSTOLIC BLOOD PRESSURE: 111 MMHG

## 2023-05-17 DIAGNOSIS — Z48.89 ENCOUNTER FOR POSTOPERATIVE CARE: Primary | ICD-10-CM

## 2023-05-17 DIAGNOSIS — Z09 POSTOPERATIVE EXAMINATION: ICD-10-CM

## 2023-05-17 PROCEDURE — 99024 POSTOP FOLLOW-UP VISIT: CPT | Performed by: SURGERY

## 2023-05-17 NOTE — PROGRESS NOTES
Postop Progress Note    Melanie Jeffery presents to the office for postop follow up. He is 2 weeks s/p exploratory laparotomy, colectomy and small bowel resection. He is doing well without complaint. Objective    Vitals:    05/17/23 1314   BP: 111/71   Pulse: 97   Resp: 18   Temp: 97 °F (36.1 °C)   SpO2: 95%     General: alert, cooperative and no distress  Incision: healing well. Open wound now closed. Staples removed. Assessment  Doing well postoperatively. Plan  1. Continue any current medications  2. Wound care discussed  3. Pt is to increase activities as tolerated  4. Usual diet  5.  Follow up: as needed    Electronically signed by Alexus Silva MD on 5/17/2023 at 1:51 PM

## 2023-05-17 NOTE — PROGRESS NOTES
Identified pt with two pt identifiers (name and ). Reviewed chart in preparation for visit and have obtained necessary documentation. Sharan Anaya is a 61 y.o. male  Chief Complaint   Patient presents with    Follow-up     S/P 23 small bowel resection sigmoid coln resection lysis of adhesions. /71 (Site: Right Upper Arm, Position: Sitting, Cuff Size: Small Adult)   Pulse 97   Temp 97 °F (36.1 °C) (Temporal)   Resp 18   Ht 5' 9\" (1.753 m)   Wt 190 lb (86.2 kg)   SpO2 95%   BMI 28.06 kg/m²     1. Have you been to the ER, urgent care clinic since your last visit? Hospitalized since your last visit?no    2. Have you seen or consulted any other health care providers outside of the [address] [place] [address] since your last visit? Include any pap smears or colon screening.  no

## 2023-05-19 ENCOUNTER — TELEPHONE (OUTPATIENT)
Facility: CLINIC | Age: 58
End: 2023-05-19

## 2023-05-25 ENCOUNTER — TELEPHONE (OUTPATIENT)
Age: 58
End: 2023-05-25

## 2023-05-25 RX ORDER — RANOLAZINE 500 MG/1
500 TABLET, EXTENDED RELEASE ORAL 2 TIMES DAILY
Qty: 60 TABLET | Refills: 0 | Status: SHIPPED | OUTPATIENT
Start: 2023-05-25

## 2023-05-25 NOTE — TELEPHONE ENCOUNTER
One  month supply sent to pharmacy. Patient needs appointment for further refills. Unable to send for a year.

## 2023-06-06 ENCOUNTER — OFFICE VISIT (OUTPATIENT)
Age: 58
End: 2023-06-06
Payer: MEDICARE

## 2023-06-06 VITALS
OXYGEN SATURATION: 96 % | WEIGHT: 192 LBS | BODY MASS INDEX: 28.44 KG/M2 | DIASTOLIC BLOOD PRESSURE: 68 MMHG | SYSTOLIC BLOOD PRESSURE: 112 MMHG | HEART RATE: 87 BPM | HEIGHT: 69 IN

## 2023-06-06 DIAGNOSIS — I50.22 CHRONIC SYSTOLIC (CONGESTIVE) HEART FAILURE (HCC): ICD-10-CM

## 2023-06-06 DIAGNOSIS — I25.5 ISCHEMIC CARDIOMYOPATHY: ICD-10-CM

## 2023-06-06 DIAGNOSIS — E78.2 MIXED HYPERLIPIDEMIA: ICD-10-CM

## 2023-06-06 DIAGNOSIS — R00.2 PALPITATIONS: ICD-10-CM

## 2023-06-06 DIAGNOSIS — Z95.810 PRESENCE OF AUTOMATIC (IMPLANTABLE) CARDIAC DEFIBRILLATOR: ICD-10-CM

## 2023-06-06 DIAGNOSIS — Z72.0 TOBACCO USE: ICD-10-CM

## 2023-06-06 DIAGNOSIS — G47.33 OBSTRUCTIVE SLEEP APNEA (ADULT) (PEDIATRIC): ICD-10-CM

## 2023-06-06 DIAGNOSIS — I10 ESSENTIAL (PRIMARY) HYPERTENSION: ICD-10-CM

## 2023-06-06 DIAGNOSIS — I25.10 ATHEROSCLEROSIS OF NATIVE CORONARY ARTERY OF NATIVE HEART WITHOUT ANGINA PECTORIS: Primary | ICD-10-CM

## 2023-06-06 PROCEDURE — 93005 ELECTROCARDIOGRAM TRACING: CPT | Performed by: INTERNAL MEDICINE

## 2023-06-06 PROCEDURE — 3074F SYST BP LT 130 MM HG: CPT | Performed by: INTERNAL MEDICINE

## 2023-06-06 PROCEDURE — 99214 OFFICE O/P EST MOD 30 MIN: CPT | Performed by: INTERNAL MEDICINE

## 2023-06-06 PROCEDURE — G8427 DOCREV CUR MEDS BY ELIG CLIN: HCPCS | Performed by: INTERNAL MEDICINE

## 2023-06-06 PROCEDURE — 93010 ELECTROCARDIOGRAM REPORT: CPT | Performed by: INTERNAL MEDICINE

## 2023-06-06 PROCEDURE — 4004F PT TOBACCO SCREEN RCVD TLK: CPT | Performed by: INTERNAL MEDICINE

## 2023-06-06 PROCEDURE — 3078F DIAST BP <80 MM HG: CPT | Performed by: INTERNAL MEDICINE

## 2023-06-06 PROCEDURE — G8419 CALC BMI OUT NRM PARAM NOF/U: HCPCS | Performed by: INTERNAL MEDICINE

## 2023-06-06 PROCEDURE — 3017F COLORECTAL CA SCREEN DOC REV: CPT | Performed by: INTERNAL MEDICINE

## 2023-06-06 RX ORDER — MAGNESIUM 30 MG
60 TABLET ORAL DAILY
COMMUNITY

## 2023-06-06 RX ORDER — RANOLAZINE 500 MG/1
500 TABLET, EXTENDED RELEASE ORAL 2 TIMES DAILY
Qty: 180 TABLET | Refills: 4 | Status: SHIPPED | OUTPATIENT
Start: 2023-06-06 | End: 2023-06-09

## 2023-06-06 ASSESSMENT — PATIENT HEALTH QUESTIONNAIRE - PHQ9
SUM OF ALL RESPONSES TO PHQ QUESTIONS 1-9: 0
SUM OF ALL RESPONSES TO PHQ9 QUESTIONS 1 & 2: 0
SUM OF ALL RESPONSES TO PHQ QUESTIONS 1-9: 0
SUM OF ALL RESPONSES TO PHQ QUESTIONS 1-9: 0
2. FEELING DOWN, DEPRESSED OR HOPELESS: 0
1. LITTLE INTEREST OR PLEASURE IN DOING THINGS: 0
SUM OF ALL RESPONSES TO PHQ QUESTIONS 1-9: 0

## 2023-06-06 NOTE — PROGRESS NOTES
[address], [facility], [address] Jennifer Duval, [address]     Subjective:        Nestor Estrada is a 61 y.o. male is here for routine f/u. Last seen by us in May 2022. Recently, the patient was discharged in May 2023 from [facility] after ruptured diverticulitis, with small bowel and portion of the colon resected. Had acute renal failure, now improving. The patient has some intermittent palpitations that occur randomly, but denies chest pain/ shortness of breath, orthopnea, PND, LE edema, syncope, presyncope or fatigue.     Patient Active Problem List    Diagnosis Date Noted    Diverticulitis of colon with perforation 04/14/2023    Vertigo 07/28/2021    Prediabetes 07/28/2021    Mild intermittent asthma without complication 43/34/9835    Nephrolithiasis 11/29/2020    Chronic systolic congestive heart failure ([address] 07/21/2020    Major depressive disorder with single episode, in partial remission ([address] 09/15/2019    MELISSA (obstructive sleep apnea) 09/13/2019    Obesity (BMI 30-39.9) 08/11/2019    Ischemic cardiomyopathy 03/22/2019    ICD (implantable cardioverter-defibrillator) in place 03/22/2019    Coronary artery disease of native artery of native heart with stable angina pectoris ([address] 10/05/2018    Smokes 1 pack of cigarettes per day 09/25/2018    Essential hypertension 09/25/2018    Hyperlipidemia, mixed 09/25/2018      Shoshana Edward MD  Past Medical History:   Diagnosis Date    ADD (attention deficit disorder) 09/25/2018    Asthma     Balance problem 09/15/2019    Chest pain 11/30/2018    Congestive heart failure ([address]     Diabetes ([address]     Dyslipidemia 09/25/2018    Fatigue 11/30/2018    Glucose intolerance (impaired glucose tolerance) 08/11/2019    Hypertension     ICD (implantable cardioverter-defibrillator) in place     Other ill-defined conditions(799.89)     ADHD,BELL'S PALSY WEAKNESS  LT FACE    Psychiatric disorder

## 2023-06-09 DIAGNOSIS — E78.2 MIXED HYPERLIPIDEMIA: ICD-10-CM

## 2023-06-09 DIAGNOSIS — R00.2 PALPITATIONS: ICD-10-CM

## 2023-06-09 DIAGNOSIS — Z72.0 TOBACCO USE: ICD-10-CM

## 2023-06-09 DIAGNOSIS — I10 ESSENTIAL (PRIMARY) HYPERTENSION: ICD-10-CM

## 2023-06-09 DIAGNOSIS — Z95.810 PRESENCE OF AUTOMATIC (IMPLANTABLE) CARDIAC DEFIBRILLATOR: ICD-10-CM

## 2023-06-09 DIAGNOSIS — I25.10 ATHEROSCLEROSIS OF NATIVE CORONARY ARTERY OF NATIVE HEART WITHOUT ANGINA PECTORIS: ICD-10-CM

## 2023-06-09 DIAGNOSIS — I50.22 CHRONIC SYSTOLIC (CONGESTIVE) HEART FAILURE (HCC): ICD-10-CM

## 2023-06-09 DIAGNOSIS — G47.33 OBSTRUCTIVE SLEEP APNEA (ADULT) (PEDIATRIC): ICD-10-CM

## 2023-06-09 DIAGNOSIS — I25.5 ISCHEMIC CARDIOMYOPATHY: ICD-10-CM

## 2023-06-09 LAB
ANION GAP SERPL CALC-SCNC: 5 MMOL/L (ref 5–15)
BUN SERPL-MCNC: 12 MG/DL (ref 6–20)
BUN/CREAT SERPL: 13 (ref 12–20)
CALCIUM SERPL-MCNC: 9 MG/DL (ref 8.5–10.1)
CHLORIDE SERPL-SCNC: 110 MMOL/L (ref 97–108)
CO2 SERPL-SCNC: 26 MMOL/L (ref 21–32)
CREAT SERPL-MCNC: 0.91 MG/DL (ref 0.7–1.3)
GLUCOSE SERPL-MCNC: 98 MG/DL (ref 65–100)
MAGNESIUM SERPL-MCNC: 2.1 MG/DL (ref 1.6–2.4)
POTASSIUM SERPL-SCNC: 4.1 MMOL/L (ref 3.5–5.1)
SODIUM SERPL-SCNC: 141 MMOL/L (ref 136–145)

## 2023-06-09 RX ORDER — RANOLAZINE 500 MG/1
500 TABLET, EXTENDED RELEASE ORAL 2 TIMES DAILY
Qty: 180 TABLET | Refills: 4
Start: 2023-06-09

## 2023-06-21 ENCOUNTER — TELEPHONE (OUTPATIENT)
Age: 58
End: 2023-06-21

## 2023-06-22 ENCOUNTER — TELEPHONE (OUTPATIENT)
Age: 58
End: 2023-06-22

## 2023-06-29 ENCOUNTER — TELEPHONE (OUTPATIENT)
Age: 58
End: 2023-06-29

## 2023-06-29 RX ORDER — SACUBITRIL AND VALSARTAN 24; 26 MG/1; MG/1
TABLET, FILM COATED ORAL
Qty: 180 TABLET | Refills: 0 | Status: SHIPPED | OUTPATIENT
Start: 2023-06-29

## 2023-07-03 RX ORDER — RANOLAZINE 500 MG/1
TABLET, EXTENDED RELEASE ORAL
Qty: 180 TABLET | Refills: 3 | OUTPATIENT
Start: 2023-07-03

## 2023-07-13 ENCOUNTER — ANCILLARY PROCEDURE (OUTPATIENT)
Age: 58
End: 2023-07-13
Payer: MEDICARE

## 2023-07-13 DIAGNOSIS — I25.10 ATHEROSCLEROSIS OF NATIVE CORONARY ARTERY OF NATIVE HEART WITHOUT ANGINA PECTORIS: ICD-10-CM

## 2023-07-13 DIAGNOSIS — E78.2 MIXED HYPERLIPIDEMIA: ICD-10-CM

## 2023-07-13 LAB
VAS LEFT CCA DIST EDV: 16.2 CM/S
VAS LEFT CCA DIST PSV: 65.1 CM/S
VAS LEFT CCA PROX EDV: 21.5 CM/S
VAS LEFT CCA PROX PSV: 83.4 CM/S
VAS LEFT ECA EDV: 26.7 CM/S
VAS LEFT ECA PSV: 272.9 CM/S
VAS LEFT ICA DIST EDV: 13.5 CM/S
VAS LEFT ICA DIST PSV: 32.6 CM/S
VAS LEFT ICA MID EDV: 17.1 CM/S
VAS LEFT ICA MID PSV: 53.7 CM/S
VAS LEFT ICA PROX EDV: 14.7 CM/S
VAS LEFT ICA PROX PSV: 43.8 CM/S
VAS LEFT ICA/CCA PSV: 0.8 NO UNITS
VAS LEFT VERTEBRAL EDV: 8.9 CM/S
VAS LEFT VERTEBRAL PSV: 25.4 CM/S
VAS RIGHT CCA DIST EDV: 17.9 CM/S
VAS RIGHT CCA DIST PSV: 68.9 CM/S
VAS RIGHT CCA PROX EDV: 14.1 CM/S
VAS RIGHT CCA PROX PSV: 70.8 CM/S
VAS RIGHT ECA EDV: 0 CM/S
VAS RIGHT ECA PSV: 122.2 CM/S
VAS RIGHT ICA DIST EDV: 16 CM/S
VAS RIGHT ICA DIST PSV: 50.9 CM/S
VAS RIGHT ICA MID EDV: 22.6 CM/S
VAS RIGHT ICA MID PSV: 55.6 CM/S
VAS RIGHT ICA PROX EDV: 18.2 CM/S
VAS RIGHT ICA PROX PSV: 57.5 CM/S
VAS RIGHT ICA/CCA PSV: 0.8 NO UNITS
VAS RIGHT VERTEBRAL EDV: 14.9 CM/S
VAS RIGHT VERTEBRAL PSV: 34.1 CM/S

## 2023-07-13 PROCEDURE — 93880 EXTRACRANIAL BILAT STUDY: CPT | Performed by: SPECIALIST

## 2023-07-13 PROCEDURE — 93880 EXTRACRANIAL BILAT STUDY: CPT

## 2023-07-14 ENCOUNTER — TELEPHONE (OUTPATIENT)
Age: 58
End: 2023-07-14

## 2023-07-14 NOTE — TELEPHONE ENCOUNTER
----- Message from BORIS Burris NP sent at 7/13/2023  4:00 PM EDT -----  Mild carotid plaque build up,  continue asa/statin. Verified Patient with two identifiers  Spoke with patient regarding results and recommendations. Patient voiced understanding.

## 2023-07-18 LAB
BASOPHILS # BLD AUTO: 0.1 X10E3/UL (ref 0–0.2)
BASOPHILS NFR BLD AUTO: 1 %
EOSINOPHIL # BLD AUTO: 0.2 X10E3/UL (ref 0–0.4)
EOSINOPHIL NFR BLD AUTO: 3 %
ERYTHROCYTE [DISTWIDTH] IN BLOOD BY AUTOMATED COUNT: 14.1 % (ref 11.6–15.4)
HCT VFR BLD AUTO: 41.6 % (ref 37.5–51)
HGB BLD-MCNC: 13.8 G/DL (ref 13–17.7)
IMM GRANULOCYTES # BLD AUTO: 0 X10E3/UL (ref 0–0.1)
IMM GRANULOCYTES NFR BLD AUTO: 0 %
LYMPHOCYTES # BLD AUTO: 2.4 X10E3/UL (ref 0.7–3.1)
LYMPHOCYTES NFR BLD AUTO: 36 %
MCH RBC QN AUTO: 29.4 PG (ref 26.6–33)
MCHC RBC AUTO-ENTMCNC: 33.2 G/DL (ref 31.5–35.7)
MCV RBC AUTO: 89 FL (ref 79–97)
MONOCYTES # BLD AUTO: 0.5 X10E3/UL (ref 0.1–0.9)
MONOCYTES NFR BLD AUTO: 8 %
NEUTROPHILS # BLD AUTO: 3.5 X10E3/UL (ref 1.4–7)
NEUTROPHILS NFR BLD AUTO: 52 %
PLATELET # BLD AUTO: 320 X10E3/UL (ref 150–450)
RBC # BLD AUTO: 4.69 X10E6/UL (ref 4.14–5.8)
WBC # BLD AUTO: 6.7 X10E3/UL (ref 3.4–10.8)

## 2023-07-19 LAB
ALBUMIN SERPL-MCNC: 4 G/DL (ref 3.8–4.9)
ALBUMIN/GLOB SERPL: 1.5 {RATIO} (ref 1.2–2.2)
ALP SERPL-CCNC: 110 IU/L (ref 44–121)
ALT SERPL-CCNC: 13 IU/L (ref 0–44)
AST SERPL-CCNC: 18 IU/L (ref 0–40)
BILIRUB SERPL-MCNC: 0.3 MG/DL (ref 0–1.2)
BUN SERPL-MCNC: 15 MG/DL (ref 6–24)
BUN/CREAT SERPL: 14 (ref 9–20)
CALCIUM SERPL-MCNC: 9.6 MG/DL (ref 8.7–10.2)
CHLORIDE SERPL-SCNC: 102 MMOL/L (ref 96–106)
CHOLEST SERPL-MCNC: 138 MG/DL (ref 100–199)
CO2 SERPL-SCNC: 25 MMOL/L (ref 20–29)
CREAT SERPL-MCNC: 1.04 MG/DL (ref 0.76–1.27)
EGFRCR SERPLBLD CKD-EPI 2021: 83 ML/MIN/1.73
EST. AVERAGE GLUCOSE BLD GHB EST-MCNC: 114 MG/DL
GLOBULIN SER CALC-MCNC: 2.6 G/DL (ref 1.5–4.5)
GLUCOSE SERPL-MCNC: 104 MG/DL (ref 70–99)
HBA1C MFR BLD: 5.6 % (ref 4.8–5.6)
HDLC SERPL-MCNC: 33 MG/DL
LDLC SERPL CALC-MCNC: 79 MG/DL (ref 0–99)
POTASSIUM SERPL-SCNC: 4.8 MMOL/L (ref 3.5–5.2)
PROT SERPL-MCNC: 6.6 G/DL (ref 6–8.5)
PSA SERPL-MCNC: 2 NG/ML (ref 0–4)
SODIUM SERPL-SCNC: 139 MMOL/L (ref 134–144)
TRIGL SERPL-MCNC: 150 MG/DL (ref 0–149)
VLDLC SERPL CALC-MCNC: 26 MG/DL (ref 5–40)

## 2023-07-24 ENCOUNTER — OFFICE VISIT (OUTPATIENT)
Facility: CLINIC | Age: 58
End: 2023-07-24
Payer: MEDICARE

## 2023-07-24 VITALS
HEIGHT: 69 IN | TEMPERATURE: 97.8 F | BODY MASS INDEX: 28.14 KG/M2 | WEIGHT: 190 LBS | SYSTOLIC BLOOD PRESSURE: 103 MMHG | HEART RATE: 87 BPM | OXYGEN SATURATION: 95 % | RESPIRATION RATE: 16 BRPM | DIASTOLIC BLOOD PRESSURE: 65 MMHG

## 2023-07-24 DIAGNOSIS — I25.5 ISCHEMIC CARDIOMYOPATHY: ICD-10-CM

## 2023-07-24 DIAGNOSIS — F17.210 SMOKES 1 PACK OF CIGARETTES PER DAY: ICD-10-CM

## 2023-07-24 DIAGNOSIS — I10 ESSENTIAL HYPERTENSION: ICD-10-CM

## 2023-07-24 DIAGNOSIS — Z00.00 MEDICARE ANNUAL WELLNESS VISIT, SUBSEQUENT: Primary | ICD-10-CM

## 2023-07-24 DIAGNOSIS — I25.118 CORONARY ARTERY DISEASE OF NATIVE ARTERY OF NATIVE HEART WITH STABLE ANGINA PECTORIS (HCC): ICD-10-CM

## 2023-07-24 DIAGNOSIS — E78.2 HYPERLIPIDEMIA, MIXED: ICD-10-CM

## 2023-07-24 PROCEDURE — G8427 DOCREV CUR MEDS BY ELIG CLIN: HCPCS | Performed by: INTERNAL MEDICINE

## 2023-07-24 PROCEDURE — 3074F SYST BP LT 130 MM HG: CPT | Performed by: INTERNAL MEDICINE

## 2023-07-24 PROCEDURE — 3078F DIAST BP <80 MM HG: CPT | Performed by: INTERNAL MEDICINE

## 2023-07-24 PROCEDURE — 1036F TOBACCO NON-USER: CPT | Performed by: INTERNAL MEDICINE

## 2023-07-24 PROCEDURE — G8419 CALC BMI OUT NRM PARAM NOF/U: HCPCS | Performed by: INTERNAL MEDICINE

## 2023-07-24 PROCEDURE — 99214 OFFICE O/P EST MOD 30 MIN: CPT | Performed by: INTERNAL MEDICINE

## 2023-07-24 PROCEDURE — G0439 PPPS, SUBSEQ VISIT: HCPCS | Performed by: INTERNAL MEDICINE

## 2023-07-24 PROCEDURE — 3017F COLORECTAL CA SCREEN DOC REV: CPT | Performed by: INTERNAL MEDICINE

## 2023-07-24 RX ORDER — LANOLIN ALCOHOL/MO/W.PET/CERES
400 CREAM (GRAM) TOPICAL DAILY
COMMUNITY
Start: 2023-05-24

## 2023-07-24 ASSESSMENT — PATIENT HEALTH QUESTIONNAIRE - PHQ9
SUM OF ALL RESPONSES TO PHQ9 QUESTIONS 1 & 2: 0
10. IF YOU CHECKED OFF ANY PROBLEMS, HOW DIFFICULT HAVE THESE PROBLEMS MADE IT FOR YOU TO DO YOUR WORK, TAKE CARE OF THINGS AT HOME, OR GET ALONG WITH OTHER PEOPLE: 1
5. POOR APPETITE OR OVEREATING: 0
6. FEELING BAD ABOUT YOURSELF - OR THAT YOU ARE A FAILURE OR HAVE LET YOURSELF OR YOUR FAMILY DOWN: 0
7. TROUBLE CONCENTRATING ON THINGS, SUCH AS READING THE NEWSPAPER OR WATCHING TELEVISION: 0
9. THOUGHTS THAT YOU WOULD BE BETTER OFF DEAD, OR OF HURTING YOURSELF: 0
3. TROUBLE FALLING OR STAYING ASLEEP: 1
4. FEELING TIRED OR HAVING LITTLE ENERGY: 3
2. FEELING DOWN, DEPRESSED OR HOPELESS: 0
SUM OF ALL RESPONSES TO PHQ QUESTIONS 1-9: 4
SUM OF ALL RESPONSES TO PHQ QUESTIONS 1-9: 4
8. MOVING OR SPEAKING SO SLOWLY THAT OTHER PEOPLE COULD HAVE NOTICED. OR THE OPPOSITE, BEING SO FIGETY OR RESTLESS THAT YOU HAVE BEEN MOVING AROUND A LOT MORE THAN USUAL: 0
SUM OF ALL RESPONSES TO PHQ QUESTIONS 1-9: 4
SUM OF ALL RESPONSES TO PHQ QUESTIONS 1-9: 4
1. LITTLE INTEREST OR PLEASURE IN DOING THINGS: 0

## 2023-07-24 ASSESSMENT — LIFESTYLE VARIABLES
HOW OFTEN DO YOU HAVE A DRINK CONTAINING ALCOHOL: NEVER
HOW MANY STANDARD DRINKS CONTAINING ALCOHOL DO YOU HAVE ON A TYPICAL DAY: PATIENT DOES NOT DRINK

## 2023-07-24 NOTE — PROGRESS NOTES
Medicare Annual Wellness Visit    Liborio Roca is here for Medicare AWV    Assessment & Plan     Discussed lab results from 7/18/23. Normal kidney and liver tests, prostate blood test (PSA), and blood counts. No longer have anemia, WBC nl compared to 2 months ago. Prediabetes improved to normal (A1c now 5.6%, was 6.1% a yr ago). Tot chol nl (138) but LDL high at 79 (not at goal of <70 due to CAD); was 66 a yr ago. Will check on compliance with atorvastatin 80 mg daily and Zetia 10 mg daily. Medicare annual wellness visit, subsequent  Recommendations for Preventive Services Due: see orders and patient instructions/AVS.  Recommended screening schedule for the next 5-10 years is provided to the patient in written form: see Patient Instructions/AVS.  Essential hypertension  Controlled. Continue present management. -     Comprehensive Metabolic Panel; Future  Hyperlipidemia, mixed  LDL not at goal of <70. He reports compliance with meds. Counseled on low-fat diet. Continue atorvastatin 80 mg daily and Zetia 10 mg daily. Recheck lipid panel in 6 months. -     Lipid Panel; Future  Smokes 1 pack of cigarettes per day  Counseled strongly on smoking cessation. He is not ready to quit right now. Coronary artery disease of native artery of native heart with stable angina pectoris ([address])  Controlled. Continue Imdur, carvedilol, Ranexa, and ASA. Follow up with Dr. Rosey Walter as scheduled. Ischemic cardiomyopathy  Stable on Entresto. Return in about 6 months (around 1/24/2024), or if symptoms worsen or fail to improve, for HTN, HLD; have fasting labs 1 week before appointment. Subjective     Presents for Medicare AWV. He has HTN, hyperlipidemia, CAD, history of MI with cardiac stent (ROSALES to proximal LAD) in 9/18, ischemic cardiomyopathy, CHF, ICD in place, major depression, prediabetes, tobacco use, and vestibular vertigo. Complains of chronic dizziness and intermittent heart palpitations.  Denies CP, SOB, Alert and interactive, no focal deficits

## 2023-07-31 PROBLEM — I48.0 PAF (PAROXYSMAL ATRIAL FIBRILLATION) (HCC): Status: ACTIVE | Noted: 2023-07-31

## 2023-08-01 RX ORDER — ISOSORBIDE MONONITRATE 30 MG/1
TABLET, EXTENDED RELEASE ORAL
Qty: 90 TABLET | Refills: 3 | Status: SHIPPED | OUTPATIENT
Start: 2023-08-01

## 2023-08-01 RX ORDER — ASPIRIN 81 MG/1
TABLET, COATED ORAL
Qty: 90 TABLET | Refills: 3 | COMMUNITY
Start: 2023-08-01 | End: 2023-08-02 | Stop reason: SDUPTHER

## 2023-08-01 NOTE — TELEPHONE ENCOUNTER
Refill Request Received for the Following Medication     Requested Prescriptions     Pending Prescriptions Disp Refills    isosorbide mononitrate (IMDUR) 30 MG extended release tablet [Pharmacy Med Name: ISOSORBIDE MONONITRATE ER TABS 30MG] 90 tablet 3     Sig: TAKE 1 TABLET DAILY    ASPIRIN LOW DOSE 81 MG EC tablet [Pharmacy Med Name: ASPIRIN EC TABS 81MG] 90 tablet 3     Sig: TAKE 1 TABLET DAILY       Last Prescribed: 01-10-23    Last Appointment With Me:  6/6/2023     Future Appointments:  Future Appointments   Date Time Provider [address] [place] [address]   9/25/2023 10:40 AM BORIS Galeano NP BS AMB   12/11/2023  9:20 AM MD DIANA Palmer BS AMB   1/25/2024  9:30 AM Fernando Jerez MD [facility] BS AMB

## 2023-08-02 ENCOUNTER — TELEPHONE (OUTPATIENT)
Age: 58
End: 2023-08-02

## 2023-08-02 RX ORDER — ASPIRIN 81 MG/1
81 TABLET ORAL DAILY
Qty: 90 TABLET | Refills: 3 | Status: SHIPPED | OUTPATIENT
Start: 2023-08-02

## 2023-08-02 NOTE — TELEPHONE ENCOUNTER
Spoke with patient, verified with 2 identifiers. [facility] per patient.

## 2023-08-02 NOTE — TELEPHONE ENCOUNTER
Pt is calling to find out why is his prescription for aspirin 81 mg is being denied.     [phone]

## 2023-08-03 ENCOUNTER — TELEPHONE (OUTPATIENT)
Age: 58
End: 2023-08-03

## 2023-08-03 NOTE — TELEPHONE ENCOUNTER
----- Message from Alicia Steinberg MD sent at 7/31/2023 10:08 PM EDT -----  Please advise heart monitor showed a small amount of probable atrial fibrillation. Need to follow-up in the clinic with [facility] in [place] in the near future to discuss, consider taking a blood thinner like Eliquis to protect against a stroke. Also probably repeat an echocardiogram plus or minus a stress test, and consider referral to electrophysiology.     Left msge on voice mail to call office back at [phone]

## 2023-08-04 NOTE — TELEPHONE ENCOUNTER
Verified Patient with two identifiers  Spoke with patient regarding results and recommendations. Patient voiced understanding. Appointment was change for  September 18 at our [facility] location. Message sent to  to see if a sooner appt can be given to patient.

## 2023-08-04 NOTE — PROGRESS NOTES
infarction. ECG: Resting ECG demonstrates normal sinus rhythm. Stress Test: A pharmacological stress test was performed using regadenoson. Hemodynamics are non-diagnostic due to medication. Blood pressure demonstrated a normal response and heart rate demonstrated a normal response to stress. Signed by: Shayy Albarran MD on 2/3/2022  5:32 PM, Signed by: Unknown Provider Result on 2/3/2022 12:00 AM        CARDIAC CATH  No results found for this or any previous visit. EKG:       Assessment:         Diagnosis Orders   1. Atherosclerosis of native coronary artery of native heart without angina pectoris  CBC    Transthoracic echocardiogram (TTE) complete with contrast, bubble, strain, and 3D PRN    [facility], [facility]iac [facility], [facility]      2. PAF (paroxysmal atrial fibrillation) (HCC)  CBC    Transthoracic echocardiogram (TTE) complete with contrast, bubble, strain, and 3D PRN    [facility], [facility])      3. Ischemic cardiomyopathy  CBC    Transthoracic echocardiogram (TTE) complete with contrast, bubble, strain, and 3D PRN    [facility], Cardiac Electrophysiology, [facility]      4. Essential (primary) hypertension  CBC    Transthoracic echocardiogram (TTE) complete with contrast, bubble, strain, and 3D PRN    [facility], Cardiac Electrophysiology, [facility])      5. Mixed hyperlipidemia  CBC    Transthoracic echocardiogram (TTE) complete with contrast, bubble, strain, and 3D PRN    [facility], Cardiac Electrophysiology, [facility])      6. Presence of automatic (implantable) cardiac defibrillator  CBC    Transthoracic echocardiogram (TTE) complete with contrast, bubble, strain, and 3D PRN    [facility], Cardiac Electrophysiology, [facility])      7.  MELISSA (obstructive sleep apnea)  CBC    Transthoracic echocardiogram (TTE) complete with

## 2023-08-07 ENCOUNTER — OFFICE VISIT (OUTPATIENT)
Age: 58
End: 2023-08-07
Payer: MEDICARE

## 2023-08-07 VITALS
OXYGEN SATURATION: 97 % | HEIGHT: 69 IN | WEIGHT: 185 LBS | DIASTOLIC BLOOD PRESSURE: 58 MMHG | SYSTOLIC BLOOD PRESSURE: 108 MMHG | BODY MASS INDEX: 27.4 KG/M2 | HEART RATE: 95 BPM

## 2023-08-07 DIAGNOSIS — I25.10 ATHEROSCLEROSIS OF NATIVE CORONARY ARTERY OF NATIVE HEART WITHOUT ANGINA PECTORIS: Primary | ICD-10-CM

## 2023-08-07 DIAGNOSIS — I25.5 ISCHEMIC CARDIOMYOPATHY: ICD-10-CM

## 2023-08-07 DIAGNOSIS — E78.2 MIXED HYPERLIPIDEMIA: ICD-10-CM

## 2023-08-07 DIAGNOSIS — I48.0 PAF (PAROXYSMAL ATRIAL FIBRILLATION) (HCC): ICD-10-CM

## 2023-08-07 DIAGNOSIS — I10 ESSENTIAL (PRIMARY) HYPERTENSION: ICD-10-CM

## 2023-08-07 DIAGNOSIS — G47.33 OSA (OBSTRUCTIVE SLEEP APNEA): ICD-10-CM

## 2023-08-07 DIAGNOSIS — F17.200 CURRENT SMOKER: ICD-10-CM

## 2023-08-07 DIAGNOSIS — Z95.810 PRESENCE OF AUTOMATIC (IMPLANTABLE) CARDIAC DEFIBRILLATOR: ICD-10-CM

## 2023-08-07 PROCEDURE — 99214 OFFICE O/P EST MOD 30 MIN: CPT | Performed by: NURSE PRACTITIONER

## 2023-08-07 PROCEDURE — 3078F DIAST BP <80 MM HG: CPT | Performed by: NURSE PRACTITIONER

## 2023-08-07 PROCEDURE — 4004F PT TOBACCO SCREEN RCVD TLK: CPT | Performed by: NURSE PRACTITIONER

## 2023-08-07 PROCEDURE — 3074F SYST BP LT 130 MM HG: CPT | Performed by: NURSE PRACTITIONER

## 2023-08-07 PROCEDURE — G8419 CALC BMI OUT NRM PARAM NOF/U: HCPCS | Performed by: NURSE PRACTITIONER

## 2023-08-07 PROCEDURE — 3017F COLORECTAL CA SCREEN DOC REV: CPT | Performed by: NURSE PRACTITIONER

## 2023-08-07 PROCEDURE — G8427 DOCREV CUR MEDS BY ELIG CLIN: HCPCS | Performed by: NURSE PRACTITIONER

## 2023-08-07 ASSESSMENT — PATIENT HEALTH QUESTIONNAIRE - PHQ9
SUM OF ALL RESPONSES TO PHQ QUESTIONS 1-9: 0
1. LITTLE INTEREST OR PLEASURE IN DOING THINGS: 0
SUM OF ALL RESPONSES TO PHQ QUESTIONS 1-9: 0
SUM OF ALL RESPONSES TO PHQ QUESTIONS 1-9: 0
SUM OF ALL RESPONSES TO PHQ9 QUESTIONS 1 & 2: 0
SUM OF ALL RESPONSES TO PHQ QUESTIONS 1-9: 0
2. FEELING DOWN, DEPRESSED OR HOPELESS: 0

## 2023-08-16 ENCOUNTER — ANCILLARY PROCEDURE (OUTPATIENT)
Age: 58
End: 2023-08-16
Payer: MEDICARE

## 2023-08-16 VITALS
BODY MASS INDEX: 27.4 KG/M2 | HEIGHT: 69 IN | WEIGHT: 185 LBS | SYSTOLIC BLOOD PRESSURE: 108 MMHG | DIASTOLIC BLOOD PRESSURE: 58 MMHG

## 2023-08-16 DIAGNOSIS — I25.5 ISCHEMIC CARDIOMYOPATHY: ICD-10-CM

## 2023-08-16 DIAGNOSIS — I25.10 ATHEROSCLEROSIS OF NATIVE CORONARY ARTERY OF NATIVE HEART WITHOUT ANGINA PECTORIS: ICD-10-CM

## 2023-08-16 DIAGNOSIS — I10 ESSENTIAL (PRIMARY) HYPERTENSION: ICD-10-CM

## 2023-08-16 DIAGNOSIS — E78.2 MIXED HYPERLIPIDEMIA: ICD-10-CM

## 2023-08-16 DIAGNOSIS — Z95.810 PRESENCE OF AUTOMATIC (IMPLANTABLE) CARDIAC DEFIBRILLATOR: ICD-10-CM

## 2023-08-16 DIAGNOSIS — I48.0 PAF (PAROXYSMAL ATRIAL FIBRILLATION) (HCC): ICD-10-CM

## 2023-08-16 DIAGNOSIS — G47.33 OSA (OBSTRUCTIVE SLEEP APNEA): ICD-10-CM

## 2023-08-16 PROCEDURE — 93306 TTE W/DOPPLER COMPLETE: CPT

## 2023-08-17 LAB
ECHO AO ASC DIAM: 3.3 CM
ECHO AO ASCENDING AORTA INDEX: 1.65 CM/M2
ECHO AO ROOT DIAM: 3.7 CM
ECHO AO ROOT INDEX: 1.85 CM/M2
ECHO AV PEAK GRADIENT: 6 MMHG
ECHO AV PEAK VELOCITY: 1.2 M/S
ECHO AV VELOCITY RATIO: 0.83
ECHO BSA: 2.02 M2
ECHO EST RA PRESSURE: 3 MMHG
ECHO LA DIAMETER INDEX: 1.65 CM/M2
ECHO LA DIAMETER: 3.3 CM
ECHO LA TO AORTIC ROOT RATIO: 0.89
ECHO LA VOL 2C: 66 ML (ref 18–58)
ECHO LA VOL 4C: 51 ML (ref 18–58)
ECHO LA VOLUME AREA LENGTH: 65 ML
ECHO LA VOLUME INDEX A2C: 33 ML/M2 (ref 16–34)
ECHO LA VOLUME INDEX A4C: 26 ML/M2 (ref 16–34)
ECHO LA VOLUME INDEX AREA LENGTH: 33 ML/M2 (ref 16–34)
ECHO LV EDV A2C: 94 ML
ECHO LV EDV A4C: 123 ML
ECHO LV EDV BP: 109 ML (ref 67–155)
ECHO LV EDV INDEX A4C: 62 ML/M2
ECHO LV EDV INDEX BP: 55 ML/M2
ECHO LV EDV NDEX A2C: 47 ML/M2
ECHO LV EJECTION FRACTION A2C: 33 %
ECHO LV EJECTION FRACTION A4C: 57 %
ECHO LV EJECTION FRACTION BIPLANE: 46 % (ref 55–100)
ECHO LV ESV A2C: 63 ML
ECHO LV ESV A4C: 52 ML
ECHO LV ESV BP: 59 ML (ref 22–58)
ECHO LV ESV INDEX A2C: 32 ML/M2
ECHO LV ESV INDEX A4C: 26 ML/M2
ECHO LV ESV INDEX BP: 30 ML/M2
ECHO LV FRACTIONAL SHORTENING: 19 % (ref 28–44)
ECHO LV INTERNAL DIMENSION DIASTOLE INDEX: 2.1 CM/M2
ECHO LV INTERNAL DIMENSION DIASTOLIC: 4.2 CM (ref 4.2–5.9)
ECHO LV INTERNAL DIMENSION SYSTOLIC INDEX: 1.7 CM/M2
ECHO LV INTERNAL DIMENSION SYSTOLIC: 3.4 CM
ECHO LV IVSD: 1.3 CM (ref 0.6–1)
ECHO LV MASS 2D: 178.2 G (ref 88–224)
ECHO LV MASS INDEX 2D: 89.1 G/M2 (ref 49–115)
ECHO LV POSTERIOR WALL DIASTOLIC: 1.1 CM (ref 0.6–1)
ECHO LV RELATIVE WALL THICKNESS RATIO: 0.52
ECHO LVOT PEAK GRADIENT: 4 MMHG
ECHO LVOT PEAK VELOCITY: 1 M/S
ECHO MV A VELOCITY: 0.66 M/S
ECHO MV AREA PHT: 3.7 CM2
ECHO MV E DECELERATION TIME (DT): 207.6 MS
ECHO MV E VELOCITY: 0.58 M/S
ECHO MV E/A RATIO: 0.88
ECHO MV PRESSURE HALF TIME (PHT): 60.2 MS
ECHO RA AREA 4C: 14.3 CM2
ECHO RA END SYSTOLIC VOLUME APICAL 4 CHAMBER INDEX BSA: 18 ML/M2
ECHO RA VOLUME AREA LENGTH APICAL 4 CHAMBER: 36 ML
ECHO RA VOLUME: 35 ML
ECHO RIGHT VENTRICULAR SYSTOLIC PRESSURE (RVSP): 34 MMHG
ECHO RV FREE WALL PEAK S': 14 CM/S
ECHO RV INTERNAL DIMENSION: 3.6 CM
ECHO RV TAPSE: 2.2 CM (ref 1.7–?)
ECHO TV REGURGITANT MAX VELOCITY: 2.77 M/S
ECHO TV REGURGITANT PEAK GRADIENT: 31 MMHG

## 2023-08-17 NOTE — RESULT ENCOUNTER NOTE
Echo showed improved / normalized EF or heart pumping strength from 40-45% to now 50-55% Continue same meds.   Keep follow up with Dr Ben Forrest in October

## 2023-08-18 ENCOUNTER — TELEPHONE (OUTPATIENT)
Age: 58
End: 2023-08-18

## 2023-08-18 NOTE — TELEPHONE ENCOUNTER
----- Message from BORIS Newman NP sent at 8/17/2023  7:31 PM EDT -----  Echo showed improved / normalized EF or heart pumping strength from 40-45% to now 50-55% Continue same meds. Keep follow up with Dr Courtney Olmos in October.     Left msge on voice mail to call office back at [phone]

## 2023-09-05 RX ORDER — DULOXETIN HYDROCHLORIDE 60 MG/1
CAPSULE, DELAYED RELEASE ORAL
Qty: 90 CAPSULE | Refills: 3 | Status: SHIPPED | OUTPATIENT
Start: 2023-09-05

## 2023-09-05 NOTE — TELEPHONE ENCOUNTER
PCP: Terri Martinez MD     Last appt:  7/24/2023    Future Appointments   Date Time Provider [address]   10/16/2023  3:20 PM Popeye Noel MD [facility] BS AMB   10/24/2023  3:00 PM [facility]   12/11/2023  9:20 AM MD DIANA Cintron  AMB   1/25/2024  9:30 AM Terri Martinez MD [facility] AMB          Requested Prescriptions     Pending Prescriptions Disp Refills    DULoxetine (CYMBALTA) 60 MG extended release capsule [Pharmacy Med Name: DULOXETINE HCL DR CAPS 60MG] 90 capsule 3     Sig: TAKE 1 CAPSULE DAILY

## 2023-09-15 DIAGNOSIS — I10 ESSENTIAL (PRIMARY) HYPERTENSION: ICD-10-CM

## 2023-09-15 DIAGNOSIS — G47.33 OSA (OBSTRUCTIVE SLEEP APNEA): ICD-10-CM

## 2023-09-15 DIAGNOSIS — E78.2 MIXED HYPERLIPIDEMIA: ICD-10-CM

## 2023-09-15 DIAGNOSIS — Z95.810 PRESENCE OF AUTOMATIC (IMPLANTABLE) CARDIAC DEFIBRILLATOR: ICD-10-CM

## 2023-09-15 DIAGNOSIS — I48.0 PAF (PAROXYSMAL ATRIAL FIBRILLATION) (HCC): ICD-10-CM

## 2023-09-15 DIAGNOSIS — I25.10 ATHEROSCLEROSIS OF NATIVE CORONARY ARTERY OF NATIVE HEART WITHOUT ANGINA PECTORIS: ICD-10-CM

## 2023-09-15 DIAGNOSIS — I25.5 ISCHEMIC CARDIOMYOPATHY: ICD-10-CM

## 2023-09-15 LAB
ERYTHROCYTE [DISTWIDTH] IN BLOOD BY AUTOMATED COUNT: 14.8 % (ref 11.5–14.5)
HCT VFR BLD AUTO: 40 % (ref 36.6–50.3)
HGB BLD-MCNC: 12.8 G/DL (ref 12.1–17)
MCH RBC QN AUTO: 28.6 PG (ref 26–34)
MCHC RBC AUTO-ENTMCNC: 32 G/DL (ref 30–36.5)
MCV RBC AUTO: 89.5 FL (ref 80–99)
NRBC # BLD: 0 K/UL (ref 0–0.01)
NRBC BLD-RTO: 0 PER 100 WBC
PLATELET # BLD AUTO: 282 K/UL (ref 150–400)
PMV BLD AUTO: 10.6 FL (ref 8.9–12.9)
RBC # BLD AUTO: 4.47 M/UL (ref 4.1–5.7)
WBC # BLD AUTO: 7.6 K/UL (ref 4.1–11.1)

## 2023-09-19 ENCOUNTER — TELEPHONE (OUTPATIENT)
Age: 58
End: 2023-09-19

## 2023-09-19 NOTE — TELEPHONE ENCOUNTER
----- Message from BORIS Somers NP sent at 9/17/2023 10:36 AM EDT -----  Hgb stable on eliquis, continue.  Follow up with vicki cowart as scheduled    Left msge on voice mail to call office back at [phone]

## 2023-09-20 ENCOUNTER — TELEPHONE (OUTPATIENT)
Age: 58
End: 2023-09-20

## 2023-09-20 NOTE — TELEPHONE ENCOUNTER
----- Message from BORIS Islas NP sent at 9/17/2023 10:36 AM EDT -----  Hgb stable on eliquis, continue. Follow up with vicki cowart as scheduled. Verified Patient with two identifiers  Spoke with patient regarding results and recommendations. Patient voiced understanding. Eliquis refill sent to pharmacy on file.

## 2023-10-23 ENCOUNTER — TELEPHONE (OUTPATIENT)
Age: 58
End: 2023-10-23

## 2023-10-23 RX ORDER — SACUBITRIL AND VALSARTAN 24; 26 MG/1; MG/1
TABLET, FILM COATED ORAL
Qty: 180 TABLET | Refills: 3 | Status: SHIPPED | OUTPATIENT
Start: 2023-10-23

## 2023-10-23 NOTE — TELEPHONE ENCOUNTER
Verified patient with two types of identifiers. Scheduled patient for device check tomorrow prior to seeding Dr. Joaquim Early. Patient agreeable. Patient verbalized understanding and will call with any other questions.       Future Appointments   Date Time Provider [address]   10/24/2023  2:40 PM Kodi Justice BS AMB   10/24/2023  3:00 PM Vanna Jackson MD [facility] BS AMB   12/11/2023  9:20 AM Alene Mohs, MD [facility] BS AMB   1/25/2024  9:30 AM MD IVY Santos BS AMB   10/15/2024  2:40 PM Alene Mohs, MD [facility] BS AMB   10/24/2024  9:20 AM MELANIE ANTONIO BS AMB   10/24/2024  9:40 AM Vanna Jackson MD [facility] BS AMB

## 2023-10-24 ENCOUNTER — PROCEDURE VISIT (OUTPATIENT)
Age: 58
End: 2023-10-24
Payer: MEDICARE

## 2023-10-24 ENCOUNTER — OFFICE VISIT (OUTPATIENT)
Age: 58
End: 2023-10-24
Payer: MEDICARE

## 2023-10-24 VITALS
HEIGHT: 69 IN | WEIGHT: 188 LBS | SYSTOLIC BLOOD PRESSURE: 98 MMHG | OXYGEN SATURATION: 96 % | HEART RATE: 87 BPM | BODY MASS INDEX: 27.85 KG/M2 | DIASTOLIC BLOOD PRESSURE: 60 MMHG

## 2023-10-24 DIAGNOSIS — I48.0 PAF (PAROXYSMAL ATRIAL FIBRILLATION) (HCC): ICD-10-CM

## 2023-10-24 DIAGNOSIS — I10 ESSENTIAL HYPERTENSION: ICD-10-CM

## 2023-10-24 DIAGNOSIS — Z95.810 ICD (IMPLANTABLE CARDIOVERTER-DEFIBRILLATOR) IN PLACE: Primary | ICD-10-CM

## 2023-10-24 DIAGNOSIS — I25.5 ISCHEMIC CARDIOMYOPATHY: ICD-10-CM

## 2023-10-24 DIAGNOSIS — Z95.810 ICD (IMPLANTABLE CARDIOVERTER-DEFIBRILLATOR) IN PLACE: ICD-10-CM

## 2023-10-24 DIAGNOSIS — I25.118 CORONARY ARTERY DISEASE OF NATIVE ARTERY OF NATIVE HEART WITH STABLE ANGINA PECTORIS (HCC): ICD-10-CM

## 2023-10-24 DIAGNOSIS — I50.22 CHRONIC SYSTOLIC CONGESTIVE HEART FAILURE (HCC): Primary | ICD-10-CM

## 2023-10-24 DIAGNOSIS — G47.33 OSA (OBSTRUCTIVE SLEEP APNEA): ICD-10-CM

## 2023-10-24 PROCEDURE — 99204 OFFICE O/P NEW MOD 45 MIN: CPT | Performed by: INTERNAL MEDICINE

## 2023-10-24 PROCEDURE — 93290 INTERROG DEV EVAL ICPMS IP: CPT | Performed by: INTERNAL MEDICINE

## 2023-10-24 ASSESSMENT — PATIENT HEALTH QUESTIONNAIRE - PHQ9
SUM OF ALL RESPONSES TO PHQ9 QUESTIONS 1 & 2: 0
SUM OF ALL RESPONSES TO PHQ QUESTIONS 1-9: 0
1. LITTLE INTEREST OR PLEASURE IN DOING THINGS: 0
SUM OF ALL RESPONSES TO PHQ QUESTIONS 1-9: 0
2. FEELING DOWN, DEPRESSED OR HOPELESS: 0

## 2023-10-24 NOTE — PROGRESS NOTES
Cardiac Electrophysiology OFFICE Consultation Note       Assessment/Plan:   1. Chronic systolic congestive heart failure ([address])  2. Ischemic cardiomyopathy  3. PAF (paroxysmal atrial fibrillation) ([address])  4. ICD (implantable cardioverter-defibrillator) in place  5. Coronary artery disease of native artery of native heart with stable angina pectoris ([address])  6. Essential hypertension  7. MELISSA (obstructive sleep apnea)       Primary Cardiologist: Bee      PAF  Intermittent palpitations  Echo August 2023 LVEF 50 to 55%, focal wall motion abnormalities consistent with prior MI  5/17/22 ECHO improved systolic function 95-55%, mild MR/TR  Extended Holter monitor 6/2023 showed AF, these episodes were very short and with total burden of less than 1%  Continue eliquis 5 mg BID   Continue BB  -He wants to switch from Dr Abigail Walker to [address] [place] [address]  - Given low burden at this time, would not commit him to antiarrhythmic therapy. No indication for ablation at this time  - We will continue to monitor burden with remote transmission every 3 months  - As long as there is no significant change in his arrhythmic burden, we will follow-up with EP in 1 year    ICM, s/p ICD  Echo August 2023 LVEF 50 to 55%, focal wall motion abnormalities consistent with prior MI   5/17/22 ECHO improved systolic function 94-95%, mild MR/TR   EF 30-35% per echo in 2/3/2022   EF 45-50% in 9/2020;    Echo in 1/2019 with reduced LVEF 40-45%  and 2018   No events noted per device check, device with appropriate function on 4/2022   Continue with Coreg 6.25 mg BID   Continue Entresto 24/26 mg BID (started 2/11/2022)   Serum Cr 1.04 in 7/2023  -We will obtain monthly heart logic  - Euvolemic on exam    ICD  Date of implant 1/25/2019, [employer] single chamber ICD with normal function. Battery life 11 years. No new or significant lead issues requiring intervention. No significant arrhythmias noted requiring intervention.   Heart logic

## 2023-10-24 NOTE — PATIENT INSTRUCTIONS
Remote transmission every 3 months  Monthly heart logic   FU with NP in 1 year  FU with Dr. Pee Ledezma in 18 months

## 2023-11-03 ENCOUNTER — TELEPHONE (OUTPATIENT)
Age: 58
End: 2023-11-03

## 2023-11-03 NOTE — TELEPHONE ENCOUNTER
[facility] 90 day supply for eliquis 5mg in order for pt to receive eliquis.      [facility] # [phone]

## 2023-11-03 NOTE — TELEPHONE ENCOUNTER
Refill Request Received for the Following Medication     Requested Prescriptions     Pending Prescriptions Disp Refills    apixaban (ELIQUIS) 5 MG TABS tablet 180 tablet 3     Sig: Take 1 tablet by mouth 2 times daily       Last Appointment With Me:  10/16/2023     Future Appointments:  Future Appointments   Date Time Provider [address] [place] [address]   1/25/2024  9:30 AM MD IVY Vidales BS AMB   10/15/2024  2:40 PM MD DIANA Jane  AMB   10/24/2024  9:20 AM MELANIE ANTONIO  AMB   10/24/2024  9:40 AM Vanna Jackson MD [facility] BS AMB

## 2023-11-03 NOTE — TELEPHONE ENCOUNTER
Please see previous message. Follow up call because call became disconnected.     [address]

## 2023-11-20 DIAGNOSIS — E78.2 HYPERLIPIDEMIA, MIXED: Primary | ICD-10-CM

## 2023-11-20 RX ORDER — EZETIMIBE 10 MG/1
TABLET ORAL
Qty: 90 TABLET | Refills: 3 | Status: SHIPPED | OUTPATIENT
Start: 2023-11-20

## 2023-11-20 NOTE — TELEPHONE ENCOUNTER
PCP: Chiquis Louis MD     Last appt:  7/24/2023      Future Appointments   Date Time Provider Department Center   1/25/2024  9:30 AM Chiquis Louis MD [facility]  AMB   10/15/2024  2:40 PM Pedro Luis Gamboa, MD [facility] BS AMB   10/24/2024  9:20 AM MELANIE ANTONIO BS AMB   10/24/2024  9:40 AM Vanna Jackson MD [facility] BS AMB          Requested Prescriptions     Pending Prescriptions Disp Refills    ezetimibe (ZETIA) 10 MG tablet [Pharmacy Med Name: EZETIMIBE TABS 10MG] 90 tablet 3     Sig: TAKE 1 TABLET DAILY FOR HIGH CHOLESTEROL

## 2024-01-17 DIAGNOSIS — E78.2 HYPERLIPIDEMIA, MIXED: ICD-10-CM

## 2024-01-17 DIAGNOSIS — I10 ESSENTIAL HYPERTENSION: ICD-10-CM

## 2024-01-23 LAB
ALBUMIN SERPL-MCNC: 4 G/DL (ref 3.8–4.9)
ALBUMIN/GLOB SERPL: 1.5 {RATIO} (ref 1.2–2.2)
ALP SERPL-CCNC: 108 IU/L (ref 44–121)
ALT SERPL-CCNC: 11 IU/L (ref 0–44)
AST SERPL-CCNC: 13 IU/L (ref 0–40)
BILIRUB SERPL-MCNC: <0.2 MG/DL (ref 0–1.2)
BUN SERPL-MCNC: 17 MG/DL (ref 6–24)
BUN/CREAT SERPL: 16 (ref 9–20)
CALCIUM SERPL-MCNC: 9.7 MG/DL (ref 8.7–10.2)
CHLORIDE SERPL-SCNC: 105 MMOL/L (ref 96–106)
CHOLEST SERPL-MCNC: 125 MG/DL (ref 100–199)
CO2 SERPL-SCNC: 21 MMOL/L (ref 20–29)
CREAT SERPL-MCNC: 1.08 MG/DL (ref 0.76–1.27)
EGFRCR SERPLBLD CKD-EPI 2021: 79 ML/MIN/1.73
GLOBULIN SER CALC-MCNC: 2.7 G/DL (ref 1.5–4.5)
GLUCOSE SERPL-MCNC: 108 MG/DL (ref 70–99)
HDLC SERPL-MCNC: 40 MG/DL
LDLC SERPL CALC-MCNC: 64 MG/DL (ref 0–99)
POTASSIUM SERPL-SCNC: 5.1 MMOL/L (ref 3.5–5.2)
PROT SERPL-MCNC: 6.7 G/DL (ref 6–8.5)
SODIUM SERPL-SCNC: 143 MMOL/L (ref 134–144)
TRIGL SERPL-MCNC: 113 MG/DL (ref 0–149)
VLDLC SERPL CALC-MCNC: 21 MG/DL (ref 5–40)

## 2024-01-25 ENCOUNTER — OFFICE VISIT (OUTPATIENT)
Facility: CLINIC | Age: 59
End: 2024-01-25
Payer: MEDICARE

## 2024-01-25 VITALS
OXYGEN SATURATION: 96 % | WEIGHT: 192.4 LBS | HEART RATE: 83 BPM | HEIGHT: 69 IN | TEMPERATURE: 97.8 F | RESPIRATION RATE: 16 BRPM | SYSTOLIC BLOOD PRESSURE: 96 MMHG | BODY MASS INDEX: 28.5 KG/M2 | DIASTOLIC BLOOD PRESSURE: 59 MMHG

## 2024-01-25 DIAGNOSIS — R14.3 FLATULENCE: ICD-10-CM

## 2024-01-25 DIAGNOSIS — J45.20 MILD INTERMITTENT ASTHMA WITHOUT COMPLICATION: ICD-10-CM

## 2024-01-25 DIAGNOSIS — F32.4 MAJOR DEPRESSIVE DISORDER, SINGLE EPISODE, IN PARTIAL REMISSION (HCC): ICD-10-CM

## 2024-01-25 DIAGNOSIS — I25.118 CORONARY ARTERY DISEASE OF NATIVE ARTERY OF NATIVE HEART WITH STABLE ANGINA PECTORIS (HCC): ICD-10-CM

## 2024-01-25 DIAGNOSIS — J30.2 SEASONAL ALLERGIC RHINITIS, UNSPECIFIED TRIGGER: ICD-10-CM

## 2024-01-25 DIAGNOSIS — E78.2 HYPERLIPIDEMIA, MIXED: ICD-10-CM

## 2024-01-25 DIAGNOSIS — R73.03 PREDIABETES: ICD-10-CM

## 2024-01-25 DIAGNOSIS — Z12.5 SCREENING FOR PROSTATE CANCER: ICD-10-CM

## 2024-01-25 DIAGNOSIS — I48.0 PAF (PAROXYSMAL ATRIAL FIBRILLATION) (HCC): ICD-10-CM

## 2024-01-25 DIAGNOSIS — F17.210 SMOKES 1 PACK OF CIGARETTES PER DAY: ICD-10-CM

## 2024-01-25 DIAGNOSIS — I10 ESSENTIAL HYPERTENSION: Primary | ICD-10-CM

## 2024-01-25 DIAGNOSIS — I50.22 CHRONIC SYSTOLIC CONGESTIVE HEART FAILURE (HCC): ICD-10-CM

## 2024-01-25 PROCEDURE — G8419 CALC BMI OUT NRM PARAM NOF/U: HCPCS | Performed by: INTERNAL MEDICINE

## 2024-01-25 PROCEDURE — 3017F COLORECTAL CA SCREEN DOC REV: CPT | Performed by: INTERNAL MEDICINE

## 2024-01-25 PROCEDURE — 3078F DIAST BP <80 MM HG: CPT | Performed by: INTERNAL MEDICINE

## 2024-01-25 PROCEDURE — 3074F SYST BP LT 130 MM HG: CPT | Performed by: INTERNAL MEDICINE

## 2024-01-25 PROCEDURE — 99214 OFFICE O/P EST MOD 30 MIN: CPT | Performed by: INTERNAL MEDICINE

## 2024-01-25 PROCEDURE — 4004F PT TOBACCO SCREEN RCVD TLK: CPT | Performed by: INTERNAL MEDICINE

## 2024-01-25 PROCEDURE — G8427 DOCREV CUR MEDS BY ELIG CLIN: HCPCS | Performed by: INTERNAL MEDICINE

## 2024-01-25 PROCEDURE — G8484 FLU IMMUNIZE NO ADMIN: HCPCS | Performed by: INTERNAL MEDICINE

## 2024-01-25 RX ORDER — PREDNISONE 20 MG/1
40 TABLET ORAL DAILY
Qty: 10 TABLET | Refills: 0 | Status: SHIPPED | OUTPATIENT
Start: 2024-01-25 | End: 2024-01-30

## 2024-01-25 ASSESSMENT — PATIENT HEALTH QUESTIONNAIRE - PHQ9
SUM OF ALL RESPONSES TO PHQ QUESTIONS 1-9: 0
5. POOR APPETITE OR OVEREATING: 0
2. FEELING DOWN, DEPRESSED OR HOPELESS: 0
9. THOUGHTS THAT YOU WOULD BE BETTER OFF DEAD, OR OF HURTING YOURSELF: 0
8. MOVING OR SPEAKING SO SLOWLY THAT OTHER PEOPLE COULD HAVE NOTICED. OR THE OPPOSITE, BEING SO FIGETY OR RESTLESS THAT YOU HAVE BEEN MOVING AROUND A LOT MORE THAN USUAL: 0
3. TROUBLE FALLING OR STAYING ASLEEP: 0
SUM OF ALL RESPONSES TO PHQ9 QUESTIONS 1 & 2: 0
10. IF YOU CHECKED OFF ANY PROBLEMS, HOW DIFFICULT HAVE THESE PROBLEMS MADE IT FOR YOU TO DO YOUR WORK, TAKE CARE OF THINGS AT HOME, OR GET ALONG WITH OTHER PEOPLE: 0
4. FEELING TIRED OR HAVING LITTLE ENERGY: 0
SUM OF ALL RESPONSES TO PHQ QUESTIONS 1-9: 0
7. TROUBLE CONCENTRATING ON THINGS, SUCH AS READING THE NEWSPAPER OR WATCHING TELEVISION: 0
1. LITTLE INTEREST OR PLEASURE IN DOING THINGS: 0
SUM OF ALL RESPONSES TO PHQ QUESTIONS 1-9: 0
6. FEELING BAD ABOUT YOURSELF - OR THAT YOU ARE A FAILURE OR HAVE LET YOURSELF OR YOUR FAMILY DOWN: 0
SUM OF ALL RESPONSES TO PHQ QUESTIONS 1-9: 0

## 2024-01-25 NOTE — PROGRESS NOTES
Efren Maya  Identified pt with two pt identifiers(name and ).  Chief Complaint   Patient presents with    Hypertension    Hyperlipidemia       1. Have you been to the ER, urgent care clinic since your last visit?  Hospitalized since your last visit? NO    2. Have you seen or consulted any other health care providers outside of the [facility] since your last visit?  Include any pap smears or colon screening. NO  Pt had Flu vaccine.     Provider notified of reason for visit, vitals and flowsheets obtained on patients.     Patient received paperwork for advance directive during previous visit but has not completed at this time     Reviewed record In preparation for visit, huddled with provider and have obtained necessary documentation      Health Maintenance Due   Topic    Hepatitis B vaccine (1 of 3 - 3-dose series)    HIV screen     Shingles vaccine (1 of 2)    Pneumococcal 0-64 years Vaccine (2 - PCV)    Flu vaccine (1)    COVID-19 Vaccine (3 -  season)       Wt Readings from Last 3 Encounters:   10/24/23 85.3 kg (188 lb)   10/16/23 89.1 kg (196 lb 6.4 oz)   23 83.9 kg (185 lb)     Temp Readings from Last 3 Encounters:   23 97.8 °F (36.6 °C) (Oral)   23 97 °F (36.1 °C) (Temporal)   05/15/23 98 °F (36.7 °C) (Oral)     BP Readings from Last 3 Encounters:   10/24/23 98/60   10/16/23 106/68   23 (!) 108/58     Pulse Readings from Last 3 Encounters:   10/24/23 87   10/16/23 86   23 95          No data to display                  Learning Assessment:  :         5/15/2023    10:30 AM   [facility] AMB LEARNING ASSESSMENT   Primary Learner Patient   level of education GRADUATED HIGH SCHOOL OR GED   Primary Language ENGLISH   Learning Preference DEMONSTRATION    READING   Answered By patient   Relationship to Learner SELF       Fall Risk Assessment:  :         2023     8:31 AM 2023     9:00 AM 2022    10:11 AM 2021     1:57 PM 2018    10:11 AM 
Glucose intolerance (impaired glucose tolerance) 08/11/2019    Hypertension     ICD (implantable cardioverter-defibrillator) in place     Other ill-defined conditions(799.89)     ADHD,BELL'S PALSY WEAKNESS  LT FACE    Psychiatric disorder     DEPRESSION    S/P coronary artery stent placement 09/25/2018 9/25/18 PCI/ROSALES to LAD    STEMI (ST elevation myocardial infarction) (HCC) 09/25/2018    Syncope 01/25/2019    Tobacco abuse 09/25/2018     Allergies   Allergen Reactions    Bee Venom Anaphylaxis    Tamsulosin Other (See Comments)     Passed out       Current Outpatient Medications   Medication Sig Dispense Refill    ezetimibe (ZETIA) 10 MG tablet TAKE 1 TABLET DAILY FOR HIGH CHOLESTEROL 90 tablet 3    apixaban (ELIQUIS) 5 MG TABS tablet Take 1 tablet by mouth 2 times daily 180 tablet 3    carvedilol (COREG) 6.25 MG tablet TAKE 1 TABLET TWICE A DAY WITH MEALS 180 tablet 3    sacubitril-valsartan (ENTRESTO) 24-26 MG per tablet Take 1 tablet by mouth twice daily 180 tablet 3    DULoxetine (CYMBALTA) 60 MG extended release capsule TAKE 1 CAPSULE DAILY 90 capsule 3    aspirin (ASPIRIN LOW DOSE) 81 MG EC tablet Take 1 tablet by mouth daily 90 tablet 3    isosorbide mononitrate (IMDUR) 30 MG extended release tablet TAKE 1 TABLET DAILY 90 tablet 3    magnesium oxide (MAG-OX) 400 (240 Mg) MG tablet Take 1 tablet by mouth daily      ranolazine (RANEXA) 500 MG extended release tablet Take 1 tablet by mouth 2 times daily 180 tablet 4    acetaminophen (TYLENOL) 500 MG tablet Take 4 tablets by mouth 2 times daily as needed      albuterol sulfate HFA (PROVENTIL;VENTOLIN;PROAIR) 108 (90 Base) MCG/ACT inhaler Inhale 2 puffs into the lungs every 4 hours as needed      atorvastatin (LIPITOR) 80 MG tablet Take 1 tablet by mouth daily      nitroGLYCERIN (NITROSTAT) 0.4 MG SL tablet Place 1 tablet under the tongue       No current facility-administered medications for this visit.       PHYSICAL EXAM  BP (!) 96/59 (Site: Left Upper Arm,

## 2024-01-25 NOTE — PATIENT INSTRUCTIONS
Patient Instructions  Schedule appointment with Dr. Gamboa.  Use Flonase or Nasacort nasal spray.  Take Claritin (loratadine) if it is okay with your heart doctor.  Take prednisone for 5 days.  Take Beano for gas.

## 2024-02-14 ENCOUNTER — TELEPHONE (OUTPATIENT)
Age: 59
End: 2024-02-14

## 2024-02-14 NOTE — TELEPHONE ENCOUNTER
Phoned patient regarding remote transmission, ID verified using two patient identifiers   Advised that device indicates that he may be holding onto some fluid. Pt states that he has noticed some swelling in his hands that \"comes and goes\", denies SOB, has not noticed change in weight. States that he has been unable to tolerate a diuretic in the past d/t hypotension. Last measured BP was at appt with Dr. Louis last month, unsure what it measured. States he has a BP cuff at home but does not measure it, advised to start checking and keeping a log. Also states that he was \"supposed to get in with Dr. Linares to \"go over meds\" but has not yet called to set up appt, advised to do so.   Will send to Dr Linares and Dr. Jackson for any further recommendations    Opportunities for questions, clarifications, and concerns provided.  Pt expressed understanding.

## 2024-03-04 NOTE — PROGRESS NOTES
Primary Cardiologist: Pedro Luis Gamboa MD   Last Office Visit: 10/16/23           [address] [place] [address] [phone]       Subjective:      Erfen Maya is a 58 y.o. male presents today for follow-up.   Last seen by Dr. Gamboa, October 2023.     Denies chest pain. Walking from house to mailbox gets short of breath. Says he has not been very active. Sits around all day. Says weather is prohibitive to his activity. Continues with smoking cigarettes. Tells me he has diagnosis for sleep apnea, however, does not tolerate wearing his mask.   Says he received phone call from EP RN after remote transmission with \"retaining fluid\". Occasionally feels it in his fingers and neck. Tells me he does not watch his salt intake.   Yesterday his SBP 80s and 70s, felt poorly. Called EMS, BP improved when EMS arrived and did not go to hospital.   Has been dizzy at times. Denies lower extremity edema.     Patient Active Problem List    Diagnosis Date Noted    PAF (paroxysmal atrial fibrillation) (HCC) 07/31/2023    Diverticulitis of colon with perforation 04/14/2023    Vertigo 07/28/2021    Prediabetes 07/28/2021    Mild intermittent asthma without complication 02/22/2021    Nephrolithiasis 11/29/2020    Chronic systolic congestive heart failure (HCC) 07/21/2020    Major depressive disorder with single episode, in partial remission (HCC) 09/15/2019    MELISSA (obstructive sleep apnea) 09/13/2019    Obesity (BMI 30-39.9) 08/11/2019    Ischemic cardiomyopathy 03/22/2019    ICD (implantable cardioverter-defibrillator) in place 03/22/2019    Coronary artery disease of native artery of native heart with stable angina pectoris (HCC) 10/05/2018    Smokes 1 pack of cigarettes per day 09/25/2018    Essential hypertension 09/25/2018    Hyperlipidemia, mixed 09/25/2018      Chiquis Louis MD  Past Medical History:   Diagnosis Date    ADD (attention deficit disorder) 09/25/2018    Asthma     Balance problem 09/15/2019

## 2024-03-08 ENCOUNTER — OFFICE VISIT (OUTPATIENT)
Age: 59
End: 2024-03-08
Payer: MEDICARE

## 2024-03-08 VITALS
HEIGHT: 69 IN | WEIGHT: 196 LBS | BODY MASS INDEX: 29.03 KG/M2 | SYSTOLIC BLOOD PRESSURE: 110 MMHG | DIASTOLIC BLOOD PRESSURE: 70 MMHG | OXYGEN SATURATION: 98 % | RESPIRATION RATE: 16 BRPM | HEART RATE: 87 BPM

## 2024-03-08 DIAGNOSIS — I50.22 CHRONIC SYSTOLIC CONGESTIVE HEART FAILURE (HCC): Primary | ICD-10-CM

## 2024-03-08 DIAGNOSIS — R06.02 SHORTNESS OF BREATH: ICD-10-CM

## 2024-03-08 DIAGNOSIS — Z95.810 ICD (IMPLANTABLE CARDIOVERTER-DEFIBRILLATOR) IN PLACE: ICD-10-CM

## 2024-03-08 DIAGNOSIS — G47.33 OSA (OBSTRUCTIVE SLEEP APNEA): ICD-10-CM

## 2024-03-08 DIAGNOSIS — I25.118 CORONARY ARTERY DISEASE OF NATIVE ARTERY OF NATIVE HEART WITH STABLE ANGINA PECTORIS (HCC): ICD-10-CM

## 2024-03-08 DIAGNOSIS — F17.200 CURRENT SMOKER: ICD-10-CM

## 2024-03-08 DIAGNOSIS — I25.5 ISCHEMIC CARDIOMYOPATHY: ICD-10-CM

## 2024-03-08 DIAGNOSIS — I48.0 PAF (PAROXYSMAL ATRIAL FIBRILLATION) (HCC): ICD-10-CM

## 2024-03-08 DIAGNOSIS — E78.2 MIXED HYPERLIPIDEMIA: ICD-10-CM

## 2024-03-08 DIAGNOSIS — I10 ESSENTIAL HYPERTENSION: ICD-10-CM

## 2024-03-08 LAB
ANION GAP SERPL CALC-SCNC: 2 MMOL/L (ref 5–15)
BUN SERPL-MCNC: 15 MG/DL (ref 6–20)
BUN/CREAT SERPL: 14 (ref 12–20)
CALCIUM SERPL-MCNC: 9.4 MG/DL (ref 8.5–10.1)
CHLORIDE SERPL-SCNC: 108 MMOL/L (ref 97–108)
CO2 SERPL-SCNC: 29 MMOL/L (ref 21–32)
CREAT SERPL-MCNC: 1.1 MG/DL (ref 0.7–1.3)
ERYTHROCYTE [DISTWIDTH] IN BLOOD BY AUTOMATED COUNT: 17.9 % (ref 11.5–14.5)
GLUCOSE SERPL-MCNC: 108 MG/DL (ref 65–100)
HCT VFR BLD AUTO: 32.7 % (ref 36.6–50.3)
HGB BLD-MCNC: 10.1 G/DL (ref 12.1–17)
MCH RBC QN AUTO: 24.5 PG (ref 26–34)
MCHC RBC AUTO-ENTMCNC: 30.9 G/DL (ref 30–36.5)
MCV RBC AUTO: 79.4 FL (ref 80–99)
NRBC # BLD: 0 K/UL (ref 0–0.01)
NRBC BLD-RTO: 0 PER 100 WBC
NT PRO BNP: 108 PG/ML
PLATELET # BLD AUTO: 418 K/UL (ref 150–400)
PMV BLD AUTO: 10.4 FL (ref 8.9–12.9)
POTASSIUM SERPL-SCNC: 4.6 MMOL/L (ref 3.5–5.1)
RBC # BLD AUTO: 4.12 M/UL (ref 4.1–5.7)
SODIUM SERPL-SCNC: 139 MMOL/L (ref 136–145)
WBC # BLD AUTO: 9.7 K/UL (ref 4.1–11.1)

## 2024-03-08 PROCEDURE — 3074F SYST BP LT 130 MM HG: CPT

## 2024-03-08 PROCEDURE — 4004F PT TOBACCO SCREEN RCVD TLK: CPT

## 2024-03-08 PROCEDURE — 3017F COLORECTAL CA SCREEN DOC REV: CPT

## 2024-03-08 PROCEDURE — 99215 OFFICE O/P EST HI 40 MIN: CPT

## 2024-03-08 PROCEDURE — G8427 DOCREV CUR MEDS BY ELIG CLIN: HCPCS

## 2024-03-08 PROCEDURE — G8419 CALC BMI OUT NRM PARAM NOF/U: HCPCS

## 2024-03-08 PROCEDURE — 3078F DIAST BP <80 MM HG: CPT

## 2024-03-08 PROCEDURE — G8484 FLU IMMUNIZE NO ADMIN: HCPCS

## 2024-03-08 RX ORDER — METOPROLOL SUCCINATE 50 MG/1
50 TABLET, EXTENDED RELEASE ORAL NIGHTLY
Qty: 90 TABLET | Refills: 1 | Status: SHIPPED | OUTPATIENT
Start: 2024-03-08

## 2024-03-08 ASSESSMENT — PATIENT HEALTH QUESTIONNAIRE - PHQ9
10. IF YOU CHECKED OFF ANY PROBLEMS, HOW DIFFICULT HAVE THESE PROBLEMS MADE IT FOR YOU TO DO YOUR WORK, TAKE CARE OF THINGS AT HOME, OR GET ALONG WITH OTHER PEOPLE: 0
7. TROUBLE CONCENTRATING ON THINGS, SUCH AS READING THE NEWSPAPER OR WATCHING TELEVISION: 0
5. POOR APPETITE OR OVEREATING: 0
4. FEELING TIRED OR HAVING LITTLE ENERGY: 0
9. THOUGHTS THAT YOU WOULD BE BETTER OFF DEAD, OR OF HURTING YOURSELF: 0
2. FEELING DOWN, DEPRESSED OR HOPELESS: 0
8. MOVING OR SPEAKING SO SLOWLY THAT OTHER PEOPLE COULD HAVE NOTICED. OR THE OPPOSITE, BEING SO FIGETY OR RESTLESS THAT YOU HAVE BEEN MOVING AROUND A LOT MORE THAN USUAL: 0
SUM OF ALL RESPONSES TO PHQ QUESTIONS 1-9: 0
1. LITTLE INTEREST OR PLEASURE IN DOING THINGS: 0
SUM OF ALL RESPONSES TO PHQ QUESTIONS 1-9: 0
SUM OF ALL RESPONSES TO PHQ9 QUESTIONS 1 & 2: 0
6. FEELING BAD ABOUT YOURSELF - OR THAT YOU ARE A FAILURE OR HAVE LET YOURSELF OR YOUR FAMILY DOWN: 0
SUM OF ALL RESPONSES TO PHQ QUESTIONS 1-9: 0
3. TROUBLE FALLING OR STAYING ASLEEP: 0
SUM OF ALL RESPONSES TO PHQ QUESTIONS 1-9: 0

## 2024-03-11 ENCOUNTER — TELEPHONE (OUTPATIENT)
Age: 59
End: 2024-03-11

## 2024-03-11 DIAGNOSIS — R06.09 DOE (DYSPNEA ON EXERTION): ICD-10-CM

## 2024-03-11 DIAGNOSIS — I25.10 ATHEROSCLEROSIS OF NATIVE CORONARY ARTERY OF NATIVE HEART WITHOUT ANGINA PECTORIS: ICD-10-CM

## 2024-03-11 DIAGNOSIS — I50.22 CHRONIC SYSTOLIC CONGESTIVE HEART FAILURE (HCC): Primary | ICD-10-CM

## 2024-03-11 DIAGNOSIS — I25.5 ISCHEMIC CARDIOMYOPATHY: ICD-10-CM

## 2024-03-11 NOTE — TELEPHONE ENCOUNTER
Patient called back, verified with 2 identifiers.  Spoke with patient , verified patient with two identifiers, regarding results and recommendations. Patient voiced understanding.     Patient stated that he can not do the walking Stress test because \"my heart do not work to full capacity\"  Patient not taking BP every day, stated.   This nurse asked to get a BP reading during this encounter, /69  hr 80. Patients wife stated patient taking only about 2 full glasses of water a day.  This nurse recommended to hydrate well everyday with about 6 full glasses of water a day.    Message sent to NP for clarification about order change to Lexiscan and to YOEL nuñez for records.

## 2024-03-11 NOTE — TELEPHONE ENCOUNTER
----- Message from BORIS Paez NP sent at 3/11/2024  8:03 AM EDT -----  Nav Alexander,  I saw patient on Friday 3/8  I just received labs and everything looks normal. He should still have echo. However, can you call him and let him know I would like to check a nuclear stress test for his shortness of breath.   Also see how BP is running?  Thank you  Celestina     This nurse attempted to contact patient. Voicemail full. Unable to leave a message. I will try to call mr Maya later.

## 2024-03-12 NOTE — TELEPHONE ENCOUNTER
Patient was called, verified with 2 identifiers. Order was changed to Lexiscan per NP. Instructions given over the phone.   Patient voiced understanding.

## 2024-04-01 ENCOUNTER — PROCEDURE VISIT (OUTPATIENT)
Age: 59
End: 2024-04-01
Payer: MEDICARE

## 2024-04-01 ENCOUNTER — ANCILLARY PROCEDURE (OUTPATIENT)
Age: 59
End: 2024-04-01
Payer: COMMERCIAL

## 2024-04-01 ENCOUNTER — TELEPHONE (OUTPATIENT)
Age: 59
End: 2024-04-01

## 2024-04-01 VITALS
HEART RATE: 67 BPM | HEIGHT: 69 IN | BODY MASS INDEX: 29.03 KG/M2 | SYSTOLIC BLOOD PRESSURE: 130 MMHG | DIASTOLIC BLOOD PRESSURE: 88 MMHG | WEIGHT: 196 LBS

## 2024-04-01 DIAGNOSIS — I50.22 CHRONIC SYSTOLIC CONGESTIVE HEART FAILURE (HCC): ICD-10-CM

## 2024-04-01 DIAGNOSIS — R06.02 SHORTNESS OF BREATH: ICD-10-CM

## 2024-04-01 DIAGNOSIS — I25.5 ISCHEMIC CARDIOMYOPATHY: ICD-10-CM

## 2024-04-01 DIAGNOSIS — R06.09 DOE (DYSPNEA ON EXERTION): ICD-10-CM

## 2024-04-01 DIAGNOSIS — Z95.810 ICD (IMPLANTABLE CARDIOVERTER-DEFIBRILLATOR) IN PLACE: Primary | ICD-10-CM

## 2024-04-01 DIAGNOSIS — I25.10 ATHEROSCLEROSIS OF NATIVE CORONARY ARTERY OF NATIVE HEART WITHOUT ANGINA PECTORIS: ICD-10-CM

## 2024-04-01 PROCEDURE — 93306 TTE W/DOPPLER COMPLETE: CPT | Performed by: INTERNAL MEDICINE

## 2024-04-01 PROCEDURE — 78452 HT MUSCLE IMAGE SPECT MULT: CPT | Performed by: INTERNAL MEDICINE

## 2024-04-01 PROCEDURE — A9500 TC99M SESTAMIBI: HCPCS | Performed by: INTERNAL MEDICINE

## 2024-04-01 PROCEDURE — 93288 INTERROG EVL PM/LDLS PM IP: CPT | Performed by: INTERNAL MEDICINE

## 2024-04-01 RX ORDER — TETRAKIS(2-METHOXYISOBUTYLISOCYANIDE)COPPER(I) TETRAFLUOROBORATE 1 MG/ML
7.5 INJECTION, POWDER, LYOPHILIZED, FOR SOLUTION INTRAVENOUS
Status: COMPLETED | OUTPATIENT
Start: 2024-04-01 | End: 2024-04-01

## 2024-04-01 RX ORDER — TETRAKIS(2-METHOXYISOBUTYLISOCYANIDE)COPPER(I) TETRAFLUOROBORATE 1 MG/ML
24.9 INJECTION, POWDER, LYOPHILIZED, FOR SOLUTION INTRAVENOUS
Status: COMPLETED | OUTPATIENT
Start: 2024-04-01 | End: 2024-04-01

## 2024-04-01 RX ORDER — REGADENOSON 0.08 MG/ML
0.4 INJECTION, SOLUTION INTRAVENOUS
Status: COMPLETED | OUTPATIENT
Start: 2024-04-01 | End: 2024-04-01

## 2024-04-01 RX ADMIN — TECHNETIUM TC-99M SESTAMIBI 24.9 MILLICURIE: 1 INJECTION INTRAVENOUS at 11:30

## 2024-04-01 RX ADMIN — TECHNETIUM TC-99M SESTAMIBI 7.5 MILLICURIE: 1 INJECTION INTRAVENOUS at 10:20

## 2024-04-01 RX ADMIN — REGADENOSON 0.4 MG: 0.08 INJECTION, SOLUTION INTRAVENOUS at 11:30

## 2024-04-01 NOTE — TELEPHONE ENCOUNTER
Patient in today 4/1/24 for nuclear stress test. Stated that he had an episode of dizziness and syncope yesterday at home. Did not go to the ER. Patient is having arm weakness today. Discussed with Dr. Gamboa prior to starting nuclear stress test. Device check, orthostatic bp's, and EKG obtained. Ok to proceed with nuclear stress test per Dr. Gamboa. Patient agrees and wants to complete testing today. Advised patient if he has another episode of syncope he should go to ER. Patient verbalized understanding.

## 2024-04-02 LAB
ECHO AO ROOT DIAM: 3.7 CM
ECHO AO ROOT INDEX: 1.8 CM/M2
ECHO AV AREA PEAK VELOCITY: 2.1 CM2
ECHO AV AREA VTI: 2.1 CM2
ECHO AV AREA/BSA PEAK VELOCITY: 1 CM2/M2
ECHO AV AREA/BSA VTI: 1 CM2/M2
ECHO AV MEAN GRADIENT: 4 MMHG
ECHO AV MEAN VELOCITY: 1 M/S
ECHO AV PEAK GRADIENT: 6 MMHG
ECHO AV PEAK VELOCITY: 1.3 M/S
ECHO AV VELOCITY RATIO: 0.69
ECHO AV VTI: 26.2 CM
ECHO BSA: 2.08 M2
ECHO BSA: 2.08 M2
ECHO LA DIAMETER INDEX: 1.51 CM/M2
ECHO LA DIAMETER: 3.1 CM
ECHO LA TO AORTIC ROOT RATIO: 0.84
ECHO LA VOL A-L A2C: 62 ML (ref 18–58)
ECHO LA VOL A-L A4C: 65 ML (ref 18–58)
ECHO LA VOL BP: 63 ML (ref 18–58)
ECHO LA VOL MOD A2C: 58 ML (ref 18–58)
ECHO LA VOL MOD A4C: 57 ML (ref 18–58)
ECHO LA VOL/BSA BIPLANE: 31 ML/M2 (ref 16–34)
ECHO LA VOLUME AREA LENGTH: 70 ML
ECHO LA VOLUME INDEX A-L A2C: 30 ML/M2 (ref 16–34)
ECHO LA VOLUME INDEX A-L A4C: 32 ML/M2 (ref 16–34)
ECHO LA VOLUME INDEX AREA LENGTH: 34 ML/M2 (ref 16–34)
ECHO LA VOLUME INDEX MOD A2C: 28 ML/M2 (ref 16–34)
ECHO LA VOLUME INDEX MOD A4C: 28 ML/M2 (ref 16–34)
ECHO LV E' LATERAL VELOCITY: 9 CM/S
ECHO LV E' SEPTAL VELOCITY: 7 CM/S
ECHO LV EDV A2C: 71 ML
ECHO LV EDV A4C: 107 ML
ECHO LV EDV BP: 88 ML (ref 67–155)
ECHO LV EDV INDEX A4C: 52 ML/M2
ECHO LV EDV INDEX BP: 43 ML/M2
ECHO LV EDV NDEX A2C: 35 ML/M2
ECHO LV EJECTION FRACTION A2C: 45 %
ECHO LV EJECTION FRACTION A4C: 42 %
ECHO LV EJECTION FRACTION BIPLANE: 45 % (ref 55–100)
ECHO LV ESV A2C: 39 ML
ECHO LV ESV A4C: 62 ML
ECHO LV ESV BP: 49 ML (ref 22–58)
ECHO LV ESV INDEX A2C: 19 ML/M2
ECHO LV ESV INDEX A4C: 30 ML/M2
ECHO LV ESV INDEX BP: 24 ML/M2
ECHO LV FRACTIONAL SHORTENING: 16 % (ref 28–44)
ECHO LV INTERNAL DIMENSION DIASTOLE INDEX: 2.2 CM/M2
ECHO LV INTERNAL DIMENSION DIASTOLIC: 4.5 CM (ref 4.2–5.9)
ECHO LV INTERNAL DIMENSION SYSTOLIC INDEX: 1.85 CM/M2
ECHO LV INTERNAL DIMENSION SYSTOLIC: 3.8 CM
ECHO LV IVSD: 1.1 CM (ref 0.6–1)
ECHO LV MASS 2D: 210.2 G (ref 88–224)
ECHO LV MASS INDEX 2D: 102.5 G/M2 (ref 49–115)
ECHO LV POSTERIOR WALL DIASTOLIC: 1.4 CM (ref 0.6–1)
ECHO LV RELATIVE WALL THICKNESS RATIO: 0.62
ECHO LVOT AREA: 2.8 CM2
ECHO LVOT AV VTI INDEX: 0.71
ECHO LVOT DIAM: 1.9 CM
ECHO LVOT MEAN GRADIENT: 2 MMHG
ECHO LVOT PEAK GRADIENT: 3 MMHG
ECHO LVOT PEAK VELOCITY: 0.9 M/S
ECHO LVOT STROKE VOLUME INDEX: 25.9 ML/M2
ECHO LVOT SV: 53 ML
ECHO LVOT VTI: 18.7 CM
ECHO MV A VELOCITY: 0.72 M/S
ECHO MV E DECELERATION TIME (DT): 233.3 MS
ECHO MV E VELOCITY: 0.55 M/S
ECHO MV E/A RATIO: 0.76
ECHO MV E/E' LATERAL: 6.11
ECHO MV E/E' RATIO (AVERAGED): 6.98
ECHO PV MAX VELOCITY: 0.8 M/S
ECHO PV PEAK GRADIENT: 2 MMHG
ECHO TV REGURGITANT MAX VELOCITY: 2.61 M/S
ECHO TV REGURGITANT PEAK GRADIENT: 27 MMHG
NUC STRESS EJECTION FRACTION: 40 %
STRESS BASELINE DIAS BP: 88 MMHG
STRESS BASELINE HR: 69 BPM
STRESS BASELINE ST DEPRESSION: 0 MM
STRESS BASELINE SYS BP: 130 MMHG
STRESS ESTIMATED WORKLOAD: 1 METS
STRESS O2 SAT PEAK: 96 %
STRESS O2 SAT REST: 96 %
STRESS PEAK DIAS BP: 90 MMHG
STRESS PEAK SYS BP: 160 MMHG
STRESS PERCENT HR ACHIEVED: 55 %
STRESS POST PEAK HR: 88 BPM
STRESS RATE PRESSURE PRODUCT: NORMAL BPM*MMHG
STRESS ST DEPRESSION: 0 MM
STRESS TARGET HR: 161 BPM
TID: 0.94

## 2024-04-02 PROCEDURE — 93016 CV STRESS TEST SUPVJ ONLY: CPT | Performed by: INTERNAL MEDICINE

## 2024-04-02 PROCEDURE — 78452 HT MUSCLE IMAGE SPECT MULT: CPT | Performed by: INTERNAL MEDICINE

## 2024-04-02 PROCEDURE — 93306 TTE W/DOPPLER COMPLETE: CPT | Performed by: INTERNAL MEDICINE

## 2024-04-02 PROCEDURE — 93018 CV STRESS TEST I&R ONLY: CPT | Performed by: INTERNAL MEDICINE

## 2024-04-02 PROCEDURE — PBSHW PBB SHADOW CHARGE: Performed by: INTERNAL MEDICINE

## 2024-04-03 NOTE — PROGRESS NOTES
had 80% stenosis of the prox third of the OM1 and RPL.  NST 2/2022:  Fixed defect anterior segment / LAD territory.  Fixed defect consistent with prior myocardial infarction per NST 9/8/2020;   Continue with ASA, Toprol XL, statin, ranexa 500mg BID  Due to orthostatic hypotension will stop isosorbide today  Advised him to call the office if he begins to have chest pain   He will follow up with Dr. Gamboa in 3 weeks      Dyslipidemia  LDL 64 in 1/24, continue Lipitor 80mg daily and Zetia 10mg.     Tobacco abuse, back to 1PPD. Not interested in smoking cessation      MELISSA  Not wearing CPAP, unable to wear dental device for MELISSA treatment due to dentures, advised him to see sleep medicine to discuss inspire for management       History of orthostatic hypotension/ supine HTN  Previously had resolved upon d/c Straterra and decreased dose of Losartan  He has seen ENT who diagnosed him with vertigo in the past   Now on Toprol XL instead of coreg  Will stop isosorbide as above  Asked him to check sitting/standing BP daily and bring readings to next office visit for review    Shortness of breath/cough  Likely multifactorial (smoking, untreated MELISSA, sedentary lifestyle, increased salt intake, seasonal allergies, asthmatic bronchitis)  Echo showed EF 45-50% which is fairly stable over the last few years   Advised him to resume claritin for seasonal allergies  Advised him to discuss treatment of asthmatic bronchitis with PCP     Difficulty swallowing  Advised him to discuss this with PCP as well     Hx Diverticulitis, ruptured with abscess, with small bowel and partial colonic resection May 2023 at [facility]-Dr. Lees    Total Time Spent: 51 minutes    JULIA Thompson, AAC[facility]   [address] [place] [address], [address]  [place] [address]  (O) [phone] (Fax) [phone]

## 2024-04-04 ENCOUNTER — TELEPHONE (OUTPATIENT)
Age: 59
End: 2024-04-04

## 2024-04-04 ENCOUNTER — OFFICE VISIT (OUTPATIENT)
Age: 59
End: 2024-04-04
Payer: MEDICARE

## 2024-04-04 VITALS
WEIGHT: 202.4 LBS | HEART RATE: 75 BPM | HEIGHT: 69 IN | BODY MASS INDEX: 29.98 KG/M2 | DIASTOLIC BLOOD PRESSURE: 66 MMHG | SYSTOLIC BLOOD PRESSURE: 120 MMHG | OXYGEN SATURATION: 97 %

## 2024-04-04 DIAGNOSIS — I48.0 PAF (PAROXYSMAL ATRIAL FIBRILLATION) (HCC): ICD-10-CM

## 2024-04-04 DIAGNOSIS — I50.22 CHRONIC SYSTOLIC CONGESTIVE HEART FAILURE (HCC): Primary | ICD-10-CM

## 2024-04-04 DIAGNOSIS — G47.33 OSA (OBSTRUCTIVE SLEEP APNEA): Primary | ICD-10-CM

## 2024-04-04 DIAGNOSIS — R05.3 CHRONIC COUGH: ICD-10-CM

## 2024-04-04 DIAGNOSIS — G47.33 OSA (OBSTRUCTIVE SLEEP APNEA): ICD-10-CM

## 2024-04-04 DIAGNOSIS — I25.10 ATHEROSCLEROSIS OF NATIVE CORONARY ARTERY OF NATIVE HEART WITHOUT ANGINA PECTORIS: ICD-10-CM

## 2024-04-04 DIAGNOSIS — I95.1 ORTHOSTATIC HYPOTENSION: ICD-10-CM

## 2024-04-04 PROCEDURE — 3074F SYST BP LT 130 MM HG: CPT | Performed by: NURSE PRACTITIONER

## 2024-04-04 PROCEDURE — G8419 CALC BMI OUT NRM PARAM NOF/U: HCPCS | Performed by: NURSE PRACTITIONER

## 2024-04-04 PROCEDURE — 4004F PT TOBACCO SCREEN RCVD TLK: CPT | Performed by: NURSE PRACTITIONER

## 2024-04-04 PROCEDURE — 3078F DIAST BP <80 MM HG: CPT | Performed by: NURSE PRACTITIONER

## 2024-04-04 PROCEDURE — 99215 OFFICE O/P EST HI 40 MIN: CPT | Performed by: NURSE PRACTITIONER

## 2024-04-04 PROCEDURE — G8427 DOCREV CUR MEDS BY ELIG CLIN: HCPCS | Performed by: NURSE PRACTITIONER

## 2024-04-04 PROCEDURE — 3017F COLORECTAL CA SCREEN DOC REV: CPT | Performed by: NURSE PRACTITIONER

## 2024-04-04 ASSESSMENT — PATIENT HEALTH QUESTIONNAIRE - PHQ9
SUM OF ALL RESPONSES TO PHQ QUESTIONS 1-9: 0
1. LITTLE INTEREST OR PLEASURE IN DOING THINGS: NOT AT ALL
SUM OF ALL RESPONSES TO PHQ9 QUESTIONS 1 & 2: 0
2. FEELING DOWN, DEPRESSED OR HOPELESS: NOT AT ALL

## 2024-04-04 NOTE — TELEPHONE ENCOUNTER
Bee patient/I saw him this AM    Please call patient and let him know that dental device for sleep apnea would not be effective with his top and bottom dentures.  I recommend he see sleep medicine for treatment of his sleep apnea since he is unable to wear CPAP.  He should inquire about treatment with inspire device.  If he is willing to do this please place referral to sleep medicine (Dr. Elliott) at [facility] for treatment of MELISSA.

## 2024-04-04 NOTE — TELEPHONE ENCOUNTER
Verified patient with two types of identifiers. Patient agrees to sleep medicine referral for MELISSA. Notified that I will place the referral and Dr. Elliott's office will be reaching out to schedule appointment. Patient verbalized understanding and will call with any other questions.      Future Appointments   Date Time Provider Department Center   4/30/2024  3:20 PM Pedro Luis Gamboa MD [facility] AM[facility]   7/25/2024  9:10 AM Chiquis Louis MD [facility] AM[facility]   10/15/2024  2:40 PM Pedro Luis Gamboa MD [facility] AM[facility]   10/24/2024  9:20 AM M[facility]AN[facility] ANTONIO [facility] AMB   10/24/2024  9:40 AM Vanna Jackson MD [facility] AM[facility]

## 2024-04-04 NOTE — PATIENT INSTRUCTIONS
Please resume claritin and let me know how your congestion does   Please avoid pseudoephedrine     Please stop taking isosorbide tomorrow, call the office if you start to develop chest pain     Please check your blood pressure daily sitting and then 3 minutes after standing (at least one hour after your morning blood pressure medications.)  Keep a written record of your blood pressure readings and call the office in 1 week to report readings to nurse.    Please discuss issues with swallowing with Dr. Louis

## 2024-04-19 PROCEDURE — 93297 REM INTERROG DEV EVAL ICPMS: CPT | Performed by: INTERNAL MEDICINE

## 2024-04-30 ENCOUNTER — OFFICE VISIT (OUTPATIENT)
Age: 59
End: 2024-04-30
Payer: COMMERCIAL

## 2024-04-30 VITALS
SYSTOLIC BLOOD PRESSURE: 88 MMHG | HEART RATE: 74 BPM | OXYGEN SATURATION: 92 % | WEIGHT: 200 LBS | BODY MASS INDEX: 29.62 KG/M2 | HEIGHT: 69 IN | DIASTOLIC BLOOD PRESSURE: 52 MMHG

## 2024-04-30 DIAGNOSIS — R06.02 SHORTNESS OF BREATH: ICD-10-CM

## 2024-04-30 DIAGNOSIS — I10 ESSENTIAL (PRIMARY) HYPERTENSION: ICD-10-CM

## 2024-04-30 DIAGNOSIS — D50.0 BLOOD LOSS ANEMIA: ICD-10-CM

## 2024-04-30 DIAGNOSIS — R06.09 DOE (DYSPNEA ON EXERTION): ICD-10-CM

## 2024-04-30 DIAGNOSIS — R00.2 PALPITATIONS: ICD-10-CM

## 2024-04-30 DIAGNOSIS — I48.0 PAF (PAROXYSMAL ATRIAL FIBRILLATION) (HCC): ICD-10-CM

## 2024-04-30 DIAGNOSIS — I50.22 CHRONIC SYSTOLIC CONGESTIVE HEART FAILURE (HCC): ICD-10-CM

## 2024-04-30 DIAGNOSIS — I25.5 ISCHEMIC CARDIOMYOPATHY: ICD-10-CM

## 2024-04-30 DIAGNOSIS — I25.10 ATHEROSCLEROSIS OF NATIVE CORONARY ARTERY OF NATIVE HEART WITHOUT ANGINA PECTORIS: ICD-10-CM

## 2024-04-30 DIAGNOSIS — I25.118 CORONARY ARTERY DISEASE OF NATIVE ARTERY OF NATIVE HEART WITH STABLE ANGINA PECTORIS (HCC): ICD-10-CM

## 2024-04-30 DIAGNOSIS — I95.1 ORTHOSTATIC HYPOTENSION: Primary | ICD-10-CM

## 2024-04-30 PROCEDURE — 3074F SYST BP LT 130 MM HG: CPT | Performed by: INTERNAL MEDICINE

## 2024-04-30 PROCEDURE — 93000 ELECTROCARDIOGRAM COMPLETE: CPT | Performed by: INTERNAL MEDICINE

## 2024-04-30 PROCEDURE — 3017F COLORECTAL CA SCREEN DOC REV: CPT | Performed by: INTERNAL MEDICINE

## 2024-04-30 PROCEDURE — G8427 DOCREV CUR MEDS BY ELIG CLIN: HCPCS | Performed by: INTERNAL MEDICINE

## 2024-04-30 PROCEDURE — 4004F PT TOBACCO SCREEN RCVD TLK: CPT | Performed by: INTERNAL MEDICINE

## 2024-04-30 PROCEDURE — 3078F DIAST BP <80 MM HG: CPT | Performed by: INTERNAL MEDICINE

## 2024-04-30 PROCEDURE — G8419 CALC BMI OUT NRM PARAM NOF/U: HCPCS | Performed by: INTERNAL MEDICINE

## 2024-04-30 PROCEDURE — 99215 OFFICE O/P EST HI 40 MIN: CPT | Performed by: INTERNAL MEDICINE

## 2024-04-30 NOTE — PATIENT INSTRUCTIONS
Stop Eliquis for now due to low hemogoblin.  Stop taking Entresto.  Get blood work done as soon as possible  Come back to see us in 3 months.   Drink 10 full glasses of water a day.

## 2024-04-30 NOTE — PROGRESS NOTES
[address] [place] [address] [phone]    [address], [place] [address]     Subjective:        Efren Maya is a 58 y.o. male is here for routine f/u.  He is short of breath, lightheaded, and was mildly orthostatic at last office visit.  Isosorbide mononitrate was held.  Recall last nuclear stress test with unchanged anterior infarction, no ischemia.  Last echo with mildly reduced LVEF 45 to 50%.  Noted that hemoglobin down to 10 recently.  The patient denies chest pain, orthopnea, PND, LE edema, palpitations, syncope, presyncope or fatigue.    Patient Active Problem List    Diagnosis Date Noted    PAF (paroxysmal atrial fibrillation) ([facility]) 07/31/2023    Diverticulitis of colon with perforation 04/14/2023    Vertigo 07/28/2021    Prediabetes 07/28/2021    Mild intermittent asthma without complication 02/22/2021    Nephrolithiasis 11/29/2020    Chronic systolic congestive heart failure (HCC) 07/21/2020    Major depressive disorder with single episode, in partial remission ([facility]) 09/15/2019    MELISSA (obstructive sleep apnea) 09/13/2019    Obesity (BMI 30-39.9) 08/11/2019    Ischemic cardiomyopathy 03/22/2019    ICD (implantable cardioverter-defibrillator) in place 03/22/2019    Coronary artery disease of native artery of native heart with stable angina pectoris (HCC) 10/05/2018    Smokes 1 pack of cigarettes per day 09/25/2018    Essential hypertension 09/25/2018    Hyperlipidemia, mixed 09/25/2018      Chiquis Louis MD  Past Medical History:   Diagnosis Date    ADD (attention deficit disorder) 09/25/2018    Asthma     Balance problem 09/15/2019    Chest pain 11/30/2018    Congestive heart failure (HCC)     Diabetes (HCC)     Dyslipidemia 09/25/2018    Fatigue 11/30/2018    Glucose intolerance (impaired glucose tolerance) 08/11/2019    Hypertension     ICD (implantable cardioverter-defibrillator) in place     Other ill-defined conditions(799.89)     ADHD,BELL'S PALSY

## 2024-05-01 ENCOUNTER — HOSPITAL ENCOUNTER (OUTPATIENT)
Facility: HOSPITAL | Age: 59
Setting detail: OBSERVATION
Discharge: HOME | End: 2024-05-06
Attending: STUDENT IN AN ORGANIZED HEALTH CARE EDUCATION/TRAINING PROGRAM | Admitting: INTERNAL MEDICINE
Payer: COMMERCIAL

## 2024-05-01 ENCOUNTER — TELEPHONE (OUTPATIENT)
Age: 59
End: 2024-05-01

## 2024-05-01 DIAGNOSIS — I25.5 ISCHEMIC CARDIOMYOPATHY: ICD-10-CM

## 2024-05-01 DIAGNOSIS — I25.118 CORONARY ARTERY DISEASE OF NATIVE ARTERY OF NATIVE HEART WITH STABLE ANGINA PECTORIS (HCC): ICD-10-CM

## 2024-05-01 DIAGNOSIS — I95.1 ORTHOSTATIC HYPOTENSION: ICD-10-CM

## 2024-05-01 DIAGNOSIS — I25.10 ATHEROSCLEROSIS OF NATIVE CORONARY ARTERY OF NATIVE HEART WITHOUT ANGINA PECTORIS: ICD-10-CM

## 2024-05-01 DIAGNOSIS — I48.0 PAF (PAROXYSMAL ATRIAL FIBRILLATION) (HCC): ICD-10-CM

## 2024-05-01 DIAGNOSIS — R06.02 SHORTNESS OF BREATH: ICD-10-CM

## 2024-05-01 DIAGNOSIS — I50.22 CHRONIC SYSTOLIC CONGESTIVE HEART FAILURE (HCC): ICD-10-CM

## 2024-05-01 DIAGNOSIS — R00.2 PALPITATIONS: ICD-10-CM

## 2024-05-01 DIAGNOSIS — I10 ESSENTIAL (PRIMARY) HYPERTENSION: ICD-10-CM

## 2024-05-01 DIAGNOSIS — D50.9 IRON DEFICIENCY ANEMIA, UNSPECIFIED IRON DEFICIENCY ANEMIA TYPE: Primary | ICD-10-CM

## 2024-05-01 DIAGNOSIS — R06.09 DOE (DYSPNEA ON EXERTION): ICD-10-CM

## 2024-05-01 PROBLEM — D64.9 ANEMIA: Status: ACTIVE | Noted: 2024-05-01

## 2024-05-01 LAB
ALBUMIN SERPL-MCNC: 3.2 G/DL (ref 3.5–5)
ALBUMIN/GLOB SERPL: 0.9 (ref 1.1–2.2)
ALP SERPL-CCNC: 97 U/L (ref 45–117)
ALT SERPL-CCNC: 17 U/L (ref 12–78)
ANION GAP SERPL CALC-SCNC: 4 MMOL/L (ref 5–15)
AST SERPL-CCNC: 15 U/L (ref 15–37)
BASOPHILS # BLD: 0.1 K/UL (ref 0–0.1)
BASOPHILS NFR BLD: 1 % (ref 0–1)
BILIRUB SERPL-MCNC: 0.2 MG/DL (ref 0.2–1)
BUN SERPL-MCNC: 18 MG/DL (ref 6–20)
BUN/CREAT SERPL: 16 (ref 12–20)
CALCIUM SERPL-MCNC: 8.8 MG/DL (ref 8.5–10.1)
CHLORIDE SERPL-SCNC: 106 MMOL/L (ref 97–108)
CO2 SERPL-SCNC: 26 MMOL/L (ref 21–32)
CREAT SERPL-MCNC: 1.14 MG/DL (ref 0.7–1.3)
DIFFERENTIAL METHOD BLD: ABNORMAL
EOSINOPHIL # BLD: 0.2 K/UL (ref 0–0.4)
EOSINOPHIL NFR BLD: 2 % (ref 0–7)
ERYTHROCYTE [DISTWIDTH] IN BLOOD BY AUTOMATED COUNT: 18.1 % (ref 11.5–14.5)
ERYTHROCYTE [DISTWIDTH] IN BLOOD BY AUTOMATED COUNT: 18.3 % (ref 11.5–14.5)
FERRITIN SERPL-MCNC: 6 NG/ML (ref 26–388)
GLOBULIN SER CALC-MCNC: 3.6 G/DL (ref 2–4)
GLUCOSE SERPL-MCNC: 103 MG/DL (ref 65–100)
HCT VFR BLD AUTO: 27 % (ref 36.6–50.3)
HCT VFR BLD AUTO: 28.2 % (ref 36.6–50.3)
HEMOCCULT STL QL: NEGATIVE
HGB BLD-MCNC: 7.8 G/DL (ref 12.1–17)
HGB BLD-MCNC: 8.1 G/DL (ref 12.1–17)
IMM GRANULOCYTES # BLD AUTO: 0 K/UL (ref 0–0.04)
IMM GRANULOCYTES NFR BLD AUTO: 0 % (ref 0–0.5)
IRON SATN MFR SERPL: 2 % (ref 20–50)
IRON SERPL-MCNC: 12 UG/DL (ref 35–150)
LYMPHOCYTES # BLD: 1.9 K/UL (ref 0.8–3.5)
LYMPHOCYTES NFR BLD: 19 % (ref 12–49)
MCH RBC QN AUTO: 21.8 PG (ref 26–34)
MCH RBC QN AUTO: 22 PG (ref 26–34)
MCHC RBC AUTO-ENTMCNC: 28.7 G/DL (ref 30–36.5)
MCHC RBC AUTO-ENTMCNC: 28.9 G/DL (ref 30–36.5)
MCV RBC AUTO: 75.4 FL (ref 80–99)
MCV RBC AUTO: 76.6 FL (ref 80–99)
MONOCYTES # BLD: 0.8 K/UL (ref 0–1)
MONOCYTES NFR BLD: 8 % (ref 5–13)
NEUTS SEG # BLD: 7 K/UL (ref 1.8–8)
NEUTS SEG NFR BLD: 70 % (ref 32–75)
NRBC # BLD: 0 K/UL (ref 0–0.01)
NRBC # BLD: 0 K/UL (ref 0–0.01)
NRBC BLD-RTO: 0 PER 100 WBC
NRBC BLD-RTO: 0 PER 100 WBC
PLATELET # BLD AUTO: 395 K/UL (ref 150–400)
PLATELET # BLD AUTO: 413 K/UL (ref 150–400)
PMV BLD AUTO: 10 FL (ref 8.9–12.9)
PMV BLD AUTO: 9.6 FL (ref 8.9–12.9)
POTASSIUM SERPL-SCNC: 4.4 MMOL/L (ref 3.5–5.1)
PROT SERPL-MCNC: 6.8 G/DL (ref 6.4–8.2)
RBC # BLD AUTO: 3.58 M/UL (ref 4.1–5.7)
RBC # BLD AUTO: 3.68 M/UL (ref 4.1–5.7)
RBC MORPH BLD: ABNORMAL
RBC MORPH BLD: ABNORMAL
SODIUM SERPL-SCNC: 136 MMOL/L (ref 136–145)
TIBC SERPL-MCNC: 519 UG/DL (ref 250–450)
WBC # BLD AUTO: 10 K/UL (ref 4.1–11.1)
WBC # BLD AUTO: 9 K/UL (ref 4.1–11.1)

## 2024-05-01 PROCEDURE — 80053 COMPREHEN METABOLIC PANEL: CPT

## 2024-05-01 PROCEDURE — G0378 HOSPITAL OBSERVATION PER HR: HCPCS

## 2024-05-01 PROCEDURE — 2580000003 HC RX 258: Performed by: INTERNAL MEDICINE

## 2024-05-01 PROCEDURE — 6360000002 HC RX W HCPCS: Performed by: INTERNAL MEDICINE

## 2024-05-01 PROCEDURE — 36415 COLL VENOUS BLD VENIPUNCTURE: CPT

## 2024-05-01 PROCEDURE — A4216 STERILE WATER/SALINE, 10 ML: HCPCS | Performed by: INTERNAL MEDICINE

## 2024-05-01 PROCEDURE — 96374 THER/PROPH/DIAG INJ IV PUSH: CPT

## 2024-05-01 PROCEDURE — 96361 HYDRATE IV INFUSION ADD-ON: CPT

## 2024-05-01 PROCEDURE — 99285 EMERGENCY DEPT VISIT HI MDM: CPT

## 2024-05-01 PROCEDURE — 85025 COMPLETE CBC W/AUTO DIFF WBC: CPT

## 2024-05-01 PROCEDURE — 82272 OCCULT BLD FECES 1-3 TESTS: CPT

## 2024-05-01 PROCEDURE — C9113 INJ PANTOPRAZOLE SODIUM, VIA: HCPCS | Performed by: INTERNAL MEDICINE

## 2024-05-01 RX ORDER — SODIUM CHLORIDE 9 MG/ML
INJECTION, SOLUTION INTRAVENOUS CONTINUOUS
Status: DISCONTINUED | OUTPATIENT
Start: 2024-05-01 | End: 2024-05-02

## 2024-05-01 RX ORDER — ATORVASTATIN CALCIUM 40 MG/1
80 TABLET, FILM COATED ORAL NIGHTLY
Status: DISCONTINUED | OUTPATIENT
Start: 2024-05-02 | End: 2024-05-06 | Stop reason: HOSPADM

## 2024-05-01 RX ORDER — ONDANSETRON 2 MG/ML
4 INJECTION INTRAMUSCULAR; INTRAVENOUS EVERY 6 HOURS PRN
Status: DISCONTINUED | OUTPATIENT
Start: 2024-05-01 | End: 2024-05-06 | Stop reason: HOSPADM

## 2024-05-01 RX ORDER — SODIUM CHLORIDE 9 MG/ML
INJECTION, SOLUTION INTRAVENOUS PRN
Status: DISCONTINUED | OUTPATIENT
Start: 2024-05-01 | End: 2024-05-06 | Stop reason: HOSPADM

## 2024-05-01 RX ORDER — METOPROLOL SUCCINATE 50 MG/1
50 TABLET, EXTENDED RELEASE ORAL NIGHTLY
Status: DISCONTINUED | OUTPATIENT
Start: 2024-05-02 | End: 2024-05-06 | Stop reason: HOSPADM

## 2024-05-01 RX ORDER — ONDANSETRON 4 MG/1
4 TABLET, ORALLY DISINTEGRATING ORAL EVERY 8 HOURS PRN
Status: DISCONTINUED | OUTPATIENT
Start: 2024-05-01 | End: 2024-05-06 | Stop reason: HOSPADM

## 2024-05-01 RX ORDER — RANOLAZINE 500 MG/1
500 TABLET, EXTENDED RELEASE ORAL 2 TIMES DAILY
Status: DISCONTINUED | OUTPATIENT
Start: 2024-05-02 | End: 2024-05-06 | Stop reason: HOSPADM

## 2024-05-01 RX ORDER — EZETIMIBE 10 MG/1
10 TABLET ORAL NIGHTLY
Status: DISCONTINUED | OUTPATIENT
Start: 2024-05-02 | End: 2024-05-06 | Stop reason: HOSPADM

## 2024-05-01 RX ORDER — ACETAMINOPHEN 650 MG/1
650 SUPPOSITORY RECTAL EVERY 6 HOURS PRN
Status: DISCONTINUED | OUTPATIENT
Start: 2024-05-01 | End: 2024-05-06 | Stop reason: HOSPADM

## 2024-05-01 RX ORDER — SODIUM CHLORIDE 0.9 % (FLUSH) 0.9 %
5-40 SYRINGE (ML) INJECTION EVERY 12 HOURS SCHEDULED
Status: DISCONTINUED | OUTPATIENT
Start: 2024-05-01 | End: 2024-05-06 | Stop reason: HOSPADM

## 2024-05-01 RX ORDER — SODIUM CHLORIDE 0.9 % (FLUSH) 0.9 %
5-40 SYRINGE (ML) INJECTION PRN
Status: DISCONTINUED | OUTPATIENT
Start: 2024-05-01 | End: 2024-05-06 | Stop reason: HOSPADM

## 2024-05-01 RX ORDER — ACETAMINOPHEN 325 MG/1
650 TABLET ORAL EVERY 6 HOURS PRN
Status: DISCONTINUED | OUTPATIENT
Start: 2024-05-01 | End: 2024-05-06 | Stop reason: HOSPADM

## 2024-05-01 RX ORDER — DULOXETIN HYDROCHLORIDE 30 MG/1
60 CAPSULE, DELAYED RELEASE ORAL DAILY
Status: DISCONTINUED | OUTPATIENT
Start: 2024-05-02 | End: 2024-05-06 | Stop reason: HOSPADM

## 2024-05-01 RX ADMIN — SODIUM CHLORIDE: 9 INJECTION, SOLUTION INTRAVENOUS at 22:45

## 2024-05-01 RX ADMIN — PANTOPRAZOLE SODIUM 40 MG: 40 INJECTION, POWDER, LYOPHILIZED, FOR SOLUTION INTRAVENOUS at 23:25

## 2024-05-01 RX ADMIN — SODIUM CHLORIDE, PRESERVATIVE FREE 10 ML: 5 INJECTION INTRAVENOUS at 23:32

## 2024-05-01 ASSESSMENT — PAIN DESCRIPTION - LOCATION: LOCATION: ABDOMEN

## 2024-05-01 ASSESSMENT — LIFESTYLE VARIABLES
HOW MANY STANDARD DRINKS CONTAINING ALCOHOL DO YOU HAVE ON A TYPICAL DAY: PATIENT DOES NOT DRINK
HOW OFTEN DO YOU HAVE A DRINK CONTAINING ALCOHOL: NEVER

## 2024-05-01 ASSESSMENT — PAIN DESCRIPTION - DESCRIPTORS: DESCRIPTORS: DULL

## 2024-05-01 ASSESSMENT — PAIN DESCRIPTION - FREQUENCY: FREQUENCY: INTERMITTENT

## 2024-05-01 ASSESSMENT — PAIN DESCRIPTION - ORIENTATION: ORIENTATION: LEFT;LOWER

## 2024-05-01 ASSESSMENT — PAIN SCALES - GENERAL: PAINLEVEL_OUTOF10: 1

## 2024-05-01 ASSESSMENT — PAIN DESCRIPTION - PAIN TYPE: TYPE: CHRONIC PAIN

## 2024-05-01 NOTE — TELEPHONE ENCOUNTER
Today's results shows  HGB 8.1.  Spoke with patient verified with 2 identifiers. Patient denied blood in urine or feces, stated that he is bloated today.     No pain, dyzziness reported. Patient will call GI doctor today.  BP today 112/66   HR  83  Patient hydrating well today, stated. This nurse instructed to take BP every day  and to go to the ED if Systolic BP is lower than 95 or feel dizzy  or passing out. Patient voiced understanding.   Please advise

## 2024-05-01 NOTE — ED NOTES
Verbal and bedside ED summary and SBAR given by Venessa ED RN (offgoing). All questions answered; care transitioned at this time. Pt is AO3-4 (disoriented to time) with some generalized confusion about his situation. Pt reports he was at the cards office and sent here due to a trend of decreasing hgb that his provider believes is due to a GI bleed from a abdominal procedure the patient had in 2023.     - Side rails x2.  - Call light within reach.  - Wife at bedside  - No acute distress reported or observed.

## 2024-05-01 NOTE — ED NOTES
Bedside and Verbal shift change report given to RN Charter (oncoming nurse) by RN Venessa (offgoing nurse). Report included the following information Nurse Handoff Report, ED Encounter Summary, ED SBAR, and MAR.

## 2024-05-01 NOTE — TELEPHONE ENCOUNTER
Patient is calling because he said the  told him to call to see what his lab results are.    [phone]

## 2024-05-01 NOTE — TELEPHONE ENCOUNTER
I personally spoke to the patient and his wife regarding his drop in hemoglobin over the last few months from 13.8 down to 8.1 currently.  Yesterday he was orthostatic, borderline dangerously hypotensive with standing blood pressure of 88.  Now improved to greater than 110 after stopping Entresto and Eliquis.  I advised however that I think it is best to go to the emergency room and ask for GI consultation to evaluate for GI bleeding which could be ongoing even though he stopped Eliquis.  I advised that it is important to do this today so that we have a day or 2 to be able to go through prep procedures etc. for possible colonoscopy and EGD if necessary with surgical consultation on an elective basis if necessary rather than coming in when things get worse over the weekend or at night.  The patient and wife expressed understanding and intent to go to the emergency room tonight.  They will likely go to [facility] where he previously had his surgeries and fortunately he recently had a stable nonischemic stress test with old infarction as previously seen and stable mildly reduced LVEF so I do not think there will be any acute cardiac issues to deal with during this hospitalization.  They will consult the on-call cardiologist and of course our group with [facility] cardiology is available for phone consultation with those cardiologists if necessary.

## 2024-05-01 NOTE — ED PROVIDER NOTES
[facility] 1 CLINICAL OBS  EMERGENCY DEPARTMENT ENCOUNTER       Pt Name: Efren Maya  MRN: [id number]  Birthdate 1965  Date of evaluation: 5/1/2024  Provider: Rico Guerra MD   PCP: Chiquis Louis MD  Note Started: 7:38 PM EDT 5/1/24     CHIEF COMPLAINT       Chief Complaint   Patient presents with    Referral - General     Pt arrives ambulatory into TR from MD Gamboa (cardiology) for \"acute blood loss/anemia\" .         HISTORY OF PRESENT ILLNESS: 1 or more elements      History From: patient, History limited by: none     Efren Maya is a 58 y.o. male presenting with anemia.  Sent from cardiology office for new anemia.  Was having dizziness, orthostatics on anticoagulation and entresto - these were stopped.  Patient notes he feels well - no abdominal pain, NV, no dark stools or bleeding (notes sometimes when he wipes).         Please See MDM for Additional Details of the HPI/PMH  Nursing Notes were all reviewed and agreed with or any disagreements were addressed in the HPI.     REVIEW OF SYSTEMS        Positives and Pertinent negatives as per HPI.    PAST HISTORY     Past Medical History:  Past Medical History:   Diagnosis Date    ADD (attention deficit disorder) 09/25/2018    Asthma     Balance problem 09/15/2019    Chest pain 11/30/2018    Congestive heart failure (HCC)     Diabetes ([facility])     Dyslipidemia 09/25/2018    Fatigue 11/30/2018    Glucose intolerance (impaired glucose tolerance) 08/11/2019    Hypertension     ICD (implantable cardioverter-defibrillator) in place     Other ill-defined conditions(799.89)     ADHD,BELL'S PALSY WEAKNESS  LT FACE    Psychiatric disorder     DEPRESSION    S/P coronary artery stent placement 09/25/2018 9/25/18 PCI/ROSALES to LAD    STEMI (ST elevation myocardial infarction) ([facility]) 09/25/2018    Syncope 01/25/2019    Tobacco abuse 09/25/2018       Past Surgical History:  Past Surgical History:   Procedure Laterality Date    BOWEL RESECTION  04/24/2023    ER Sigmoid

## 2024-05-02 LAB
ALBUMIN SERPL-MCNC: 3 G/DL (ref 3.5–5)
ALBUMIN/GLOB SERPL: 0.9 (ref 1.1–2.2)
ALP SERPL-CCNC: 85 U/L (ref 45–117)
ALT SERPL-CCNC: 14 U/L (ref 12–78)
ANION GAP SERPL CALC-SCNC: 3 MMOL/L (ref 5–15)
AST SERPL-CCNC: 12 U/L (ref 15–37)
BASOPHILS # BLD: 0.1 K/UL (ref 0–0.1)
BASOPHILS NFR BLD: 1 % (ref 0–1)
BILIRUB SERPL-MCNC: 0.3 MG/DL (ref 0.2–1)
BUN SERPL-MCNC: 15 MG/DL (ref 6–20)
BUN/CREAT SERPL: 15 (ref 12–20)
CALCIUM SERPL-MCNC: 9.3 MG/DL (ref 8.5–10.1)
CHLORIDE SERPL-SCNC: 109 MMOL/L (ref 97–108)
CO2 SERPL-SCNC: 28 MMOL/L (ref 21–32)
CREAT SERPL-MCNC: 1.03 MG/DL (ref 0.7–1.3)
DIFFERENTIAL METHOD BLD: ABNORMAL
EOSINOPHIL # BLD: 0.2 K/UL (ref 0–0.4)
EOSINOPHIL NFR BLD: 3 % (ref 0–7)
ERYTHROCYTE [DISTWIDTH] IN BLOOD BY AUTOMATED COUNT: 18.1 % (ref 11.5–14.5)
GLOBULIN SER CALC-MCNC: 3.2 G/DL (ref 2–4)
GLUCOSE SERPL-MCNC: 94 MG/DL (ref 65–100)
HCT VFR BLD AUTO: 25.7 % (ref 36.6–50.3)
HGB BLD-MCNC: 7.4 G/DL (ref 12.1–17)
IMM GRANULOCYTES # BLD AUTO: 0 K/UL (ref 0–0.04)
IMM GRANULOCYTES NFR BLD AUTO: 0 % (ref 0–0.5)
LYMPHOCYTES # BLD: 1.5 K/UL (ref 0.8–3.5)
LYMPHOCYTES NFR BLD: 23 % (ref 12–49)
MCH RBC QN AUTO: 21.9 PG (ref 26–34)
MCHC RBC AUTO-ENTMCNC: 28.8 G/DL (ref 30–36.5)
MCV RBC AUTO: 76 FL (ref 80–99)
MONOCYTES # BLD: 0.7 K/UL (ref 0–1)
MONOCYTES NFR BLD: 10 % (ref 5–13)
NEUTS SEG # BLD: 4.2 K/UL (ref 1.8–8)
NEUTS SEG NFR BLD: 63 % (ref 32–75)
NRBC # BLD: 0 K/UL (ref 0–0.01)
NRBC BLD-RTO: 0 PER 100 WBC
PLATELET # BLD AUTO: 354 K/UL (ref 150–400)
PMV BLD AUTO: 9.9 FL (ref 8.9–12.9)
POTASSIUM SERPL-SCNC: 4.2 MMOL/L (ref 3.5–5.1)
PROT SERPL-MCNC: 6.2 G/DL (ref 6.4–8.2)
RBC # BLD AUTO: 3.38 M/UL (ref 4.1–5.7)
RBC MORPH BLD: ABNORMAL
RBC MORPH BLD: ABNORMAL
SODIUM SERPL-SCNC: 140 MMOL/L (ref 136–145)
WBC # BLD AUTO: 6.7 K/UL (ref 4.1–11.1)

## 2024-05-02 PROCEDURE — 6370000000 HC RX 637 (ALT 250 FOR IP): Performed by: INTERNAL MEDICINE

## 2024-05-02 PROCEDURE — 36415 COLL VENOUS BLD VENIPUNCTURE: CPT

## 2024-05-02 PROCEDURE — 96376 TX/PRO/DX INJ SAME DRUG ADON: CPT

## 2024-05-02 PROCEDURE — 96375 TX/PRO/DX INJ NEW DRUG ADDON: CPT

## 2024-05-02 PROCEDURE — A4216 STERILE WATER/SALINE, 10 ML: HCPCS | Performed by: INTERNAL MEDICINE

## 2024-05-02 PROCEDURE — 85025 COMPLETE CBC W/AUTO DIFF WBC: CPT

## 2024-05-02 PROCEDURE — G0378 HOSPITAL OBSERVATION PER HR: HCPCS

## 2024-05-02 PROCEDURE — 6360000002 HC RX W HCPCS: Performed by: INTERNAL MEDICINE

## 2024-05-02 PROCEDURE — 2580000003 HC RX 258: Performed by: INTERNAL MEDICINE

## 2024-05-02 PROCEDURE — 80053 COMPREHEN METABOLIC PANEL: CPT

## 2024-05-02 PROCEDURE — C9113 INJ PANTOPRAZOLE SODIUM, VIA: HCPCS | Performed by: INTERNAL MEDICINE

## 2024-05-02 RX ORDER — HYDROXYZINE HYDROCHLORIDE 10 MG/1
10 TABLET, FILM COATED ORAL 3 TIMES DAILY PRN
Status: DISCONTINUED | OUTPATIENT
Start: 2024-05-02 | End: 2024-05-06 | Stop reason: HOSPADM

## 2024-05-02 RX ORDER — NICOTINE 21 MG/24HR
1 PATCH, TRANSDERMAL 24 HOURS TRANSDERMAL DAILY
Status: DISCONTINUED | OUTPATIENT
Start: 2024-05-02 | End: 2024-05-06 | Stop reason: HOSPADM

## 2024-05-02 RX ADMIN — ONDANSETRON 4 MG: 2 INJECTION INTRAMUSCULAR; INTRAVENOUS at 11:52

## 2024-05-02 RX ADMIN — HYDROXYZINE HYDROCHLORIDE 10 MG: 10 TABLET ORAL at 18:45

## 2024-05-02 RX ADMIN — RANOLAZINE 500 MG: 500 TABLET, EXTENDED RELEASE ORAL at 21:26

## 2024-05-02 RX ADMIN — IRON SUCROSE 200 MG: 20 INJECTION, SOLUTION INTRAVENOUS at 08:39

## 2024-05-02 RX ADMIN — PANTOPRAZOLE SODIUM 40 MG: 40 INJECTION, POWDER, LYOPHILIZED, FOR SOLUTION INTRAVENOUS at 21:26

## 2024-05-02 RX ADMIN — HYDROXYZINE HYDROCHLORIDE 10 MG: 10 TABLET ORAL at 11:53

## 2024-05-02 RX ADMIN — ATORVASTATIN CALCIUM 80 MG: 40 TABLET, FILM COATED ORAL at 21:26

## 2024-05-02 RX ADMIN — SODIUM CHLORIDE, PRESERVATIVE FREE 10 ML: 5 INJECTION INTRAVENOUS at 08:41

## 2024-05-02 RX ADMIN — RANOLAZINE 500 MG: 500 TABLET, EXTENDED RELEASE ORAL at 08:35

## 2024-05-02 RX ADMIN — DULOXETINE HYDROCHLORIDE 60 MG: 30 CAPSULE, DELAYED RELEASE ORAL at 08:35

## 2024-05-02 RX ADMIN — EZETIMIBE 10 MG: 10 TABLET ORAL at 21:26

## 2024-05-02 RX ADMIN — SODIUM CHLORIDE, PRESERVATIVE FREE 10 ML: 5 INJECTION INTRAVENOUS at 21:27

## 2024-05-02 RX ADMIN — PANTOPRAZOLE SODIUM 40 MG: 40 INJECTION, POWDER, LYOPHILIZED, FOR SOLUTION INTRAVENOUS at 08:40

## 2024-05-02 RX ADMIN — ACETAMINOPHEN 650 MG: 325 TABLET ORAL at 18:40

## 2024-05-02 ASSESSMENT — PAIN DESCRIPTION - ORIENTATION: ORIENTATION: RIGHT;LEFT

## 2024-05-02 ASSESSMENT — PAIN DESCRIPTION - DESCRIPTORS: DESCRIPTORS: ACHING

## 2024-05-02 ASSESSMENT — PAIN SCALES - GENERAL
PAINLEVEL_OUTOF10: 4
PAINLEVEL_OUTOF10: 0

## 2024-05-02 ASSESSMENT — PAIN DESCRIPTION - LOCATION: LOCATION: HEAD;NECK

## 2024-05-02 NOTE — CONSULTS
Cydney Blevins PA-C                      [phone] cell                      Friday 7:30 am-4:30 pm     Gastroenterology Consultation Note      Admit Date: 5/1/2024  Consult Date: 5/2/2024   I greatly appreciate your asking me to see Efren Maya, thank you very much for the opportunity to participate in his care.    Narrative Assessment and Plan   GI consult for anemia, rectal bleeding. 58 YOM with PMHx significant for ADD, depression, nephrolithiasis, HLD, CHF, HTN, asthma, STEMI, CAD s/p coronary artery stent placement, Afib who presented to the hospital per recommendations by Dr. Heart for evaluation of anemia. Denies melena, hematochezia, abdominal pain. Had perforated sigmoid diverticulitis and SBO April 2023 (seen by Dr. Clemens). Has never had an EGD/colonoscopy. Denies Fhx of CRC. He was on Entresto and Eliquis which were both stopped 2 days ago.   Labs: Hgb 7.4 Hct 25.7 MCV 76 RDW 18.1 Ferritin 6 Iron 12 TIBC 519 Iron Sat 2%, FOBT Negative, Cologuard 1/27/2023 Negative    Impression  NELSON  History of perforated sigmoid diverticulitis  Afib  AC use  CAD   HTN  HLD      Plan:  Monitor H&H, transfuse PRN  Continue IV PPI BID  EGD by Dr. Marin tomorrow, NPO past midnight  Colonoscopy by Dr. Carvajal Monday   Subjective:     Chief Complaint: GI consult for anemia, rectal bleeding    History of Present Illness: GI consult for anemia, rectal bleeding  Efren Maya is a 57 y/o male with PMHx significant for ADD, depression, HLD, CHF, HTN, asthma, STEMI, CAD s/p coronary artery stent placement, Afib who presented to the hospital per recommendations by Dr. Heart for evaluation of anemia. Denies melena, hematochezia, abdominal pain. Had perforated sigmoid diverticulitis and SBO April 2023 (seen by Dr. Clemens). Has never had an EGD/colonoscopy. Denies Fhx of CRC. He was on Entresto and Eliquis which were both stopped 2 days ago.

## 2024-05-02 NOTE — H&P
Hospitalist Admission Note    NAME: Efren Maya   :  1965   MRN:  [id number]     Date/Time:  2024 8:43 PM    Patient PCP: Chiquis Louis MD  ______________________________________________________________________  Given the patient's current clinical presentation, I have a high level of concern for decompensation if discharged from the emergency department.  Given the patient's current clinical presentation, I have a high level of concern for decompensation if patient is discharged from the emergency department.  Patient will be admitted as an inpatient with an estimated LOS of 2 days    My assessment of this patient's clinical condition and my plan of care is as follows.    Assessment / Plan:    Iron deficiency anemia   Rectal bleeding  -Hg 7.8 to 8.1.   hg 10 last March and was 12 last year.  -no history of endoscopy.   -Heme neg  -start IV protonix  -IV iron sucrose 200mg daily x 3  -NPO tonight.  Maintenance IVF  -consult GI in AM    CAD, Hx ROSALES to LAD 2018  Ischemic CM  S/P ICD  HTN / HLD  Paroxysmal Afib  -recent TTE EF 45-50%, trace MR, no AS  -continue lipitor 80 and zetia  -continue ranexa, toprol XL  -hold ASA  -Eliquis and entresto was stopped yesterday    Smoker, 1ppd  -declines patch.  Counseled     Depression  -continue cymbalta    MELISSA on PAP      Medical Decision Making:  Labs reviewed by myself: CBC, BMP  Diagnostic data reviewed by myself:    Toxic drug monitoring:  Discussed case with emergency room physician . After discussion I am in agreement that acuity of patient's medical condition necessitates hospital stay.        Code Status:   Full  Surrogate Decision Maker:  Spouse    DVT Prophylaxis:  SCD    Baseline: .  Independent    Subjective:   CHIEF COMPLAINT:  Referred to the ED for low Hg    HISTORY OF PRESENT ILLNESS:     Efren Maya is a 58 y.o. male with a history of CAD, Cardiomyopathy, ICD, HTN and PAF who was referred to the ED from his cardiologist office  3.5 - 5.0 g/dL    Globulin 3.6 2.0 - 4.0 g/dL    Albumin/Globulin Ratio 0.9 (L) 1.1 - 2.2     CBC with Auto Differential    Collection Time: 05/01/24  7:07 PM   Result Value Ref Range    WBC 10.0 4.1 - 11.1 K/uL    RBC 3.58 (L) 4.10 - 5.70 M/uL    Hemoglobin 7.8 (L) 12.1 - 17.0 g/dL    Hematocrit 27.0 (L) 36.6 - 50.3 %    MCV 75.4 (L) 80.0 - 99.0 FL    MCH 21.8 (L) 26.0 - 34.0 PG    MCHC 28.9 (L) 30.0 - 36.5 g/dL    RDW 18.3 (H) 11.5 - 14.5 %    Platelets 395 150 - 400 K/uL    MPV 9.6 8.9 - 12.9 FL    Nucleated RBCs 0.0 0  WBC    nRBC 0.00 0.00 - 0.01 K/uL    Neutrophils % 70 32 - 75 %    Lymphocytes % 19 12 - 49 %    Monocytes % 8 5 - 13 %    Eosinophils % 2 0 - 7 %    Basophils % 1 0 - 1 %    Immature Granulocytes % 0 0.0 - 0.5 %    Neutrophils Absolute 7.0 1.8 - 8.0 K/UL    Lymphocytes Absolute 1.9 0.8 - 3.5 K/UL    Monocytes Absolute 0.8 0.0 - 1.0 K/UL    Eosinophils Absolute 0.2 0.0 - 0.4 K/UL    Basophils Absolute 0.1 0.0 - 0.1 K/UL    Immature Granulocytes Absolute 0.0 0.00 - 0.04 K/UL    Differential Type SMEAR SCANNED      RBC Comment ANISOCYTOSIS  PRESENT        RBC Comment MICROCYTOSIS  PRESENT       Occult Blood, Fecal    Collection Time: 05/01/24  7:52 PM   Result Value Ref Range    POC Occult Blood, Fecal Negative NEG           _______________________________________________________________________    TOTAL TIME:  70 Minutes    Critical Care Provided     Minutes non procedure based    Signed: Isaiah Ogden MD    Discussion/MDM: Patient with multiple medical comorbidities, each with high likelihood for morbidity and mortality if left untreated.  I have reviewed patient's presenting subjective and objective findings, as well as all laboratory studies, imaging studies, and vital signs to date as well as treatment rendered and patient's response to those treatments.  In addition, prior medical, surgical and relevant social and family histories were reviewed.

## 2024-05-02 NOTE — PROGRESS NOTES
Bedside report given to Chanel RN. Report included SBAR and MAR.   Patient with no new events over night. Denies pain or sob at rest.

## 2024-05-02 NOTE — ED NOTES
Comprehensive verbal ED summary and SBAR given to receiving IP RN over the telephone. All questions answered at this time. Pt going ADRIANO with telebox at this time. He is AO4, RA, ambulated to wheelchair independently. He denies pain or acute distress.

## 2024-05-02 NOTE — PROGRESS NOTES
Hospitalist Progress Note    NAME:   Efren Maya   : 1965   MRN: [id number]     Date/Time: 2024 3:01 PM  Patient PCP: Chiquis Louis MD    Estimated discharge date:  Barriers: EGD tomorrow and colonoscopy on Monday      Assessment / Plan:  Iron deficiency anemia   Rectal bleeding  -Hg 7.8 to 8.1.   hg 10 last March and was 12 last year.  -no history of endoscopy.   -Heme neg  Continue with IV protonix  -IV iron sucrose 200mg daily x 3  Plan for EGD tomorrow     CAD, Hx ROSALES to LAD   Ischemic CM  S/P ICD  HTN / HLD  Paroxysmal Afib  -recent TTE EF 45-50%, trace MR, no AS  -continue lipitor 80 and zetia  -continue ranexa, toprol XL  -hold ASA  -Eliquis and entresto was stopped on     History of a perforated sigmoid diverticulitis and SBO last year    Smoker, 1ppd  -declines patch.  Counseled      Depression  -continue cymbalta     MELISSA on PAP       Medical Decision Making:  Labs reviewed by myself: CBC, BMP  Diagnostic data reviewed by myself:    Toxic drug monitoring:  Discussed case with :            Code Status:   Full  Surrogate Decision Maker:  Spouse     DVT Prophylaxis:  SCD     Baseline: .  Independent    Subjective:     Chief Complaint / Reason for Physician Visit  \"\" Follow-up iron deficiency anemia.  Discussed with RN events overnight.   No GI bleed    Objective:     VITALS:   Last 24hrs VS reviewed since prior progress note. Most recent are:  Patient Vitals for the past 24 hrs:   BP Temp Temp src Pulse Resp SpO2 Height Weight   24 1441 123/77 -- -- 76 -- -- -- --   24 0833 106/66 98.1 °F (36.7 °C) Oral 67 16 93 % -- --   24 0254 114/68 97.9 °F (36.6 °C) -- 73 17 93 % -- --   24 2250 128/79 98.1 °F (36.7 °C) Oral 78 16 98 % -- --   24 2245 -- 98.5 °F (36.9 °C) Oral -- -- -- -- --   24 2225 -- -- -- 82 17 -- -- --   24 2100 137/80 -- -- 79 20 97 % -- --   24 128/73 -- -- 85 15 99 % -- --   24 1945 129/76 -- --  79 19 98 % -- --   05/01/24 1915 117/62 98.1 °F (36.7 °C) Oral 80 15 97 % -- --   05/01/24 1900 119/76 -- -- 87 15 98 % -- --   05/01/24 1814 124/72 98.2 °F (36.8 °C) Oral 80 20 100 % 1.753 m (5' 9\") 91.6 kg (201 lb 15.1 oz)         Intake/Output Summary (Last 24 hours) at 5/2/2024 1501  Last data filed at 5/2/2024 1051  Gross per 24 hour   Intake 250 ml   Output --   Net 250 ml        I had a face to face encounter and independently examined this patient on 5/2/2024, as outlined below:  PHYSICAL EXAM:  General: Alert, cooperative  EENT:  EOMI. Anicteric sclerae.  Resp:  CTA bilaterally, no wheezing or rales.  No accessory muscle use  CV:  Regular  rhythm,  No edema  GI:  Soft, Non distended, Non tender.  +Bowel sounds  Neurologic:  Alert and oriented X 3, normal speech,   Psych:   Good insight. Not anxious nor agitated  Skin:  No rashes.  No jaundice    Reviewed most current lab test results and cultures  YES  Reviewed most current radiology test results   YES  Review and summation of old records today    NO  Reviewed patient's current orders and MAR    YES  PMH/SH reviewed - no change compared to H&P    Procedures: see electronic medical records for all procedures/Xrays and details which were not copied into this note but were reviewed prior to creation of Plan.      LABS:  I reviewed today's most current labs and imaging studies.  Pertinent labs include:  Recent Labs     05/01/24  0849 05/01/24 1907 05/02/24  0555   WBC 9.0 10.0 6.7   HGB 8.1* 7.8* 7.4*   HCT 28.2* 27.0* 25.7*   * 395 354     Recent Labs     05/01/24 1907 05/02/24  0555    140   K 4.4 4.2    109*   CO2 26 28   GLUCOSE 103* 94   BUN 18 15   CREATININE 1.14 1.03   CALCIUM 8.8 9.3   BILITOT 0.2 0.3   AST 15 12*   ALT 17 14       Signed: Rolando Schwartz MD

## 2024-05-02 NOTE — PLAN OF CARE
Problem: Discharge Planning  Goal: Discharge to home or other facility with appropriate resources  5/2/2024 1051 by Melva Nguyễn, RN  Outcome: Progressing  5/2/2024 0011 by Aleida Cunningham, RN  Outcome: Progressing  Flowsheets (Taken 5/2/2024 0000)  Discharge to home or other facility with appropriate resources: Identify barriers to discharge with patient and caregiver

## 2024-05-03 ENCOUNTER — ANESTHESIA EVENT (OUTPATIENT)
Facility: HOSPITAL | Age: 59
End: 2024-05-03
Payer: COMMERCIAL

## 2024-05-03 ENCOUNTER — ANESTHESIA (OUTPATIENT)
Facility: HOSPITAL | Age: 59
End: 2024-05-03
Payer: COMMERCIAL

## 2024-05-03 PROCEDURE — A4216 STERILE WATER/SALINE, 10 ML: HCPCS | Performed by: INTERNAL MEDICINE

## 2024-05-03 PROCEDURE — 2580000003 HC RX 258: Performed by: INTERNAL MEDICINE

## 2024-05-03 PROCEDURE — 2500000003 HC RX 250 WO HCPCS: Performed by: NURSE ANESTHETIST, CERTIFIED REGISTERED

## 2024-05-03 PROCEDURE — 6360000002 HC RX W HCPCS: Performed by: INTERNAL MEDICINE

## 2024-05-03 PROCEDURE — 7100000011 HC PHASE II RECOVERY - ADDTL 15 MIN: Performed by: INTERNAL MEDICINE

## 2024-05-03 PROCEDURE — G0378 HOSPITAL OBSERVATION PER HR: HCPCS

## 2024-05-03 PROCEDURE — 6370000000 HC RX 637 (ALT 250 FOR IP): Performed by: INTERNAL MEDICINE

## 2024-05-03 PROCEDURE — 6360000002 HC RX W HCPCS: Performed by: NURSE ANESTHETIST, CERTIFIED REGISTERED

## 2024-05-03 PROCEDURE — 88305 TISSUE EXAM BY PATHOLOGIST: CPT

## 2024-05-03 PROCEDURE — 3600007512: Performed by: INTERNAL MEDICINE

## 2024-05-03 PROCEDURE — 3600007502: Performed by: INTERNAL MEDICINE

## 2024-05-03 PROCEDURE — 3700000000 HC ANESTHESIA ATTENDED CARE: Performed by: INTERNAL MEDICINE

## 2024-05-03 PROCEDURE — C9113 INJ PANTOPRAZOLE SODIUM, VIA: HCPCS | Performed by: INTERNAL MEDICINE

## 2024-05-03 PROCEDURE — 7100000010 HC PHASE II RECOVERY - FIRST 15 MIN: Performed by: INTERNAL MEDICINE

## 2024-05-03 PROCEDURE — 3700000001 HC ADD 15 MINUTES (ANESTHESIA): Performed by: INTERNAL MEDICINE

## 2024-05-03 PROCEDURE — 2709999900 HC NON-CHARGEABLE SUPPLY: Performed by: INTERNAL MEDICINE

## 2024-05-03 RX ORDER — SODIUM CHLORIDE 0.9 % (FLUSH) 0.9 %
5-40 SYRINGE (ML) INJECTION EVERY 12 HOURS SCHEDULED
Status: DISCONTINUED | OUTPATIENT
Start: 2024-05-03 | End: 2024-05-06 | Stop reason: HOSPADM

## 2024-05-03 RX ORDER — SODIUM CHLORIDE 9 MG/ML
25 INJECTION, SOLUTION INTRAVENOUS PRN
Status: DISCONTINUED | OUTPATIENT
Start: 2024-05-03 | End: 2024-05-06 | Stop reason: HOSPADM

## 2024-05-03 RX ORDER — EPHEDRINE SULFATE/0.9% NACL/PF 50 MG/5 ML
SYRINGE (ML) INTRAVENOUS PRN
Status: DISCONTINUED | OUTPATIENT
Start: 2024-05-03 | End: 2024-05-03 | Stop reason: SDUPTHER

## 2024-05-03 RX ORDER — LIDOCAINE HYDROCHLORIDE 20 MG/ML
INJECTION, SOLUTION EPIDURAL; INFILTRATION; INTRACAUDAL; PERINEURAL PRN
Status: DISCONTINUED | OUTPATIENT
Start: 2024-05-03 | End: 2024-05-03 | Stop reason: SDUPTHER

## 2024-05-03 RX ORDER — PHENYLEPHRINE HCL IN 0.9% NACL 0.4MG/10ML
SYRINGE (ML) INTRAVENOUS PRN
Status: DISCONTINUED | OUTPATIENT
Start: 2024-05-03 | End: 2024-05-03 | Stop reason: SDUPTHER

## 2024-05-03 RX ORDER — SODIUM CHLORIDE 0.9 % (FLUSH) 0.9 %
5-40 SYRINGE (ML) INJECTION PRN
Status: DISCONTINUED | OUTPATIENT
Start: 2024-05-03 | End: 2024-05-06 | Stop reason: HOSPADM

## 2024-05-03 RX ADMIN — Medication 15 MG: at 08:53

## 2024-05-03 RX ADMIN — RANOLAZINE 500 MG: 500 TABLET, EXTENDED RELEASE ORAL at 20:38

## 2024-05-03 RX ADMIN — SODIUM CHLORIDE, PRESERVATIVE FREE 10 ML: 5 INJECTION INTRAVENOUS at 10:05

## 2024-05-03 RX ADMIN — Medication 200 MCG: at 08:48

## 2024-05-03 RX ADMIN — PANTOPRAZOLE SODIUM 40 MG: 40 INJECTION, POWDER, LYOPHILIZED, FOR SOLUTION INTRAVENOUS at 10:03

## 2024-05-03 RX ADMIN — SODIUM CHLORIDE: 9 INJECTION, SOLUTION INTRAVENOUS at 08:32

## 2024-05-03 RX ADMIN — DULOXETINE HYDROCHLORIDE 60 MG: 30 CAPSULE, DELAYED RELEASE ORAL at 10:00

## 2024-05-03 RX ADMIN — PANTOPRAZOLE SODIUM 40 MG: 40 INJECTION, POWDER, LYOPHILIZED, FOR SOLUTION INTRAVENOUS at 20:38

## 2024-05-03 RX ADMIN — IRON SUCROSE 200 MG: 20 INJECTION, SOLUTION INTRAVENOUS at 10:03

## 2024-05-03 RX ADMIN — SODIUM CHLORIDE, PRESERVATIVE FREE 10 ML: 5 INJECTION INTRAVENOUS at 20:46

## 2024-05-03 RX ADMIN — PROPOFOL 50 MG: 10 INJECTION, EMULSION INTRAVENOUS at 08:45

## 2024-05-03 RX ADMIN — METOPROLOL SUCCINATE 50 MG: 50 TABLET, EXTENDED RELEASE ORAL at 20:38

## 2024-05-03 RX ADMIN — LIDOCAINE HYDROCHLORIDE 40 MG: 20 INJECTION, SOLUTION EPIDURAL; INFILTRATION; INTRACAUDAL; PERINEURAL at 08:40

## 2024-05-03 RX ADMIN — EZETIMIBE 10 MG: 10 TABLET ORAL at 20:38

## 2024-05-03 RX ADMIN — ATORVASTATIN CALCIUM 80 MG: 40 TABLET, FILM COATED ORAL at 20:38

## 2024-05-03 RX ADMIN — PROPOFOL 50 MG: 10 INJECTION, EMULSION INTRAVENOUS at 08:40

## 2024-05-03 RX ADMIN — Medication 200 MCG: at 08:46

## 2024-05-03 RX ADMIN — Medication 10 MG: at 08:51

## 2024-05-03 RX ADMIN — RANOLAZINE 500 MG: 500 TABLET, EXTENDED RELEASE ORAL at 10:00

## 2024-05-03 RX ADMIN — PROPOFOL 50 MG: 10 INJECTION, EMULSION INTRAVENOUS at 08:43

## 2024-05-03 ASSESSMENT — PAIN SCALES - GENERAL: PAINLEVEL_OUTOF10: 0

## 2024-05-03 ASSESSMENT — PAIN - FUNCTIONAL ASSESSMENT: PAIN_FUNCTIONAL_ASSESSMENT: NONE - DENIES PAIN

## 2024-05-03 ASSESSMENT — LIFESTYLE VARIABLES: SMOKING_STATUS: 1

## 2024-05-03 NOTE — PROGRESS NOTES
TRANSFER - OUT REPORT:    Verbal report given to Savanah (name) on Efren Maya  being transferred to obs unit room 1135(unit) for  routine progression of care       Report consisted of patient’s Situation, Background, Assessment and   Recommendations(SBAR).     Information from the following report(s) VSS,  see MD note, plan for Colonoscopy Monday,  wrote diet order  was reviewed with the receiving nurse.    Lines:   Peripheral IV 05/01/24 Left Antecubital (Active)   Site Assessment Clean, dry & intact 05/02/24 2240   Line Status Capped;Flushed 05/02/24 2240   Line Care Cap changed 05/02/24 2240   Phlebitis Assessment No symptoms 05/02/24 2240   Infiltration Assessment 0 05/02/24 2240   Alcohol Cap Used Yes 05/02/24 2240   Dressing Status Clean, dry & intact 05/02/24 2240   Dressing Type Transparent 05/02/24 2240        Opportunity for questions and clarification was provided.      Patient transported with:   Transport and monitor

## 2024-05-03 NOTE — PROGRESS NOTES
Endoscopy Case End Note:    0845:  Procedure scope was pre-cleaned, per protocol, at bedside by Althargelia.      0845:  Report received from anesthesia - YOLANDA Barron.  See anesthesia flowsheet for intra-procedure vital signs and events.    0852:  glasses returned to patient.

## 2024-05-03 NOTE — H&P
Pre-endoscopy H and P    The patient was seen and examined in the room/pre-op holding area.  The airway was assessed and documented.  The problem list, past medical history, and medications were reviewed.     Patient Active Problem List   Diagnosis    Chronic systolic congestive heart failure (HCC)    Major depressive disorder with single episode, in partial remission (HCC)    Nephrolithiasis    Smokes 1 pack of cigarettes per day    Ischemic cardiomyopathy    Essential hypertension    Hyperlipidemia, mixed    Mild intermittent asthma without complication    Coronary artery disease of native artery of native heart with stable angina pectoris (HCC)    Vertigo    Obesity (BMI 30-39.9)    MELISSA (obstructive sleep apnea)    ICD (implantable cardioverter-defibrillator) in place    Prediabetes    Diverticulitis of colon with perforation    PAF (paroxysmal atrial fibrillation) ([facility])    Anemia     Social History     Socioeconomic History    Marital status:      Spouse name: Not on file    Number of children: Not on file    Years of education: 12    Highest education level: Not on file   Occupational History    Not on file   Tobacco Use    Smoking status: Every Day     Current packs/day: 1.00     Average packs/day: 1 pack/day for 40.8 years (40.8 ttl pk-yrs)     Types: Cigarettes     Start date: 7/21/1983    Smokeless tobacco: Never   Vaping Use    Vaping Use: Never used   Substance and Sexual Activity    Alcohol use: No     Alcohol/week: 0.0 standard drinks of alcohol    Drug use: No    Sexual activity: Not Currently     Partners: Female   Other Topics Concern    Not on file   Social History Narrative    Not on file     Social Determinants of Health     Financial Resource Strain: Low Risk  (5/15/2023)    Overall Financial Resource Strain (CARDIA)     Difficulty of Paying Living Expenses: Not very hard   Food Insecurity: No Food Insecurity (5/1/2024)    Hunger Vital Sign     Worried About Running Out of Food in the Last  Year: Never true     Ran Out of Food in the Last Year: Never true   Transportation Needs: No Transportation Needs (5/1/2024)    PRAPARE - Transportation     Lack of Transportation (Medical): No     Lack of Transportation (Non-Medical): No   Physical Activity: Sufficiently Active (7/24/2023)    Exercise Vital Sign     Days of Exercise per Week: 4 days     Minutes of Exercise per Session: 50 min   Stress: Not on file   Social Connections: Not on file   Intimate Partner Violence: Not on file   Housing Stability: Low Risk  (5/1/2024)    Housing Stability Vital Sign     Unable to Pay for Housing in the Last Year: No     Number of Places Lived in the Last Year: 1     Unstable Housing in the Last Year: No     Past Medical History:   Diagnosis Date    ADD (attention deficit disorder) 09/25/2018    Asthma     Balance problem 09/15/2019    Chest pain 11/30/2018    Congestive heart failure (HCC)     Diabetes ([facility])     Dyslipidemia 09/25/2018    Fatigue 11/30/2018    Glucose intolerance (impaired glucose tolerance) 08/11/2019    Hypertension     ICD (implantable cardioverter-defibrillator) in place     Other ill-defined conditions(799.89)     ADHD,BELL'S PALSY WEAKNESS  LT FACE    Psychiatric disorder     DEPRESSION    S/P coronary artery stent placement 09/25/2018 9/25/18 PCI/ROSALES to LAD    STEMI (ST elevation myocardial infarction) ([facility]) 09/25/2018    Syncope 01/25/2019    Tobacco abuse 09/25/2018         Prior to Admission Medications   Prescriptions Last Dose Informant Patient Reported? Taking?   DULoxetine (CYMBALTA) 60 MG extended release capsule 5/2/2024 at 0900  No No   Sig: TAKE 1 CAPSULE DAILY   acetaminophen (TYLENOL) 500 MG tablet 5/2/2024 at 1200  Yes No   Sig: Take 4 tablets by mouth 2 times daily as needed   albuterol sulfate HFA (PROVENTIL;VENTOLIN;PROAIR) 108 (90 Base) MCG/ACT inhaler Past Week at 1200  Yes No   Sig: Inhale 2 puffs into the lungs every 4 hours as needed   aspirin (ASPIRIN LOW DOSE) 81 MG EC

## 2024-05-03 NOTE — ANESTHESIA PRE PROCEDURE
Department of Anesthesiology  Preprocedure Note       Name:  Efren Maya   Age:  58 y.o.  :  1965                                          MRN:  [id number]         Date:  5/3/2024      Surgeon: Surgeon(s):  Enoch Marin MD    Procedure: Procedure(s):  ESOPHAGOGASTRODUODENOSCOPY    Medications prior to admission:   Prior to Admission medications    Medication Sig Start Date End Date Taking? Authorizing Provider   metoprolol succinate (TOPROL XL) 50 MG extended release tablet Take 1 tablet by mouth nightly 3/8/24   Celestina Worthy, APRN - NP   ezetimibe (ZETIA) 10 MG tablet TAKE 1 TABLET DAILY FOR HIGH CHOLESTEROL 23   Chiquis Louis MD   DULoxetine (CYMBALTA) 60 MG extended release capsule TAKE 1 CAPSULE DAILY 23   Shani Mcdonald MD   aspirin (ASPIRIN LOW DOSE) 81 MG EC tablet Take 1 tablet by mouth daily 23   Pedro Luis Gamboa MD   magnesium oxide (MAG-OX) 400 (240 Mg) MG tablet Take 1 tablet by mouth daily 23   Provider, MD Fatimah   ranolazine (RANEXA) 500 MG extended release tablet Take 1 tablet by mouth 2 times daily 23   Pedro Luis Gamboa MD   acetaminophen (TYLENOL) 500 MG tablet Take 4 tablets by mouth 2 times daily as needed    Automatic Reconciliation, Ar   albuterol sulfate HFA (PROVENTIL;VENTOLIN;PROAIR) 108 (90 Base) MCG/ACT inhaler Inhale 2 puffs into the lungs every 4 hours as needed 20   Automatic Reconciliation, Ar   atorvastatin (LIPITOR) 80 MG tablet Take 1 tablet by mouth daily 22   Automatic Reconciliation, Ar   nitroGLYCERIN (NITROSTAT) 0.4 MG SL tablet Place 1 tablet under the tongue 22   Automatic Reconciliation, Ar       Current medications:    Current Facility-Administered Medications   Medication Dose Route Frequency Provider Last Rate Last Admin    [START ON 2024] polyethylene glycol (GoLYTELY) solution 2,000 mL  2,000 mL Oral Once Cydney Blevins PA-C        [START ON 2024] polyethylene glycol (GoLYTELY)

## 2024-05-03 NOTE — PROGRESS NOTES
ARRIVAL INFORMATION:  Verified patient name and date of birth, scheduled procedure, and informed consent.       Belongings with patient include:  Clothing,Glasses  states that dentures and phone are in hospital room.    GI FOCUSED ASSESSMENT:  Neuro: Awake, alert, oriented x4  Respiratory: even and unlabored   GI: soft and non-distended  EKG Rhythm: normal sinus rhythm    Has Pacemaker/ICD    The risks and benefits of the bite block have been explained to patient.  Patient verbalizes understanding.

## 2024-05-03 NOTE — PROGRESS NOTES
Endoscopy recovery  Patient returned to baseline, vital signs stable (see vital sign flowsheet). Patient offered liquids and tolerated well. Respiratory status within defined limits. Abdomen soft not tender. Skin with in defined limits. Pt transferred back to the unit

## 2024-05-03 NOTE — PROGRESS NOTES
Hospitalist Progress Note    NAME:   Efren Maya   : 1965   MRN: [id number]     Date/Time: 5/3/2024 11:36 AM  Patient PCP: Chiquis Louis MD    Estimated discharge date:  Barriers: EGD tomorrow and colonoscopy on Monday      Assessment / Plan:  Iron deficiency anemia   Rectal bleeding  -Hg 7.8 to 8.1.   hg 10 last March and was 12 last year.  -no history of endoscopy.   -Heme neg  Continue with IV protonix  -IV iron sucrose 200mg daily x 3  S/p EGD today which showed   Stomach:   A solitary erosion is noted in the pre-pyloric antrum: biopsies taken   The gastric mucosa has erythema in the antrum. Biopsied.   The fundus was found to be normal with no lesions noted on retroflexion.  Endoscopic grading of gastroesophageal flap valve (GEFV)/Hill thgthrthathdtheth:th th4th The angularis is normal     Plan is for colonoscopy on Monday  CAD, Hx ROSALES to LAD 2018  Ischemic CM  S/P ICD  HTN / HLD  Paroxysmal Afib  -recent TTE EF 45-50%, trace MR, no AS  -continue lipitor 80 and zetia  -continue ranexa, toprol XL  -hold ASA  -Eliquis and entresto was stopped on     History of a perforated sigmoid diverticulitis and SBO last year    Smoker, 1ppd  -declines patch.  Counseled      Depression  -continue cymbalta     MELISSA on PAP       Medical Decision Making:  Labs reviewed by myself: CBC, BMP  Diagnostic data reviewed by myself:  EGD results   Toxic drug monitoring:  Discussed case with :            Code Status:   Full  Surrogate Decision Maker:  Spouse     DVT Prophylaxis:  SCD     Baseline: .  Independent    Subjective:     Chief Complaint / Reason for Physician Visit  \"\" Follow-up iron deficiency anemia.  Discussed with RN events overnight.   No further GI bleed    Objective:     VITALS:   Last 24hrs VS reviewed since prior progress note. Most recent are:  Patient Vitals for the past 24 hrs:   BP Temp Temp src Pulse Resp SpO2   24 0945 113/68 98 °F (36.7 °C) Temporal 77 -- 96 %   24 0935 115/65 -- --     140   K 4.4 4.2    109*   CO2 26 28   GLUCOSE 103* 94   BUN 18 15   CREATININE 1.14 1.03   CALCIUM 8.8 9.3   BILITOT 0.2 0.3   AST 15 12*   ALT 17 14         Signed: Rolando Schwartz MD

## 2024-05-03 NOTE — CARE COORDINATION
Care Management Initial Assessment       RUR: Observation   Readmission? No  1st IM letter given? No  1st  letter given: No      CM introduce self, explain role, and confirmed demographics with pt's spouse, Maru Maya due to pt was off the floor.    Pt lives with his spouse in a two story home with first floor living and three steps to enter.    Pt has a hx of home health-[facility].    No hx of inpatient rehab or SNF.    Pt uses [facility] on [address] or [facility].     At the time of d/c pt's spouse will transport.    CM will follow and assist with d/c planning.       05/03/24 1150   Service Assessment   Patient Orientation Alert and Oriented   Cognition Alert   History Provided By Spouse   Primary Caregiver Self   Support Systems Spouse/Significant Other   Patient's Healthcare Decision Maker is: Legal Next of Kin  (Pt's spouse-Maru Maya)   PCP Verified by CM Yes   Last Visit to PCP Within last 6 months   Prior Functional Level Independent in ADLs/IADLs   Current Functional Level Independent in ADLs/IADLs   Can patient return to prior living arrangement Yes   Family able to assist with home care needs: No   Would you like for me to discuss the discharge plan with any other family members/significant others, and if so, who? Yes  (Pt's spouse-Maru Maya)   Financial Resources Medicare;Other (Comment)  (BCBS)   Community Resources None   Social/Functional History   Lives With Spouse   Type of Home House   Home Equipment Cane  (C Pap but this month pt is going for a sleep study because pt does not use the C Pap and the C Pap is old)   Active  Yes   Discharge Planning   Type of Residence House   Current Services Prior To Admission C-pap   Patient expects to be discharged to: House     Advance Care Planning     General Advance Care Planning (ACP) Conversation    Date of Conversation: 5/3/2024  Conducted with: Patient with Decision Making Capacity  Other persons present: Spouse Maru

## 2024-05-03 NOTE — ANESTHESIA POSTPROCEDURE EVALUATION
Department of Anesthesiology  Postprocedure Note    Patient: Efren Maya  MRN: [id number]  YOB: 1965  Date of evaluation: 5/3/2024    Procedure Summary       Date: 05/03/24 Room / Location: [facility] 04 / [facility] ENDOSCOPY    Anesthesia Start: 0836 Anesthesia Stop: 0856    Procedure: ESOPHAGOGASTRODUODENOSCOPY (Upper GI Region) Diagnosis:       Anemia, unspecified type      Rectal bleed      (Anemia, unspecified type [D64.9])      (Rectal bleed [K62.5])    Surgeons: Enoch Marin MD Responsible Provider: Elio Marie MD    Anesthesia Type: TIVA ASA Status: 4            Anesthesia Type: TIVA    Bob Phase I: Bob Score: 10    Bob Phase II: Bob Score: 10    Anesthesia Post Evaluation    Patient location during evaluation: PACU  Patient participation: complete - patient participated  Level of consciousness: awake and alert  Airway patency: patent  Nausea & Vomiting: no nausea  Cardiovascular status: hemodynamically stable  Respiratory status: acceptable  Hydration status: euvolemic    No notable events documented.

## 2024-05-03 NOTE — OP NOTE
[place] Office: [phone]      Esophagogastroduodenoscopy Procedure Note      Efren Maya  1965  [id number]    Indication:  Anemia, GI bleed      : Misael Marin MD    Referring Provider:  Chiquis Louis MD    Sedation:  MAC anesthesia Propofol    Procedure Details:  After detailed informed consent was obtained for the procedure, with all risks and benefits of procedure explained the patient was taken to the endoscopy suite and placed in the left lateral decubitus position.  Following sequential administration of sedation as per above, the endoscope was inserted into the mouth and advanced under direct vision to second portion of the duodenum.  A careful inspection was made as the gastroscope was withdrawn, including a retroflexed view of the proximal stomach; findings and interventions are described below.      Findings:     Esophagus:  The esophageal mucosa in the proximal, mid and distal esophagus is normal.   Erythema and irregular Cotto's like GE junction mucosa noted. Biopsy taken x 1(Recent use of Eliquis)  The squamo-columnar junction is at 43 cm where the Z-line was noted.   A 2-3 cm sliding hiatal hernia is noted    Stomach:   A solitary erosion is noted in the pre-pyloric antrum: biopsies taken   The gastric mucosa has erythema in the antrum. Biopsied.   The fundus was found to be normal with no lesions noted on retroflexion.  Endoscopic grading of gastroesophageal flap valve (GEFV)/Hill ndgndrndanddndend:nd nd2nd The angularis is normal    Duodenum:   The bulb and post bulbar mucosa is normal in appearance.   The duodenal folds are normal. Biopsied to rule out celiac sprue.    Therapies:  biopsy of esophagus  biopsy of stomach antrum, pre pyloric antrum  biopsy of duodenal distal bulb, second portion    Specimen:     Specimens were collected as described and send to the laboratory.           Complications:   None were encountered during the procedure.    EBL:  None.          Recommendations:

## 2024-05-04 LAB
ERYTHROCYTE [DISTWIDTH] IN BLOOD BY AUTOMATED COUNT: 18.6 % (ref 11.5–14.5)
HCT VFR BLD AUTO: 26.7 % (ref 36.6–50.3)
HGB BLD-MCNC: 7.6 G/DL (ref 12.1–17)
MCH RBC QN AUTO: 22.3 PG (ref 26–34)
MCHC RBC AUTO-ENTMCNC: 28.5 G/DL (ref 30–36.5)
MCV RBC AUTO: 78.3 FL (ref 80–99)
NRBC # BLD: 0.02 K/UL (ref 0–0.01)
NRBC BLD-RTO: 0.2 PER 100 WBC
PLATELET # BLD AUTO: 341 K/UL (ref 150–400)
PMV BLD AUTO: 10.2 FL (ref 8.9–12.9)
RBC # BLD AUTO: 3.41 M/UL (ref 4.1–5.7)
WBC # BLD AUTO: 9.4 K/UL (ref 4.1–11.1)

## 2024-05-04 PROCEDURE — 6370000000 HC RX 637 (ALT 250 FOR IP): Performed by: INTERNAL MEDICINE

## 2024-05-04 PROCEDURE — 2580000003 HC RX 258: Performed by: INTERNAL MEDICINE

## 2024-05-04 PROCEDURE — 36415 COLL VENOUS BLD VENIPUNCTURE: CPT

## 2024-05-04 PROCEDURE — G0378 HOSPITAL OBSERVATION PER HR: HCPCS

## 2024-05-04 PROCEDURE — 85027 COMPLETE CBC AUTOMATED: CPT

## 2024-05-04 PROCEDURE — A4216 STERILE WATER/SALINE, 10 ML: HCPCS | Performed by: INTERNAL MEDICINE

## 2024-05-04 PROCEDURE — 96376 TX/PRO/DX INJ SAME DRUG ADON: CPT

## 2024-05-04 PROCEDURE — 6360000002 HC RX W HCPCS: Performed by: INTERNAL MEDICINE

## 2024-05-04 PROCEDURE — C9113 INJ PANTOPRAZOLE SODIUM, VIA: HCPCS | Performed by: INTERNAL MEDICINE

## 2024-05-04 RX ADMIN — ATORVASTATIN CALCIUM 80 MG: 40 TABLET, FILM COATED ORAL at 20:36

## 2024-05-04 RX ADMIN — SODIUM CHLORIDE, PRESERVATIVE FREE 10 ML: 5 INJECTION INTRAVENOUS at 08:15

## 2024-05-04 RX ADMIN — METOPROLOL SUCCINATE 50 MG: 50 TABLET, EXTENDED RELEASE ORAL at 20:37

## 2024-05-04 RX ADMIN — DULOXETINE HYDROCHLORIDE 60 MG: 30 CAPSULE, DELAYED RELEASE ORAL at 08:29

## 2024-05-04 RX ADMIN — SODIUM CHLORIDE, PRESERVATIVE FREE 10 ML: 5 INJECTION INTRAVENOUS at 08:31

## 2024-05-04 RX ADMIN — PANTOPRAZOLE SODIUM 40 MG: 40 INJECTION, POWDER, LYOPHILIZED, FOR SOLUTION INTRAVENOUS at 08:29

## 2024-05-04 RX ADMIN — SODIUM CHLORIDE, PRESERVATIVE FREE 10 ML: 5 INJECTION INTRAVENOUS at 20:42

## 2024-05-04 RX ADMIN — RANOLAZINE 500 MG: 500 TABLET, EXTENDED RELEASE ORAL at 20:37

## 2024-05-04 RX ADMIN — PANTOPRAZOLE SODIUM 40 MG: 40 INJECTION, POWDER, LYOPHILIZED, FOR SOLUTION INTRAVENOUS at 20:37

## 2024-05-04 RX ADMIN — EZETIMIBE 10 MG: 10 TABLET ORAL at 20:37

## 2024-05-04 RX ADMIN — RANOLAZINE 500 MG: 500 TABLET, EXTENDED RELEASE ORAL at 08:29

## 2024-05-04 RX ADMIN — IRON SUCROSE 200 MG: 20 INJECTION, SOLUTION INTRAVENOUS at 08:29

## 2024-05-04 ASSESSMENT — PAIN SCALES - GENERAL
PAINLEVEL_OUTOF10: 0

## 2024-05-04 NOTE — PROGRESS NOTES
EGD report reviewed.  Case d/w Dr. Schwartz.  Plan to do colonoscopy on Monday given prior complicated diverticuiltis in 2023 treated surgically.  Should have colonoscopy while off eliquis and plan to do it on Monday w Dr Carvajal.  Prep orders written for Sunday PM.

## 2024-05-04 NOTE — PROGRESS NOTES
End of Shift Note    Bedside shift change report given to Sultana AMAYA (oncoming nurse) by Tito Satrr RN (offgoing nurse).  Report included the following information SBAR, Kardex, and MAR    Shift worked:  7am-1930pm     Shift summary and any significant changes:     Pt is ambulatory. CBC sent. Plan is to do colonoscopy on Monday. Hourly round completed. No any complain from pt side.         Tito Starr RN

## 2024-05-04 NOTE — PLAN OF CARE
Problem: Discharge Planning  Goal: Discharge to home or other facility with appropriate resources  Outcome: Progressing  Flowsheets (Taken 5/3/2024 1941)  Discharge to home or other facility with appropriate resources: Identify barriers to discharge with patient and caregiver     Problem: Safety - Adult  Goal: Free from fall injury  Outcome: Progressing     Problem: ABCDS Injury Assessment  Goal: Absence of physical injury  Outcome: Progressing     Problem: Chronic Conditions and Co-morbidities  Goal: Patient's chronic conditions and co-morbidity symptoms are monitored and maintained or improved  Outcome: Progressing  Flowsheets (Taken 5/3/2024 1941)  Care Plan - Patient's Chronic Conditions and Co-Morbidity Symptoms are Monitored and Maintained or Improved: Monitor and assess patient's chronic conditions and comorbid symptoms for stability, deterioration, or improvement

## 2024-05-04 NOTE — PROGRESS NOTES
Hospitalist Progress Note    NAME:   Efren Maya   : 1965   MRN: [id number]     Date/Time: 2024 11:21 AM  Patient PCP: Chiquis Louis MD    Estimated discharge date:  Barriers:  colonoscopy on Monday      Assessment / Plan:  Iron deficiency anemia   Rectal bleeding  -Hg 7.8 to 8.1.   hg 10 last March and was 12 last year.  Hemoglobin around 7.6  -no history of endoscopy.   -Heme neg  Continue with IV protonix  -IV iron sucrose 200mg daily x 3  S/p EGD today which showed   Stomach:   A solitary erosion is noted in the pre-pyloric antrum: biopsies taken   The gastric mucosa has erythema in the antrum. Biopsied.   The fundus was found to be normal with no lesions noted on retroflexion.  Endoscopic grading of gastroesophageal flap valve (GEFV)/Hill thgthrthathdtheth:th th4th The angularis is normal     Plan is for colonoscopy on Monday  CAD, Hx ROSALES to LAD 2018  Ischemic CM  S/P ICD  HTN / HLD  Paroxysmal Afib  -recent TTE EF 45-50%, trace MR, no AS  -continue lipitor 80 and zetia  -continue ranexa, toprol XL  -hold ASA  -Eliquis and entresto was stopped on     History of a perforated sigmoid diverticulitis and SBO last year    Smoker, 1ppd  -declines patch.  Counseled      Depression  -continue cymbalta     MELISSA on PAP       Medical Decision Making:  Labs reviewed by myself: CBC, BMP  Diagnostic data reviewed by myself:    Toxic drug monitoring:  Discussed case with :            Code Status:   Full  Surrogate Decision Maker:  Spouse     DVT Prophylaxis:  SCD     Baseline: .  Independent    Subjective:     Chief Complaint / Reason for Physician Visit  \"\" Follow-up iron deficiency anemia.  Discussed with RN events overnight.   No further GI bleed    Objective:     VITALS:   Last 24hrs VS reviewed since prior progress note. Most recent are:  Patient Vitals for the past 24 hrs:   BP Temp Temp src Pulse Resp SpO2   24 0813 119/66 98.2 °F (36.8 °C) Oral 68 19 99 %   24 127/62 -- -- 84 --

## 2024-05-05 PROCEDURE — 96376 TX/PRO/DX INJ SAME DRUG ADON: CPT

## 2024-05-05 PROCEDURE — 6360000002 HC RX W HCPCS: Performed by: INTERNAL MEDICINE

## 2024-05-05 PROCEDURE — A4216 STERILE WATER/SALINE, 10 ML: HCPCS | Performed by: INTERNAL MEDICINE

## 2024-05-05 PROCEDURE — G0378 HOSPITAL OBSERVATION PER HR: HCPCS

## 2024-05-05 PROCEDURE — 6370000000 HC RX 637 (ALT 250 FOR IP): Performed by: INTERNAL MEDICINE

## 2024-05-05 PROCEDURE — 2580000003 HC RX 258: Performed by: INTERNAL MEDICINE

## 2024-05-05 PROCEDURE — C9113 INJ PANTOPRAZOLE SODIUM, VIA: HCPCS | Performed by: INTERNAL MEDICINE

## 2024-05-05 PROCEDURE — 6370000000 HC RX 637 (ALT 250 FOR IP)

## 2024-05-05 RX ORDER — SODIUM CHLORIDE 9 MG/ML
INJECTION, SOLUTION INTRAVENOUS CONTINUOUS
Status: CANCELLED | OUTPATIENT
Start: 2024-05-05

## 2024-05-05 RX ORDER — SIMETHICONE 40MG/0.6ML
40 SUSPENSION, DROPS(FINAL DOSAGE FORM)(ML) ORAL AS NEEDED
Status: CANCELLED | OUTPATIENT
Start: 2024-05-05

## 2024-05-05 RX ORDER — SODIUM CHLORIDE 0.9 % (FLUSH) 0.9 %
5-40 SYRINGE (ML) INJECTION PRN
Status: CANCELLED | OUTPATIENT
Start: 2024-05-05

## 2024-05-05 RX ADMIN — POLYETHYLENE GLYCOL-3350 AND ELECTROLYTES 2000 ML: 236; 6.74; 5.86; 2.97; 22.74 POWDER, FOR SOLUTION ORAL at 16:12

## 2024-05-05 RX ADMIN — ATORVASTATIN CALCIUM 80 MG: 40 TABLET, FILM COATED ORAL at 21:45

## 2024-05-05 RX ADMIN — SODIUM CHLORIDE, PRESERVATIVE FREE 10 ML: 5 INJECTION INTRAVENOUS at 21:47

## 2024-05-05 RX ADMIN — EZETIMIBE 10 MG: 10 TABLET ORAL at 21:46

## 2024-05-05 RX ADMIN — SODIUM CHLORIDE, PRESERVATIVE FREE 10 ML: 5 INJECTION INTRAVENOUS at 09:14

## 2024-05-05 RX ADMIN — RANOLAZINE 500 MG: 500 TABLET, EXTENDED RELEASE ORAL at 21:46

## 2024-05-05 RX ADMIN — PANTOPRAZOLE SODIUM 40 MG: 40 INJECTION, POWDER, LYOPHILIZED, FOR SOLUTION INTRAVENOUS at 09:57

## 2024-05-05 RX ADMIN — SODIUM CHLORIDE, PRESERVATIVE FREE 10 ML: 5 INJECTION INTRAVENOUS at 10:04

## 2024-05-05 RX ADMIN — RANOLAZINE 500 MG: 500 TABLET, EXTENDED RELEASE ORAL at 09:57

## 2024-05-05 RX ADMIN — PANTOPRAZOLE SODIUM 40 MG: 40 INJECTION, POWDER, LYOPHILIZED, FOR SOLUTION INTRAVENOUS at 21:46

## 2024-05-05 RX ADMIN — METOPROLOL SUCCINATE 50 MG: 50 TABLET, EXTENDED RELEASE ORAL at 21:46

## 2024-05-05 RX ADMIN — DULOXETINE HYDROCHLORIDE 60 MG: 30 CAPSULE, DELAYED RELEASE ORAL at 09:57

## 2024-05-05 ASSESSMENT — PAIN SCALES - GENERAL
PAINLEVEL_OUTOF10: 0

## 2024-05-05 NOTE — PLAN OF CARE
Problem: Discharge Planning  Goal: Discharge to home or other facility with appropriate resources  5/5/2024 1127 by Héctor Guillen RN  Outcome: Progressing  5/5/2024 0618 by Sultana Cast RN  Outcome: Progressing  Flowsheets (Taken 5/4/2024 2030)  Discharge to home or other facility with appropriate resources: Identify barriers to discharge with patient and caregiver     Problem: Safety - Adult  Goal: Free from fall injury  5/5/2024 1127 by Héctor Guillen RN  Outcome: Progressing  5/5/2024 0618 by Sultana Cast RN  Outcome: Progressing     Problem: ABCDS Injury Assessment  Goal: Absence of physical injury  5/5/2024 1127 by Héctor Guillen RN  Outcome: Progressing  5/5/2024 0618 by Sultana Cast RN  Outcome: Progressing     Problem: Chronic Conditions and Co-morbidities  Goal: Patient's chronic conditions and co-morbidity symptoms are monitored and maintained or improved  5/5/2024 1127 by Héctor Guillen RN  Outcome: Progressing  5/5/2024 0618 by Sultana Cast RN  Outcome: Progressing  Flowsheets (Taken 5/4/2024 2030)  Care Plan - Patient's Chronic Conditions and Co-Morbidity Symptoms are Monitored and Maintained or Improved: Monitor and assess patient's chronic conditions and comorbid symptoms for stability, deterioration, or improvement

## 2024-05-05 NOTE — PROGRESS NOTES
Hospitalist Progress Note    NAME:   Efren Maya   : 1965   MRN: [id number]     Date/Time: 2024 11:47 AM  Patient PCP: Chiquis Louis MD    Estimated discharge date:  Barriers:  colonoscopy on Monday      Assessment / Plan:  Iron deficiency anemia   Rectal bleeding  -Hg 7.8 to 8.1.   hg 10 last March and was 12 last year.  Hemoglobin around 7.6  -no history of endoscopy.   -Heme neg  Continue with IV protonix  -IV iron sucrose 200mg daily x 3  S/p EGD today which showed   Stomach:   A solitary erosion is noted in the pre-pyloric antrum: biopsies taken   The gastric mucosa has erythema in the antrum. Biopsied.   The fundus was found to be normal with no lesions noted on retroflexion.  Endoscopic grading of gastroesophageal flap valve (GEFV)/Hill thgthrthathdtheth:th th4th The angularis is normal     Plan is for colonoscopy on Monday  CAD, Hx ROSALES to LAD 2018  Ischemic CM  S/P ICD  HTN / HLD  Paroxysmal Afib  -recent TTE EF 45-50%, trace MR, no AS  -continue lipitor 80 and zetia  -continue ranexa, toprol XL  -hold ASA  -Eliquis and entresto was stopped on     History of a perforated sigmoid diverticulitis and SBO last year    Smoker, 1ppd  -declines patch.  Counseled      Depression  -continue cymbalta     MELISSA on PAP       Medical Decision Making:  Labs reviewed by myself: CBC, BMP  Diagnostic data reviewed by myself:    Toxic drug monitoring:  Discussed case with : GI doc, plan for colonoscopy Monday needed to be done inpatient due to his history of perforated sigmoid diverticulitis           Code Status:   Full  Surrogate Decision Maker:  Spouse     DVT Prophylaxis:  SCD     Baseline: .  Independent    Subjective:     Chief Complaint / Reason for Physician Visit  \"\" Follow-up iron deficiency anemia.  Discussed with RN events overnight.   No further GI bleed    Objective:     VITALS:   Last 24hrs VS reviewed since prior progress note. Most recent are:  Patient Vitals for the past 24 hrs:   BP

## 2024-05-05 NOTE — PROGRESS NOTES
End of Shift Note    Bedside shift change report given to MONIQUE toribio (oncoming nurse) by Héctor Guillen RN (offgoing nurse).  Report included the following information SBAR, Kardex, and Procedure Summary    Shift worked:  7a-7p     Shift summary and any significant changes:     Pt is alert and orientation. No any pain complained during my shift. Plan colonoscopy on Monday . Pt have no BM during my shift.     Concerns for physician to address:       Zone phone for oncoming shift:   [phone]       Activity:     Number times ambulated in hallways past shift: 0  Number of times OOB to chair past shift: 0    Cardiac:   Cardiac Monitoring: No           Access:  Current line(s): PIV     Genitourinary:   Urinary status: voiding    Respiratory:      Chronic home O2 use?: NO  Incentive spirometer at bedside: YES       GI:     Current diet:  ADULT DIET; Clear Liquid; No red dye  Diet NPO Exceptions are: Sips of Water with Meds  Passing flatus: YES  Tolerating current diet: YES       Pain Management:   Patient states pain is manageable on current regimen: YES    Skin:     Interventions: turn team, specialty bed, float heels, and PT/OT consult    Patient Safety:  Fall Score:    Interventions: bed/chair alarm, assistive device (walker, cane. etc), gripper socks, pt to call before getting OOB, and stay with me (per policy)       Length of Stay:  Expected LOS: 6  Actual LOS: 0      Héctor Guillen RN

## 2024-05-06 ENCOUNTER — ANESTHESIA EVENT (OUTPATIENT)
Facility: HOSPITAL | Age: 59
End: 2024-05-06
Payer: COMMERCIAL

## 2024-05-06 ENCOUNTER — ANESTHESIA (OUTPATIENT)
Facility: HOSPITAL | Age: 59
End: 2024-05-06
Payer: COMMERCIAL

## 2024-05-06 VITALS
WEIGHT: 201.94 LBS | SYSTOLIC BLOOD PRESSURE: 140 MMHG | DIASTOLIC BLOOD PRESSURE: 79 MMHG | BODY MASS INDEX: 29.91 KG/M2 | HEART RATE: 67 BPM | OXYGEN SATURATION: 99 % | HEIGHT: 69 IN | RESPIRATION RATE: 16 BRPM | TEMPERATURE: 98 F

## 2024-05-06 PROCEDURE — 2580000003 HC RX 258: Performed by: INTERNAL MEDICINE

## 2024-05-06 PROCEDURE — 2500000003 HC RX 250 WO HCPCS: Performed by: NURSE ANESTHETIST, CERTIFIED REGISTERED

## 2024-05-06 PROCEDURE — A4216 STERILE WATER/SALINE, 10 ML: HCPCS | Performed by: INTERNAL MEDICINE

## 2024-05-06 PROCEDURE — C9113 INJ PANTOPRAZOLE SODIUM, VIA: HCPCS | Performed by: INTERNAL MEDICINE

## 2024-05-06 PROCEDURE — 3700000001 HC ADD 15 MINUTES (ANESTHESIA): Performed by: INTERNAL MEDICINE

## 2024-05-06 PROCEDURE — 3600007512: Performed by: INTERNAL MEDICINE

## 2024-05-06 PROCEDURE — 2709999900 HC NON-CHARGEABLE SUPPLY: Performed by: INTERNAL MEDICINE

## 2024-05-06 PROCEDURE — 6360000002 HC RX W HCPCS: Performed by: INTERNAL MEDICINE

## 2024-05-06 PROCEDURE — 3600007502: Performed by: INTERNAL MEDICINE

## 2024-05-06 PROCEDURE — 96361 HYDRATE IV INFUSION ADD-ON: CPT

## 2024-05-06 PROCEDURE — 6370000000 HC RX 637 (ALT 250 FOR IP): Performed by: INTERNAL MEDICINE

## 2024-05-06 PROCEDURE — 3700000000 HC ANESTHESIA ATTENDED CARE: Performed by: INTERNAL MEDICINE

## 2024-05-06 PROCEDURE — G0378 HOSPITAL OBSERVATION PER HR: HCPCS

## 2024-05-06 PROCEDURE — 7100000010 HC PHASE II RECOVERY - FIRST 15 MIN: Performed by: INTERNAL MEDICINE

## 2024-05-06 PROCEDURE — 6360000002 HC RX W HCPCS: Performed by: NURSE ANESTHETIST, CERTIFIED REGISTERED

## 2024-05-06 PROCEDURE — 2580000003 HC RX 258: Performed by: NURSE ANESTHETIST, CERTIFIED REGISTERED

## 2024-05-06 PROCEDURE — 96376 TX/PRO/DX INJ SAME DRUG ADON: CPT

## 2024-05-06 RX ORDER — 0.9 % SODIUM CHLORIDE 0.9 %
INTRAVENOUS SOLUTION INTRAVENOUS PRN
Status: DISCONTINUED | OUTPATIENT
Start: 2024-05-06 | End: 2024-05-06 | Stop reason: SDUPTHER

## 2024-05-06 RX ORDER — ATORVASTATIN CALCIUM 80 MG/1
TABLET, FILM COATED ORAL
Qty: 90 TABLET | Refills: 0 | OUTPATIENT
Start: 2024-05-06

## 2024-05-06 RX ORDER — NICOTINE 21 MG/24HR
1 PATCH, TRANSDERMAL 24 HOURS TRANSDERMAL DAILY
Qty: 30 PATCH | Refills: 0 | Status: SHIPPED | OUTPATIENT
Start: 2024-05-07 | End: 2024-06-06

## 2024-05-06 RX ORDER — PANTOPRAZOLE SODIUM 40 MG/1
40 TABLET, DELAYED RELEASE ORAL
Qty: 60 TABLET | Refills: 0 | Status: SHIPPED | OUTPATIENT
Start: 2024-05-06

## 2024-05-06 RX ADMIN — PROPOFOL 60 MG: 10 INJECTION, EMULSION INTRAVENOUS at 10:34

## 2024-05-06 RX ADMIN — PROPOFOL 100 MG: 10 INJECTION, EMULSION INTRAVENOUS at 10:28

## 2024-05-06 RX ADMIN — SODIUM CHLORIDE, PRESERVATIVE FREE 10 ML: 5 INJECTION INTRAVENOUS at 08:21

## 2024-05-06 RX ADMIN — LIDOCAINE HYDROCHLORIDE 100 MG: 20 INJECTION, SOLUTION EPIDURAL; INFILTRATION; INTRACAUDAL; PERINEURAL at 10:28

## 2024-05-06 RX ADMIN — PANTOPRAZOLE SODIUM 40 MG: 40 INJECTION, POWDER, LYOPHILIZED, FOR SOLUTION INTRAVENOUS at 08:20

## 2024-05-06 RX ADMIN — PROPOFOL 20 MG: 10 INJECTION, EMULSION INTRAVENOUS at 10:39

## 2024-05-06 RX ADMIN — SODIUM CHLORIDE 500 ML: 900 INJECTION, SOLUTION INTRAVENOUS at 10:39

## 2024-05-06 RX ADMIN — DULOXETINE HYDROCHLORIDE 60 MG: 30 CAPSULE, DELAYED RELEASE ORAL at 08:20

## 2024-05-06 RX ADMIN — RANOLAZINE 500 MG: 500 TABLET, EXTENDED RELEASE ORAL at 08:20

## 2024-05-06 ASSESSMENT — LIFESTYLE VARIABLES: SMOKING_STATUS: 1

## 2024-05-06 ASSESSMENT — PAIN - FUNCTIONAL ASSESSMENT: PAIN_FUNCTIONAL_ASSESSMENT: 0-10

## 2024-05-06 NOTE — DISCHARGE SUMMARY
Discharge Summary    Name: Efren Maya  [id number]  YOB: 1965 (Age: 58 y.o.)   Date of Admission: 5/1/2024  Date of Discharge: 5/6/2024  Attending Physician: Rolando Schwartz MD    Discharge Diagnosis:   Iron deficiency anemia   Rectal bleeding  CAD, Hx ROSALES to LAD 2018  Ischemic CM  S/P ICD  HTN / HLD  Paroxysmal Afib  History of a perforated sigmoid diverticulitis and SBO last year   Smoker, 1ppd  Depression  MELISSA  Consultations:  None      Brief Admission History/Reason for Admission Per Isaiah Ogden MD:   CHIEF COMPLAINT:  Referred to the ED for low Hg     HISTORY OF PRESENT ILLNESS:     Efren Maya is a 58 y.o. male with a history of CAD, Cardiomyopathy, ICD, HTN and PAF who was referred to the ED from his cardiologist office because of anemia.  Patient has been having on and off rectal bleeding and heart burn.  His Hg today was 8.1, down from 10 last month. His cardiologist stopped his eliquis and entresto and referred him to the ED.  Repeat in ED Hg 7.8.  HM asked to admit for further work up and treatment.       Brief Hospital Course by Main Problems:   Iron deficiency anemia   Rectal bleeding  -Hg 7.8 to 8.1.   hg 10 last March and was 12 last year.  Hemoglobin around 7.6  -no history of endoscopy.   -Heme neg  S/p V protonix  -IV iron sucrose 200mg daily x 3  S/p EGD today which showed   Stomach:   A solitary erosion is noted in the pre-pyloric antrum: biopsies taken   The gastric mucosa has erythema in the antrum. Biopsied.   The fundus was found to be normal with no lesions noted on retroflexion.  Endoscopic grading of gastroesophageal flap valve (GEFV)/Hill ndgndrndanddndend:nd nd2nd The angularis is normal     S/p colonoscopy  which came back normal   Plan for outpatient enteroscopy   CAD, Hx ROSALES to LAD 2018  Ischemic CM  S/P ICD  HTN / HLD  Paroxysmal Afib  -recent TTE EF 45-50%, trace MR, no AS  -continue lipitor 80 and zetia  -continue ranexa, toprol XL  -hold  ASA  -Eliquis and entresto was stopped on 05/01     History of a perforated sigmoid diverticulitis and SBO last year     Smoker, 1ppd  -declines patch.  Counseled      Depression  -continue cymbalta     MELISSA on PAP    Discharge Exam:  Patient seen and examined by me on discharge day.  Pertinent Findings:  Patient Vitals for the past 24 hrs:   BP Temp Temp src Pulse Resp SpO2   05/06/24 1121 (!) 140/79 -- -- 67 16 99 %   05/06/24 1058 120/70 -- -- 66 15 95 %   05/06/24 1054 123/67 -- -- 67 18 94 %   05/06/24 1051 (!) 104/59 -- -- 87 18 --   05/06/24 1048 108/63 98 °F (36.7 °C) Temporal 68 16 95 %   05/06/24 1041 (!) 105/53 -- -- 74 14 94 %   05/06/24 0929 (!) 143/75 98 °F (36.7 °C) Oral 67 10 95 %   05/06/24 0820 118/69 98.1 °F (36.7 °C) Oral 65 16 93 %   05/05/24 2015 136/71 98.6 °F (37 °C) -- 65 17 98 %       Gen:    Not in distress  Chest: Clear lungs  CVS:   Regular rhythm.  No edema  Abd:  Soft, not distended, not tender  Neuro: awake, moving all exts    Discharge/Recent Laboratory Results:  No results for input(s): \"NA\", \"K\", \"CL\", \"CO2\", \"BUN\", \"CREATININE\", \"GLUCOSE\", \"CALCIUM\", \"PHOS\", \"MG\" in the last 72 hours.  Recent Labs     05/04/24  1001   HGB 7.6*   HCT 26.7*   WBC 9.4          Discharge Medications:     Medication List        START taking these medications      nicotine 21 MG/24HR  Commonly known as: NICODERM CQ  Place 1 patch onto the skin daily  Start taking on: May 7, 2024     pantoprazole 40 MG tablet  Commonly known as: PROTONIX  Take 1 tablet by mouth 2 times daily (before meals)            CONTINUE taking these medications      acetaminophen 500 MG tablet  Commonly known as: TYLENOL     albuterol sulfate  (90 Base) MCG/ACT inhaler  Commonly known as: PROVENTIL;VENTOLIN;PROAIR     aspirin 81 MG EC tablet  Commonly known as: Aspirin Low Dose  Take 1 tablet by mouth daily     atorvastatin 80 MG tablet  Commonly known as: LIPITOR     DULoxetine 60 MG extended release capsule  Commonly

## 2024-05-06 NOTE — PROGRESS NOTES
Endoscopy recovery  Patient returned to baseline, vital signs stable (see vital sign flowsheet). Patient offered liquids and tolerated well. Respiratory status within defined limits. Abdomen soft not tender. Skin with in defined limits.     1107- patient returned to room 1123 via transport on the monitor.

## 2024-05-06 NOTE — ANESTHESIA POSTPROCEDURE EVALUATION
Department of Anesthesiology  Postprocedure Note    Patient: Efren Maya  MRN: [id number]  YOB: 1965  Date of evaluation: 5/6/2024    Procedure Summary       Date: 05/06/24 Room / Location: [facility] ENDO 03 / [facility] ENDOSCOPY    Anesthesia Start: 1024 Anesthesia Stop: 1041    Procedure: COLONOSCOPY DIAGNOSTIC Diagnosis:       Rectal bleed      Acute blood loss anemia      (Rectal bleed [K62.5])      (Acute blood loss anemia [D62])    Surgeons: Anju Carvajal MD Responsible Provider: Jun Drummond MD    Anesthesia Type: MAC ASA Status: 4            Anesthesia Type: MAC    Bob Phase I: Bob Score: 10    Bob Phase II: Bob Score: 10    Anesthesia Post Evaluation    Patient location during evaluation: PACU  Patient participation: complete - patient participated  Level of consciousness: awake and alert  Airway patency: patent  Nausea & Vomiting: no nausea and no vomiting  Cardiovascular status: hemodynamically stable  Respiratory status: acceptable  Hydration status: stable    No notable events documented.

## 2024-05-06 NOTE — ANESTHESIA PRE PROCEDURE
05/02/2024 05:55 AM    K 4.2 05/02/2024 05:55 AM     05/02/2024 05:55 AM    CO2 28 05/02/2024 05:55 AM    BUN 15 05/02/2024 05:55 AM    CREATININE 1.03 05/02/2024 05:55 AM    GFRAA 108 02/10/2022 08:41 AM    AGRATIO 1.5 01/22/2024 08:21 AM    LABGLOM 84 05/02/2024 05:55 AM    LABGLOM >60 03/08/2024 03:50 PM    LABGLOM 79 01/22/2024 08:21 AM    GLUCOSE 94 05/02/2024 05:55 AM    GLUCOSE 108 01/22/2024 08:21 AM    CALCIUM 9.3 05/02/2024 05:55 AM    BILITOT 0.3 05/02/2024 05:55 AM    ALKPHOS 85 05/02/2024 05:55 AM    ALKPHOS 108 01/22/2024 08:21 AM    AST 12 05/02/2024 05:55 AM    ALT 14 05/02/2024 05:55 AM       POC Tests: No results for input(s): \"POCGLU\", \"POCNA\", \"POCK\", \"POCCL\", \"POCBUN\", \"POCHEMO\", \"POCHCT\" in the last 72 hours.    Coags: No results found for: \"PROTIME\", \"INR\", \"APTT\"    HCG (If Applicable): No results found for: \"PREGTESTUR\", \"PREGSERUM\", \"HCG\", \"HCGQUANT\"     ABGs: No results found for: \"PHART\", \"PO2ART\", \"EKX4RFV\", \"YLJ5ZCD\", \"BEART\", \"K7XUDTKY\"     Type & Screen (If Applicable):  No results found for: \"LABABO\"    Drug/Infectious Status (If Applicable):  Lab Results   Component Value Date/Time    HEPCAB <0.1 10/15/2019 08:43 AM       COVID-19 Screening (If Applicable): No results found for: \"COVID19\"        Anesthesia Evaluation  Patient summary reviewed and Nursing notes reviewed   no history of anesthetic complications:   Airway: Mallampati: III  TM distance: >3 FB   Neck ROM: full  Mouth opening: > = 3 FB   Dental:    (+) edentulous      Pulmonary:normal exam    (+)     sleep apnea:       asthma: current smoker                           Cardiovascular:    (+) hypertension:, angina:, pacemaker (placed for low EF, since then improved): AICD, past MI: > 6 months and no interval change, CAD:, CABG/stent (Hx ROSALES to LAD 2018):, dysrhythmias: atrial fibrillation, CHF:, hyperlipidemia      ECG reviewed      Echocardiogram reviewed  Stress test reviewed             ROS comment: LVEF

## 2024-05-06 NOTE — PLAN OF CARE
Problem: Discharge Planning  Goal: Discharge to home or other facility with appropriate resources  Outcome: Progressing  Flowsheets (Taken 5/5/2024 2024 by Rashida Rodríguez RN)  Discharge to home or other facility with appropriate resources: Identify barriers to discharge with patient and caregiver

## 2024-05-06 NOTE — PERIOP NOTE
1041: See anesthesia note and MAR for medications given during procedure.   Endoscope was pre-cleaned at the bedside immediately following procedure by MILLIE Carlson

## 2024-05-06 NOTE — CARE COORDINATION
Pt is clear from CM standpoint for d/c.    Pt's spouse to transport.    Transition of Care Plan:    RUR: Observation  Prior Level of Functioning: Independent   Disposition: Home with spouse no needs   If SNF or IPR: Date FOC offered:   Date FOC received:   Accepting facility:   Date authorization started with reference number:   Date authorization received and expires:   Follow up appointments: PCP  DME needed: No DME needed   Transportation at discharge: Pt's spouse   IM/IMM Medicare/ letter given: N/A   Is patient a  and connected with VA? No   If yes, was  transfer form completed and VA notified? No  Caregiver Contact: Pt's spouse   Discharge Caregiver contacted prior to discharge? Pt was contacted   Care Conference needed? No  Barriers to discharge: None         05/06/24 1335   Services At/After Discharge   Transition of Care Consult (CM Consult) N/A   Services At/After Discharge None    Resource Information Provided? No   Mode of Transport at Discharge Self   Confirm Follow Up Transport Self   Condition of Participation: Discharge Planning   The Plan for Transition of Care is related to the following treatment goals: Goal is to return home with follow up appointments   The Patient and/or Patient Representative was provided with a Choice of Provider? Patient   The Patient and/Or Patient Representative agree with the Discharge Plan? Yes   Freedom of Choice list was provided with basic dialogue that supports the patient's individualized plan of care/goals, treatment preferences, and shares the quality data associated with the providers?  Yes     Tish Villagran

## 2024-05-06 NOTE — DISCHARGE INSTRUCTIONS
DISCHARGE DIAGNOSIS:  Iron deficiency anemia   Rectal bleeding  CAD, Hx ROSALES to LAD 2018  Ischemic CM  S/P ICD  HTN / HLD  Paroxysmal Afib  History of a perforated sigmoid diverticulitis and SBO last year   Smoker, 1ppd  Depression  MELISSA    MEDICATIONS:  It is important that you take the medication exactly as they are prescribed.   Keep your medication in the bottles provided by the pharmacist and keep a list of the medication names, dosages, and times to be taken in your wallet.   Do not take other medications without consulting your doctor.     Pain Management: per above medications    What to do at Home    Recommended diet:  cardiac diet    Recommended activity: activity as tolerated    If you have questions regarding the hospital related prescriptions or hospital related issues please call [facility] at . You can always direct your questions to your primary care doctor if you are unable to reach your hospital physician; your PCP works as an extension of your hospital doctor just like your hospital doctor is an extension of your PCP for your time at the hospital ([facility]).    If you experience any of the following symptoms then please call your primary care physician or return to the emergency room if you cannot get hold of your doctor:  Fever, chills, nausea, vomiting, diarrhea, change in mentation, falling, bleeding, shortness of breath,

## 2024-05-06 NOTE — PROGRESS NOTES
Endoscopy recovery  Patient returned to baseline, vital signs stable (see vital sign flowsheet). Patient offered liquids and tolerated well. Respiratory status within defined limits. Abdomen soft not tender. Skin with in defined limits.   1107- patient transported back to room 1123via transport.

## 2024-05-10 ENCOUNTER — OFFICE VISIT (OUTPATIENT)
Facility: CLINIC | Age: 59
End: 2024-05-10

## 2024-05-10 VITALS
BODY MASS INDEX: 29.86 KG/M2 | OXYGEN SATURATION: 97 % | DIASTOLIC BLOOD PRESSURE: 73 MMHG | TEMPERATURE: 98.1 F | SYSTOLIC BLOOD PRESSURE: 118 MMHG | HEIGHT: 69 IN | WEIGHT: 201.6 LBS | HEART RATE: 73 BPM | RESPIRATION RATE: 16 BRPM

## 2024-05-10 DIAGNOSIS — I10 ESSENTIAL HYPERTENSION: ICD-10-CM

## 2024-05-10 DIAGNOSIS — D50.9 IRON DEFICIENCY ANEMIA, UNSPECIFIED IRON DEFICIENCY ANEMIA TYPE: ICD-10-CM

## 2024-05-10 DIAGNOSIS — Z09 HOSPITAL DISCHARGE FOLLOW-UP: Primary | ICD-10-CM

## 2024-05-10 DIAGNOSIS — E78.2 HYPERLIPIDEMIA, MIXED: ICD-10-CM

## 2024-05-10 LAB
HCT VFR BLD AUTO: 29.2 % (ref 36.6–50.3)
HGB BLD-MCNC: 8.5 G/DL (ref 12.1–17)

## 2024-05-10 RX ORDER — ATORVASTATIN CALCIUM 80 MG/1
80 TABLET, FILM COATED ORAL DAILY
Qty: 90 TABLET | Refills: 3 | Status: SHIPPED | OUTPATIENT
Start: 2024-05-10

## 2024-05-10 NOTE — PROGRESS NOTES
Post-Discharge Transitional Care  Follow Up      Efren Maya   YOB: 1965    Date of Office Visit:  5/10/2024  Date of Hospital Admission: 5/1/24  Date of Hospital Discharge: 5/6/24  Risk of hospital readmission (high >=14%. Medium >=10%) :No data recorded    Care management risk score Rising risk (score 2-5) and Complex Care (Scores >=6): No Risk Score On File     Non face to face  following discharge, date last encounter closed (first attempt may have been earlier): 05/07/2024    Call initiated 2 business days of discharge: Yes    ASSESSMENT/PLAN:   Hospital discharge follow-up  -     SC DISCHARGE MEDS RECONCILED W/ CURRENT OUTPATIENT MED LIST  Iron deficiency anemia, unspecified iron deficiency anemia type  Asymptomatic. Source of blood loss not found. Follow up with GI for enteroscopy. Will recheck H and H today.  -     Hemoglobin and Hematocrit; Future  Reflux Esophagitis  Seen on EGD pathology. Results of pathology reviewed with patient. Continue pantoprazole 40 mg 2 times daily.  Hyperlipidemia, mixed  Controlled. LDL 64 on 1/22/24.  -     Refill atorvastatin (LIPITOR) 80 MG tablet; Take 1 tablet by mouth daily, Disp-90 tablet, R-3Normal  Essential hypertension  Controlled. Continue present management.    Medical Decision Making: moderate complexity    Return if symptoms worsen or fail to improve, for Scheduled appointment in July with labs 1 week before.      On this date 5/10/2024 I have spent 40 minutes reviewing previous notes, test results and face to face with the patient discussing the diagnosis and importance of compliance with the treatment plan as well as documenting on the day of the visit.       Subjective:   Accompanied by his wife.    HPI:  Follow up of Hospital problems/diagnosis(es): Rectal bleeding, iron deficiency anemia    Inpatient course: Discharge summary reviewed- see chart.  Hg 7.8 on 5/1 and 7.4 on 5/2/24; was 10.1 on 3/824 and 12.8 on 9/5/23. Eliquis stopped.

## 2024-05-10 NOTE — PROGRESS NOTES
Efren Maya  Identified pt with two pt identifiers(name and ).  Chief Complaint   Patient presents with    Follow-Up from Hospital       1. Have you been to the ER, urgent care clinic since your last visit?  Hospitalized since your last visit? [facility]    2. Have you seen or consulted any other health care providers outside of the [facility] since your last visit?  Include any pap smears or colon screening. Pt is going to [facility] on 2024.       Provider notified of reason for visit, vitals and flowsheets obtained on patients.     Patient received paperwork for advance directive during previous visit but has not completed at this time     Reviewed record In preparation for visit, huddled with provider and have obtained necessary documentation      Health Maintenance Due   Topic    Hepatitis B vaccine (1 of 3 - 3-dose series)    HIV screen        Wt Readings from Last 3 Encounters:   05/10/24 91.4 kg (201 lb 9.6 oz)   24 91.6 kg (201 lb 15.1 oz)   24 90.7 kg (200 lb)     Temp Readings from Last 3 Encounters:   05/10/24 98.1 °F (36.7 °C) (Oral)   24 98 °F (36.7 °C) (Temporal)   24 97.8 °F (36.6 °C) (Oral)     BP Readings from Last 3 Encounters:   05/10/24 118/73   24 (!) 140/79   24 (!) 88/52     Pulse Readings from Last 3 Encounters:   05/10/24 73   24 67   24 74          No data to display                  Learning Assessment:  :         5/15/2023    10:30 AM   [facility] AMB LEARNING ASSESSMENT   Primary Learner Patient   level of education GRADUATED HIGH SCHOOL OR GED   Primary Language ENGLISH   Learning Preference DEMONSTRATION    READING   Answered By patient   Relationship to Learner SELF       Fall Risk Assessment:  :         2023     8:31 AM 2023     9:00 AM 2022    10:11 AM 2021     1:57 PM 2018    10:11 AM   Amb Fall Risk Assessment and TUG Test   Do you feel unsteady or are you worried about falling?  yes

## 2024-05-23 ENCOUNTER — OFFICE VISIT (OUTPATIENT)
Age: 59
End: 2024-05-23
Payer: COMMERCIAL

## 2024-05-23 VITALS
SYSTOLIC BLOOD PRESSURE: 110 MMHG | HEART RATE: 85 BPM | HEIGHT: 69 IN | DIASTOLIC BLOOD PRESSURE: 64 MMHG | OXYGEN SATURATION: 93 % | TEMPERATURE: 98.2 F | BODY MASS INDEX: 29.98 KG/M2 | WEIGHT: 202.4 LBS

## 2024-05-23 DIAGNOSIS — G47.33 OSA (OBSTRUCTIVE SLEEP APNEA): Primary | ICD-10-CM

## 2024-05-23 DIAGNOSIS — I48.0 PAROXYSMAL ATRIAL FIBRILLATION (HCC): ICD-10-CM

## 2024-05-23 DIAGNOSIS — Z86.79 H/O HEART FAILURE: ICD-10-CM

## 2024-05-23 DIAGNOSIS — I10 PRIMARY HYPERTENSION: ICD-10-CM

## 2024-05-23 PROCEDURE — G8427 DOCREV CUR MEDS BY ELIG CLIN: HCPCS | Performed by: INTERNAL MEDICINE

## 2024-05-23 PROCEDURE — 3078F DIAST BP <80 MM HG: CPT | Performed by: INTERNAL MEDICINE

## 2024-05-23 PROCEDURE — 3017F COLORECTAL CA SCREEN DOC REV: CPT | Performed by: INTERNAL MEDICINE

## 2024-05-23 PROCEDURE — 99204 OFFICE O/P NEW MOD 45 MIN: CPT | Performed by: INTERNAL MEDICINE

## 2024-05-23 PROCEDURE — 3074F SYST BP LT 130 MM HG: CPT | Performed by: INTERNAL MEDICINE

## 2024-05-23 PROCEDURE — 4004F PT TOBACCO SCREEN RCVD TLK: CPT | Performed by: INTERNAL MEDICINE

## 2024-05-23 PROCEDURE — G8419 CALC BMI OUT NRM PARAM NOF/U: HCPCS | Performed by: INTERNAL MEDICINE

## 2024-05-23 ASSESSMENT — SLEEP AND FATIGUE QUESTIONNAIRES
HOW LIKELY ARE YOU TO NOD OFF OR FALL ASLEEP WHILE LYING DOWN TO REST IN THE AFTERNOON WHEN CIRCUMSTANCES PERMIT: MODERATE CHANCE OF DOZING
HOW LIKELY ARE YOU TO NOD OFF OR FALL ASLEEP WHEN YOU ARE A PASSENGER IN A CAR FOR AN HOUR WITHOUT A BREAK: WOULD NEVER DOZE
HOW LIKELY ARE YOU TO NOD OFF OR FALL ASLEEP WHILE SITTING AND READING: SLIGHT CHANCE OF DOZING
HOW LIKELY ARE YOU TO NOD OFF OR FALL ASLEEP WHILE WATCHING TV: SLIGHT CHANCE OF DOZING
HOW LIKELY ARE YOU TO NOD OFF OR FALL ASLEEP IN A CAR, WHILE STOPPED FOR A FEW MINUTES IN TRAFFIC: WOULD NEVER DOZE
HOW LIKELY ARE YOU TO NOD OFF OR FALL ASLEEP WHILE SITTING QUIETLY AFTER LUNCH WITHOUT ALCOHOL: WOULD NEVER DOZE
HOW LIKELY ARE YOU TO NOD OFF OR FALL ASLEEP WHILE SITTING AND TALKING TO SOMEONE: WOULD NEVER DOZE
HOW LIKELY ARE YOU TO NOD OFF OR FALL ASLEEP WHILE SITTING INACTIVE IN A PUBLIC PLACE: WOULD NEVER DOZE
ESS TOTAL SCORE: 4

## 2024-05-23 NOTE — PROGRESS NOTES
Balance problem, Chest pain, Congestive heart failure (HCC), Diabetes (HCC), Dyslipidemia, Fatigue, Glucose intolerance (impaired glucose tolerance), Hypertension, ICD (implantable cardioverter-defibrillator) in place, Other ill-defined conditions(799.89), Psychiatric disorder, S/P coronary artery stent placement, STEMI (ST elevation myocardial infarction) ([facility]), Syncope, and Tobacco abuse.    He  has a past surgical history that includes heent; Percutaneous Transluminal Coronary Angio; Coronary angioplasty with stent; Cardiac catheterization; orthopedic surgery (1984); gi (1984); neurological surgery; pacemaker (01/25/2019); Bowel resection (04/24/2023); Upper gastrointestinal endoscopy (N/A, 05/03/2024); and Colonoscopy (N/A, 05/06/2024).    He family history includes Alcohol Abuse in his paternal aunt and paternal uncle; Cancer in his sister; Diabetes in his maternal uncle and mother; Heart Disease in his maternal uncle and paternal grandfather; Hypertension in his brother, maternal uncle, mother, sister, and sister; Lung Disease in his paternal aunt; Macular Degen in his sister; No Known Problems in his father.    He  reports that he has been smoking cigarettes. He started smoking about 40 years ago. He has a 40.8 pack-year smoking history. He has never used smokeless tobacco. He reports that he does not drink alcohol and does not use drugs.     The patient has not undergone diagnostic testing for the current problems.     Review of Systems:  Constitutional:  No significant weight loss or weight gain  Eyes:  No blurred vision  CVS:  No significant chest pain  Pulm:  No significant shortness of breath  GI:  No significant nausea or vomiting  :  + significant nocturia  Musculoskeletal:  No significant joint pain at night  Skin:  No significant rashes  Neuro:  + significant dizziness   Psych:  No active mood issues    Sleep Review of Systems: notable for Negative difficulty falling asleep; Positive awakenings at

## 2024-05-23 NOTE — PATIENT INSTRUCTIONS
[address]  [place] [address]  Tel.  [phone]  Fax. [phone] [address]  [place] [address]  Tel.  [phone]  Fax. [phone] [address] [place] [address]  Tel.  [phone]  Fax. [phone]     Sleep Apnea: After Your Visit  Your Care Instructions  Sleep apnea occurs when you frequently stop breathing for 10 seconds or longer during sleep. It can be mild to severe, based on the number of times per hour that you stop breathing or have slowed breathing. Blocked or narrowed airways in your nose, mouth, or throat can cause sleep apnea. Your airway can become blocked when your throat muscles and tongue relax during sleep.  Sleep apnea is common, occurring in 1 out of 20 individuals.  Individuals having any of the following characteristics should be evaluated and treated right away due to high risk and detrimental consequences from untreated sleep apnea:  Obesity  Congestive Heart failure  Atrial Fibrillation  Uncontrolled Hypertension  Type II Diabetes  Night-time Arrhythmias  Stroke  Pulmonary Hypertension  High-risk Driving Populations (pilots, truck drivers, etc.)  Patients Considering Weight-loss Surgery    How do you know you have sleep apnea?  You probably have sleep apnea if you answer 'yes' to 3 or more of the following questions:  S - Have you been told that you Snore?   T - Are you often Tired during the day?  O - Has anyone Observed you stop breathing while sleeping?  P- Do you have (or are being treated for) high blood Pressure?    B - Are you obese (Body Mass Index > 35)?  A - Is your Age 50 years old or older?  N - Is your Neck size greater than 16 inches?  G - Are you male Gender?  A sleep physician can prescribe a breathing device that prevents tissues in the throat from blocking your airway. Or your doctor may recommend using a dental device (oral breathing device) to help keep your airway open. In some cases, surgery may

## 2024-06-06 ENCOUNTER — TELEPHONE (OUTPATIENT)
Age: 59
End: 2024-06-06

## 2024-06-06 NOTE — TELEPHONE ENCOUNTER
I spoke with Dr. Rachel Carvajal, of GI regarding this patient.  He just read capsule endoscopy which showed distal ileal anastomotic ulcer that will predispose the patient to long-term bleeding, likely slow oozing, especially if on blood thinners.  Due to prior low AF burden, I think it is best to continue off of Eliquis at this time but I would like him to follow-up with Dr. Jackson ASAP to consider watchman device, understanding that even on his short-term anticoagulation and 6 months of ASA/Plavix per typical protocol he will be at higher risk of slow GI bleeding and should have periodic CBC, with low threshold to alter the anticoagulation protocol if felt to be clinically indicated.    Copy to Millie Alexander, Dr. Jackson.      Future Appointments   Date Time Provider Department [place]   7/12/2024  8:30 PM BED[facility] 4 [facility] Sleep Lab Me   7/25/2024  9:10 AM Chiquis Louis MD [facility] BS AMB   7/31/2024  1:00 PM Asya Au APRN - TERESA ORTIZ BS AMB   10/15/2024  2:40 PM Pedro Luis Gamboa MD [facility] BS AMB   10/24/2024  9:20 AM MELANIE ANTONIO BS AMB   10/24/2024  9:40 AM Vanna Jackson MD [facility] BS AMB

## 2024-06-06 NOTE — TELEPHONE ENCOUNTER
Dr.Divyang Carvajal [facility] is calling because the doctor wants to speak with  about this patient.    [phone] cell  (Nurse [facility])

## 2024-06-06 NOTE — TELEPHONE ENCOUNTER
Message (PS) sent to dr Gamboa in order to contact to lou Carvajal.  Dr. Carvajal belongs to [facility].

## 2024-06-07 ENCOUNTER — TELEPHONE (OUTPATIENT)
Age: 59
End: 2024-06-07

## 2024-06-07 NOTE — TELEPHONE ENCOUNTER
Spoke with patient , verified patient with two identifiers, regarding results and recommendations. Patient voiced understanding.     Message sent to  for dr. Jackson to schedule patient ASAP as per dr. Santos.

## 2024-06-14 ENCOUNTER — TRANSCRIBE ORDERS (OUTPATIENT)
Facility: HOSPITAL | Age: 59
End: 2024-06-14

## 2024-06-14 DIAGNOSIS — R93.3 ABNORMAL SMALL BOWEL X-RAY: Primary | ICD-10-CM

## 2024-06-20 ENCOUNTER — HOSPITAL ENCOUNTER (OUTPATIENT)
Facility: HOSPITAL | Age: 59
Discharge: HOME | End: 2024-06-20
Attending: INTERNAL MEDICINE
Payer: COMMERCIAL

## 2024-06-20 DIAGNOSIS — G47.33 OSA (OBSTRUCTIVE SLEEP APNEA): ICD-10-CM

## 2024-06-20 PROCEDURE — 95811 POLYSOM 6/>YRS CPAP 4/> PARM: CPT | Performed by: INTERNAL MEDICINE

## 2024-06-21 VITALS
WEIGHT: 202 LBS | HEIGHT: 69 IN | HEART RATE: 92 BPM | TEMPERATURE: 98.1 F | SYSTOLIC BLOOD PRESSURE: 118 MMHG | DIASTOLIC BLOOD PRESSURE: 70 MMHG | OXYGEN SATURATION: 94 % | BODY MASS INDEX: 29.92 KG/M2

## 2024-06-25 PROCEDURE — 93297 REM INTERROG DEV EVAL ICPMS: CPT | Performed by: INTERNAL MEDICINE

## 2024-06-27 ENCOUNTER — CLINICAL DOCUMENTATION (OUTPATIENT)
Age: 59
End: 2024-06-27

## 2024-06-27 DIAGNOSIS — G47.33 OSA (OBSTRUCTIVE SLEEP APNEA): Primary | ICD-10-CM

## 2024-06-28 NOTE — PROGRESS NOTES
Efren Maya is to be contacted by lead sleep technologist regarding results of Sleep Testing which was indicative of an average AHI of 96 per hour with an SpO2 rl of 82% and SpO2 of < 88% being 24.90 minutes.      Patient met criteria for a Split Night Test and a PAP Titration was preformed. Patient's sleep related respiratory disturbance was not controlled at final pressure pressure of 14 cmH2O. A trial of bilevel positive airway pressure therapy was initiated but could not be completed as patient requested to end the study.    An APAP prescription has been written to expedite care given severity of patient's sleep-related breathing disorder. Patient will be contacted by office staff regarding follow-up  in 2-3 months after initiation of therapy.  If patient's has difficulties tolerating CPAP or if the disorder is not controlled well with CPAP therapy, the patient will then need to return for a bilevel trial.    Encounter Diagnosis   Name Primary?    MELISSA (obstructive sleep apnea) Yes       Orders Placed This Encounter   Procedures    DME Order for (Specify) as OP     - DME device ordered - ResMed Device with Heated Humidifer  / .   - Diagnosis: Obstructive Sleep Apnea (G47.33)  - Length of Need: Lifetime    Pressure Settin - 20 cmH2O     Nasal Cushion (Replace) 2 per month.     Nasal Interface Mask 1 every 3 months.    Headgear 1 every 6 months.     Filter(s) Disposable 2 per month.   Filter(s) Non-Disposable 1 every 6 months.      Water Chamber for Humidifier (Replace) 1 every 6 months.   Tubing with heating element 1 every 3 months.    Perform Mask Fitting per patient preference and comfort - replace as above.      Raymond Elliott MD, FAASM; NPI: [id number]  Electronically signed. 2024

## 2024-07-01 ENCOUNTER — CLINICAL DOCUMENTATION (OUTPATIENT)
Age: 59
End: 2024-07-01

## 2024-07-01 ENCOUNTER — TELEPHONE (OUTPATIENT)
Age: 59
End: 2024-07-01

## 2024-07-01 NOTE — TELEPHONE ENCOUNTER
Unable to leave voicemail due to mailbox being full to review sleep study results and message has been sent to [place] [facility].  Spoke with patient's wife, Maru, and reviewed results and advised results will be on [facility] for her  to review.    Please fax DME order & Schedule 1st adherence visit in 31 to 90 days.

## 2024-07-03 ENCOUNTER — CLINICAL DOCUMENTATION (OUTPATIENT)
Age: 59
End: 2024-07-03

## 2024-07-03 NOTE — TELEPHONE ENCOUNTER
Called and spoke to patient to discuss PAP device order and DME company options. PAP device order faxed to [employer] per discussion with patient. Patient instructed on next steps and scheduled for a first adherence/compliance appointment. Patient instructed to bring device, supplies, and power cord to appointment. Patient instructed to contact [facility] if issues with PAP therapy or device arise. Definition of compliance with PAP therapy conveyed to patient.

## 2024-07-11 ENCOUNTER — HOSPITAL ENCOUNTER (OUTPATIENT)
Facility: HOSPITAL | Age: 59
Discharge: HOME | End: 2024-07-11
Attending: INTERNAL MEDICINE
Payer: COMMERCIAL

## 2024-07-11 DIAGNOSIS — R93.3 ABNORMAL SMALL BOWEL X-RAY: ICD-10-CM

## 2024-07-11 PROCEDURE — 6360000004 HC RX CONTRAST MEDICATION: Performed by: INTERNAL MEDICINE

## 2024-07-11 PROCEDURE — 74177 CT ABD & PELVIS W/CONTRAST: CPT

## 2024-07-11 RX ADMIN — IOPAMIDOL 100 ML: 755 INJECTION, SOLUTION INTRAVENOUS at 08:09

## 2024-07-17 NOTE — PROGRESS NOTES
abnormalities.  Extremities:, no clubbing, cyanosis, or edema  Musculoskeletal: normal range of motion  Neurological:  Alert and oriented, no focal neurologic deficits  Psychiatric:  Normal mood and affect    EKG: sinus rhythm, rate 66 bpm, low voltage    No results found for: \"HBA1C\", \"IFO2HSGQ\"    08/16/23    TRANSTHORACIC ECHOCARDIOGRAM (TTE) COMPLETE (CONTRAST/BUBBLE/3D PRN) 08/17/2023  6:19 PM (Final)    Interpretation Summary    Left Ventricle: Low normal left ventricular systolic function with a visually estimated EF of 50 - 55%. Left ventricle size is normal. Mildly increased wall thickness. There are regional wall motion abnormalities.  There is severe hypokinesis of the mid to distal anterior wall and apical portions of all walls. Normal diastolic function for age.    Aortic Valve: Trileaflet, mildly thickened and calcified valve.    Signed by: Pedro Luis Gamboa MD on 8/17/2023  6:19 PM    No results found for this or any previous visit.    02/03/22    NM STRESS TEST WITH MYOCARDIAL PERFUSION 02/03/2022  5:32 PM, 02/03/2022 12:00 AM (Final)    Narrative  This is a summary report. The complete report is available in the patient's medical record. If you cannot access the medical record, please contact the sending organization for a detailed fax or copy.      Nuclear Findings: LVEF measures 40%. LV perfusion is abnormal. There is no evidence of inducible ischemia.    Nuclear Findings: LVEF measures 40%. There is a moderate severity left ventricular stress perfusion defect that is medium in size present in the anterior segment(s) that is predominantly fixed. The defect is consistent with abnormal perfusion in the LAD territory. There is a moderate severity second left ventricular stress perfusion defect.    Nuclear Findings: Abnormal left ventricular systolic function post-stress. Global function is mildly reduced. LVEF measures 40%. There is a global left ventricular stress perfusion defect present in the

## 2024-07-18 ENCOUNTER — LAB (OUTPATIENT)
Facility: CLINIC | Age: 59
End: 2024-07-18

## 2024-07-18 ENCOUNTER — OFFICE VISIT (OUTPATIENT)
Age: 59
End: 2024-07-18
Payer: COMMERCIAL

## 2024-07-18 VITALS
WEIGHT: 201.8 LBS | SYSTOLIC BLOOD PRESSURE: 116 MMHG | OXYGEN SATURATION: 97 % | HEIGHT: 69 IN | DIASTOLIC BLOOD PRESSURE: 70 MMHG | HEART RATE: 78 BPM | BODY MASS INDEX: 29.89 KG/M2

## 2024-07-18 DIAGNOSIS — I25.5 ISCHEMIC CARDIOMYOPATHY: ICD-10-CM

## 2024-07-18 DIAGNOSIS — I10 ESSENTIAL HYPERTENSION: ICD-10-CM

## 2024-07-18 DIAGNOSIS — Z95.810 ICD (IMPLANTABLE CARDIOVERTER-DEFIBRILLATOR) IN PLACE: ICD-10-CM

## 2024-07-18 DIAGNOSIS — I25.118 CORONARY ARTERY DISEASE OF NATIVE ARTERY OF NATIVE HEART WITH STABLE ANGINA PECTORIS (HCC): ICD-10-CM

## 2024-07-18 DIAGNOSIS — R73.03 PREDIABETES: ICD-10-CM

## 2024-07-18 DIAGNOSIS — G47.33 OSA (OBSTRUCTIVE SLEEP APNEA): ICD-10-CM

## 2024-07-18 DIAGNOSIS — I50.22 CHRONIC SYSTOLIC CONGESTIVE HEART FAILURE (HCC): ICD-10-CM

## 2024-07-18 DIAGNOSIS — Z12.5 SCREENING FOR PROSTATE CANCER: ICD-10-CM

## 2024-07-18 DIAGNOSIS — I48.0 PAF (PAROXYSMAL ATRIAL FIBRILLATION) (HCC): Primary | ICD-10-CM

## 2024-07-18 DIAGNOSIS — E78.2 HYPERLIPIDEMIA, MIXED: ICD-10-CM

## 2024-07-18 DIAGNOSIS — E66.9 OBESITY (BMI 30-39.9): ICD-10-CM

## 2024-07-18 PROCEDURE — 93000 ELECTROCARDIOGRAM COMPLETE: CPT | Performed by: INTERNAL MEDICINE

## 2024-07-18 PROCEDURE — 4004F PT TOBACCO SCREEN RCVD TLK: CPT | Performed by: INTERNAL MEDICINE

## 2024-07-18 PROCEDURE — G8427 DOCREV CUR MEDS BY ELIG CLIN: HCPCS | Performed by: INTERNAL MEDICINE

## 2024-07-18 PROCEDURE — 3078F DIAST BP <80 MM HG: CPT | Performed by: INTERNAL MEDICINE

## 2024-07-18 PROCEDURE — G8419 CALC BMI OUT NRM PARAM NOF/U: HCPCS | Performed by: INTERNAL MEDICINE

## 2024-07-18 PROCEDURE — 3017F COLORECTAL CA SCREEN DOC REV: CPT | Performed by: INTERNAL MEDICINE

## 2024-07-18 PROCEDURE — 3074F SYST BP LT 130 MM HG: CPT | Performed by: INTERNAL MEDICINE

## 2024-07-18 PROCEDURE — 99214 OFFICE O/P EST MOD 30 MIN: CPT | Performed by: INTERNAL MEDICINE

## 2024-07-18 ASSESSMENT — PATIENT HEALTH QUESTIONNAIRE - PHQ9
SUM OF ALL RESPONSES TO PHQ QUESTIONS 1-9: 0
SUM OF ALL RESPONSES TO PHQ QUESTIONS 1-9: 0
2. FEELING DOWN, DEPRESSED OR HOPELESS: NOT AT ALL
SUM OF ALL RESPONSES TO PHQ9 QUESTIONS 1 & 2: 0
SUM OF ALL RESPONSES TO PHQ QUESTIONS 1-9: 0
SUM OF ALL RESPONSES TO PHQ QUESTIONS 1-9: 0
1. LITTLE INTEREST OR PLEASURE IN DOING THINGS: NOT AT ALL

## 2024-07-18 NOTE — PATIENT INSTRUCTIONS
If you have significant recurrent Afib in the future, would proceed with Watchman implantation  FU with 6 months with NP and Dr. Jackson in 1 year

## 2024-07-19 LAB
ALBUMIN SERPL-MCNC: 3.4 G/DL (ref 3.5–5)
ALBUMIN/GLOB SERPL: 1 (ref 1.1–2.2)
ALP SERPL-CCNC: 113 U/L (ref 45–117)
ALT SERPL-CCNC: 19 U/L (ref 12–78)
ANION GAP SERPL CALC-SCNC: 5 MMOL/L (ref 5–15)
AST SERPL-CCNC: 13 U/L (ref 15–37)
BASOPHILS # BLD: 0.1 K/UL (ref 0–0.1)
BASOPHILS NFR BLD: 1 % (ref 0–1)
BILIRUB SERPL-MCNC: 0.3 MG/DL (ref 0.2–1)
BUN SERPL-MCNC: 17 MG/DL (ref 6–20)
BUN/CREAT SERPL: 15 (ref 12–20)
CALCIUM SERPL-MCNC: 9.6 MG/DL (ref 8.5–10.1)
CHLORIDE SERPL-SCNC: 104 MMOL/L (ref 97–108)
CHOLEST SERPL-MCNC: 119 MG/DL
CO2 SERPL-SCNC: 30 MMOL/L (ref 21–32)
CREAT SERPL-MCNC: 1.14 MG/DL (ref 0.7–1.3)
DIFFERENTIAL METHOD BLD: ABNORMAL
EOSINOPHIL # BLD: 0.3 K/UL (ref 0–0.4)
EOSINOPHIL NFR BLD: 4 % (ref 0–7)
ERYTHROCYTE [DISTWIDTH] IN BLOOD BY AUTOMATED COUNT: 23.2 % (ref 11.5–14.5)
EST. AVERAGE GLUCOSE BLD GHB EST-MCNC: 123 MG/DL
GLOBULIN SER CALC-MCNC: 3.4 G/DL (ref 2–4)
GLUCOSE SERPL-MCNC: 94 MG/DL (ref 65–100)
HBA1C MFR BLD: 5.9 % (ref 4–5.6)
HCT VFR BLD AUTO: 39 % (ref 36.6–50.3)
HDLC SERPL-MCNC: 37 MG/DL
HDLC SERPL: 3.2 (ref 0–5)
HGB BLD-MCNC: 12.1 G/DL (ref 12.1–17)
IMM GRANULOCYTES # BLD AUTO: 0 K/UL (ref 0–0.04)
IMM GRANULOCYTES NFR BLD AUTO: 0 % (ref 0–0.5)
LDLC SERPL CALC-MCNC: 58.6 MG/DL (ref 0–100)
LYMPHOCYTES # BLD: 2.3 K/UL (ref 0.8–3.5)
LYMPHOCYTES NFR BLD: 29 % (ref 12–49)
MCH RBC QN AUTO: 23.9 PG (ref 26–34)
MCHC RBC AUTO-ENTMCNC: 31 G/DL (ref 30–36.5)
MCV RBC AUTO: 77.1 FL (ref 80–99)
MONOCYTES # BLD: 0.6 K/UL (ref 0–1)
MONOCYTES NFR BLD: 8 % (ref 5–13)
NEUTS SEG # BLD: 4.5 K/UL (ref 1.8–8)
NEUTS SEG NFR BLD: 58 % (ref 32–75)
NRBC # BLD: 0 K/UL (ref 0–0.01)
NRBC BLD-RTO: 0 PER 100 WBC
PLATELET # BLD AUTO: 349 K/UL (ref 150–400)
PMV BLD AUTO: 10 FL (ref 8.9–12.9)
POTASSIUM SERPL-SCNC: 4.8 MMOL/L (ref 3.5–5.1)
PROT SERPL-MCNC: 6.8 G/DL (ref 6.4–8.2)
PSA SERPL-MCNC: 0.6 NG/ML (ref 0.01–4)
RBC # BLD AUTO: 5.06 M/UL (ref 4.1–5.7)
RBC MORPH BLD: ABNORMAL
SODIUM SERPL-SCNC: 139 MMOL/L (ref 136–145)
TRIGL SERPL-MCNC: 117 MG/DL
VLDLC SERPL CALC-MCNC: 23.4 MG/DL
WBC # BLD AUTO: 7.8 K/UL (ref 4.1–11.1)

## 2024-07-22 ASSESSMENT — LIFESTYLE VARIABLES
HOW MANY STANDARD DRINKS CONTAINING ALCOHOL DO YOU HAVE ON A TYPICAL DAY: 0
HOW OFTEN DO YOU HAVE A DRINK CONTAINING ALCOHOL: 1
HOW OFTEN DO YOU HAVE SIX OR MORE DRINKS ON ONE OCCASION: 1

## 2024-07-25 ENCOUNTER — TELEPHONE (OUTPATIENT)
Age: 59
End: 2024-07-25

## 2024-07-25 ENCOUNTER — OFFICE VISIT (OUTPATIENT)
Facility: CLINIC | Age: 59
End: 2024-07-25

## 2024-07-25 VITALS
SYSTOLIC BLOOD PRESSURE: 110 MMHG | TEMPERATURE: 97.9 F | HEIGHT: 69 IN | DIASTOLIC BLOOD PRESSURE: 71 MMHG | OXYGEN SATURATION: 96 % | BODY MASS INDEX: 29.98 KG/M2 | WEIGHT: 202.4 LBS | RESPIRATION RATE: 16 BRPM | HEART RATE: 79 BPM

## 2024-07-25 DIAGNOSIS — F17.210 SMOKES 1 PACK OF CIGARETTES PER DAY: ICD-10-CM

## 2024-07-25 DIAGNOSIS — I48.0 PAF (PAROXYSMAL ATRIAL FIBRILLATION) (HCC): ICD-10-CM

## 2024-07-25 DIAGNOSIS — D50.9 IRON DEFICIENCY ANEMIA, UNSPECIFIED IRON DEFICIENCY ANEMIA TYPE: ICD-10-CM

## 2024-07-25 DIAGNOSIS — I25.10 CORONARY ARTERY DISEASE INVOLVING NATIVE CORONARY ARTERY OF NATIVE HEART WITHOUT ANGINA PECTORIS: ICD-10-CM

## 2024-07-25 DIAGNOSIS — Z00.00 MEDICARE ANNUAL WELLNESS VISIT, SUBSEQUENT: Primary | ICD-10-CM

## 2024-07-25 DIAGNOSIS — I10 ESSENTIAL HYPERTENSION: ICD-10-CM

## 2024-07-25 DIAGNOSIS — R73.03 PREDIABETES: ICD-10-CM

## 2024-07-25 DIAGNOSIS — Z95.810 ICD (IMPLANTABLE CARDIOVERTER-DEFIBRILLATOR) IN PLACE: ICD-10-CM

## 2024-07-25 DIAGNOSIS — E78.2 HYPERLIPIDEMIA, MIXED: ICD-10-CM

## 2024-07-25 DIAGNOSIS — I50.22 CHRONIC SYSTOLIC CONGESTIVE HEART FAILURE (HCC): ICD-10-CM

## 2024-07-25 NOTE — PROGRESS NOTES
Efren Maya  Identified pt with two pt identifiers(name and ).  Chief Complaint   Patient presents with    Medicare AWV       1. Have you been to the ER, urgent care clinic since your last visit?  Hospitalized since your last visit? NO    2. Have you seen or consulted any other health care providers outside of the [facility] since your last visit?  Include any pap smears or colon screening. NO      Provider notified of reason for visit, vitals and flowsheets obtained on patients.     Patient received paperwork for advance directive during previous visit but has not completed at this time     Reviewed record In preparation for visit, huddled with provider and have obtained necessary documentation      Health Maintenance Due   Topic    HIV screen     Annual Wellness Visit (Medicare)        Wt Readings from Last 3 Encounters:   24 91.8 kg (202 lb 6.4 oz)   24 91.5 kg (201 lb 12.8 oz)   24 91.6 kg (202 lb)     Temp Readings from Last 3 Encounters:   24 98.1 °F (36.7 °C)   24 98.2 °F (36.8 °C) (Temporal)   05/10/24 98.1 °F (36.7 °C) (Oral)     BP Readings from Last 3 Encounters:   24 116/70   24 118/70   24 110/64     Pulse Readings from Last 3 Encounters:   24 78   24 92   24 85          No data to display                  Learning Assessment:  :         5/15/2023    10:30 AM   [facility] AMB LEARNING ASSESSMENT   Primary Learner Patient   level of education GRADUATED HIGH SCHOOL OR GED   Primary Language ENGLISH   Learning Preference DEMONSTRATION    READING   Answered By patient   Relationship to Learner SELF       Fall Risk Assessment:  :         2024    12:16 PM 2024    10:47 AM 2023     8:31 AM 2023     9:00 AM 2022    10:11 AM 2021     1:57 PM 2018    10:11 AM   Amb Fall Risk Assessment and TUG Test   Do you feel unsteady or are you worried about falling?  yes no yes       2 or more falls in past

## 2024-07-25 NOTE — PROGRESS NOTES
Medicare Annual Wellness Visit    Efren Maya is here for Medicare AWV       Assessment and Plan  Medicare annual wellness visit, subsequent  Essential hypertension  Controlled. Continue metoprolol.  Hyperlipidemia, mixed  Stable. Continue atorvastatin 80 mg daily and Zetia.  Prediabetes  Counseled on diet, exercise, and weight loss.  Iron deficiency anemia, unspecified iron deficiency anemia type  Improving. Since he has fatigue, take iron tablets for another 30 days.  PAF (paroxysmal atrial fibrillation) (HCC)  Rate controlled on metoprolol. Eliquis was stopped due to GI bleed. Continue following with Dr. Jackson.  ICD (implantable cardioverter-defibrillator) in place  Smokes 1 pack of cigarettes per day  Counseled on smoking cessation.  Coronary artery disease involving native coronary artery of native heart without angina pectoris  Stable. Continue ASA 81 mg daily, metoprolol, and statin.  Chronic systolic congestive heart failure (HCC)  Stable. Last echo 8/2023: EF 50-55%.    Recommendations for Preventive Services Due: see orders and patient instructions/AVS.  Recommended screening schedule for the next 5-10 years is provided to the patient in written form: see Patient Instructions/AVS.    Time Spent: 45 mins on medical record review, history, exam, discussing problems and plan, counseling, placing orders, and documenting note.       Return in about 4 months (around 11/25/2024), or if symptoms worsen or fail to improve, for HTN, anemia.       Subjective     Presents for 2 month follow up evaluation. He has HTN, hyperlipidemia, paroxysmal a-fib, CAD, history of MI with cardiac stent (ROSALES to proximal LAD) in 9/18, ischemic cardiomyopathy, CHF, ICD in place, MELISSA on CPAP, major depression, prediabetes, tobacco use, obesity, and hx diverticulitis with colon perforation s/p partial small bowel resection on 4/24/23.     Reports he had enteroscopy done with Dr. Carvajal. Was told no source of bleeding found but he may

## 2024-07-28 PROCEDURE — 93297 REM INTERROG DEV EVAL ICPMS: CPT | Performed by: INTERNAL MEDICINE

## 2024-07-30 NOTE — PROGRESS NOTES
hiatal hernia is noted    Lilly Au, ACNP, AAC  [facility]     [address], [address]  [place] [address]  (O) [phone] (Fax) [phone]     [address], [address]  [place] [address]  (O) [phone]

## 2024-07-31 ENCOUNTER — OFFICE VISIT (OUTPATIENT)
Age: 59
End: 2024-07-31
Payer: COMMERCIAL

## 2024-07-31 VITALS
SYSTOLIC BLOOD PRESSURE: 118 MMHG | HEART RATE: 86 BPM | WEIGHT: 200.4 LBS | OXYGEN SATURATION: 97 % | DIASTOLIC BLOOD PRESSURE: 78 MMHG | RESPIRATION RATE: 17 BRPM | BODY MASS INDEX: 29.59 KG/M2

## 2024-07-31 DIAGNOSIS — I25.10 CORONARY ARTERY DISEASE INVOLVING NATIVE CORONARY ARTERY OF NATIVE HEART WITHOUT ANGINA PECTORIS: ICD-10-CM

## 2024-07-31 DIAGNOSIS — I95.1 ORTHOSTATIC HYPOTENSION: ICD-10-CM

## 2024-07-31 DIAGNOSIS — I48.0 PAF (PAROXYSMAL ATRIAL FIBRILLATION) (HCC): ICD-10-CM

## 2024-07-31 DIAGNOSIS — I50.22 CHRONIC SYSTOLIC CONGESTIVE HEART FAILURE (HCC): Primary | ICD-10-CM

## 2024-07-31 DIAGNOSIS — G47.33 OSA (OBSTRUCTIVE SLEEP APNEA): ICD-10-CM

## 2024-07-31 PROCEDURE — 3074F SYST BP LT 130 MM HG: CPT | Performed by: NURSE PRACTITIONER

## 2024-07-31 PROCEDURE — G8427 DOCREV CUR MEDS BY ELIG CLIN: HCPCS | Performed by: NURSE PRACTITIONER

## 2024-07-31 PROCEDURE — 3078F DIAST BP <80 MM HG: CPT | Performed by: NURSE PRACTITIONER

## 2024-07-31 PROCEDURE — G8419 CALC BMI OUT NRM PARAM NOF/U: HCPCS | Performed by: NURSE PRACTITIONER

## 2024-07-31 PROCEDURE — 3017F COLORECTAL CA SCREEN DOC REV: CPT | Performed by: NURSE PRACTITIONER

## 2024-07-31 PROCEDURE — 4004F PT TOBACCO SCREEN RCVD TLK: CPT | Performed by: NURSE PRACTITIONER

## 2024-07-31 PROCEDURE — 99214 OFFICE O/P EST MOD 30 MIN: CPT | Performed by: NURSE PRACTITIONER

## 2024-08-12 ENCOUNTER — TELEPHONE (OUTPATIENT)
Age: 59
End: 2024-08-12

## 2024-08-12 RX ORDER — ISOSORBIDE MONONITRATE 30 MG/1
TABLET, EXTENDED RELEASE ORAL
Qty: 90 TABLET | Refills: 3 | OUTPATIENT
Start: 2024-08-12

## 2024-08-12 RX ORDER — RANOLAZINE 500 MG/1
TABLET, EXTENDED RELEASE ORAL
Qty: 180 TABLET | Refills: 3 | Status: SHIPPED | OUTPATIENT
Start: 2024-08-12

## 2024-08-12 NOTE — TELEPHONE ENCOUNTER
Refill Request Received for the Following Medication     Requested Prescriptions     Pending Prescriptions Disp Refills    ranolazine (RANEXA) 500 MG extended release tablet [Pharmacy Med Name: RANOLAZINE ER TABS 500MG] 180 tablet 3     Sig: TAKE 1 TABLET TWICE A DAY (APPOINTMENT NEEDED FOR FURTHER REFILLS)    isosorbide mononitrate (IMDUR) 30 MG extended release tablet [Pharmacy Med Name: ISOSORBIDE MONONITRATE ER TABS 30MG] 90 tablet 3     Sig: TAKE 1 TABLET DAILY       Last Prescribed: 06-    Last Appointment With Me:  4/30/2024     Future Appointments:  Future Appointments   Date Time Provider Department Center   9/5/2024 11:00 AM [facility] ECHO 1 DIANA BS AMB   9/13/2024  1:20 PM Asya Au APRN - NP [facility]  AMB   10/15/2024 11:00 AM Raymond Elliott MD [facility]   10/15/2024  2:40 PM Pedro Luis Gamboa MD [facility]   11/26/2024  8:10 AM Chiquis Louis MD [facility]   1/10/2025  9:00 AM Millie Moy APRN - TERESA ORTIZ BS AMB   7/17/2025  9:40 AM Vanna Jackson MD [facility]  AMB   8/29/2025  9:20 AM MELANIE ANTONIO BS AMB   8/29/2025  9:40 AM Angelika Oates APRN - CNP [facility] BS AMB

## 2024-08-12 NOTE — TELEPHONE ENCOUNTER
Per [facility], patient's order has been cancelled due to not responding to phone calls. Letter scanned in chart.

## 2024-08-13 NOTE — TELEPHONE ENCOUNTER
PCP: Chiquis Louis MD     Last appt: 7/25/2024    Future Appointments   Date Time Provider Department Center   9/5/2024 11:00 AM [facility] ECHO 1 [facility]   9/13/2024  1:20 PM Asya Au APRN - NP [facility]   10/15/2024 11:00 AM Raymond Elliott MD [facility]   10/15/2024  2:40 PM Pedro Luis Gamboa MD [facility]   11/26/2024  8:10 AM Chiquis Louis MD [facility]   1/10/2025  9:00 AM Millie Moy APRN - NP [facility]   7/17/2025  9:40 AM Vanna Jackson MD [facility]   8/29/2025  9:20 AM MELANIE ANTONIO [facility]   8/29/2025  9:40 AM Angelika Oates APRN - CNP [facility]          Requested Prescriptions     Pending Prescriptions Disp Refills    DULoxetine (CYMBALTA) 60 MG extended release capsule [Pharmacy Med Name: DULOXETINE HCL DR CAPS 60MG] 90 capsule 3     Sig: TAKE 1 CAPSULE DAILY

## 2024-08-14 RX ORDER — DULOXETIN HYDROCHLORIDE 60 MG/1
CAPSULE, DELAYED RELEASE ORAL
Qty: 90 CAPSULE | Refills: 3 | Status: SHIPPED | OUTPATIENT
Start: 2024-08-14

## 2024-08-19 RX ORDER — METOPROLOL SUCCINATE 50 MG/1
50 TABLET, EXTENDED RELEASE ORAL NIGHTLY
Qty: 90 TABLET | Refills: 3 | Status: SHIPPED | OUTPATIENT
Start: 2024-08-19

## 2024-08-19 NOTE — TELEPHONE ENCOUNTER
Requested Prescriptions     Signed Prescriptions Disp Refills    metoprolol succinate (TOPROL XL) 50 MG extended release tablet 90 tablet 3     Sig: Take 1 tablet by mouth nightly     Authorizing Provider: NICKOLAS JACK     Ordering User: ENOC EWING Appointments   Date Time Provider Department Center   9/5/2024 11:00 AM [facility] ECHO 1 [facility] BS AMB   9/13/2024  1:20 PM Asya Au APRN - NP [facility] BS AMB   10/15/2024 11:00 AM Raymond Elliott MD [facility]   10/15/2024  2:40 PM Pedro Luis Gamboa MD [facility] AMB   11/26/2024  8:10 AM Chiquis Louis MD [facility]   1/10/2025  9:00 AM Millie Moy APRN - TERESA ORTIZ BS AMB   7/17/2025  9:40 AM Vanna Jackson MD [facility]   8/29/2025  9:20 AM MELANIE ANTONIO BS AMB   8/29/2025  9:40 AM Angelika Oates APRN - CNP [facility] BS AMB

## 2024-09-02 PROCEDURE — 93297 REM INTERROG DEV EVAL ICPMS: CPT | Performed by: INTERNAL MEDICINE

## 2024-09-05 ENCOUNTER — ANCILLARY PROCEDURE (OUTPATIENT)
Age: 59
End: 2024-09-05
Payer: MEDICARE

## 2024-09-05 VITALS
SYSTOLIC BLOOD PRESSURE: 145 MMHG | HEART RATE: 86 BPM | DIASTOLIC BLOOD PRESSURE: 90 MMHG | BODY MASS INDEX: 29.62 KG/M2 | WEIGHT: 200 LBS | HEIGHT: 69 IN

## 2024-09-05 DIAGNOSIS — I50.22 CHRONIC SYSTOLIC CONGESTIVE HEART FAILURE (HCC): ICD-10-CM

## 2024-09-05 PROCEDURE — 93308 TTE F-UP OR LMTD: CPT | Performed by: INTERNAL MEDICINE

## 2024-09-07 LAB
ECHO AV MEAN GRADIENT: 3 MMHG
ECHO AV MEAN VELOCITY: 0.9 M/S
ECHO AV PEAK GRADIENT: 5 MMHG
ECHO AV PEAK VELOCITY: 1.1 M/S
ECHO AV VELOCITY RATIO: 0.64
ECHO AV VTI: 22.9 CM
ECHO BSA: 2.1 M2
ECHO LV EDV A2C: 125 ML
ECHO LV EDV A4C: 147 ML
ECHO LV EDV BP: 138 ML (ref 67–155)
ECHO LV EDV INDEX A4C: 71 ML/M2
ECHO LV EDV INDEX BP: 67 ML/M2
ECHO LV EDV NDEX A2C: 60 ML/M2
ECHO LV EJECTION FRACTION A2C: 22 %
ECHO LV EJECTION FRACTION A4C: 29 %
ECHO LV EJECTION FRACTION BIPLANE: 27 % (ref 55–100)
ECHO LV ESV A2C: 98 ML
ECHO LV ESV A4C: 105 ML
ECHO LV ESV BP: 101 ML (ref 22–58)
ECHO LV ESV INDEX A2C: 47 ML/M2
ECHO LV ESV INDEX A4C: 51 ML/M2
ECHO LV ESV INDEX BP: 49 ML/M2
ECHO LV FRACTIONAL SHORTENING: 17 % (ref 28–44)
ECHO LV INTERNAL DIMENSION DIASTOLE INDEX: 2.8 CM/M2
ECHO LV INTERNAL DIMENSION DIASTOLIC: 5.8 CM (ref 4.2–5.9)
ECHO LV INTERNAL DIMENSION SYSTOLIC INDEX: 2.32 CM/M2
ECHO LV INTERNAL DIMENSION SYSTOLIC: 4.8 CM
ECHO LV IVSD: 1 CM (ref 0.6–1)
ECHO LV MASS 2D: 233.1 G (ref 88–224)
ECHO LV MASS INDEX 2D: 112.6 G/M2 (ref 49–115)
ECHO LV POSTERIOR WALL DIASTOLIC: 1 CM (ref 0.6–1)
ECHO LV RELATIVE WALL THICKNESS RATIO: 0.34
ECHO LVOT AV VTI INDEX: 0.67
ECHO LVOT MEAN GRADIENT: 1 MMHG
ECHO LVOT PEAK GRADIENT: 2 MMHG
ECHO LVOT PEAK VELOCITY: 0.7 M/S
ECHO LVOT VTI: 15.3 CM
ECHO RV TAPSE: 1.6 CM (ref 1.7–?)

## 2024-09-07 PROCEDURE — 93321 DOPPLER ECHO F-UP/LMTD STD: CPT | Performed by: INTERNAL MEDICINE

## 2024-09-07 PROCEDURE — 93308 TTE F-UP OR LMTD: CPT | Performed by: INTERNAL MEDICINE

## 2024-09-07 PROCEDURE — 93325 DOPPLER ECHO COLOR FLOW MAPG: CPT | Performed by: INTERNAL MEDICINE

## 2024-09-13 ENCOUNTER — OFFICE VISIT (OUTPATIENT)
Age: 59
End: 2024-09-13
Payer: MEDICARE

## 2024-09-13 VITALS
HEART RATE: 78 BPM | WEIGHT: 201 LBS | HEIGHT: 69 IN | BODY MASS INDEX: 29.77 KG/M2 | SYSTOLIC BLOOD PRESSURE: 112 MMHG | DIASTOLIC BLOOD PRESSURE: 78 MMHG | OXYGEN SATURATION: 96 %

## 2024-09-13 DIAGNOSIS — Z72.0 TOBACCO USE: ICD-10-CM

## 2024-09-13 DIAGNOSIS — I48.0 PAF (PAROXYSMAL ATRIAL FIBRILLATION) (HCC): ICD-10-CM

## 2024-09-13 DIAGNOSIS — E78.2 HYPERLIPIDEMIA, MIXED: ICD-10-CM

## 2024-09-13 DIAGNOSIS — I25.10 CORONARY ARTERY DISEASE INVOLVING NATIVE CORONARY ARTERY OF NATIVE HEART WITHOUT ANGINA PECTORIS: ICD-10-CM

## 2024-09-13 DIAGNOSIS — G47.33 OSA (OBSTRUCTIVE SLEEP APNEA): ICD-10-CM

## 2024-09-13 DIAGNOSIS — I50.22 CHRONIC SYSTOLIC CONGESTIVE HEART FAILURE (HCC): Primary | ICD-10-CM

## 2024-09-13 DIAGNOSIS — I50.22 CHRONIC SYSTOLIC CONGESTIVE HEART FAILURE (HCC): ICD-10-CM

## 2024-09-13 LAB
APPEARANCE UR: CLEAR
BACTERIA URNS QL MICRO: NEGATIVE /HPF
BILIRUB UR QL: NEGATIVE
COLOR UR: NORMAL
EPITH CASTS URNS QL MICRO: NORMAL /LPF
GLUCOSE UR STRIP.AUTO-MCNC: NEGATIVE MG/DL
HGB UR QL STRIP: NEGATIVE
HYALINE CASTS URNS QL MICRO: NORMAL /LPF (ref 0–5)
KETONES UR QL STRIP.AUTO: NEGATIVE MG/DL
LEUKOCYTE ESTERASE UR QL STRIP.AUTO: NEGATIVE
NITRITE UR QL STRIP.AUTO: NEGATIVE
PH UR STRIP: 5.5 (ref 5–8)
PROT UR STRIP-MCNC: NEGATIVE MG/DL
RBC #/AREA URNS HPF: NORMAL /HPF (ref 0–5)
SP GR UR REFRACTOMETRY: 1.01 (ref 1–1.03)
URINE CULTURE IF INDICATED: NORMAL
UROBILINOGEN UR QL STRIP.AUTO: 0.2 EU/DL (ref 0.2–1)
WBC URNS QL MICRO: NORMAL /HPF (ref 0–4)

## 2024-09-13 PROCEDURE — 3074F SYST BP LT 130 MM HG: CPT | Performed by: NURSE PRACTITIONER

## 2024-09-13 PROCEDURE — 99214 OFFICE O/P EST MOD 30 MIN: CPT | Performed by: NURSE PRACTITIONER

## 2024-09-13 PROCEDURE — G8419 CALC BMI OUT NRM PARAM NOF/U: HCPCS | Performed by: NURSE PRACTITIONER

## 2024-09-13 PROCEDURE — 4004F PT TOBACCO SCREEN RCVD TLK: CPT | Performed by: NURSE PRACTITIONER

## 2024-09-13 PROCEDURE — 3078F DIAST BP <80 MM HG: CPT | Performed by: NURSE PRACTITIONER

## 2024-09-13 PROCEDURE — G8427 DOCREV CUR MEDS BY ELIG CLIN: HCPCS | Performed by: NURSE PRACTITIONER

## 2024-09-13 PROCEDURE — 3017F COLORECTAL CA SCREEN DOC REV: CPT | Performed by: NURSE PRACTITIONER

## 2024-09-13 ASSESSMENT — PATIENT HEALTH QUESTIONNAIRE - PHQ9
SUM OF ALL RESPONSES TO PHQ QUESTIONS 1-9: 0
2. FEELING DOWN, DEPRESSED OR HOPELESS: NOT AT ALL
SUM OF ALL RESPONSES TO PHQ QUESTIONS 1-9: 0
SUM OF ALL RESPONSES TO PHQ QUESTIONS 1-9: 0
SUM OF ALL RESPONSES TO PHQ9 QUESTIONS 1 & 2: 0
1. LITTLE INTEREST OR PLEASURE IN DOING THINGS: NOT AT ALL
SUM OF ALL RESPONSES TO PHQ QUESTIONS 1-9: 0

## 2024-10-12 LAB
BUN SERPL-MCNC: 16 MG/DL (ref 8–27)
BUN/CREAT SERPL: 15 (ref 10–24)
CALCIUM SERPL-MCNC: 9.6 MG/DL (ref 8.6–10.2)
CHLORIDE SERPL-SCNC: 100 MMOL/L (ref 96–106)
CO2 SERPL-SCNC: 24 MMOL/L (ref 20–29)
CREAT SERPL-MCNC: 1.07 MG/DL (ref 0.76–1.27)
EGFRCR SERPLBLD CKD-EPI 2021: 79 ML/MIN/1.73
GLUCOSE SERPL-MCNC: 103 MG/DL (ref 70–99)
POTASSIUM SERPL-SCNC: 4.4 MMOL/L (ref 3.5–5.2)
SODIUM SERPL-SCNC: 138 MMOL/L (ref 134–144)

## 2024-10-13 PROCEDURE — 93297 REM INTERROG DEV EVAL ICPMS: CPT | Performed by: INTERNAL MEDICINE

## 2024-10-15 ENCOUNTER — OFFICE VISIT (OUTPATIENT)
Age: 59
End: 2024-10-15
Payer: MEDICARE

## 2024-10-15 VITALS
SYSTOLIC BLOOD PRESSURE: 122 MMHG | OXYGEN SATURATION: 95 % | BODY MASS INDEX: 29.44 KG/M2 | DIASTOLIC BLOOD PRESSURE: 82 MMHG | HEIGHT: 69 IN | HEART RATE: 83 BPM | WEIGHT: 198.8 LBS

## 2024-10-15 DIAGNOSIS — I10 ESSENTIAL HYPERTENSION: ICD-10-CM

## 2024-10-15 DIAGNOSIS — E66.9 OBESITY (BMI 30-39.9): ICD-10-CM

## 2024-10-15 DIAGNOSIS — I48.0 PAF (PAROXYSMAL ATRIAL FIBRILLATION) (HCC): ICD-10-CM

## 2024-10-15 DIAGNOSIS — I25.5 ISCHEMIC CARDIOMYOPATHY: ICD-10-CM

## 2024-10-15 DIAGNOSIS — I25.118 CORONARY ARTERY DISEASE OF NATIVE ARTERY OF NATIVE HEART WITH STABLE ANGINA PECTORIS (HCC): ICD-10-CM

## 2024-10-15 DIAGNOSIS — I50.22 CHRONIC SYSTOLIC CONGESTIVE HEART FAILURE (HCC): Primary | ICD-10-CM

## 2024-10-15 PROCEDURE — 99214 OFFICE O/P EST MOD 30 MIN: CPT | Performed by: INTERNAL MEDICINE

## 2024-10-15 PROCEDURE — G8427 DOCREV CUR MEDS BY ELIG CLIN: HCPCS | Performed by: INTERNAL MEDICINE

## 2024-10-15 PROCEDURE — G8484 FLU IMMUNIZE NO ADMIN: HCPCS | Performed by: INTERNAL MEDICINE

## 2024-10-15 PROCEDURE — 93005 ELECTROCARDIOGRAM TRACING: CPT | Performed by: INTERNAL MEDICINE

## 2024-10-15 PROCEDURE — 3074F SYST BP LT 130 MM HG: CPT | Performed by: INTERNAL MEDICINE

## 2024-10-15 PROCEDURE — 3079F DIAST BP 80-89 MM HG: CPT | Performed by: INTERNAL MEDICINE

## 2024-10-15 PROCEDURE — G8419 CALC BMI OUT NRM PARAM NOF/U: HCPCS | Performed by: INTERNAL MEDICINE

## 2024-10-15 PROCEDURE — 4004F PT TOBACCO SCREEN RCVD TLK: CPT | Performed by: INTERNAL MEDICINE

## 2024-10-15 PROCEDURE — 93010 ELECTROCARDIOGRAM REPORT: CPT | Performed by: INTERNAL MEDICINE

## 2024-10-15 PROCEDURE — 3017F COLORECTAL CA SCREEN DOC REV: CPT | Performed by: INTERNAL MEDICINE

## 2024-10-15 ASSESSMENT — PATIENT HEALTH QUESTIONNAIRE - PHQ9
SUM OF ALL RESPONSES TO PHQ QUESTIONS 1-9: 0
SUM OF ALL RESPONSES TO PHQ9 QUESTIONS 1 & 2: 0
1. LITTLE INTEREST OR PLEASURE IN DOING THINGS: NOT AT ALL
SUM OF ALL RESPONSES TO PHQ QUESTIONS 1-9: 0
2. FEELING DOWN, DEPRESSED OR HOPELESS: NOT AT ALL

## 2024-10-15 NOTE — PROGRESS NOTES
-Extended Holter monitor 6/2023 showed 15 short events of atrial fibrillation, burden less than 1%, continue Toprol XL    Lab Results   Component Value Date    HGB 12.1 07/18/2024         ICM, s/p ICD  -Echo Sept 2024 showed EF 35-40%, mod GK, calcified AV-question reduced LVEF because had to come off Entresto around the time of GIB?  -Echo April 2024 LVEF 45-50%, + WMA, normal diastolic function   -Echo August 2023 LVEF 50 to 55%, focal wall motion abnormalities consistent with prior MI  -5/17/22 ECHO LVEF 40-45%, mild MR/TR  -EF 30-35% per echo in 2/3/2022  -EF 45-50% in 9/2020;   -Echo in 1/2019 with reduced LVEF 40-45%  and 2018  -Now off Entresto 24/26 mg BID, coreg and imdur due to low BP  -Continue Toprol XL, 9/13/2024 started jardiance 10mg daily today after checking a UA  -BMP 3 weeks after starting jardiance:  Lab Results   Component Value Date    CREATININE 1.07 10/11/2024    -Now that anemia much improved 10/15/2024, systolic blood pressure 122, will try 1/2 tablet Entresto 24-26 twice daily, record blood pressures, bring to office appointment in 3 months-will call if not tolerating    Orthostatic hypotension  -not orthostatic 9/13/2024, encouraged hydration with 3 bottles of water  -Previously had resolved upon d/c Straterra and decreased dose of Losartan  -He has seen ENT who diagnosed him with vertigo in the past   -Now on Toprol XL instead of coreg     CAD  Hx anterior STEMI in 9/2018, with peak troponin 100. Cardiac cath with ROSALES to the prox LAD.   He had 80% stenosis of the prox third of the OM1 and RPL.  NST 2/2022, again 4/2024:  Fixed defect anterior segment / LAD territory.  Fixed defect consistent with prior myocardial infarction per NST 9/8/2020;   Continue with ASA, Toprol XL, statin, ranexa 500mg BID  Due to orthostatic hypotension stopped isosorbide, carvedilol, Entresto     Dyslipidemia  Lab Results   Component Value Date    LDL 58.6 07/18/2024   -continue Lipitor 80mg daily and Zetia

## 2024-11-04 ENCOUNTER — PATIENT MESSAGE (OUTPATIENT)
Age: 59
End: 2024-11-04

## 2024-11-18 DIAGNOSIS — E78.2 HYPERLIPIDEMIA, MIXED: ICD-10-CM

## 2024-11-18 NOTE — TELEPHONE ENCOUNTER
PCP: Chiquis Louis MD     Last appt:  7/25/2024      Future Appointments   Date Time Provider Department Center   11/26/2024  8:10 AM Chiquis Louis MD [facility]   1/10/2025  9:00 AM Millie Moy, APRN - NP [facility] BS AMB   1/21/2025  3:00 PM Pedro Luis Gamboa MD [facility] AMB   7/17/2025  9:40 AM Vanna Jackson MD [facility] AMB   8/29/2025  9:20 AM MELANIE ANTONIO BS AMB   8/29/2025  9:40 AM Angelika Oates APRN - CNP [facility] BS AMB          Requested Prescriptions     Pending Prescriptions Disp Refills    ezetimibe (ZETIA) 10 MG tablet [Pharmacy Med Name: EZETIMIBE TABS 10MG] 90 tablet 3     Sig: TAKE 1 TABLET DAILY FOR HIGH CHOLESTEROL

## 2024-11-19 RX ORDER — EZETIMIBE 10 MG/1
TABLET ORAL
Qty: 90 TABLET | Refills: 3 | Status: SHIPPED | OUTPATIENT
Start: 2024-11-19

## 2024-11-26 ENCOUNTER — OFFICE VISIT (OUTPATIENT)
Facility: CLINIC | Age: 59
End: 2024-11-26

## 2024-11-26 VITALS
HEART RATE: 76 BPM | WEIGHT: 198.6 LBS | TEMPERATURE: 98.2 F | RESPIRATION RATE: 16 BRPM | BODY MASS INDEX: 29.41 KG/M2 | HEIGHT: 69 IN | SYSTOLIC BLOOD PRESSURE: 100 MMHG | DIASTOLIC BLOOD PRESSURE: 62 MMHG | OXYGEN SATURATION: 96 %

## 2024-11-26 DIAGNOSIS — E78.2 HYPERLIPIDEMIA, MIXED: ICD-10-CM

## 2024-11-26 DIAGNOSIS — D50.9 IRON DEFICIENCY ANEMIA, UNSPECIFIED IRON DEFICIENCY ANEMIA TYPE: ICD-10-CM

## 2024-11-26 DIAGNOSIS — I10 ESSENTIAL HYPERTENSION: Primary | ICD-10-CM

## 2024-11-26 DIAGNOSIS — Z11.4 SCREENING FOR HIV (HUMAN IMMUNODEFICIENCY VIRUS): ICD-10-CM

## 2024-11-26 LAB
ERYTHROCYTE [DISTWIDTH] IN BLOOD BY AUTOMATED COUNT: 16.2 % (ref 11.5–14.5)
FERRITIN SERPL-MCNC: 16 NG/ML (ref 26–388)
HCT VFR BLD AUTO: 44.8 % (ref 36.6–50.3)
HGB BLD-MCNC: 14.5 G/DL (ref 12.1–17)
HIV 1+2 AB+HIV1 P24 AG SERPL QL IA: NONREACTIVE
HIV 1/2 RESULT COMMENT: NORMAL
IRON SATN MFR SERPL: 16 % (ref 20–50)
IRON SERPL-MCNC: 60 UG/DL (ref 35–150)
MCH RBC QN AUTO: 28.3 PG (ref 26–34)
MCHC RBC AUTO-ENTMCNC: 32.4 G/DL (ref 30–36.5)
MCV RBC AUTO: 87.5 FL (ref 80–99)
NRBC # BLD: 0 K/UL (ref 0–0.01)
NRBC BLD-RTO: 0 PER 100 WBC
PLATELET # BLD AUTO: 248 K/UL (ref 150–400)
PMV BLD AUTO: 11 FL (ref 8.9–12.9)
RBC # BLD AUTO: 5.12 M/UL (ref 4.1–5.7)
TIBC SERPL-MCNC: 377 UG/DL (ref 250–450)
WBC # BLD AUTO: 11.7 K/UL (ref 4.1–11.1)

## 2024-11-26 NOTE — PROGRESS NOTES
Efren Maya  Identified pt with two pt identifiers(name and ).  Chief Complaint   Patient presents with    Hypertension    Anemia       1. Have you been to the ER, urgent care clinic since your last visit?  Hospitalized since your last visit? NO    2. Have you seen or consulted any other health care providers outside of the [facility] since your last visit?  Include any pap smears or colon screening. Pt had a colonoscopy done while in [facility] around May 2024.       Provider notified of reason for visit, vitals and flowsheets obtained on patients.     Patient received paperwork for advance directive during previous visit but has not completed at this time     Reviewed record In preparation for visit, huddled with provider and have obtained necessary documentation      Health Maintenance Due   Topic    HIV screen     Lung Cancer Screening &/or Counseling     Colorectal Cancer Screen     COVID-19 Vaccine (3 - 2023-24 season)    Respiratory Syncytial Virus (RSV) Pregnant or age 60 yrs+ (1 - Risk 60-74 years 1-dose series)       Wt Readings from Last 3 Encounters:   10/15/24 90.2 kg (198 lb 12.8 oz)   24 91.2 kg (201 lb)   24 90.7 kg (200 lb)     Temp Readings from Last 3 Encounters:   24 97.9 °F (36.6 °C) (Oral)   24 98.1 °F (36.7 °C)   24 98.2 °F (36.8 °C) (Temporal)     BP Readings from Last 3 Encounters:   10/15/24 122/82   24 112/78   24 (!) 145/90     Pulse Readings from Last 3 Encounters:   10/15/24 83   24 78   24 86          No data to display                  Learning Assessment:  :         5/15/2023    10:30 AM   [facility] AMB LEARNING ASSESSMENT   Primary Learner Patient   level of education GRADUATED HIGH SCHOOL OR GED   Primary Language ENGLISH   Learning Preference DEMONSTRATION    READING   Answered By patient   Relationship to Learner SELF       Fall Risk Assessment:  :         10/15/2024     2:31 PM 2024     1:12 PM 2024

## 2024-11-26 NOTE — PROGRESS NOTES
Chief Complaint   Patient presents with    Hypertension    Anemia     History of Present Illness  The patient is a 59-year-old male who presents for a 4-month follow-up evaluation of hypertension and anemia.    He reports experiencing mild dizziness since starting a new medication, Entresto, which he takes half a pill twice daily. An echocardiogram conducted in 09/2024 revealed a decrease in his heart function to 45%, leading to the reintroduction of Entresto into his treatment plan. He denies chest pain, heart palpitations, or shortness of breath.    He has history of iron deficiency anemia due to GI bleed while taking Eliquis. Eliquis stopped on 5/1/24. Today he denies fatigue or any black or bloody stools. He discontinued iron tablets 3 months ago. He underwent a colonoscopy in the spring, which yielded normal results.    He has been experiencing back pain for the past 2 days, located between his shoulder blades.      He has been experiencing a dry cough since starting Jardiance and Entresto, but it is not severe. He does not report any abdominal pain and has regular bowel movements.     Patient Active Problem List   Diagnosis    Chronic systolic congestive heart failure (HCC)    Major depressive disorder with single episode, in partial remission (HCC)    Nephrolithiasis    Smokes 1 pack of cigarettes per day    Ischemic cardiomyopathy    Essential hypertension    Hyperlipidemia, mixed    Mild intermittent asthma without complication    Coronary artery disease of native artery of native heart with stable angina pectoris (HCC)    Vertigo    Obesity (BMI 30-39.9)    MELISSA (obstructive sleep apnea)    ICD (implantable cardioverter-defibrillator) in place    Prediabetes    Diverticulitis of colon with perforation    PAF (paroxysmal atrial fibrillation) ([facility])    Anemia     Past Medical History:   Diagnosis Date    ADD (attention deficit disorder) 09/25/2018    Asthma     Balance problem 09/15/2019    Chest pain

## 2024-12-14 PROCEDURE — 93297 REM INTERROG DEV EVAL ICPMS: CPT | Performed by: INTERNAL MEDICINE

## 2025-01-03 NOTE — PROGRESS NOTES
the  intracranial internal carotid, anterior cerebral, and middle cerebral arteries.  The anterior communicating artery is patent.    There is no evidence of large vessel occlusion or flow-limiting stenosis of the  intracranial vertebral arteries, basilar artery, or posterior cerebral arteries.  The posterior communicating arteries are are not seen.    There is no evidence of aneurysm or vascular malformation.    MRA Neck:  NASCET method was utilized for calculating stenosis.    The common carotid arteries are normal in course and caliber bilaterally. There  is no evidence of flow-limiting stenosis in the cervical right internal carotid  artery. There is no evidence of flow-limiting stenosis in the cervical left  internal carotid artery.    There is a codominant vertebrobasilar arterial system. The vertebral arteries  and imaged portion of the basilar artery demonstrate no flow-limiting stenosis.    Impression  IMPRESSION:  MRI Brain:  1. Normal brain MRI.    MRA Head:  1. No evidence of significant stenosis or aneurysm.    MRA Neck:  1. No evidence of significant stenosis.          Current meds:  Current Outpatient Medications   Medication Sig Dispense Refill    ezetimibe (ZETIA) 10 MG tablet TAKE 1 TABLET DAILY FOR HIGH CHOLESTEROL 90 tablet 3    empagliflozin (JARDIANCE) 10 MG tablet Take 1 tablet by mouth daily 90 tablet 0    sacubitril-valsartan (ENTRESTO) 24-26 MG per tablet Take 0.5 tablets by mouth 2 times daily 90 tablet 4    metoprolol succinate (TOPROL XL) 50 MG extended release tablet Take 1 tablet by mouth nightly 90 tablet 3    DULoxetine (CYMBALTA) 60 MG extended release capsule TAKE 1 CAPSULE DAILY 90 capsule 3    ranolazine (RANEXA) 500 MG extended release tablet TAKE 1 TABLET TWICE A DAY (APPOINTMENT NEEDED FOR FURTHER REFILLS) 180 tablet 3    atorvastatin (LIPITOR) 80 MG tablet Take 1 tablet by mouth daily 90 tablet 3    aspirin (ASPIRIN LOW DOSE) 81 MG EC tablet Take 1 tablet by mouth daily 90

## 2025-01-10 ENCOUNTER — OFFICE VISIT (OUTPATIENT)
Age: 60
End: 2025-01-10

## 2025-01-10 VITALS
DIASTOLIC BLOOD PRESSURE: 60 MMHG | WEIGHT: 197.8 LBS | HEART RATE: 71 BPM | BODY MASS INDEX: 29.3 KG/M2 | HEIGHT: 69 IN | OXYGEN SATURATION: 97 % | SYSTOLIC BLOOD PRESSURE: 100 MMHG

## 2025-01-10 DIAGNOSIS — Z95.810 ICD (IMPLANTABLE CARDIOVERTER-DEFIBRILLATOR) IN PLACE: Primary | ICD-10-CM

## 2025-01-10 DIAGNOSIS — G47.33 OSA ON CPAP: ICD-10-CM

## 2025-01-10 DIAGNOSIS — I25.10 CORONARY ARTERY DISEASE INVOLVING NATIVE CORONARY ARTERY OF NATIVE HEART WITHOUT ANGINA PECTORIS: ICD-10-CM

## 2025-01-10 DIAGNOSIS — I25.5 ISCHEMIC CARDIOMYOPATHY: ICD-10-CM

## 2025-01-10 DIAGNOSIS — Z79.01 ANTICOAGULATED: ICD-10-CM

## 2025-01-10 DIAGNOSIS — I50.22 CHRONIC SYSTOLIC CONGESTIVE HEART FAILURE (HCC): ICD-10-CM

## 2025-01-10 DIAGNOSIS — I25.10 CORONARY ARTERY STENOSIS: ICD-10-CM

## 2025-01-10 DIAGNOSIS — I10 PRIMARY HYPERTENSION: ICD-10-CM

## 2025-01-10 DIAGNOSIS — I48.0 PAF (PAROXYSMAL ATRIAL FIBRILLATION) (HCC): ICD-10-CM

## 2025-01-10 ASSESSMENT — PATIENT HEALTH QUESTIONNAIRE - PHQ9
SUM OF ALL RESPONSES TO PHQ QUESTIONS 1-9: 0
SUM OF ALL RESPONSES TO PHQ9 QUESTIONS 1 & 2: 0
SUM OF ALL RESPONSES TO PHQ QUESTIONS 1-9: 0
SUM OF ALL RESPONSES TO PHQ QUESTIONS 1-9: 0
1. LITTLE INTEREST OR PLEASURE IN DOING THINGS: NOT AT ALL
SUM OF ALL RESPONSES TO PHQ QUESTIONS 1-9: 0
2. FEELING DOWN, DEPRESSED OR HOPELESS: NOT AT ALL

## 2025-02-03 ENCOUNTER — OFFICE VISIT (OUTPATIENT)
Age: 60
End: 2025-02-03
Payer: MEDICARE

## 2025-02-03 VITALS
DIASTOLIC BLOOD PRESSURE: 70 MMHG | SYSTOLIC BLOOD PRESSURE: 120 MMHG | HEART RATE: 78 BPM | BODY MASS INDEX: 29.33 KG/M2 | OXYGEN SATURATION: 97 % | HEIGHT: 69 IN | WEIGHT: 198 LBS

## 2025-02-03 DIAGNOSIS — I48.0 PAF (PAROXYSMAL ATRIAL FIBRILLATION) (HCC): ICD-10-CM

## 2025-02-03 DIAGNOSIS — Z72.0 TOBACCO ABUSE DISORDER: ICD-10-CM

## 2025-02-03 DIAGNOSIS — I25.5 ISCHEMIC CARDIOMYOPATHY: ICD-10-CM

## 2025-02-03 DIAGNOSIS — I50.22 CHRONIC SYSTOLIC CONGESTIVE HEART FAILURE (HCC): Primary | ICD-10-CM

## 2025-02-03 DIAGNOSIS — I10 PRIMARY HYPERTENSION: ICD-10-CM

## 2025-02-03 DIAGNOSIS — I25.10 CORONARY ARTERY DISEASE INVOLVING NATIVE CORONARY ARTERY OF NATIVE HEART WITHOUT ANGINA PECTORIS: ICD-10-CM

## 2025-02-03 DIAGNOSIS — G47.33 OSA ON CPAP: ICD-10-CM

## 2025-02-03 DIAGNOSIS — Z95.810 ICD (IMPLANTABLE CARDIOVERTER-DEFIBRILLATOR) IN PLACE: ICD-10-CM

## 2025-02-03 PROCEDURE — 99406 BEHAV CHNG SMOKING 3-10 MIN: CPT | Performed by: INTERNAL MEDICINE

## 2025-02-03 PROCEDURE — 99214 OFFICE O/P EST MOD 30 MIN: CPT | Performed by: INTERNAL MEDICINE

## 2025-02-03 PROCEDURE — G2211 COMPLEX E/M VISIT ADD ON: HCPCS | Performed by: INTERNAL MEDICINE

## 2025-02-03 PROCEDURE — G8427 DOCREV CUR MEDS BY ELIG CLIN: HCPCS | Performed by: INTERNAL MEDICINE

## 2025-02-03 PROCEDURE — 3078F DIAST BP <80 MM HG: CPT | Performed by: INTERNAL MEDICINE

## 2025-02-03 PROCEDURE — 93010 ELECTROCARDIOGRAM REPORT: CPT | Performed by: INTERNAL MEDICINE

## 2025-02-03 PROCEDURE — 4004F PT TOBACCO SCREEN RCVD TLK: CPT | Performed by: INTERNAL MEDICINE

## 2025-02-03 PROCEDURE — 3074F SYST BP LT 130 MM HG: CPT | Performed by: INTERNAL MEDICINE

## 2025-02-03 PROCEDURE — 93005 ELECTROCARDIOGRAM TRACING: CPT | Performed by: INTERNAL MEDICINE

## 2025-02-03 PROCEDURE — 3017F COLORECTAL CA SCREEN DOC REV: CPT | Performed by: INTERNAL MEDICINE

## 2025-02-03 PROCEDURE — G8419 CALC BMI OUT NRM PARAM NOF/U: HCPCS | Performed by: INTERNAL MEDICINE

## 2025-02-03 NOTE — PROGRESS NOTES
[address] [place] [address] [phone]    [address] [place] [address]     Subjective:        Efren Maya is a 59 y.o. male is here for routine f/u.  The patient denies chest pain/ shortness of breath, orthopnea, PND, LE edema, palpitations, syncope, presyncope or fatigue.    Patient Active Problem List    Diagnosis Date Noted    Anemia 05/01/2024    PAF (paroxysmal atrial fibrillation) ([facility]) 07/31/2023    Diverticulitis of colon with perforation 04/14/2023    Vertigo 07/28/2021    Prediabetes 07/28/2021    Mild intermittent asthma without complication 02/22/2021    Nephrolithiasis 11/29/2020    Chronic systolic congestive heart failure ([facility]) 07/21/2020    Major depressive disorder with single episode, in partial remission ([facility]) 09/15/2019    MELISSA (obstructive sleep apnea) 09/13/2019    Obesity (BMI 30-39.9) 08/11/2019    Ischemic cardiomyopathy 03/22/2019    ICD (implantable cardioverter-defibrillator) in place 03/22/2019    Coronary artery disease of native artery of native heart with stable angina pectoris ([facility]) 10/05/2018    Smokes 1 pack of cigarettes per day 09/25/2018    Essential hypertension 09/25/2018    Hyperlipidemia, mixed 09/25/2018      Chiquis Louis MD  Past Medical History:   Diagnosis Date    ADD (attention deficit disorder) 09/25/2018    Asthma     Balance problem 09/15/2019    Chest pain 11/30/2018    Congestive heart failure ([facility])     Diabetes ([facility])     Dyslipidemia 09/25/2018    Fatigue 11/30/2018    Glucose intolerance (impaired glucose tolerance) 08/11/2019    Hypertension     ICD (implantable cardioverter-defibrillator) in place     Other ill-defined conditions(799.89)     ADHD,BELL'S PALSY WEAKNESS  LT FACE    Psychiatric disorder     DEPRESSION    S/P coronary artery stent placement 09/25/2018 9/25/18 PCI/ROSALES to LAD    STEMI (ST elevation myocardial infarction) ([facility]) 09/25/2018    Syncope 01/25/2019    Tobacco abuse 09/25/2018

## 2025-02-03 NOTE — PATIENT INSTRUCTIONS
Schedule echocardiogram in 6 months.    Schedule follow up with Dr. Gamboa, 1 week after echocardiogram.

## 2025-02-04 RX ORDER — EMPAGLIFLOZIN 10 MG/1
10 TABLET, FILM COATED ORAL DAILY
Qty: 90 TABLET | Refills: 3 | Status: SHIPPED | OUTPATIENT
Start: 2025-02-04

## 2025-02-04 NOTE — TELEPHONE ENCOUNTER
Refill Request Received for the Following Medication     Requested Prescriptions     Pending Prescriptions Disp Refills    JARDIANCE 10 MG tablet [Pharmacy Med Name: Jardiance 10 MG Oral Tablet] 90 tablet 0     Sig: Take 1 tablet by mouth once daily       Last Prescribed: 11-    Last Appointment With Me:  2/3/2025     Future Appointments:  Future Appointments   Date Time Provider Department Center   3/26/2025  9:50 AM Chiquis Louis MD [facility]   7/17/2025  9:40 AM Vanna Jackson MD [facility]   8/11/2025  8:00 AM [facility] ECHO 3 [facility]   8/18/2025  8:20 AM Pedro Luis Gamboa MD [facility]   8/29/2025  9:20 AM [email or web address]   8/29/2025  9:40 AM Angelika Oates APRN - CNP [facility]

## 2025-02-23 PROCEDURE — 93297 REM INTERROG DEV EVAL ICPMS: CPT | Performed by: INTERNAL MEDICINE

## 2025-03-25 SDOH — ECONOMIC STABILITY: FOOD INSECURITY: WITHIN THE PAST 12 MONTHS, YOU WORRIED THAT YOUR FOOD WOULD RUN OUT BEFORE YOU GOT MONEY TO BUY MORE.: NEVER TRUE

## 2025-03-25 SDOH — ECONOMIC STABILITY: TRANSPORTATION INSECURITY
IN THE PAST 12 MONTHS, HAS THE LACK OF TRANSPORTATION KEPT YOU FROM MEDICAL APPOINTMENTS OR FROM GETTING MEDICATIONS?: NO

## 2025-03-25 SDOH — ECONOMIC STABILITY: TRANSPORTATION INSECURITY
IN THE PAST 12 MONTHS, HAS LACK OF TRANSPORTATION KEPT YOU FROM MEETINGS, WORK, OR FROM GETTING THINGS NEEDED FOR DAILY LIVING?: NO

## 2025-03-25 SDOH — ECONOMIC STABILITY: FOOD INSECURITY: WITHIN THE PAST 12 MONTHS, THE FOOD YOU BOUGHT JUST DIDN'T LAST AND YOU DIDN'T HAVE MONEY TO GET MORE.: NEVER TRUE

## 2025-03-26 ENCOUNTER — TELEPHONE (OUTPATIENT)
Age: 60
End: 2025-03-26

## 2025-03-26 ENCOUNTER — OFFICE VISIT (OUTPATIENT)
Facility: CLINIC | Age: 60
End: 2025-03-26
Payer: MEDICARE

## 2025-03-26 VITALS
SYSTOLIC BLOOD PRESSURE: 124 MMHG | OXYGEN SATURATION: 96 % | DIASTOLIC BLOOD PRESSURE: 64 MMHG | BODY MASS INDEX: 29.3 KG/M2 | TEMPERATURE: 97.9 F | WEIGHT: 197.8 LBS | HEIGHT: 69 IN | RESPIRATION RATE: 16 BRPM | HEART RATE: 72 BPM

## 2025-03-26 DIAGNOSIS — Z72.0 TOBACCO USE: ICD-10-CM

## 2025-03-26 DIAGNOSIS — Z12.5 SCREENING FOR PROSTATE CANCER: ICD-10-CM

## 2025-03-26 DIAGNOSIS — I10 ESSENTIAL HYPERTENSION: ICD-10-CM

## 2025-03-26 DIAGNOSIS — J30.1 SEASONAL ALLERGIC RHINITIS DUE TO POLLEN: Primary | ICD-10-CM

## 2025-03-26 DIAGNOSIS — I25.118 CORONARY ARTERY DISEASE OF NATIVE ARTERY OF NATIVE HEART WITH STABLE ANGINA PECTORIS: ICD-10-CM

## 2025-03-26 DIAGNOSIS — I50.22 CHRONIC SYSTOLIC CONGESTIVE HEART FAILURE (HCC): ICD-10-CM

## 2025-03-26 DIAGNOSIS — R73.03 PREDIABETES: ICD-10-CM

## 2025-03-26 DIAGNOSIS — E78.2 HYPERLIPIDEMIA, MIXED: ICD-10-CM

## 2025-03-26 PROCEDURE — 4004F PT TOBACCO SCREEN RCVD TLK: CPT | Performed by: INTERNAL MEDICINE

## 2025-03-26 PROCEDURE — 3078F DIAST BP <80 MM HG: CPT | Performed by: INTERNAL MEDICINE

## 2025-03-26 PROCEDURE — 99214 OFFICE O/P EST MOD 30 MIN: CPT | Performed by: INTERNAL MEDICINE

## 2025-03-26 PROCEDURE — G8419 CALC BMI OUT NRM PARAM NOF/U: HCPCS | Performed by: INTERNAL MEDICINE

## 2025-03-26 PROCEDURE — 3074F SYST BP LT 130 MM HG: CPT | Performed by: INTERNAL MEDICINE

## 2025-03-26 PROCEDURE — G8427 DOCREV CUR MEDS BY ELIG CLIN: HCPCS | Performed by: INTERNAL MEDICINE

## 2025-03-26 PROCEDURE — 3017F COLORECTAL CA SCREEN DOC REV: CPT | Performed by: INTERNAL MEDICINE

## 2025-03-26 NOTE — TELEPHONE ENCOUNTER
Patient requested a call back asking why was his medication cancelled and no one told him    Ranolazine 500 mg  twice a day     He stated he's nervous because he never stopped taken it since his heart attack      Contact Information  [phone] (Mobile)   [phone] (Home Phone)

## 2025-03-26 NOTE — PROGRESS NOTES
Efren Maya  Identified pt with two pt identifiers(name and ).  Chief Complaint   Patient presents with    Hypertension    Hyperlipidemia       1. Have you been to the ER, urgent care clinic since your last visit?  Hospitalized since your last visit? NO    2. Have you seen or consulted any other health care providers outside of the [facility] since your last visit?  Include any pap smears or colon screening. NO      Provider notified of reason for visit, vitals and flowsheets obtained on patients.     Patient received paperwork for advance directive during previous visit but has not completed at this time     Reviewed record In preparation for visit, huddled with provider and have obtained necessary documentation      Health Maintenance Due   Topic    Lung Cancer Screening &/or Counseling     Colorectal Cancer Screen        Wt Readings from Last 3 Encounters:   25 89.7 kg (197 lb 12.8 oz)   25 89.8 kg (198 lb)   01/10/25 89.7 kg (197 lb 12.8 oz)     Temp Readings from Last 3 Encounters:   25 97.9 °F (36.6 °C) (Oral)   24 98.2 °F (36.8 °C) (Oral)   24 97.9 °F (36.6 °C) (Oral)     BP Readings from Last 3 Encounters:   25 124/64   25 120/70   01/10/25 100/60     Pulse Readings from Last 3 Encounters:   25 72   25 78   01/10/25 71          No data to display                  Learning Assessment:  :         5/15/2023    10:30 AM   [facility] AMB LEARNING ASSESSMENT   Primary Learner Patient   level of education GRADUATED HIGH SCHOOL OR GED   Primary Language ENGLISH   Learning Preference DEMONSTRATION    READING   Answered By patient   Relationship to Learner SELF       Fall Risk Assessment:  :         1/10/2025     9:06 AM 10/15/2024     2:31 PM 2024     1:12 PM 2024    12:16 PM 2024    10:47 AM 2023     8:31 AM 2023     9:00 AM   Amb Fall Risk Assessment and TUG Test   Do you feel unsteady or are you worried about falling?

## 2025-03-26 NOTE — PROGRESS NOTES
Chief Complaint   Patient presents with    Hypertension    Hyperlipidemia     History of Present Illness  The patient is a 59-year-old male presenting for a 4-month follow-up for hypertension and hyperlipidemia.    No new health issues. No changes to cardiac treatment plan. On carvedilol and Entresto. Denies headaches, CP, SOB, dizziness, heart palpitations, muscle cramps, or leg swelling.    Reports runny nose, nasal congestion, and sneezing. No postnasal drainage. Taking Coricidin HBP. Has Claritin at home but not taking.    Colonoscopy performed last year with Dr. Carvajal. Declined lung cancer screening.     Smokes 1.5 packs/day. Previous quit attempts with Chantix and Wellbutrin unsuccessful. No gastrointestinal symptoms or muscle cramps.    Current medications: atorvastatin 80 mg nightly, ezetimibe 10 mg daily. Confusion about ranolazine 500 mg twice a day, believed discontinued by Dr. Gamboa on 02/03/2025, but patient continues taking it.    Soc Hx  . Has 2 stepsons, no grandchildren. Spends the day riding in van with his brother-in-law who builds pools. Smokes 1.5 ppd over past 3-4 months. Previously 1 ppd since age 6 (53 pack yr hx). Stays active but no formal exercise.    Patient Active Problem List   Diagnosis    Chronic systolic congestive heart failure (HCC)    Major depressive disorder with single episode, in partial remission    Nephrolithiasis    Smokes 1 pack of cigarettes per day    Ischemic cardiomyopathy    Essential hypertension    Hyperlipidemia, mixed    Mild intermittent asthma without complication    Coronary artery disease of native artery of native heart with stable angina pectoris    Vertigo    Obesity (BMI 30-39.9)    MELISSA (obstructive sleep apnea)    ICD (implantable cardioverter-defibrillator) in place    Prediabetes    Diverticulitis of colon with perforation    PAF (paroxysmal atrial fibrillation) (HCC)    Anemia     Past Medical History:   Diagnosis Date    ADD (attention

## 2025-03-26 NOTE — TELEPHONE ENCOUNTER
Per dr. Lai's office notes on 02-.  \"Continue with ASA, Toprol XL, statin, no longer taking ranexa 500mg BID\".    Spoke with mr aMya, verified with 2 identifiers, about Ranexa. Patient stated that he Never stopped taking Ranexa  and he would like to know if he suppose to be taking this medication or not.  Message sent to dr. Gamboa

## 2025-03-27 RX ORDER — RANOLAZINE 500 MG/1
500 TABLET, EXTENDED RELEASE ORAL 2 TIMES DAILY
Qty: 180 TABLET | Refills: 3 | Status: SHIPPED | OUTPATIENT
Start: 2025-03-27

## 2025-03-27 NOTE — TELEPHONE ENCOUNTER
Spoke with patient , verified patient with two identifiers, regarding results and recommendations. Patient voiced understanding.     Ranexa 500 mg bid reinstated.

## 2025-03-27 NOTE — TELEPHONE ENCOUNTER
For some reason we were under the impression that at some point he had stopped Ranexa.  If he is still taking it, just go ahead and continue it.  It helps prevent chest pain.  It is not a life-saving medication.  He can try some days without it especially when he is not doing a ton of exertion and then if he does not notice a difference he does not have to take it in the long run.  But for now we will just keep it going if he is happy with it.

## 2025-04-13 PROCEDURE — 93297 REM INTERROG DEV EVAL ICPMS: CPT | Performed by: INTERNAL MEDICINE

## 2025-05-04 DIAGNOSIS — E78.2 HYPERLIPIDEMIA, MIXED: ICD-10-CM

## 2025-05-05 RX ORDER — ATORVASTATIN CALCIUM 80 MG/1
80 TABLET, FILM COATED ORAL DAILY
Qty: 90 TABLET | Refills: 3 | Status: SHIPPED | OUTPATIENT
Start: 2025-05-05

## 2025-05-05 NOTE — TELEPHONE ENCOUNTER
PCP: Chiquis Louis MD     Last appt:  3/26/2025      Future Appointments   Date Time Provider Department Center   7/17/2025  9:40 AM Vanna Jackson MD [facility]   8/11/2025  8:00 AM [facility]   8/18/2025  8:20 AM Pedro Luis Gamboa MD [facility]   8/21/2025  8:00 AM LAB [facility]   8/28/2025  8:30 AM Chiquis Louis MD [facility] DE[facility]   8/29/2025  9:20 AM [facility]   8/29/2025  9:40 AM Angelika Oates APRN - CNP [facility]  AMB          Requested Prescriptions     Pending Prescriptions Disp Refills    atorvastatin (LIPITOR) 80 MG tablet [Pharmacy Med Name: Atorvastatin Calcium 80 MG Oral Tablet] 90 tablet 0     Sig: Take 1 tablet by mouth once daily

## 2025-06-14 PROCEDURE — 93297 REM INTERROG DEV EVAL ICPMS: CPT | Performed by: INTERNAL MEDICINE

## 2025-07-15 PROCEDURE — 93297 REM INTERROG DEV EVAL ICPMS: CPT | Performed by: INTERNAL MEDICINE

## 2025-07-17 ENCOUNTER — PROCEDURE VISIT (OUTPATIENT)
Age: 60
End: 2025-07-17
Payer: MEDICARE

## 2025-07-17 ENCOUNTER — OFFICE VISIT (OUTPATIENT)
Age: 60
End: 2025-07-17
Payer: MEDICARE

## 2025-07-17 VITALS
OXYGEN SATURATION: 97 % | BODY MASS INDEX: 28.14 KG/M2 | HEART RATE: 78 BPM | HEIGHT: 69 IN | WEIGHT: 190 LBS | DIASTOLIC BLOOD PRESSURE: 68 MMHG | SYSTOLIC BLOOD PRESSURE: 118 MMHG

## 2025-07-17 DIAGNOSIS — Z95.810 ICD (IMPLANTABLE CARDIOVERTER-DEFIBRILLATOR) IN PLACE: Primary | ICD-10-CM

## 2025-07-17 DIAGNOSIS — I48.0 PAF (PAROXYSMAL ATRIAL FIBRILLATION) (HCC): Primary | ICD-10-CM

## 2025-07-17 DIAGNOSIS — I10 PRIMARY HYPERTENSION: ICD-10-CM

## 2025-07-17 DIAGNOSIS — E78.2 MIXED HYPERLIPIDEMIA: ICD-10-CM

## 2025-07-17 DIAGNOSIS — I50.22 CHRONIC SYSTOLIC CONGESTIVE HEART FAILURE (HCC): ICD-10-CM

## 2025-07-17 DIAGNOSIS — I25.5 ISCHEMIC CARDIOMYOPATHY: ICD-10-CM

## 2025-07-17 DIAGNOSIS — Z72.0 TOBACCO ABUSE DISORDER: ICD-10-CM

## 2025-07-17 DIAGNOSIS — Z79.01 ANTICOAGULATED: ICD-10-CM

## 2025-07-17 DIAGNOSIS — I25.10 CORONARY ARTERY DISEASE INVOLVING NATIVE CORONARY ARTERY OF NATIVE HEART WITHOUT ANGINA PECTORIS: ICD-10-CM

## 2025-07-17 DIAGNOSIS — Z95.810 ICD (IMPLANTABLE CARDIOVERTER-DEFIBRILLATOR) IN PLACE: ICD-10-CM

## 2025-07-17 PROCEDURE — 3017F COLORECTAL CA SCREEN DOC REV: CPT | Performed by: INTERNAL MEDICINE

## 2025-07-17 PROCEDURE — 4004F PT TOBACCO SCREEN RCVD TLK: CPT | Performed by: INTERNAL MEDICINE

## 2025-07-17 PROCEDURE — 93282 PRGRMG EVAL IMPLANTABLE DFB: CPT | Performed by: INTERNAL MEDICINE

## 2025-07-17 PROCEDURE — G2211 COMPLEX E/M VISIT ADD ON: HCPCS | Performed by: INTERNAL MEDICINE

## 2025-07-17 PROCEDURE — G8419 CALC BMI OUT NRM PARAM NOF/U: HCPCS | Performed by: INTERNAL MEDICINE

## 2025-07-17 PROCEDURE — G8427 DOCREV CUR MEDS BY ELIG CLIN: HCPCS | Performed by: INTERNAL MEDICINE

## 2025-07-17 PROCEDURE — 93010 ELECTROCARDIOGRAM REPORT: CPT | Performed by: INTERNAL MEDICINE

## 2025-07-17 PROCEDURE — 93005 ELECTROCARDIOGRAM TRACING: CPT | Performed by: INTERNAL MEDICINE

## 2025-07-17 PROCEDURE — 99214 OFFICE O/P EST MOD 30 MIN: CPT | Performed by: INTERNAL MEDICINE

## 2025-07-17 PROCEDURE — 3078F DIAST BP <80 MM HG: CPT | Performed by: INTERNAL MEDICINE

## 2025-07-17 PROCEDURE — 3074F SYST BP LT 130 MM HG: CPT | Performed by: INTERNAL MEDICINE

## 2025-07-17 NOTE — PROGRESS NOTES
1. Have you been to the ER, urgent care clinic since your last visit?  Hospitalized since your last visit?No    2. Have you seen or consulted any other health care providers outside of the [facility] since your last visit?  Include any pap smears or colon screening. No

## 2025-07-17 NOTE — PATIENT INSTRUCTIONS
Continue remote transmission every 3 months  FU with NP in 1 year  FU with Dr. Jackson in 2 years

## 2025-07-17 NOTE — PROGRESS NOTES
Cardiac Electrophysiology OFFICE Follow-up Note       Assessment/Plan:   1. PAF (paroxysmal atrial fibrillation) (HCC)  -     EKG 12 Lead  2. ICD (implantable cardioverter-defibrillator) in place  3. Chronic systolic congestive heart failure (HCC)  4. Ischemic cardiomyopathy  5. Primary hypertension  6. Tobacco abuse disorder  7. Anticoagulated  8. Mixed hyperlipidemia  9. Coronary artery disease involving native coronary artery of native heart without angina pectoris         Primary Cardiologist: Bee    PAF   - Echo August 2023 LVEF 50 to 55%, focal wall motion abnormalities consistent with prior MI   - Extended Holter monitor 6/2023 showed AF, these episodes were very short and with total burden of less than 1%   - His CHADS2-VASc score is borderline 3 (CAD, CHF, HTN), reasonable to continue ASA right now, might not be high risk enough to consider WM   - if he has significant recurrent AF, would proceed with WM without Eliquis pre and post procedure   - no recurrence of AF noted on ICD interrogation   - FU with EP in 1 year    Anticoagulation   - Eliquis stopped due to anemia/GI bleed and low AF burden   - Given slowly bleeding ulcer can limit anticoagulation and not start anticoagulation prior to watchman. Can assess for KIMBER clot with CT scan and EDELMIRA the day of the Watchman implant. After Watchman can start aspirin/Plavix.    - will not pursue WM now but can consider in the future if he has significant AF burden     ICM, s/p ICD   - Echo August 2023 LVEF 50 to 55%, focal wall motion abnormalities consistent with prior MI   - Echo 9/2024 LVEF 35-40%, no significant valvular dysfunction.  - Continue GDMT with Toprol, entresto, jardiance.       [employer] single chamber ICD    Date of implant 1/25/2019 ([place]idia)  Device with normal function. Battery life 9.5 years. No new or significant lead issues requiring intervention. No significant arrhythmias noted requiring intervention. Heart logic within

## 2025-08-11 ENCOUNTER — ANCILLARY PROCEDURE (OUTPATIENT)
Age: 60
End: 2025-08-11
Payer: COMMERCIAL

## 2025-08-11 VITALS
SYSTOLIC BLOOD PRESSURE: 120 MMHG | HEIGHT: 69 IN | BODY MASS INDEX: 28.14 KG/M2 | DIASTOLIC BLOOD PRESSURE: 70 MMHG | WEIGHT: 190 LBS

## 2025-08-11 DIAGNOSIS — I25.5 ISCHEMIC CARDIOMYOPATHY: ICD-10-CM

## 2025-08-11 DIAGNOSIS — I50.22 CHRONIC SYSTOLIC CONGESTIVE HEART FAILURE (HCC): ICD-10-CM

## 2025-08-11 DIAGNOSIS — Z72.0 TOBACCO ABUSE DISORDER: ICD-10-CM

## 2025-08-11 DIAGNOSIS — I48.0 PAF (PAROXYSMAL ATRIAL FIBRILLATION) (HCC): ICD-10-CM

## 2025-08-11 DIAGNOSIS — I25.10 CORONARY ARTERY DISEASE INVOLVING NATIVE CORONARY ARTERY OF NATIVE HEART WITHOUT ANGINA PECTORIS: ICD-10-CM

## 2025-08-11 DIAGNOSIS — I10 PRIMARY HYPERTENSION: ICD-10-CM

## 2025-08-11 DIAGNOSIS — G47.33 OSA ON CPAP: ICD-10-CM

## 2025-08-11 DIAGNOSIS — Z95.810 ICD (IMPLANTABLE CARDIOVERTER-DEFIBRILLATOR) IN PLACE: ICD-10-CM

## 2025-08-11 PROCEDURE — 93306 TTE W/DOPPLER COMPLETE: CPT

## 2025-08-12 LAB
ECHO AO ASC DIAM: 3.2 CM
ECHO AO ASCENDING AORTA INDEX: 1.58 CM/M2
ECHO AO ROOT DIAM: 3.7 CM
ECHO AO ROOT INDEX: 1.83 CM/M2
ECHO AV MEAN GRADIENT: 2 MMHG
ECHO AV MEAN VELOCITY: 0.7 M/S
ECHO AV PEAK GRADIENT: 4 MMHG
ECHO AV PEAK VELOCITY: 0.9 M/S
ECHO AV VELOCITY RATIO: 1
ECHO AV VTI: 18.4 CM
ECHO BSA: 2.05 M2
ECHO LA DIAMETER INDEX: 1.83 CM/M2
ECHO LA DIAMETER: 3.7 CM
ECHO LA TO AORTIC ROOT RATIO: 1
ECHO LA VOL A-L A2C: 60 ML (ref 18–58)
ECHO LA VOL A-L A4C: 50 ML (ref 18–58)
ECHO LA VOL BP: 56 ML (ref 18–58)
ECHO LA VOL MOD A2C: 58 ML (ref 18–58)
ECHO LA VOL MOD A4C: 48 ML (ref 18–58)
ECHO LA VOL/BSA BIPLANE: 28 ML/M2 (ref 16–34)
ECHO LA VOLUME AREA LENGTH: 59 ML
ECHO LA VOLUME INDEX A-L A2C: 30 ML/M2 (ref 16–34)
ECHO LA VOLUME INDEX A-L A4C: 25 ML/M2 (ref 16–34)
ECHO LA VOLUME INDEX AREA LENGTH: 29 ML/M2 (ref 16–34)
ECHO LA VOLUME INDEX MOD A2C: 29 ML/M2 (ref 16–34)
ECHO LA VOLUME INDEX MOD A4C: 24 ML/M2 (ref 16–34)
ECHO LV E' LATERAL VELOCITY: 6.05 CM/S
ECHO LV E' SEPTAL VELOCITY: 5.57 CM/S
ECHO LV EDV A2C: 75 ML
ECHO LV EDV A4C: 97 ML
ECHO LV EDV BP: 86 ML (ref 67–155)
ECHO LV EDV INDEX A4C: 48 ML/M2
ECHO LV EDV INDEX BP: 43 ML/M2
ECHO LV EDV NDEX A2C: 37 ML/M2
ECHO LV EF PHYSICIAN: 50 %
ECHO LV EJECTION FRACTION A2C: 57 %
ECHO LV EJECTION FRACTION A4C: 56 %
ECHO LV EJECTION FRACTION BIPLANE: 57 % (ref 55–100)
ECHO LV ESV A2C: 32 ML
ECHO LV ESV A4C: 43 ML
ECHO LV ESV BP: 37 ML (ref 22–58)
ECHO LV ESV INDEX A2C: 16 ML/M2
ECHO LV ESV INDEX A4C: 21 ML/M2
ECHO LV ESV INDEX BP: 18 ML/M2
ECHO LV FRACTIONAL SHORTENING: 26 % (ref 28–44)
ECHO LV INTERNAL DIMENSION DIASTOLE INDEX: 2.33 CM/M2
ECHO LV INTERNAL DIMENSION DIASTOLIC: 4.7 CM (ref 4.2–5.9)
ECHO LV INTERNAL DIMENSION SYSTOLIC INDEX: 1.73 CM/M2
ECHO LV INTERNAL DIMENSION SYSTOLIC: 3.5 CM
ECHO LV IVSD: 1.1 CM (ref 0.6–1)
ECHO LV MASS 2D: 153.4 G (ref 88–224)
ECHO LV MASS INDEX 2D: 75.9 G/M2 (ref 49–115)
ECHO LV POSTERIOR WALL DIASTOLIC: 0.8 CM (ref 0.6–1)
ECHO LV RELATIVE WALL THICKNESS RATIO: 0.34
ECHO LVOT AV VTI INDEX: 0.99
ECHO LVOT MEAN GRADIENT: 2 MMHG
ECHO LVOT PEAK GRADIENT: 3 MMHG
ECHO LVOT PEAK VELOCITY: 0.9 M/S
ECHO LVOT VTI: 18.3 CM
ECHO MV A VELOCITY: 0.61 M/S
ECHO MV AREA PHT: 3 CM2
ECHO MV E DECELERATION TIME (DT): 250 MS
ECHO MV E VELOCITY: 0.49 M/S
ECHO MV E/A RATIO: 0.8
ECHO MV E/E' LATERAL: 8.1
ECHO MV E/E' RATIO (AVERAGED): 8.45
ECHO MV E/E' SEPTAL: 8.8
ECHO MV PRESSURE HALF TIME (PHT): 72.5 MS
ECHO RA AREA 4C: 15.5 CM2
ECHO RA END SYSTOLIC VOLUME APICAL 4 CHAMBER INDEX BSA: 21 ML/M2
ECHO RA VOLUME AREA LENGTH APICAL 4 CHAMBER: 43 ML
ECHO RA VOLUME: 42 ML
ECHO RV FREE WALL PEAK S': 11.2 CM/S
ECHO RV INTERNAL DIMENSION: 3.5 CM
ECHO RV TAPSE: 2.4 CM (ref 1.7–?)
ECHO TV REGURGITANT MAX VELOCITY: 2.18 M/S
ECHO TV REGURGITANT PEAK GRADIENT: 19 MMHG

## 2025-08-12 PROCEDURE — 93306 TTE W/DOPPLER COMPLETE: CPT | Performed by: INTERNAL MEDICINE

## 2025-08-18 ENCOUNTER — OFFICE VISIT (OUTPATIENT)
Age: 60
End: 2025-08-18
Payer: COMMERCIAL

## 2025-08-18 VITALS
SYSTOLIC BLOOD PRESSURE: 92 MMHG | HEIGHT: 69 IN | WEIGHT: 193 LBS | BODY MASS INDEX: 28.58 KG/M2 | OXYGEN SATURATION: 97 % | DIASTOLIC BLOOD PRESSURE: 64 MMHG | HEART RATE: 74 BPM

## 2025-08-18 DIAGNOSIS — I48.0 PAF (PAROXYSMAL ATRIAL FIBRILLATION) (HCC): ICD-10-CM

## 2025-08-18 DIAGNOSIS — Z95.810 ICD (IMPLANTABLE CARDIOVERTER-DEFIBRILLATOR) IN PLACE: ICD-10-CM

## 2025-08-18 DIAGNOSIS — I25.118 CORONARY ARTERY DISEASE OF NATIVE ARTERY OF NATIVE HEART WITH STABLE ANGINA PECTORIS: Primary | ICD-10-CM

## 2025-08-18 DIAGNOSIS — I50.22 CHRONIC SYSTOLIC CONGESTIVE HEART FAILURE (HCC): ICD-10-CM

## 2025-08-18 DIAGNOSIS — I25.5 ISCHEMIC CARDIOMYOPATHY: ICD-10-CM

## 2025-08-18 DIAGNOSIS — I10 ESSENTIAL HYPERTENSION: ICD-10-CM

## 2025-08-18 DIAGNOSIS — E78.2 HYPERLIPIDEMIA, MIXED: ICD-10-CM

## 2025-08-18 PROCEDURE — 3017F COLORECTAL CA SCREEN DOC REV: CPT | Performed by: INTERNAL MEDICINE

## 2025-08-18 PROCEDURE — 99214 OFFICE O/P EST MOD 30 MIN: CPT | Performed by: INTERNAL MEDICINE

## 2025-08-18 PROCEDURE — G8419 CALC BMI OUT NRM PARAM NOF/U: HCPCS | Performed by: INTERNAL MEDICINE

## 2025-08-18 PROCEDURE — 4004F PT TOBACCO SCREEN RCVD TLK: CPT | Performed by: INTERNAL MEDICINE

## 2025-08-18 PROCEDURE — 93010 ELECTROCARDIOGRAM REPORT: CPT | Performed by: INTERNAL MEDICINE

## 2025-08-18 PROCEDURE — 3074F SYST BP LT 130 MM HG: CPT | Performed by: INTERNAL MEDICINE

## 2025-08-18 PROCEDURE — G2211 COMPLEX E/M VISIT ADD ON: HCPCS | Performed by: INTERNAL MEDICINE

## 2025-08-18 PROCEDURE — 93005 ELECTROCARDIOGRAM TRACING: CPT | Performed by: INTERNAL MEDICINE

## 2025-08-18 PROCEDURE — G8427 DOCREV CUR MEDS BY ELIG CLIN: HCPCS | Performed by: INTERNAL MEDICINE

## 2025-08-18 PROCEDURE — 3078F DIAST BP <80 MM HG: CPT | Performed by: INTERNAL MEDICINE

## 2025-08-18 ASSESSMENT — PATIENT HEALTH QUESTIONNAIRE - PHQ9
9. THOUGHTS THAT YOU WOULD BE BETTER OFF DEAD, OR OF HURTING YOURSELF: NOT AT ALL
3. TROUBLE FALLING OR STAYING ASLEEP: NOT AT ALL
10. IF YOU CHECKED OFF ANY PROBLEMS, HOW DIFFICULT HAVE THESE PROBLEMS MADE IT FOR YOU TO DO YOUR WORK, TAKE CARE OF THINGS AT HOME, OR GET ALONG WITH OTHER PEOPLE: NOT DIFFICULT AT ALL
8. MOVING OR SPEAKING SO SLOWLY THAT OTHER PEOPLE COULD HAVE NOTICED. OR THE OPPOSITE, BEING SO FIGETY OR RESTLESS THAT YOU HAVE BEEN MOVING AROUND A LOT MORE THAN USUAL: NOT AT ALL
5. POOR APPETITE OR OVEREATING: NOT AT ALL
SUM OF ALL RESPONSES TO PHQ QUESTIONS 1-9: 0
4. FEELING TIRED OR HAVING LITTLE ENERGY: NOT AT ALL
7. TROUBLE CONCENTRATING ON THINGS, SUCH AS READING THE NEWSPAPER OR WATCHING TELEVISION: NOT AT ALL
2. FEELING DOWN, DEPRESSED OR HOPELESS: NOT AT ALL
6. FEELING BAD ABOUT YOURSELF - OR THAT YOU ARE A FAILURE OR HAVE LET YOURSELF OR YOUR FAMILY DOWN: NOT AT ALL

## 2025-08-21 ENCOUNTER — LAB (OUTPATIENT)
Facility: CLINIC | Age: 60
End: 2025-08-21

## 2025-08-21 DIAGNOSIS — E78.2 HYPERLIPIDEMIA, MIXED: ICD-10-CM

## 2025-08-21 DIAGNOSIS — Z12.5 SCREENING FOR PROSTATE CANCER: ICD-10-CM

## 2025-08-21 DIAGNOSIS — I10 ESSENTIAL HYPERTENSION: ICD-10-CM

## 2025-08-21 DIAGNOSIS — R73.03 PREDIABETES: ICD-10-CM

## 2025-08-22 LAB
ALBUMIN SERPL-MCNC: 3.4 G/DL (ref 3.5–5.2)
ALBUMIN/GLOB SERPL: 1.2 (ref 1.1–2.2)
ALP SERPL-CCNC: 99 U/L (ref 40–129)
ALT SERPL-CCNC: 13 U/L (ref 10–50)
ANION GAP SERPL CALC-SCNC: 8 MMOL/L (ref 2–14)
AST SERPL-CCNC: 18 U/L (ref 10–50)
BASOPHILS # BLD: 0.09 K/UL (ref 0–0.1)
BASOPHILS NFR BLD: 1.1 % (ref 0–1)
BILIRUB SERPL-MCNC: 0.3 MG/DL (ref 0–1.2)
BUN SERPL-MCNC: 14 MG/DL (ref 8–23)
BUN/CREAT SERPL: 12 (ref 12–20)
CALCIUM SERPL-MCNC: 9.2 MG/DL (ref 8.8–10.2)
CHLORIDE SERPL-SCNC: 100 MMOL/L (ref 98–107)
CHOLEST SERPL-MCNC: 107 MG/DL (ref 0–200)
CO2 SERPL-SCNC: 28 MMOL/L (ref 20–29)
CREAT SERPL-MCNC: 1.14 MG/DL (ref 0.7–1.2)
DIFFERENTIAL METHOD BLD: ABNORMAL
EOSINOPHIL # BLD: 0.15 K/UL (ref 0–0.4)
EOSINOPHIL NFR BLD: 1.9 % (ref 0–7)
ERYTHROCYTE [DISTWIDTH] IN BLOOD BY AUTOMATED COUNT: 14.8 % (ref 11.5–14.5)
EST. AVERAGE GLUCOSE BLD GHB EST-MCNC: 123 MG/DL
GLOBULIN SER CALC-MCNC: 2.8 G/DL (ref 2–4)
GLUCOSE SERPL-MCNC: 88 MG/DL (ref 65–100)
HBA1C MFR BLD: 5.9 % (ref 4–5.6)
HCT VFR BLD AUTO: 48.2 % (ref 36.6–50.3)
HDLC SERPL-MCNC: 34 MG/DL (ref 40–60)
HDLC SERPL: 3.2 (ref 0–5)
HGB BLD-MCNC: 15.3 G/DL (ref 12.1–17)
IMM GRANULOCYTES # BLD AUTO: 0.03 K/UL (ref 0–0.04)
IMM GRANULOCYTES NFR BLD AUTO: 0.4 % (ref 0–0.5)
LDLC SERPL CALC-MCNC: 55 MG/DL (ref 0–100)
LYMPHOCYTES # BLD: 1.72 K/UL (ref 0.8–3.5)
LYMPHOCYTES NFR BLD: 21.6 % (ref 12–49)
MCH RBC QN AUTO: 29.8 PG (ref 26–34)
MCHC RBC AUTO-ENTMCNC: 31.7 G/DL (ref 30–36.5)
MCV RBC AUTO: 93.8 FL (ref 80–99)
MONOCYTES # BLD: 0.63 K/UL (ref 0–1)
MONOCYTES NFR BLD: 7.9 % (ref 5–13)
NEUTS SEG # BLD: 5.36 K/UL (ref 1.8–8)
NEUTS SEG NFR BLD: 67.1 % (ref 32–75)
NRBC # BLD: 0 K/UL (ref 0–0.01)
NRBC BLD-RTO: 0 PER 100 WBC
PLATELET # BLD AUTO: 242 K/UL (ref 150–400)
PMV BLD AUTO: 11 FL (ref 8.9–12.9)
POTASSIUM SERPL-SCNC: 4.8 MMOL/L (ref 3.5–5.1)
PROT SERPL-MCNC: 6.3 G/DL (ref 6.4–8.3)
PSA SERPL-MCNC: 0.6 NG/ML (ref 0–4.1)
RBC # BLD AUTO: 5.14 M/UL (ref 4.1–5.7)
SODIUM SERPL-SCNC: 137 MMOL/L (ref 136–145)
TRIGL SERPL-MCNC: 92 MG/DL (ref 0–150)
TSH, 3RD GENERATION: 1.15 UIU/ML (ref 0.27–4.2)
VLDLC SERPL CALC-MCNC: 18 MG/DL
WBC # BLD AUTO: 8 K/UL (ref 4.1–11.1)

## 2025-08-22 SDOH — HEALTH STABILITY: PHYSICAL HEALTH: ON AVERAGE, HOW MANY MINUTES DO YOU ENGAGE IN EXERCISE AT THIS LEVEL?: PATIENT DECLINED

## 2025-08-22 SDOH — HEALTH STABILITY: PHYSICAL HEALTH: ON AVERAGE, HOW MANY DAYS PER WEEK DO YOU ENGAGE IN MODERATE TO STRENUOUS EXERCISE (LIKE A BRISK WALK)?: 1 DAY

## 2025-08-22 ASSESSMENT — LIFESTYLE VARIABLES
HOW OFTEN DO YOU HAVE SIX OR MORE DRINKS ON ONE OCCASION: 1
HOW MANY STANDARD DRINKS CONTAINING ALCOHOL DO YOU HAVE ON A TYPICAL DAY: 0
HOW OFTEN DO YOU HAVE A DRINK CONTAINING ALCOHOL: 1

## 2025-08-26 RX ORDER — DULOXETIN HYDROCHLORIDE 60 MG/1
60 CAPSULE, DELAYED RELEASE ORAL DAILY
Qty: 90 CAPSULE | Refills: 3 | Status: SHIPPED | OUTPATIENT
Start: 2025-08-26

## 2025-08-28 ENCOUNTER — OFFICE VISIT (OUTPATIENT)
Facility: CLINIC | Age: 60
End: 2025-08-28

## 2025-08-28 VITALS
RESPIRATION RATE: 12 BRPM | TEMPERATURE: 97.7 F | SYSTOLIC BLOOD PRESSURE: 100 MMHG | HEART RATE: 74 BPM | WEIGHT: 193 LBS | OXYGEN SATURATION: 98 % | HEIGHT: 69 IN | BODY MASS INDEX: 28.58 KG/M2 | DIASTOLIC BLOOD PRESSURE: 60 MMHG

## 2025-08-28 DIAGNOSIS — I10 ESSENTIAL HYPERTENSION: ICD-10-CM

## 2025-08-28 DIAGNOSIS — E78.2 HYPERLIPIDEMIA, MIXED: ICD-10-CM

## 2025-08-28 DIAGNOSIS — R73.03 PREDIABETES: ICD-10-CM

## 2025-08-28 DIAGNOSIS — Z00.00 MEDICARE ANNUAL WELLNESS VISIT, SUBSEQUENT: Primary | ICD-10-CM

## 2025-08-28 DIAGNOSIS — F32.4 MAJOR DEPRESSIVE DISORDER WITH SINGLE EPISODE, IN PARTIAL REMISSION: ICD-10-CM

## 2025-08-28 DIAGNOSIS — R41.3 MEMORY LOSS, SHORT TERM: ICD-10-CM

## 2025-08-28 SDOH — ECONOMIC STABILITY: FOOD INSECURITY: WITHIN THE PAST 12 MONTHS, YOU WORRIED THAT YOUR FOOD WOULD RUN OUT BEFORE YOU GOT MONEY TO BUY MORE.: NEVER TRUE

## 2025-08-28 SDOH — ECONOMIC STABILITY: FOOD INSECURITY: WITHIN THE PAST 12 MONTHS, THE FOOD YOU BOUGHT JUST DIDN'T LAST AND YOU DIDN'T HAVE MONEY TO GET MORE.: NEVER TRUE

## 2025-08-28 ASSESSMENT — PATIENT HEALTH QUESTIONNAIRE - PHQ9
SUM OF ALL RESPONSES TO PHQ QUESTIONS 1-9: 6
SUM OF ALL RESPONSES TO PHQ QUESTIONS 1-9: 6
3. TROUBLE FALLING OR STAYING ASLEEP: NOT AT ALL
5. POOR APPETITE OR OVEREATING: NOT AT ALL
6. FEELING BAD ABOUT YOURSELF - OR THAT YOU ARE A FAILURE OR HAVE LET YOURSELF OR YOUR FAMILY DOWN: NOT AT ALL
8. MOVING OR SPEAKING SO SLOWLY THAT OTHER PEOPLE COULD HAVE NOTICED. OR THE OPPOSITE, BEING SO FIGETY OR RESTLESS THAT YOU HAVE BEEN MOVING AROUND A LOT MORE THAN USUAL: NOT AT ALL
10. IF YOU CHECKED OFF ANY PROBLEMS, HOW DIFFICULT HAVE THESE PROBLEMS MADE IT FOR YOU TO DO YOUR WORK, TAKE CARE OF THINGS AT HOME, OR GET ALONG WITH OTHER PEOPLE: NOT DIFFICULT AT ALL
4. FEELING TIRED OR HAVING LITTLE ENERGY: NEARLY EVERY DAY
SUM OF ALL RESPONSES TO PHQ QUESTIONS 1-9: 6
1. LITTLE INTEREST OR PLEASURE IN DOING THINGS: NEARLY EVERY DAY
SUM OF ALL RESPONSES TO PHQ QUESTIONS 1-9: 6
9. THOUGHTS THAT YOU WOULD BE BETTER OFF DEAD, OR OF HURTING YOURSELF: NOT AT ALL
7. TROUBLE CONCENTRATING ON THINGS, SUCH AS READING THE NEWSPAPER OR WATCHING TELEVISION: NOT AT ALL
2. FEELING DOWN, DEPRESSED OR HOPELESS: NOT AT ALL

## 2025-08-28 ASSESSMENT — LIFESTYLE VARIABLES
HOW MANY STANDARD DRINKS CONTAINING ALCOHOL DO YOU HAVE ON A TYPICAL DAY: PATIENT DOES NOT DRINK
HOW OFTEN DO YOU HAVE A DRINK CONTAINING ALCOHOL: NEVER

## (undated) DEVICE — FORCEPS BX L240CM JAW DIA2.4MM ORNG L CAP W/ NDL DISP RAD

## (undated) DEVICE — SUTURE VCRL SZ 3-0 L27IN ABSRB UD L26MM SH 1/2 CIR J416H

## (undated) DEVICE — PAD,ABDOMINAL,5"X9",ST,LF,25/BX: Brand: MEDLINE INDUSTRIES, INC.

## (undated) DEVICE — SUTURE STRATAFIX SYMMETRIC SZ 1 L18IN ABSRB VLT CT1 L36CM SXPP1A404

## (undated) DEVICE — TIP SUCT TRNSPAR RIB SURF STD BLB RIG NVENT W/ 5IN1 CONN DYND50138] MEDLINE INDUSTRIES INC]

## (undated) DEVICE — RELOAD STPL L60MM H1.5-3.6MM REG TISS BLU GRIPPING SURF B

## (undated) DEVICE — CATHETER IV 18GA L1.16IN OD1.27-1.3462MM ID0.9398-1.016MM

## (undated) DEVICE — SPONGE GZ W4XL4IN COT 12 PLY TYP VII WVN C FLD DSGN STERILE

## (undated) DEVICE — SYSTEM REPROC CBL 3 LD DISPOSABLE

## (undated) DEVICE — SNARE ENDOSCP POLYP MED STD AD 2.4X27X240 CM 2.8 MM OVL SENS

## (undated) DEVICE — TOTAL TRAY, 16FR 10ML SIL FOLEY, URN: Brand: MEDLINE

## (undated) DEVICE — TRAP ENDOSCP POLYP 2 CHMBR DRAWER TYP

## (undated) DEVICE — CATHETER IV 20GA L1.16IN OD1.0414-1.1176MM ID0.762-0.8382MM

## (undated) DEVICE — ELECTRODE PT RET AD L9FT HI MOIST COND ADH HYDRGEL CORDED

## (undated) DEVICE — CATHETER IV 24GA L0.75IN OD0.6604-0.7366MM

## (undated) DEVICE — DRAPE THERMABASIN INTRATEMP --

## (undated) DEVICE — CATHETER IV 22GA L1IN OD0.8382-0.9144MM ID0.6096-0.6858MM 382523

## (undated) DEVICE — SUTURE ETHLN SZ 2-0 L18IN NONABSORBABLE BLK L19MM PS-2 PRIM 593H

## (undated) DEVICE — ENDOSCOPIC KIT COMPLIANCE ENDOKIT

## (undated) DEVICE — SUTURE PERMAHAND SZ 2-0 L30IN NONABSORBABLE BLK SILK W/O A305H

## (undated) DEVICE — DRAIN SURG 19FR L025IN DIA63MM SIL CHN RND FULL FLUT CLS

## (undated) DEVICE — DRAIN WOUND RES BULB 100ML --

## (undated) DEVICE — LARGE, DISPOSABLE ALEXIS O C-SECTION PROTECTOR - RETRACTOR: Brand: ALEXIS ® O C-SECTION PROTECTOR - RETRACTOR

## (undated) DEVICE — GLOVE ORTHO 7 1/2   MSG9475

## (undated) DEVICE — CUFF BLD PRSS AD CLTH SGL TB W/ BAYNT CONN ROUNDED CORNER

## (undated) DEVICE — BLADE ES L6IN ELASTOMERIC COAT EXT DURABLE BEND UPTO 90DEG

## (undated) DEVICE — CANISTER, RIGID, 3000CC: Brand: MEDLINE INDUSTRIES, INC.

## (undated) DEVICE — CURVED, LARGE JAW, OPEN SEALER/DIVIDER NANO-COATED: Brand: LIGASURE IMPACT

## (undated) DEVICE — SOLUTION IRRIG 1000ML 0.9% SOD CHL USP POUR PLAS BTL

## (undated) DEVICE — STAPLER SKN FIX HD COUNT STRL -- SUB STAPLER35WA  6/CA $58.16

## (undated) DEVICE — IV START KIT: Brand: MEDLINE

## (undated) DEVICE — CONTAINER SPEC ANAERB VACTNR

## (undated) DEVICE — SUTURE PERMAHAND SZ 3-0 L18IN NONABSORBABLE BLK L26MM SH C013D

## (undated) DEVICE — SUTURE PERMAHAND SZ 3-0 L30IN NONABSORBABLE BLK SILK BRAID A304H

## (undated) DEVICE — CONTAINER SPEC 20 ML LID NEUT BUFF FORMALIN 10 % POLYPR STS

## (undated) DEVICE — 3M™ MEDIPORE™ H SOFT CLOTH TAPE SHORT ROLL TAPE 6INCHES X 2 YARDS 16 ROLLS/CASE 2866S: Brand: 3M™ MEDIPORE™

## (undated) DEVICE — 20 DEG ANGLED DRILL MODULE: Brand: MICROAIRE®

## (undated) DEVICE — SET GRAV CK VLV NEEDLESS ST 3 GANGED 4WAY STPCOCK HI FLO 10

## (undated) DEVICE — GLOVE ORANGE PI 7 1/2   MSG9075

## (undated) DEVICE — STAPLER INT DIA29MM CLS STPL H1.5-2.2MM OPN LEG L5.2MM 26

## (undated) DEVICE — 450 ML BOTTLE OF 0.05% CHLORHEXIDINE GLUCONATE IN 99.95% STERILE WATER FOR IRRIGATION, USP AND APPLICATOR.: Brand: IRRISEPT ANTIMICROBIAL WOUND LAVAGE

## (undated) DEVICE — YANKAUER,POOLE TIP,STERILE,50/CS: Brand: MEDLINE

## (undated) DEVICE — SPONGE LAPAROTOMY W18XL18IN WHITE STRUNG RADIOPAQUE STERILE

## (undated) DEVICE — MAJOR LAPAROTOMY-MRMC: Brand: MEDLINE INDUSTRIES, INC.